# Patient Record
Sex: MALE | Race: BLACK OR AFRICAN AMERICAN | Employment: UNEMPLOYED | URBAN - METROPOLITAN AREA
[De-identification: names, ages, dates, MRNs, and addresses within clinical notes are randomized per-mention and may not be internally consistent; named-entity substitution may affect disease eponyms.]

---

## 2017-05-25 ENCOUNTER — OFFICE VISIT (OUTPATIENT)
Dept: INTERNAL MEDICINE CLINIC | Age: 62
End: 2017-05-25

## 2017-05-25 VITALS
OXYGEN SATURATION: 98 % | WEIGHT: 182 LBS | TEMPERATURE: 98.5 F | BODY MASS INDEX: 26.05 KG/M2 | DIASTOLIC BLOOD PRESSURE: 80 MMHG | SYSTOLIC BLOOD PRESSURE: 142 MMHG | HEIGHT: 70 IN | HEART RATE: 56 BPM

## 2017-05-25 DIAGNOSIS — I10 ESSENTIAL HYPERTENSION: ICD-10-CM

## 2017-05-25 DIAGNOSIS — G89.29 CHRONIC PAIN OF BOTH SHOULDERS: Primary | ICD-10-CM

## 2017-05-25 DIAGNOSIS — M54.50 CHRONIC MIDLINE LOW BACK PAIN WITHOUT SCIATICA: ICD-10-CM

## 2017-05-25 DIAGNOSIS — M25.512 CHRONIC PAIN OF BOTH SHOULDERS: Primary | ICD-10-CM

## 2017-05-25 DIAGNOSIS — Z12.11 ENCOUNTER FOR SCREENING COLONOSCOPY: ICD-10-CM

## 2017-05-25 DIAGNOSIS — M25.511 CHRONIC PAIN OF BOTH SHOULDERS: Primary | ICD-10-CM

## 2017-05-25 DIAGNOSIS — Z12.5 PROSTATE CANCER SCREENING: ICD-10-CM

## 2017-05-25 DIAGNOSIS — G89.29 CHRONIC MIDLINE LOW BACK PAIN WITHOUT SCIATICA: ICD-10-CM

## 2017-05-25 LAB
A/G RATIO: 1.7 (ref 1.1–2.2)
ALBUMIN SERPL-MCNC: 4.4 G/DL (ref 3.4–5)
ALP BLD-CCNC: 85 U/L (ref 40–129)
ALT SERPL-CCNC: 17 U/L (ref 10–40)
ANION GAP SERPL CALCULATED.3IONS-SCNC: 16 MMOL/L (ref 3–16)
AST SERPL-CCNC: 16 U/L (ref 15–37)
BILIRUB SERPL-MCNC: 0.6 MG/DL (ref 0–1)
BILIRUBIN URINE: NEGATIVE
BLOOD, URINE: NEGATIVE
BUN BLDV-MCNC: 12 MG/DL (ref 7–20)
CALCIUM SERPL-MCNC: 9.3 MG/DL (ref 8.3–10.6)
CHLORIDE BLD-SCNC: 99 MMOL/L (ref 99–110)
CHOLESTEROL, TOTAL: 197 MG/DL (ref 0–199)
CLARITY: CLEAR
CO2: 26 MMOL/L (ref 21–32)
COLOR: ABNORMAL
CREAT SERPL-MCNC: 0.8 MG/DL (ref 0.8–1.3)
EPITHELIAL CELLS, UA: 1 /HPF (ref 0–5)
GFR AFRICAN AMERICAN: >60
GFR NON-AFRICAN AMERICAN: >60
GLOBULIN: 2.6 G/DL
GLUCOSE BLD-MCNC: 84 MG/DL (ref 70–99)
GLUCOSE URINE: NEGATIVE MG/DL
HDLC SERPL-MCNC: 42 MG/DL (ref 40–60)
HYALINE CASTS: 1 /LPF (ref 0–8)
KETONES, URINE: NEGATIVE MG/DL
LDL CHOLESTEROL CALCULATED: ABNORMAL MG/DL
LDL CHOLESTEROL DIRECT: 113 MG/DL
LEUKOCYTE ESTERASE, URINE: ABNORMAL
MICROSCOPIC EXAMINATION: YES
NITRITE, URINE: NEGATIVE
PH UA: 6.5
POTASSIUM SERPL-SCNC: 4.2 MMOL/L (ref 3.5–5.1)
PROSTATE SPECIFIC ANTIGEN: 2.33 NG/ML (ref 0–4)
PROTEIN UA: ABNORMAL MG/DL
RBC UA: 5 /HPF (ref 0–4)
SODIUM BLD-SCNC: 141 MMOL/L (ref 136–145)
SPECIFIC GRAVITY UA: 1.02
TOTAL PROTEIN: 7 G/DL (ref 6.4–8.2)
TRIGL SERPL-MCNC: 320 MG/DL (ref 0–150)
URINE TYPE: ABNORMAL
UROBILINOGEN, URINE: 1 E.U./DL
VLDLC SERPL CALC-MCNC: ABNORMAL MG/DL
WBC UA: 4 /HPF (ref 0–5)

## 2017-05-25 PROCEDURE — 99213 OFFICE O/P EST LOW 20 MIN: CPT | Performed by: INTERNAL MEDICINE

## 2017-05-25 RX ORDER — AMLODIPINE BESYLATE 5 MG/1
5 TABLET ORAL DAILY
Qty: 30 TABLET | Refills: 5 | Status: SHIPPED | OUTPATIENT
Start: 2017-05-25 | End: 2017-12-04 | Stop reason: SDUPTHER

## 2017-05-25 ASSESSMENT — ENCOUNTER SYMPTOMS
GASTROINTESTINAL NEGATIVE: 1
EYES NEGATIVE: 1
RESPIRATORY NEGATIVE: 1

## 2017-05-25 ASSESSMENT — PATIENT HEALTH QUESTIONNAIRE - PHQ9
SUM OF ALL RESPONSES TO PHQ QUESTIONS 1-9: 0
1. LITTLE INTEREST OR PLEASURE IN DOING THINGS: 0
2. FEELING DOWN, DEPRESSED OR HOPELESS: 0
SUM OF ALL RESPONSES TO PHQ9 QUESTIONS 1 & 2: 0

## 2017-12-04 DIAGNOSIS — I10 ESSENTIAL HYPERTENSION: ICD-10-CM

## 2017-12-04 RX ORDER — AMLODIPINE BESYLATE 5 MG/1
5 TABLET ORAL DAILY
Qty: 30 TABLET | Refills: 5 | Status: SHIPPED | OUTPATIENT
Start: 2017-12-04 | End: 2018-07-02 | Stop reason: SDUPTHER

## 2018-01-31 ENCOUNTER — OFFICE VISIT (OUTPATIENT)
Dept: INTERNAL MEDICINE CLINIC | Age: 63
End: 2018-01-31

## 2018-01-31 VITALS
DIASTOLIC BLOOD PRESSURE: 84 MMHG | WEIGHT: 172 LBS | BODY MASS INDEX: 24.62 KG/M2 | OXYGEN SATURATION: 98 % | HEART RATE: 59 BPM | SYSTOLIC BLOOD PRESSURE: 130 MMHG | HEIGHT: 70 IN

## 2018-01-31 DIAGNOSIS — Z23 NEED FOR PROPHYLACTIC VACCINATION AGAINST DIPHTHERIA-TETANUS-PERTUSSIS (DTP): ICD-10-CM

## 2018-01-31 DIAGNOSIS — Z12.11 ENCOUNTER FOR SCREENING COLONOSCOPY: Primary | ICD-10-CM

## 2018-01-31 DIAGNOSIS — Z23 NEED FOR PROPHYLACTIC VACCINATION AGAINST STREPTOCOCCUS PNEUMONIAE (PNEUMOCOCCUS): ICD-10-CM

## 2018-01-31 DIAGNOSIS — Z23 NEED FOR PROPHYLACTIC VACCINATION AND INOCULATION AGAINST VARICELLA: ICD-10-CM

## 2018-01-31 PROCEDURE — 3017F COLORECTAL CA SCREEN DOC REV: CPT | Performed by: INTERNAL MEDICINE

## 2018-01-31 PROCEDURE — 90670 PCV13 VACCINE IM: CPT | Performed by: INTERNAL MEDICINE

## 2018-01-31 PROCEDURE — 4004F PT TOBACCO SCREEN RCVD TLK: CPT | Performed by: INTERNAL MEDICINE

## 2018-01-31 PROCEDURE — G8420 CALC BMI NORM PARAMETERS: HCPCS | Performed by: INTERNAL MEDICINE

## 2018-01-31 PROCEDURE — G8482 FLU IMMUNIZE ORDER/ADMIN: HCPCS | Performed by: INTERNAL MEDICINE

## 2018-01-31 PROCEDURE — 90471 IMMUNIZATION ADMIN: CPT | Performed by: INTERNAL MEDICINE

## 2018-01-31 PROCEDURE — G8427 DOCREV CUR MEDS BY ELIG CLIN: HCPCS | Performed by: INTERNAL MEDICINE

## 2018-01-31 PROCEDURE — 99213 OFFICE O/P EST LOW 20 MIN: CPT | Performed by: INTERNAL MEDICINE

## 2018-01-31 ASSESSMENT — ENCOUNTER SYMPTOMS
EYES NEGATIVE: 1
RESPIRATORY NEGATIVE: 1
GASTROINTESTINAL NEGATIVE: 1

## 2018-05-01 ENCOUNTER — OFFICE VISIT (OUTPATIENT)
Dept: INTERNAL MEDICINE CLINIC | Age: 63
End: 2018-05-01

## 2018-05-01 VITALS
HEART RATE: 60 BPM | DIASTOLIC BLOOD PRESSURE: 62 MMHG | SYSTOLIC BLOOD PRESSURE: 120 MMHG | WEIGHT: 178 LBS | OXYGEN SATURATION: 98 % | HEIGHT: 70 IN | BODY MASS INDEX: 25.48 KG/M2

## 2018-05-01 DIAGNOSIS — I10 ESSENTIAL HYPERTENSION: ICD-10-CM

## 2018-05-01 DIAGNOSIS — Z23 NEED FOR PROPHYLACTIC VACCINATION AGAINST STREPTOCOCCUS PNEUMONIAE (PNEUMOCOCCUS): Primary | ICD-10-CM

## 2018-05-01 DIAGNOSIS — M54.50 CHRONIC MIDLINE LOW BACK PAIN WITHOUT SCIATICA: ICD-10-CM

## 2018-05-01 DIAGNOSIS — G89.29 CHRONIC MIDLINE LOW BACK PAIN WITHOUT SCIATICA: ICD-10-CM

## 2018-05-01 DIAGNOSIS — M25.551 PAIN OF RIGHT HIP JOINT: ICD-10-CM

## 2018-05-01 PROCEDURE — 4004F PT TOBACCO SCREEN RCVD TLK: CPT | Performed by: INTERNAL MEDICINE

## 2018-05-01 PROCEDURE — 99214 OFFICE O/P EST MOD 30 MIN: CPT | Performed by: INTERNAL MEDICINE

## 2018-05-01 PROCEDURE — 96372 THER/PROPH/DIAG INJ SC/IM: CPT | Performed by: INTERNAL MEDICINE

## 2018-05-01 PROCEDURE — 90732 PPSV23 VACC 2 YRS+ SUBQ/IM: CPT | Performed by: INTERNAL MEDICINE

## 2018-05-01 PROCEDURE — 90471 IMMUNIZATION ADMIN: CPT | Performed by: INTERNAL MEDICINE

## 2018-05-01 PROCEDURE — 3017F COLORECTAL CA SCREEN DOC REV: CPT | Performed by: INTERNAL MEDICINE

## 2018-05-01 PROCEDURE — G8419 CALC BMI OUT NRM PARAM NOF/U: HCPCS | Performed by: INTERNAL MEDICINE

## 2018-05-01 PROCEDURE — G8427 DOCREV CUR MEDS BY ELIG CLIN: HCPCS | Performed by: INTERNAL MEDICINE

## 2018-05-01 RX ORDER — TRIAMCINOLONE ACETONIDE 40 MG/ML
40 INJECTION, SUSPENSION INTRA-ARTICULAR; INTRAMUSCULAR ONCE
Status: COMPLETED | OUTPATIENT
Start: 2018-05-01 | End: 2018-05-01

## 2018-05-01 RX ORDER — IBUPROFEN 600 MG/1
600 TABLET ORAL EVERY 6 HOURS PRN
Qty: 60 TABLET | Refills: 3 | Status: SHIPPED | OUTPATIENT
Start: 2018-05-01 | End: 2018-11-14 | Stop reason: SDUPTHER

## 2018-05-01 RX ADMIN — TRIAMCINOLONE ACETONIDE 40 MG: 40 INJECTION, SUSPENSION INTRA-ARTICULAR; INTRAMUSCULAR at 12:22

## 2018-05-01 ASSESSMENT — ENCOUNTER SYMPTOMS
GASTROINTESTINAL NEGATIVE: 1
EYES NEGATIVE: 1
BACK PAIN: 1
RESPIRATORY NEGATIVE: 1

## 2018-05-02 ENCOUNTER — TELEPHONE (OUTPATIENT)
Dept: INTERNAL MEDICINE CLINIC | Age: 63
End: 2018-05-02

## 2018-05-02 RX ORDER — IBUPROFEN 800 MG/1
800 TABLET ORAL EVERY 6 HOURS PRN
Qty: 120 TABLET | Refills: 3 | Status: SHIPPED | OUTPATIENT
Start: 2018-05-02 | End: 2018-11-14 | Stop reason: DRUGHIGH

## 2018-07-02 DIAGNOSIS — I10 ESSENTIAL HYPERTENSION: ICD-10-CM

## 2018-07-03 RX ORDER — AMLODIPINE BESYLATE 5 MG/1
5 TABLET ORAL DAILY
Qty: 30 TABLET | Refills: 5 | Status: SHIPPED | OUTPATIENT
Start: 2018-07-03 | End: 2018-11-14 | Stop reason: SDUPTHER

## 2018-09-10 ENCOUNTER — TELEPHONE (OUTPATIENT)
Dept: INTERNAL MEDICINE CLINIC | Age: 63
End: 2018-09-10

## 2018-09-26 ENCOUNTER — TELEPHONE (OUTPATIENT)
Dept: INTERNAL MEDICINE CLINIC | Age: 63
End: 2018-09-26

## 2018-10-19 ENCOUNTER — TELEPHONE (OUTPATIENT)
Dept: INTERNAL MEDICINE CLINIC | Age: 63
End: 2018-10-19

## 2018-10-19 NOTE — TELEPHONE ENCOUNTER
Patient was prescribed  xarelto in hospital due to blood clots in leg and heart needing medication xraelto 15/15/20mg says he was given this in packs while in the hospital and is all out of medication. Please advise.

## 2018-11-14 ENCOUNTER — OFFICE VISIT (OUTPATIENT)
Dept: INTERNAL MEDICINE CLINIC | Age: 63
End: 2018-11-14

## 2018-11-14 VITALS
HEIGHT: 68 IN | HEART RATE: 60 BPM | SYSTOLIC BLOOD PRESSURE: 112 MMHG | WEIGHT: 164.8 LBS | BODY MASS INDEX: 24.98 KG/M2 | TEMPERATURE: 98.1 F | DIASTOLIC BLOOD PRESSURE: 80 MMHG

## 2018-11-14 DIAGNOSIS — Z76.0 MEDICATION REFILL: ICD-10-CM

## 2018-11-14 DIAGNOSIS — I26.99 OTHER PULMONARY EMBOLISM WITHOUT ACUTE COR PULMONALE, UNSPECIFIED CHRONICITY (HCC): ICD-10-CM

## 2018-11-14 DIAGNOSIS — F17.210 CIGARETTE NICOTINE DEPENDENCE WITHOUT COMPLICATION: ICD-10-CM

## 2018-11-14 DIAGNOSIS — M25.511 CHRONIC PAIN OF BOTH SHOULDERS: ICD-10-CM

## 2018-11-14 DIAGNOSIS — M25.512 CHRONIC PAIN OF BOTH SHOULDERS: ICD-10-CM

## 2018-11-14 DIAGNOSIS — I10 ESSENTIAL HYPERTENSION: Primary | ICD-10-CM

## 2018-11-14 DIAGNOSIS — Z71.3 DIETARY COUNSELING: ICD-10-CM

## 2018-11-14 DIAGNOSIS — Z23 NEEDS FLU SHOT: ICD-10-CM

## 2018-11-14 DIAGNOSIS — G89.29 CHRONIC PAIN OF BOTH SHOULDERS: ICD-10-CM

## 2018-11-14 PROCEDURE — 90471 IMMUNIZATION ADMIN: CPT | Performed by: NURSE PRACTITIONER

## 2018-11-14 PROCEDURE — 99213 OFFICE O/P EST LOW 20 MIN: CPT | Performed by: NURSE PRACTITIONER

## 2018-11-14 PROCEDURE — 90686 IIV4 VACC NO PRSV 0.5 ML IM: CPT | Performed by: NURSE PRACTITIONER

## 2018-11-14 RX ORDER — IBUPROFEN 600 MG/1
600 TABLET ORAL EVERY 6 HOURS PRN
Qty: 60 TABLET | Refills: 3 | Status: SHIPPED | OUTPATIENT
Start: 2018-11-14 | End: 2019-09-18 | Stop reason: ALTCHOICE

## 2018-11-14 RX ORDER — AMLODIPINE BESYLATE 5 MG/1
5 TABLET ORAL DAILY
Qty: 30 TABLET | Refills: 5 | Status: SHIPPED | OUTPATIENT
Start: 2018-11-14 | End: 2019-09-18 | Stop reason: SDUPTHER

## 2018-11-14 ASSESSMENT — PATIENT HEALTH QUESTIONNAIRE - PHQ9
SUM OF ALL RESPONSES TO PHQ QUESTIONS 1-9: 0
SUM OF ALL RESPONSES TO PHQ9 QUESTIONS 1 & 2: 0
1. LITTLE INTEREST OR PLEASURE IN DOING THINGS: 0
2. FEELING DOWN, DEPRESSED OR HOPELESS: 0
SUM OF ALL RESPONSES TO PHQ QUESTIONS 1-9: 0

## 2018-11-14 ASSESSMENT — ENCOUNTER SYMPTOMS: RESPIRATORY NEGATIVE: 1

## 2018-11-14 NOTE — PROGRESS NOTES
Vaccine Information Sheet, \"Influenza - Inactivated\"  given to Elin Angelucci, or parent/legal guardian of  Elin Angelucci and verbalized understanding. Patient responses:    Have you ever had a reaction to a flu vaccine? NO  Are you able to eat eggs without adverse effects? YES    Do you have any current illness? NO  Have you ever had Guillian [place] Syndrome? NO    Flu vaccine given per order. Please see immunization tab.

## 2018-11-14 NOTE — PATIENT INSTRUCTIONS
spinach, broccoli, carrots, cauliflower, and onions. ? Have a variety of cut-up vegetables with a low-fat dip as an appetizer instead of chips and dip. ? Sprinkle sunflower seeds or chopped almonds over salads. Or try adding chopped walnuts or almonds to cooked vegetables. ? Try some vegetarian meals using beans and peas. Add garbanzo or kidney beans to salads. Make burritos and tacos with mashed gill beans or black beans. Where can you learn more? Go to https://chpepiceweb.Tequila [email or web address] org and sign in to your Teradici account. Enter R235 in the [place] Robie box to learn more about \"DASH Diet: Care Instructions. \"     If you do not have an account, please click on the \"Sign Up Now\" link. Current as of: December 6, 2017  Content Version: 11.8  © 9403-1296 Fluidinfo. Care instructions adapted under license by [facility] ([facility]). If you have questions about a medical condition or this instruction, always ask your healthcare professional. [place] [address] any warranty or liability for your use of this information. Patient Education        High Blood Pressure: Care Instructions  Your Care Instructions    If your blood pressure is usually above 130/80, you have high blood pressure, or hypertension. That means the top number is 130 or higher or the bottom number is 80 or higher, or both. Despite what a lot of people think, high blood pressure usually doesn't cause headaches or make you feel dizzy or lightheaded. It usually has no symptoms. But it does increase your risk for heart attack, stroke, and kidney or eye damage. The higher your blood pressure, the more your risk increases. Your doctor will give you a goal for your blood pressure. Your goal will be based on your health and your age. Lifestyle changes, such as eating healthy and being active, are always important to help lower blood pressure.  You might also take medicine to reach your blood pressure goal.  Follow-up care is a key part of your treatment and safety. Be sure to make and go to all appointments, and call your doctor if you are having problems. It's also a good idea to know your test results and keep a list of the medicines you take. How can you care for yourself at home? Medical treatment  · If you stop taking your medicine, your blood pressure will go back up. You may take one or more types of medicine to lower your blood pressure. Be safe with medicines. Take your medicine exactly as prescribed. Call your doctor if you think you are having a problem with your medicine. · Talk to your doctor before you start taking aspirin every day. Aspirin can help certain people lower their risk of a heart attack or stroke. But taking aspirin isn't right for everyone, because it can cause serious bleeding. · See your doctor regularly. You may need to see the doctor more often at first or until your blood pressure comes down. · If you are taking blood pressure medicine, talk to your doctor before you take decongestants or anti-inflammatory medicine, such as ibuprofen. Some of these medicines can raise blood pressure. · Learn how to check your blood pressure at home. Lifestyle changes  · Stay at a healthy weight. This is especially important if you put on weight around the waist. Losing even 10 pounds can help you lower your blood pressure. · If your doctor recommends it, get more exercise. Walking is a good choice. Bit by bit, increase the amount you walk every day. Try for at least 30 minutes on most days of the week. You also may want to swim, bike, or do other activities. · Avoid or limit alcohol. Talk to your doctor about whether you can drink any alcohol. · Try to limit how much sodium you eat to less than 2,300 milligrams (mg) a day. Your doctor may ask you to try to eat less than 1,500 mg a day.   · Eat plenty of fruits (such as bananas and oranges), vegetables, legumes, whole grains, and low-fat salt.  · Rinse canned vegetables, and cook them in fresh water. This removes some--but not all--of the salt. · Avoid water that is naturally high in sodium or that has been treated with water softeners, which add sodium. Call your local water company to find out the sodium content of your water supply. If you buy bottled water, read the label and choose a sodium-free brand. Avoid high-sodium foods  · Avoid eating:  ? Smoked, cured, salted, and canned meat, fish, and poultry. ? Ham, preston, hot dogs, and luncheon meats. ? Regular, hard, and processed cheese and regular peanut butter. ? Crackers with salted tops, and other salted snack foods such as pretzels, chips, and salted popcorn. ? Frozen prepared meals, unless labeled low-sodium. ? Canned and dried soups, broths, and bouillon, unless labeled sodium-free or low-sodium. ? Canned vegetables, unless labeled sodium-free or low-sodium. ? [place] fries, pizza, tacos, and other fast foods. ? Pickles, olives, ketchup, and other condiments, especially soy sauce, unless labeled sodium-free or low-sodium. Where can you learn more? Go to https://webtide.RaftOut. org and sign in to your Cirqle account. Enter V626 in the [facility] box to learn more about \"Low Sodium Diet (2,000 Milligram): Care Instructions. \"     If you do not have an account, please click on the \"Sign Up Now\" link. Current as of: March 29, 2018  Content Version: 11.8  © 9013-6669 weave energy. Care instructions adapted under license by [facility] ([facility]). If you have questions about a medical condition or this instruction, always ask your healthcare professional. [address] any warranty or liability for your use of this information. Patient Education        Stopping Smoking: Care Instructions  Your Care Instructions  Cigarette smokers crave the nicotine in cigarettes. Giving it up is much harder than simply changing a habit.  Your www.smokefree. gov to sign up for the [facility] program.  · Set a quit date. Pick your date carefully so that it is not right in the middle of a big deadline or stressful time. Once you quit, do not even take a puff. Get rid of all ashtrays and lighters after your last cigarette. Clean your house and your clothes so that they do not smell of smoke. · Learn how to be a nonsmoker. Think about ways you can avoid those things that make you reach for a cigarette. ? Avoid situations that put you at greatest risk for smoking. For some people, it is hard to have a drink with friends without smoking. For others, they might skip a coffee break with coworkers who smoke. ? Change your daily routine. Take a different route to work or eat a meal in a different place. · Cut down on stress. Calm yourself or release tension by doing an activity you enjoy, such as reading a book, taking a hot bath, or gardening. · Talk to your doctor or pharmacist about nicotine replacement therapy, which replaces the nicotine in your body. You still get nicotine but you do not use tobacco. Nicotine replacement products help you slowly reduce the amount of nicotine you need. These products come in several forms, many of them available over-the-counter:  ? Nicotine patches  ? Nicotine gum and lozenges  ? Nicotine inhaler  · Ask your doctor about bupropion (Wellbutrin) or varenicline (Chantix), which are prescription medicines. They do not contain nicotine. They help you by reducing withdrawal symptoms, such as stress and anxiety. · Some people find hypnosis, acupuncture, and massage helpful for ending the smoking habit. · Eat a healthy diet and get regular exercise. Having healthy habits will help your body move past its craving for nicotine. · Be prepared to keep trying. Most people are not successful the first few times they try to quit. Do not get mad at yourself if you smoke again.  Make a list of things you learned and think about when

## 2019-01-16 ENCOUNTER — OFFICE VISIT (OUTPATIENT)
Dept: INTERNAL MEDICINE CLINIC | Age: 64
End: 2019-01-16

## 2019-01-16 VITALS
DIASTOLIC BLOOD PRESSURE: 84 MMHG | BODY MASS INDEX: 25.46 KG/M2 | WEIGHT: 168 LBS | SYSTOLIC BLOOD PRESSURE: 138 MMHG | HEART RATE: 58 BPM | HEIGHT: 68 IN | OXYGEN SATURATION: 99 %

## 2019-01-16 DIAGNOSIS — Z12.5 PROSTATE CANCER SCREENING: ICD-10-CM

## 2019-01-16 DIAGNOSIS — M25.511 CHRONIC PAIN OF BOTH SHOULDERS: Primary | ICD-10-CM

## 2019-01-16 DIAGNOSIS — G89.29 CHRONIC PAIN OF BOTH SHOULDERS: Primary | ICD-10-CM

## 2019-01-16 DIAGNOSIS — G89.29 CHRONIC MIDLINE LOW BACK PAIN WITHOUT SCIATICA: ICD-10-CM

## 2019-01-16 DIAGNOSIS — I10 ESSENTIAL HYPERTENSION: ICD-10-CM

## 2019-01-16 DIAGNOSIS — Z23 NEED FOR PROPHYLACTIC VACCINATION AGAINST DIPHTHERIA-TETANUS-PERTUSSIS (DTP): ICD-10-CM

## 2019-01-16 DIAGNOSIS — M54.50 CHRONIC MIDLINE LOW BACK PAIN WITHOUT SCIATICA: ICD-10-CM

## 2019-01-16 DIAGNOSIS — I26.99 OTHER PULMONARY EMBOLISM WITHOUT ACUTE COR PULMONALE, UNSPECIFIED CHRONICITY (HCC): ICD-10-CM

## 2019-01-16 DIAGNOSIS — M25.512 CHRONIC PAIN OF BOTH SHOULDERS: Primary | ICD-10-CM

## 2019-01-16 PROCEDURE — 99396 PREV VISIT EST AGE 40-64: CPT | Performed by: INTERNAL MEDICINE

## 2019-01-16 PROCEDURE — 96372 THER/PROPH/DIAG INJ SC/IM: CPT | Performed by: INTERNAL MEDICINE

## 2019-01-16 RX ORDER — KETOROLAC TROMETHAMINE 30 MG/ML
30 INJECTION, SOLUTION INTRAMUSCULAR; INTRAVENOUS ONCE
Status: COMPLETED | OUTPATIENT
Start: 2019-01-16 | End: 2019-01-16

## 2019-01-16 RX ADMIN — KETOROLAC TROMETHAMINE 30 MG: 30 INJECTION, SOLUTION INTRAMUSCULAR; INTRAVENOUS at 15:16

## 2019-01-16 RX ADMIN — KETOROLAC TROMETHAMINE 30 MG: 30 INJECTION, SOLUTION INTRAMUSCULAR; INTRAVENOUS at 15:15

## 2019-01-16 ASSESSMENT — ENCOUNTER SYMPTOMS
EYES NEGATIVE: 1
SHORTNESS OF BREATH: 1
GASTROINTESTINAL NEGATIVE: 1

## 2019-01-16 ASSESSMENT — PATIENT HEALTH QUESTIONNAIRE - PHQ9
SUM OF ALL RESPONSES TO PHQ9 QUESTIONS 1 & 2: 0
1. LITTLE INTEREST OR PLEASURE IN DOING THINGS: 0
SUM OF ALL RESPONSES TO PHQ QUESTIONS 1-9: 0
SUM OF ALL RESPONSES TO PHQ QUESTIONS 1-9: 0
2. FEELING DOWN, DEPRESSED OR HOPELESS: 0

## 2019-01-17 LAB
A/G RATIO: 1.4 (ref 1.1–2.2)
ALBUMIN SERPL-MCNC: 4.2 G/DL (ref 3.4–5)
ALP BLD-CCNC: 117 U/L (ref 40–129)
ALT SERPL-CCNC: 30 U/L (ref 10–40)
ANION GAP SERPL CALCULATED.3IONS-SCNC: 13 MMOL/L (ref 3–16)
AST SERPL-CCNC: 55 U/L (ref 15–37)
BILIRUB SERPL-MCNC: 0.5 MG/DL (ref 0–1)
BILIRUBIN URINE: NEGATIVE
BLOOD, URINE: NEGATIVE
BUN BLDV-MCNC: 12 MG/DL (ref 7–20)
CALCIUM SERPL-MCNC: 9.8 MG/DL (ref 8.3–10.6)
CHLORIDE BLD-SCNC: 100 MMOL/L (ref 99–110)
CHOLESTEROL, TOTAL: 204 MG/DL (ref 0–199)
CLARITY: CLEAR
CO2: 27 MMOL/L (ref 21–32)
COLOR: YELLOW
CREAT SERPL-MCNC: 0.8 MG/DL (ref 0.8–1.3)
GFR AFRICAN AMERICAN: >60
GFR NON-AFRICAN AMERICAN: >60
GLOBULIN: 3.1 G/DL
GLUCOSE BLD-MCNC: 89 MG/DL (ref 70–99)
GLUCOSE URINE: NEGATIVE MG/DL
HDLC SERPL-MCNC: 49 MG/DL (ref 40–60)
KETONES, URINE: NEGATIVE MG/DL
LDL CHOLESTEROL CALCULATED: 109 MG/DL
LEUKOCYTE ESTERASE, URINE: NEGATIVE
MICROSCOPIC EXAMINATION: NORMAL
NITRITE, URINE: NEGATIVE
PH UA: 5.5
POTASSIUM SERPL-SCNC: 4.6 MMOL/L (ref 3.5–5.1)
PROSTATE SPECIFIC ANTIGEN: 1.44 NG/ML (ref 0–4)
PROTEIN UA: NEGATIVE MG/DL
SODIUM BLD-SCNC: 140 MMOL/L (ref 136–145)
SPECIFIC GRAVITY UA: 1.02
TOTAL PROTEIN: 7.3 G/DL (ref 6.4–8.2)
TRIGL SERPL-MCNC: 230 MG/DL (ref 0–150)
URINE TYPE: NORMAL
UROBILINOGEN, URINE: 0.2 E.U./DL
VLDLC SERPL CALC-MCNC: 46 MG/DL

## 2019-02-28 ENCOUNTER — TELEPHONE (OUTPATIENT)
Dept: INTERNAL MEDICINE CLINIC | Age: 64
End: 2019-02-28

## 2019-07-26 ENCOUNTER — HOSPITAL ENCOUNTER (OUTPATIENT)
Age: 64
Setting detail: OUTPATIENT SURGERY
Discharge: HOME | End: 2019-07-26
Attending: INTERNAL MEDICINE | Admitting: INTERNAL MEDICINE
Payer: COMMERCIAL

## 2019-07-26 VITALS
WEIGHT: 168 LBS | OXYGEN SATURATION: 98 % | TEMPERATURE: 97.2 F | RESPIRATION RATE: 16 BRPM | HEIGHT: 70 IN | DIASTOLIC BLOOD PRESSURE: 74 MMHG | BODY MASS INDEX: 24.05 KG/M2 | HEART RATE: 48 BPM | SYSTOLIC BLOOD PRESSURE: 137 MMHG

## 2019-07-26 PROCEDURE — 7100000010 HC PHASE II RECOVERY - FIRST 15 MIN: Performed by: INTERNAL MEDICINE

## 2019-07-26 PROCEDURE — 3609027000 HC COLONOSCOPY: Performed by: INTERNAL MEDICINE

## 2019-07-26 PROCEDURE — 2500000003 HC RX 250 WO HCPCS: Performed by: INTERNAL MEDICINE

## 2019-07-26 PROCEDURE — 99152 MOD SED SAME PHYS/QHP 5/>YRS: CPT | Performed by: INTERNAL MEDICINE

## 2019-07-26 PROCEDURE — 6360000002 HC RX W HCPCS: Performed by: INTERNAL MEDICINE

## 2019-07-26 PROCEDURE — 7100000011 HC PHASE II RECOVERY - ADDTL 15 MIN: Performed by: INTERNAL MEDICINE

## 2019-07-26 RX ORDER — MIDAZOLAM HYDROCHLORIDE 1 MG/ML
INJECTION INTRAMUSCULAR; INTRAVENOUS PRN
Status: DISCONTINUED | OUTPATIENT
Start: 2019-07-26 | End: 2019-07-26 | Stop reason: ALTCHOICE

## 2019-07-26 RX ORDER — MEPERIDINE HYDROCHLORIDE 50 MG/ML
INJECTION INTRAMUSCULAR; INTRAVENOUS; SUBCUTANEOUS PRN
Status: DISCONTINUED | OUTPATIENT
Start: 2019-07-26 | End: 2019-07-26 | Stop reason: ALTCHOICE

## 2019-07-26 ASSESSMENT — PAIN - FUNCTIONAL ASSESSMENT
PAIN_FUNCTIONAL_ASSESSMENT: 0-10

## 2019-07-26 ASSESSMENT — PAIN SCALES - WONG BAKER
WONGBAKER_NUMERICALRESPONSE: 0

## 2019-07-26 NOTE — PROGRESS NOTES
Jessica Morris is a 58 y.o. male patient. No current facility-administered medications for this encounter. No Known Allergies  Active Problems:    * No active hospital problems. *  Resolved Problems:    * No resolved hospital problems. *    Blood pressure (!) 143/84, pulse 55, temperature 97.2 °F (36.2 °C), temperature source Oral, resp. rate 16, height 5' 10\" (1.778 m), weight 168 lb (76.2 kg), SpO2 100 %. Subjective:  Symptoms:  Stable. Diet:  Adequate intake. Activity level: Normal.    Pain:  He reports no pain. Objective:  General Appearance:  Comfortable, well-appearing and in no acute distress. Vital signs: (most recent): Blood pressure (!) 143/84, pulse 55, temperature 97.2 °F (36.2 °C), temperature source Oral, resp. rate 16, height 5' 10\" (1.778 m), weight 168 lb (76.2 kg), SpO2 100 %. Vital signs are normal.    Output: Producing urine and producing stool. Lungs:  Normal effort. Heart: Bradycardia. Abdomen: Abdomen is soft. There is no abdominal tenderness. Extremities: Normal range of motion. Neurological: Patient is alert and oriented to person, place and time. Skin:  Warm and dry.       Assessment & Plan    Jigna Donovan RN  7/26/2019

## 2019-08-30 ENCOUNTER — HOSPITAL ENCOUNTER (OUTPATIENT)
Age: 64
Setting detail: OUTPATIENT SURGERY
Discharge: HOME | End: 2019-08-30
Attending: INTERNAL MEDICINE | Admitting: INTERNAL MEDICINE
Payer: COMMERCIAL

## 2019-08-30 VITALS
SYSTOLIC BLOOD PRESSURE: 150 MMHG | OXYGEN SATURATION: 96 % | BODY MASS INDEX: 24.62 KG/M2 | HEART RATE: 57 BPM | DIASTOLIC BLOOD PRESSURE: 76 MMHG | TEMPERATURE: 99 F | HEIGHT: 70 IN | RESPIRATION RATE: 18 BRPM | WEIGHT: 172 LBS

## 2019-08-30 PROCEDURE — 7100000011 HC PHASE II RECOVERY - ADDTL 15 MIN: Performed by: INTERNAL MEDICINE

## 2019-08-30 PROCEDURE — 3609010300 HC COLONOSCOPY W/BIOPSY SINGLE/MULTIPLE: Performed by: INTERNAL MEDICINE

## 2019-08-30 PROCEDURE — 7100000010 HC PHASE II RECOVERY - FIRST 15 MIN: Performed by: INTERNAL MEDICINE

## 2019-08-30 PROCEDURE — 88305 TISSUE EXAM BY PATHOLOGIST: CPT

## 2019-08-30 PROCEDURE — 2500000003 HC RX 250 WO HCPCS: Performed by: INTERNAL MEDICINE

## 2019-08-30 PROCEDURE — 6360000002 HC RX W HCPCS: Performed by: INTERNAL MEDICINE

## 2019-08-30 PROCEDURE — 2709999900 HC NON-CHARGEABLE SUPPLY: Performed by: INTERNAL MEDICINE

## 2019-08-30 PROCEDURE — 99152 MOD SED SAME PHYS/QHP 5/>YRS: CPT | Performed by: INTERNAL MEDICINE

## 2019-08-30 RX ORDER — MEPERIDINE HYDROCHLORIDE 50 MG/ML
INJECTION INTRAMUSCULAR; INTRAVENOUS; SUBCUTANEOUS PRN
Status: DISCONTINUED | OUTPATIENT
Start: 2019-08-30 | End: 2019-08-30 | Stop reason: ALTCHOICE

## 2019-08-30 RX ORDER — MIDAZOLAM HYDROCHLORIDE 1 MG/ML
INJECTION INTRAMUSCULAR; INTRAVENOUS PRN
Status: DISCONTINUED | OUTPATIENT
Start: 2019-08-30 | End: 2019-08-30 | Stop reason: ALTCHOICE

## 2019-08-30 RX ORDER — BISACODYL 5 MG
TABLET, DELAYED RELEASE (ENTERIC COATED) ORAL DAILY PRN
Status: ON HOLD | COMMUNITY
Start: 2019-08-29 | End: 2020-03-18

## 2019-08-30 ASSESSMENT — PAIN - FUNCTIONAL ASSESSMENT
PAIN_FUNCTIONAL_ASSESSMENT: FACES
PAIN_FUNCTIONAL_ASSESSMENT: 0-10

## 2019-08-30 ASSESSMENT — PAIN SCALES - WONG BAKER
WONGBAKER_NUMERICALRESPONSE: 0
WONGBAKER_NUMERICALRESPONSE: 0

## 2019-08-30 NOTE — H&P
no edema. Pulses normal    Pre-Procedure Assessment / Plan:  ASA 2 - Patient with mild systemic disease with no functional limitations  Mallampati Airway Assessment:  Mallampati Class I - (soft palate, fauces, uvula & anterior/posterior tonsillar pillars are visible)  Level of Sedation Plan: Moderate sedation  Post Procedure plan: Return to same level of care    I assessed the patient and find that the patient is in satisfactory condition to proceed with the planned procedure and sedation plan. I have explained the risk, benefits, and alternatives to the procedure. The patient understands and agrees to proceed.   Tonny Shultz  12:15 PM 8/30/2019

## 2019-09-18 ENCOUNTER — OFFICE VISIT (OUTPATIENT)
Dept: INTERNAL MEDICINE CLINIC | Age: 64
End: 2019-09-18
Payer: COMMERCIAL

## 2019-09-18 VITALS
DIASTOLIC BLOOD PRESSURE: 80 MMHG | TEMPERATURE: 97.5 F | BODY MASS INDEX: 24.67 KG/M2 | WEIGHT: 162.8 LBS | SYSTOLIC BLOOD PRESSURE: 120 MMHG | HEIGHT: 68 IN | OXYGEN SATURATION: 99 % | HEART RATE: 64 BPM

## 2019-09-18 DIAGNOSIS — Z23 NEED FOR IMMUNIZATION AGAINST INFLUENZA: ICD-10-CM

## 2019-09-18 DIAGNOSIS — I10 ESSENTIAL HYPERTENSION: Primary | ICD-10-CM

## 2019-09-18 DIAGNOSIS — M25.551 HIP PAIN, RIGHT: ICD-10-CM

## 2019-09-18 DIAGNOSIS — E78.2 MIXED HYPERLIPIDEMIA: ICD-10-CM

## 2019-09-18 DIAGNOSIS — F17.290 SMOKES CIGARS: ICD-10-CM

## 2019-09-18 DIAGNOSIS — I26.99 PULMONARY EMBOLISM, BILATERAL (HCC): ICD-10-CM

## 2019-09-18 DIAGNOSIS — F11.11 HISTORY OF HEROIN ABUSE (HCC): ICD-10-CM

## 2019-09-18 PROCEDURE — 99214 OFFICE O/P EST MOD 30 MIN: CPT | Performed by: INTERNAL MEDICINE

## 2019-09-18 PROCEDURE — 90471 IMMUNIZATION ADMIN: CPT | Performed by: INTERNAL MEDICINE

## 2019-09-18 PROCEDURE — 99406 BEHAV CHNG SMOKING 3-10 MIN: CPT | Performed by: INTERNAL MEDICINE

## 2019-09-18 PROCEDURE — 4004F PT TOBACCO SCREEN RCVD TLK: CPT | Performed by: INTERNAL MEDICINE

## 2019-09-18 PROCEDURE — 1123F ACP DISCUSS/DSCN MKR DOCD: CPT | Performed by: INTERNAL MEDICINE

## 2019-09-18 PROCEDURE — G8427 DOCREV CUR MEDS BY ELIG CLIN: HCPCS | Performed by: INTERNAL MEDICINE

## 2019-09-18 PROCEDURE — 4040F PNEUMOC VAC/ADMIN/RCVD: CPT | Performed by: INTERNAL MEDICINE

## 2019-09-18 PROCEDURE — G8420 CALC BMI NORM PARAMETERS: HCPCS | Performed by: INTERNAL MEDICINE

## 2019-09-18 PROCEDURE — 3017F COLORECTAL CA SCREEN DOC REV: CPT | Performed by: INTERNAL MEDICINE

## 2019-09-18 PROCEDURE — 90653 IIV ADJUVANT VACCINE IM: CPT | Performed by: INTERNAL MEDICINE

## 2019-09-18 RX ORDER — AMLODIPINE BESYLATE 5 MG/1
5 TABLET ORAL DAILY
Qty: 30 TABLET | Refills: 3 | Status: SHIPPED | OUTPATIENT
Start: 2019-09-18 | End: 2020-02-04

## 2019-09-18 RX ORDER — LIDOCAINE 50 MG/G
OINTMENT TOPICAL
Qty: 1 TUBE | Refills: 0 | Status: SHIPPED | OUTPATIENT
Start: 2019-09-18 | End: 2020-07-15

## 2019-10-15 ENCOUNTER — TELEPHONE (OUTPATIENT)
Dept: PAIN MANAGEMENT | Age: 64
End: 2019-10-15

## 2019-11-19 ENCOUNTER — TELEPHONE (OUTPATIENT)
Dept: INTERNAL MEDICINE CLINIC | Age: 64
End: 2019-11-19

## 2019-11-19 DIAGNOSIS — I26.99 PULMONARY EMBOLISM, BILATERAL (HCC): ICD-10-CM

## 2019-12-12 ENCOUNTER — OFFICE VISIT (OUTPATIENT)
Dept: INTERNAL MEDICINE CLINIC | Age: 64
End: 2019-12-12
Payer: COMMERCIAL

## 2019-12-12 VITALS
RESPIRATION RATE: 20 BRPM | WEIGHT: 168 LBS | BODY MASS INDEX: 24.05 KG/M2 | DIASTOLIC BLOOD PRESSURE: 62 MMHG | TEMPERATURE: 98.5 F | HEIGHT: 70 IN | SYSTOLIC BLOOD PRESSURE: 120 MMHG | HEART RATE: 82 BPM

## 2019-12-12 DIAGNOSIS — R21 RASH OF FACE: Primary | ICD-10-CM

## 2019-12-12 PROCEDURE — 99213 OFFICE O/P EST LOW 20 MIN: CPT | Performed by: INTERNAL MEDICINE

## 2019-12-12 ASSESSMENT — ENCOUNTER SYMPTOMS
GASTROINTESTINAL NEGATIVE: 1
EYES NEGATIVE: 1
RESPIRATORY NEGATIVE: 1

## 2020-02-26 ENCOUNTER — HOSPITAL ENCOUNTER (OUTPATIENT)
Age: 65
Setting detail: OUTPATIENT SURGERY
Discharge: HOME | End: 2020-02-26
Attending: INTERNAL MEDICINE | Admitting: INTERNAL MEDICINE
Payer: COMMERCIAL

## 2020-02-26 VITALS
DIASTOLIC BLOOD PRESSURE: 86 MMHG | TEMPERATURE: 97.5 F | BODY MASS INDEX: 23.34 KG/M2 | OXYGEN SATURATION: 100 % | RESPIRATION RATE: 16 BRPM | WEIGHT: 163 LBS | SYSTOLIC BLOOD PRESSURE: 153 MMHG | HEART RATE: 48 BPM | HEIGHT: 70 IN

## 2020-02-26 PROCEDURE — 7100000010 HC PHASE II RECOVERY - FIRST 15 MIN: Performed by: INTERNAL MEDICINE

## 2020-02-26 PROCEDURE — 6360000002 HC RX W HCPCS: Performed by: INTERNAL MEDICINE

## 2020-02-26 PROCEDURE — 7100000011 HC PHASE II RECOVERY - ADDTL 15 MIN: Performed by: INTERNAL MEDICINE

## 2020-02-26 PROCEDURE — 99152 MOD SED SAME PHYS/QHP 5/>YRS: CPT | Performed by: INTERNAL MEDICINE

## 2020-02-26 PROCEDURE — 2580000003 HC RX 258: Performed by: INTERNAL MEDICINE

## 2020-02-26 PROCEDURE — 3609017100 HC EGD: Performed by: INTERNAL MEDICINE

## 2020-02-26 RX ORDER — SODIUM CHLORIDE 9 MG/ML
INJECTION, SOLUTION INTRAVENOUS CONTINUOUS
Status: DISCONTINUED | OUTPATIENT
Start: 2020-02-26 | End: 2020-02-26 | Stop reason: HOSPADM

## 2020-02-26 RX ORDER — FENTANYL CITRATE 50 UG/ML
INJECTION, SOLUTION INTRAMUSCULAR; INTRAVENOUS PRN
Status: DISCONTINUED | OUTPATIENT
Start: 2020-02-26 | End: 2020-02-26 | Stop reason: ALTCHOICE

## 2020-02-26 RX ORDER — DEXTROSE AND SODIUM CHLORIDE 5; .9 G/100ML; G/100ML
INJECTION, SOLUTION INTRAVENOUS CONTINUOUS
Status: DISCONTINUED | OUTPATIENT
Start: 2020-02-26 | End: 2020-02-26 | Stop reason: HOSPADM

## 2020-02-26 RX ORDER — MIDAZOLAM HYDROCHLORIDE 1 MG/ML
INJECTION INTRAMUSCULAR; INTRAVENOUS PRN
Status: DISCONTINUED | OUTPATIENT
Start: 2020-02-26 | End: 2020-02-26 | Stop reason: ALTCHOICE

## 2020-02-26 RX ADMIN — DEXTROSE AND SODIUM CHLORIDE: 5; 900 INJECTION, SOLUTION INTRAVENOUS at 08:14

## 2020-02-26 ASSESSMENT — PAIN - FUNCTIONAL ASSESSMENT
PAIN_FUNCTIONAL_ASSESSMENT: 0-10

## 2020-02-26 ASSESSMENT — PAIN SCALES - GENERAL: PAINLEVEL_OUTOF10: 0

## 2020-02-26 NOTE — H&P
History and Physical / Pre-Sedation Assessment    Patient:  Rosana Mueller   :   1955     Intended Procedure:  egd    HPI: obscure gi bleed    Past Medical History:   has a past medical history of Anxiety, Back pain, DVT (deep venous thrombosis) ([facility] [address] History of heroin abuse ([facility] [address] Hypertension, and Pulmonary embolism ([facility] [address] Past Surgical History:   has a past surgical history that includes Elbow surgery (Right); Colonoscopy (N/A, 2019); and Colonoscopy (2019). Medications:  Prior to Admission medications    Medication Sig Start Date End Date Taking? Authorizing Provider   amLODIPine (NORVASC) 5 MG tablet TAKE 1 TABLET BY MOUTH EVERY DAY 20  Yes C Vernadine MD Lana   rivaroxaban (XARELTO) 20 MG TABS tablet Take 1 tablet by mouth daily (with breakfast) 19  Yes Anselmo Reyes MD   METHADONE HCL PO Take 76 mg by mouth daily   Yes Historical Provider, MD   lidocaine (XYLOCAINE) 5 % ointment Apply topically BID as needed. 19   Anselmo Reyes MD   [employer] GENTLE LAXATIVE 5 MG EC tablet Take by mouth daily as needed 19   Historical Provider, MD       Family History:  family history includes Cancer in his father; High Blood Pressure in his mother; Kidney Disease in his brother. Social History:   reports that he has been smoking cigars. He started smoking about 36 years ago. He has a 5.00 pack-year smoking history. He has never used smokeless tobacco. He reports current alcohol use. He reports that he does not use drugs. Allergies:  Patient has no known allergies. ROS:  twelve point system review was unremarkable except for above noted history. Nurses notes reviewed and agreed. Medications reviewed    Physical Exam:  Vital Signs: /75   Pulse 53   Temp 97.5 °F (36.4 °C) (Oral)   Resp 16   Ht 5' 10\" (1.778 m)   Wt 163 lb (73.9 kg)   SpO2 98%   BMI 23.39 kg/m²    Skin: normal  HEENT: normal  Neck: supple. No adenopathy. No thyromegaly. No JVD.

## 2020-02-27 NOTE — OP NOTE
[facility] [address]                                OPERATIVE REPORT    PATIENT NAME: Jayshree Chicas                     :        1955  MED REC NO:   [id number]                          ROOM:  ACCOUNT NO:   [de-identified]                           ADMIT DATE: 2020  PROVIDER:     Mason Rosales MD    DATE OF PROCEDURE:  2020    SURGEON:  Mason Rosales MD    INDICATION FOR PROCEDURE:  Occult GI bleed. DESCRIPTION OF PROCEDURE:  With the patient in the left lateral position  and after sedation with 50 mcg of fentanyl and 3 mg of Versed IV, the  Olympus video endoscope was introduced into the esophagus and advanced  towards the GE junction. Esophagus was normal.  Stomach was carefully  inspected. It was normal.  The duodenum was normal.  The scope was then  removed without complications. IMPRESSION:  Normal esophagogastroduodenoscopy.    ebl none        Randy Franklin MD    D: 2020 10:52:44       T: 2020 13:20:47     [email or web address]  Job#: [id number]     Doc#: [id number]    CC: MD KURT Grayson MD

## 2020-03-18 ENCOUNTER — HOSPITAL ENCOUNTER (OUTPATIENT)
Age: 65
Setting detail: OUTPATIENT SURGERY
Discharge: HOME | End: 2020-03-18
Attending: INTERNAL MEDICINE | Admitting: INTERNAL MEDICINE
Payer: COMMERCIAL

## 2020-03-18 VITALS
DIASTOLIC BLOOD PRESSURE: 77 MMHG | HEART RATE: 45 BPM | SYSTOLIC BLOOD PRESSURE: 121 MMHG | RESPIRATION RATE: 18 BRPM | OXYGEN SATURATION: 98 % | TEMPERATURE: 98.1 F | HEIGHT: 70 IN | WEIGHT: 169 LBS | BODY MASS INDEX: 24.2 KG/M2

## 2020-03-18 PROCEDURE — 99152 MOD SED SAME PHYS/QHP 5/>YRS: CPT | Performed by: INTERNAL MEDICINE

## 2020-03-18 PROCEDURE — 2709999900 HC NON-CHARGEABLE SUPPLY: Performed by: INTERNAL MEDICINE

## 2020-03-18 PROCEDURE — 7100000011 HC PHASE II RECOVERY - ADDTL 15 MIN: Performed by: INTERNAL MEDICINE

## 2020-03-18 PROCEDURE — 6360000002 HC RX W HCPCS: Performed by: INTERNAL MEDICINE

## 2020-03-18 PROCEDURE — 3609014000 HC ENTEROSCOPY BIOPSY: Performed by: INTERNAL MEDICINE

## 2020-03-18 PROCEDURE — 6370000000 HC RX 637 (ALT 250 FOR IP): Performed by: INTERNAL MEDICINE

## 2020-03-18 PROCEDURE — 7100000010 HC PHASE II RECOVERY - FIRST 15 MIN: Performed by: INTERNAL MEDICINE

## 2020-03-18 RX ORDER — MIDAZOLAM HYDROCHLORIDE 5 MG/ML
INJECTION INTRAMUSCULAR; INTRAVENOUS PRN
Status: DISCONTINUED | OUTPATIENT
Start: 2020-03-18 | End: 2020-03-18 | Stop reason: ALTCHOICE

## 2020-03-18 RX ORDER — FENTANYL CITRATE 50 UG/ML
INJECTION, SOLUTION INTRAMUSCULAR; INTRAVENOUS PRN
Status: DISCONTINUED | OUTPATIENT
Start: 2020-03-18 | End: 2020-03-18 | Stop reason: ALTCHOICE

## 2020-03-18 ASSESSMENT — PAIN - FUNCTIONAL ASSESSMENT
PAIN_FUNCTIONAL_ASSESSMENT: FACES
PAIN_FUNCTIONAL_ASSESSMENT: 0-10
PAIN_FUNCTIONAL_ASSESSMENT: FACES

## 2020-03-18 NOTE — OP NOTE
[facility]                                OPERATIVE REPORT    PATIENT NAME: Sara Hook                     :        1955  MED REC NO:   [id number]                          ROOM:  ACCOUNT NO:   [de-identified]                           ADMIT DATE: 2020  PROVIDER:     Aixa Hanson MD    DATE OF PROCEDURE:  2020    SURGEON:  Aixa Hanson MD    INDICATION FOR PROCEDURE:  Occult GI bleed. DESCRIPTION OF PROCEDURE:  With the patient in the left lateral position  and after 25 mcg of fentanyl and 4 mg of Versed IV, the Olympus video  endoscope was introduced into the esophagus and advanced toward the  stomach and further into small bowel. Small bowel was carefully  inspected and appeared normal.  The scope was then removed without  complication. IMPRESSION:  Normal push enteroscopy. ESTIMATED BLOOD LOSS:  None. Thank you Dr. Gareth Rich MD    D: 2020 12:08:16       T: 2020 12:43:14     [email or web address]  Job#: [id number]     Doc#: [id number]    CC:   Aixa Hanson MD

## 2020-03-18 NOTE — DISCHARGE SUMMARY
Pulmonary:Normal  Cardiac:Normal  Abdomen:Normal  MS: normal  Neuro: normal  Ext: no edema. Pulses normal    Pre-Procedure Assessment / Plan:  ASA 2 - Patient with mild systemic disease with no functional limitations  Mallampati Airway Assessment:  Mallampati Class I - (soft palate, fauces, uvula & anterior/posterior tonsillar pillars are visible)  Level of Sedation Plan: Moderate sedation  Post Procedure plan: Return to same level of care    I assessed the patient and find that the patient is in satisfactory condition to proceed with the planned procedure and sedation plan. I have explained the risk, benefits, and alternatives to the procedure. The patient understands and agrees to proceed.   Lenda Poll  11:41 AM 3/18/2020

## 2020-05-14 RX ORDER — RIVAROXABAN 20 MG/1
TABLET, FILM COATED ORAL
Qty: 90 TABLET | Refills: 1 | Status: SHIPPED | OUTPATIENT
Start: 2020-05-14 | End: 2020-06-25 | Stop reason: SDUPTHER

## 2020-07-15 ENCOUNTER — OFFICE VISIT (OUTPATIENT)
Dept: INTERNAL MEDICINE CLINIC | Age: 65
End: 2020-07-15
Payer: COMMERCIAL

## 2020-07-15 VITALS
OXYGEN SATURATION: 100 % | DIASTOLIC BLOOD PRESSURE: 89 MMHG | SYSTOLIC BLOOD PRESSURE: 170 MMHG | HEART RATE: 52 BPM | RESPIRATION RATE: 20 BRPM | BODY MASS INDEX: 22.82 KG/M2 | TEMPERATURE: 97.6 F | HEIGHT: 70 IN | WEIGHT: 159.4 LBS

## 2020-07-15 PROBLEM — L81.9 HYPERPIGMENTED SKIN LESION: Status: ACTIVE | Noted: 2020-07-15

## 2020-07-15 PROCEDURE — 99213 OFFICE O/P EST LOW 20 MIN: CPT | Performed by: STUDENT IN AN ORGANIZED HEALTH CARE EDUCATION/TRAINING PROGRAM

## 2020-07-15 ASSESSMENT — ENCOUNTER SYMPTOMS
SINUS PAIN: 0
COLOR CHANGE: 1
SHORTNESS OF BREATH: 0
SORE THROAT: 0
WHEEZING: 0
CONSTIPATION: 0
NAUSEA: 0
ABDOMINAL PAIN: 0
CHEST TIGHTNESS: 0
SINUS PRESSURE: 0
ABDOMINAL DISTENTION: 0
COUGH: 0
RHINORRHEA: 0
VOMITING: 0
DIARRHEA: 0

## 2020-07-15 ASSESSMENT — VISUAL ACUITY: OU: 1

## 2020-07-15 NOTE — PROGRESS NOTES
7/15/2020    Erasmo Hackett (:  1955) is a 71 y.o. male w/ PMH of DVT/PE (in 2018 on Xeralto) and HTN, here for evaluation of the following medical concerns:    HPI  Hyperpigmentation on his face - The rash/lesios are dark and flat and located on the cheek bones and bridge of nose. It appeared in  and is slowly getting getting bigger. There was no precipitating event. They do not itch, burn or scale. There is no loss of sensation in the area. Nothing is making it better. Referral was made to Derm in  but pt states he lost his mother and could not go to his appointment and when he called to reschedules, he was told he'd need another referral.     BP is elevated today to 170s/80s-90s. Pt states he did not take his Amlodipine today because he was in a hurry to leave home to go to a new job. He denies any symptoms. Review of Systems   Constitutional: Negative for chills, fatigue and fever. HENT: Negative for congestion, rhinorrhea, sinus pressure, sinus pain and sore throat. Eyes: Negative for visual disturbance. Respiratory: Negative for cough, chest tightness, shortness of breath and wheezing. Cardiovascular: Negative for chest pain, palpitations and leg swelling. Gastrointestinal: Negative for abdominal distention, abdominal pain, constipation, diarrhea, nausea and vomiting. Musculoskeletal: Negative for arthralgias and myalgias. Skin: Positive for color change and rash. Neurological: Negative for dizziness, syncope, weakness, light-headedness and headaches. Psychiatric/Behavioral: Negative for agitation and confusion. The patient is not nervous/anxious. Prior to Visit Medications    Medication Sig Taking?  Authorizing Provider   rivaroxaban (XARELTO) 20 MG TABS tablet TAKE 1 TABLET BY MOUTH EVERY DAY WITH BREAKFAST Yes Roni Raman MD   amLODIPine (NORVASC) 5 MG tablet TAKE 1 TABLET BY MOUTH EVERY DAY Yes SUZI Devi MD   METHADONE HCL PO Take 76 mg by mouth daily Yes Historical Provider, MD        No Known Allergies    Past Medical History:   Diagnosis Date    Anxiety 2010    Back pain 2012    DVT (deep venous thrombosis) (HCC)     [facility] of [facility] ([facility] [address]     Hypertension 10/25/2011    Pulmonary embolism ([facility] [address]     FRANCHESKA       Past Surgical History:   Procedure Laterality Date    COLONOSCOPY N/A 2019    COLONOSCOPY performed by Agnieszka Zamora MD at [facility]  2019    COLONOSCOPY WITH BIOPSY performed by Agnieszka Zamora MD at [address] [place] [address] Right     ENDOSCOPY, COLON, DIAGNOSTIC      UPPER GASTROINTESTINAL ENDOSCOPY N/A 2020    EGD DIAGNOSTIC ONLY performed by Agnieszka Zamora MD at [facility] N/A 3/18/2020    PUSH ENTEROSCOPY performed by Agnieszka Zamora MD at [address] [place] [address] Marital status:       Spouse name: Not on file    Number of children: Not on file    Years of education: Not on file    Highest education level: Not on file   Occupational History    Not on file   Social Needs    Financial resource strain: Not on file    Food insecurity     Worry: Not on file     Inability: Not on file    Transportation needs     Medical: Not on file     Non-medical: Not on file   Tobacco Use    Smoking status: Current Every Day Smoker     Packs/day: 0.25     Years: 20.00     Pack years: 5.00     Types: Cigars     Start date: 45     Last attempt to quit: 2019     Years since quittin.8    Smokeless tobacco: Never Used    Tobacco comment: 1 DAILY   Substance and Sexual Activity    Alcohol use: Yes     Comment: 1-2 X WEEKLY    Drug use: No    Sexual activity: Yes     Partners: Female   Lifestyle    Physical activity     Days per week: Not on file     Minutes per session: Not on file    Stress: Not on file   Relationships    Social connections     Talks on phone: Not on file     Gets together: Not on file     Attends Church service: Not on file     Active member of club or organization: Not on file     Attends meetings of clubs or organizations: Not on file     Relationship status: Not on file    Intimate partner violence     Fear of current or ex partner: Not on file     Emotionally abused: Not on file     Physically abused: Not on file     Forced sexual activity: Not on file   Other Topics Concern    Not on file   Social History Narrative    Not on file        Family History   Problem Relation Age of Onset    High Blood Pressure Mother     Cancer Father         ? BRAIN    Kidney Disease Brother         ESRD ON DIALYSIS       Vitals:    07/15/20 1520   BP: (!) 170/89   Site: Left Upper Arm   Position: Sitting   Cuff Size: Medium Adult   Pulse: 52   Resp: 20   Temp: 97.6 °F (36.4 °C)   TempSrc: Oral   SpO2: 100%   Weight: 159 lb 6.4 oz (72.3 kg)   Height: 5' 10\" (1.778 m)     Estimated body mass index is 22.87 kg/m² as calculated from the following:    Height as of this encounter: 5' 10\" (1.778 m). Weight as of this encounter: 159 lb 6.4 oz (72.3 kg). Physical Exam  Constitutional:       General: He is not in acute distress. Appearance: Normal appearance. He is not ill-appearing. HENT:      Head: Normocephalic and atraumatic. Eyes:      General: Vision grossly intact. Conjunctiva/sclera: Conjunctivae normal.   Neck:      Musculoskeletal: Full passive range of motion without pain, normal range of motion and neck supple. Cardiovascular:      Rate and Rhythm: Normal rate and regular rhythm. Pulses: Normal pulses. Heart sounds: Normal heart sounds, S1 normal and S2 normal. No murmur. No friction rub. No gallop. Pulmonary:      Effort: Pulmonary effort is normal. No respiratory distress. Breath sounds: Normal breath sounds and air entry. No wheezing or rales. Abdominal:      General: Bowel sounds are normal. There is no distension.       Palpations: Abdomen is soft.      Tenderness: There is no abdominal tenderness. Musculoskeletal: Normal range of motion. Right lower leg: No edema. Left lower leg: No edema. Skin:     Capillary Refill: Capillary refill takes less than 2 seconds. Coloration: Skin is not pale. Findings: Lesion (As described in HENT) present. Neurological:      Mental Status: He is alert and oriented to person, place, and time. Mental status is at baseline. Psychiatric:         Mood and Affect: Mood normal.         Speech: Speech normal.         Judgment: Judgment normal.         ASSESSMENT/PLAN:  1. Hyperpigmented skin lesion  - Ambulatory referral to Dermatology    2. Essential hypertension  - Cont taking Amlodipine 5mg daily. - Take your blood pressure at home once a day and keep a record of it. - Come back and see us in 1 month with your blood pressure records. We will talk about adjusting the medication then. Return in about 1 month (around 8/15/2020) for HTN. An  electronic signature was used to authenticate this note.     --Kimberly Mendoza MD on 7/15/2020 at 4:16 PM

## 2020-07-15 NOTE — PATIENT INSTRUCTIONS
- Call and make an appointment with Dr. Nivia Soto Dermatology. - Go to the lab to get your blood work done. Scripts are attached  - Take your blood pressure at home once a day and keep a record of it  - Continue taking all your other medication as prescribed  - Come back and see us in 1 month with your blood pressure records. We will talk about adjusting the medication then    Thank you.

## 2020-07-16 ENCOUNTER — TELEPHONE (OUTPATIENT)
Dept: DERMATOLOGY | Age: 65
End: 2020-07-16

## 2020-07-16 NOTE — TELEPHONE ENCOUNTER
The patient is being referred by [facility] pcp for dark patches on face, 3 areas, for a couple months. He would like to establish with Dr. Ravinder Hogan. May we schedule?      [phone]

## 2020-07-17 NOTE — TELEPHONE ENCOUNTER
Called pt to see if he could send pictures of spots, he does not know how and has no one to help him     Do you want to see him in the office

## 2020-07-20 DIAGNOSIS — I26.99 PULMONARY EMBOLISM, BILATERAL (HCC): ICD-10-CM

## 2020-07-20 DIAGNOSIS — I10 ESSENTIAL HYPERTENSION: ICD-10-CM

## 2020-07-20 DIAGNOSIS — E78.2 MIXED HYPERLIPIDEMIA: ICD-10-CM

## 2020-07-20 DIAGNOSIS — F11.11 HISTORY OF HEROIN ABUSE (HCC): ICD-10-CM

## 2020-07-20 LAB
A/G RATIO: 1.5 (ref 1.1–2.2)
ALBUMIN SERPL-MCNC: 4.2 G/DL (ref 3.4–5)
ALP BLD-CCNC: 100 U/L (ref 40–129)
ALT SERPL-CCNC: 20 U/L (ref 10–40)
AMPHETAMINE SCREEN, URINE: ABNORMAL
ANION GAP SERPL CALCULATED.3IONS-SCNC: 11 MMOL/L (ref 3–16)
AST SERPL-CCNC: 30 U/L (ref 15–37)
BARBITURATE SCREEN URINE: ABNORMAL
BASOPHILS ABSOLUTE: 0 K/UL (ref 0–0.2)
BASOPHILS RELATIVE PERCENT: 0.9 %
BENZODIAZEPINE SCREEN, URINE: ABNORMAL
BILIRUB SERPL-MCNC: 1.2 MG/DL (ref 0–1)
BUN BLDV-MCNC: 6 MG/DL (ref 7–20)
CALCIUM SERPL-MCNC: 9.5 MG/DL (ref 8.3–10.6)
CANNABINOID SCREEN URINE: POSITIVE
CHLORIDE BLD-SCNC: 104 MMOL/L (ref 99–110)
CHOLESTEROL, TOTAL: 174 MG/DL (ref 0–199)
CO2: 26 MMOL/L (ref 21–32)
COCAINE METABOLITE SCREEN URINE: ABNORMAL
CREAT SERPL-MCNC: 0.7 MG/DL (ref 0.8–1.3)
EOSINOPHILS ABSOLUTE: 0 K/UL (ref 0–0.6)
EOSINOPHILS RELATIVE PERCENT: 0.2 %
GFR AFRICAN AMERICAN: >60
GFR NON-AFRICAN AMERICAN: >60
GLOBULIN: 2.8 G/DL
GLUCOSE BLD-MCNC: 118 MG/DL (ref 70–99)
HCT VFR BLD CALC: 47.6 % (ref 40.5–52.5)
HDLC SERPL-MCNC: 51 MG/DL (ref 40–60)
HEMOGLOBIN: 15.7 G/DL (ref 13.5–17.5)
HEPATITIS C ANTIBODY INTERPRETATION: NORMAL
LDL CHOLESTEROL CALCULATED: 72 MG/DL
LYMPHOCYTES ABSOLUTE: 1.8 K/UL (ref 1–5.1)
LYMPHOCYTES RELATIVE PERCENT: 36.8 %
Lab: ABNORMAL
MCH RBC QN AUTO: 33.7 PG (ref 26–34)
MCHC RBC AUTO-ENTMCNC: 33 G/DL (ref 31–36)
MCV RBC AUTO: 102.1 FL (ref 80–100)
METHADONE SCREEN, URINE: POSITIVE
MONOCYTES ABSOLUTE: 0.4 K/UL (ref 0–1.3)
MONOCYTES RELATIVE PERCENT: 8.8 %
NEUTROPHILS ABSOLUTE: 2.5 K/UL (ref 1.7–7.7)
NEUTROPHILS RELATIVE PERCENT: 53.3 %
OPIATE SCREEN URINE: ABNORMAL
OXYCODONE URINE: ABNORMAL
PDW BLD-RTO: 14.3 % (ref 12.4–15.4)
PH UA: 6
PHENCYCLIDINE SCREEN URINE: ABNORMAL
PLATELET # BLD: 228 K/UL (ref 135–450)
PMV BLD AUTO: 8.1 FL (ref 5–10.5)
POTASSIUM SERPL-SCNC: 4.5 MMOL/L (ref 3.5–5.1)
PROPOXYPHENE SCREEN: ABNORMAL
RBC # BLD: 4.67 M/UL (ref 4.2–5.9)
SODIUM BLD-SCNC: 141 MMOL/L (ref 136–145)
TOTAL PROTEIN: 7 G/DL (ref 6.4–8.2)
TRIGL SERPL-MCNC: 253 MG/DL (ref 0–150)
TSH REFLEX: 1.22 UIU/ML (ref 0.27–4.2)
VITAMIN D 25-HYDROXY: 28.7 NG/ML
VLDLC SERPL CALC-MCNC: 51 MG/DL
WBC # BLD: 4.8 K/UL (ref 4–11)

## 2020-07-21 LAB
HIV AG/AB: NORMAL
HIV ANTIGEN: NORMAL
HIV-1 ANTIBODY: NORMAL
HIV-2 AB: NORMAL

## 2020-08-19 ENCOUNTER — OFFICE VISIT (OUTPATIENT)
Dept: INTERNAL MEDICINE CLINIC | Age: 65
End: 2020-08-19
Payer: COMMERCIAL

## 2020-08-19 VITALS
RESPIRATION RATE: 16 BRPM | SYSTOLIC BLOOD PRESSURE: 127 MMHG | DIASTOLIC BLOOD PRESSURE: 81 MMHG | HEART RATE: 78 BPM | HEIGHT: 70 IN | WEIGHT: 162 LBS | OXYGEN SATURATION: 97 % | BODY MASS INDEX: 23.19 KG/M2 | TEMPERATURE: 98.2 F

## 2020-08-19 PROBLEM — F11.11 HISTORY OF HEROIN ABUSE (HCC): Status: ACTIVE | Noted: 2020-08-19

## 2020-08-19 PROCEDURE — 99213 OFFICE O/P EST LOW 20 MIN: CPT | Performed by: STUDENT IN AN ORGANIZED HEALTH CARE EDUCATION/TRAINING PROGRAM

## 2020-08-19 PROCEDURE — 96372 THER/PROPH/DIAG INJ SC/IM: CPT

## 2020-08-19 PROCEDURE — 6360000002 HC RX W HCPCS: Performed by: STUDENT IN AN ORGANIZED HEALTH CARE EDUCATION/TRAINING PROGRAM

## 2020-08-19 RX ORDER — TRIAMCINOLONE ACETONIDE 40 MG/ML
40 INJECTION, SUSPENSION INTRA-ARTICULAR; INTRAMUSCULAR ONCE
Status: COMPLETED | OUTPATIENT
Start: 2020-08-19 | End: 2020-08-19

## 2020-08-19 RX ADMIN — TRIAMCINOLONE ACETONIDE 40 MG: 40 INJECTION, SUSPENSION INTRA-ARTICULAR; INTRAMUSCULAR at 15:15

## 2020-08-19 NOTE — PATIENT INSTRUCTIONS
Please get your left hip Xray this week. You can get it at any imaging center, or at [facility] when you are next in for testing. Come back to see us in three months.

## 2020-08-19 NOTE — PROGRESS NOTES
Outpatient Clinic Visit Note    Patient: Cande Lucas  : 1955 (2 y.o.)  Date: 2020    CC: Left Hip Pain, HTN Follow-up     HPI:      Mr. Esperanza Bonilla is a pleasant 69yo male here today for HTN folow up. Unfortunately he didn't bring his BP log. BP looks good on intake vitals. His facial hyperpigmented radsh has not progressed. He has an additional complaint today of severe left hip pain, worsened with any ambulation or leg crossing, and would like some assessment and treatment. He was referred for left hip X rays 3 views at his earliest convenience. We injected 10mg triamcinolone with xylocaine into the superficial musculature near the hip joint. We did not approach any nervous or vascular structures during our injection. He was ordered a tube of voltaren gel for his discretional use. Xarelto was refilled. Denies any fevers, chills, chest pain, palpitations, leg swelling, cough, shortness of breath, difficulty breathing, wheezing, abdominal pain, nausea, vomiting, diarrhea. Past Medical History:    Past Medical History:   Diagnosis Date    Anxiety 2010    Back pain 2012    DVT (deep venous thrombosis) (HCC)     History of heroin abuse ([facility] [address]     Hypertension 10/25/2011    Pulmonary embolism (HCC)     FRANCHESKA       Past Surgical History:  Past Surgical History:   Procedure Laterality Date    COLONOSCOPY N/A 2019    COLONOSCOPY performed by Cristiane Mueller MD at [facility]  2019    COLONOSCOPY WITH BIOPSY performed by Cristiane Mueller MD at [facility] Right     ENDOSCOPY, COLON, DIAGNOSTIC      UPPER GASTROINTESTINAL ENDOSCOPY N/A 2020    EGD DIAGNOSTIC ONLY performed by Cristiane Mueller MD at [address] [facility] N/A 3/18/2020    PUSH ENTEROSCOPY performed by Cristiane Mueller MD at [facility] Medications:  Has been reviewed and as documented.     Allergies:    Patient has no known allergies. Family History:       Problem Relation Age of Onset    High Blood Pressure Mother     Cancer Father         ? BRAIN    Kidney Disease Brother         ESRD ON DIALYSIS       ROS: A 10-organ Review Of Systems was obtained and otherwise unremarkable except as per HPI. Data: Old records have been reviewed electronically. PHYSICAL EXAM:  /81 (Site: Left Upper Arm, Position: Sitting, Cuff Size: Large Adult)   Pulse 78   Temp 98.2 °F (36.8 °C) (Oral)   Resp 16   Ht 5' 10\" (1.778 m)   Wt 162 lb (73.5 kg)   SpO2 97%   BMI 23.24 kg/m²   Physical Exam  Vitals signs and nursing note reviewed. Constitutional:       General: He is not in acute distress. Appearance: Normal appearance. He is not ill-appearing or toxic-appearing. HENT:      Head: Normocephalic and atraumatic. Right Ear: External ear normal.      Left Ear: External ear normal.      Nose: Nose normal.      Mouth/Throat:      Mouth: Mucous membranes are moist.      Pharynx: Oropharynx is clear. Eyes:      General: No scleral icterus. Conjunctiva/sclera: Conjunctivae normal.      Pupils: Pupils are equal, round, and reactive to light. Neck:      Musculoskeletal: Normal range of motion and neck supple. No neck rigidity. Cardiovascular:      Rate and Rhythm: Normal rate and regular rhythm. Pulses: Normal pulses. Heart sounds: No murmur. Pulmonary:      Effort: Pulmonary effort is normal. No respiratory distress. Breath sounds: Normal breath sounds. No stridor. No wheezing, rhonchi or rales. Abdominal:      General: Abdomen is flat. Bowel sounds are normal. There is no distension. Palpations: Abdomen is soft. Tenderness: There is no abdominal tenderness. There is no guarding or rebound. Musculoskeletal: Normal range of motion. General: No swelling. Comments: Severe decreased passive ROM in BL hips, more pronounced on left. Positive EAGLE on left.     Lymphadenopathy: Cervical: No cervical adenopathy. Skin:     General: Skin is warm and dry. Capillary Refill: Capillary refill takes less than 2 seconds. Coloration: Skin is not jaundiced or pale. Findings: No bruising. Neurological:      General: No focal deficit present. Mental Status: He is alert and oriented to person, place, and time. Cranial Nerves: No cranial nerve deficit. Sensory: No sensory deficit. Motor: No weakness. Gait: Gait normal.   Psychiatric:         Mood and Affect: Mood normal.         Behavior: Behavior normal.         Thought Content: Thought content normal.         Judgment: Judgment normal.         Assessment & Plan:      1. Pain of left hip joint  - 40mg Triamcinolone IM injection completed in office, with xylocaine  - XR HIP LEFT (2-3 VIEWS); Future  - diclofenac sodium (VOLTAREN) 1 % GEL; Apply 4 g topically 4 times daily  Dispense: 5 Tube; Refill: 0    2. Pulmonary embolism, bilateral (HCC)  - asymptomatic  - rivaroxaban (XARELTO) 20 MG TABS tablet; TAKE 1 TABLET BY MOUTH EVERY DAY WITH BREAKFAST  Dispense: 90 tablet; Refill: 1    3.  Hypertension  - BP looks great in office today  - no refills needed on Norvasc 5mg        Dispo: Pt has been staffed with Dr. Violeta Cruz  _______________  Eulogio Yun DO, 8/19/2020 3:24 PM   PGY-2

## 2020-08-20 RX ORDER — AMLODIPINE BESYLATE 5 MG/1
TABLET ORAL
Qty: 90 TABLET | Refills: 1 | Status: SHIPPED | OUTPATIENT
Start: 2020-08-20 | End: 2020-09-17

## 2020-08-21 ENCOUNTER — OFFICE VISIT (OUTPATIENT)
Dept: DERMATOLOGY | Age: 65
End: 2020-08-21
Payer: COMMERCIAL

## 2020-08-21 VITALS — TEMPERATURE: 97.9 F

## 2020-08-21 PROCEDURE — 11103 TANGNTL BX SKIN EA SEP/ADDL: CPT | Performed by: DERMATOLOGY

## 2020-08-21 PROCEDURE — 11104 PUNCH BX SKIN SINGLE LESION: CPT | Performed by: DERMATOLOGY

## 2020-08-21 PROCEDURE — 99203 OFFICE O/P NEW LOW 30 MIN: CPT | Performed by: DERMATOLOGY

## 2020-08-21 NOTE — PROGRESS NOTES
COLONOSCOPY performed by Grzegorz Pierson MD at [address] [facility]  8/30/2019    COLONOSCOPY WITH BIOPSY performed by Grzegorz Pierson MD at [address] [facility] Right     ENDOSCOPY, COLON, DIAGNOSTIC      UPPER GASTROINTESTINAL ENDOSCOPY N/A 2/26/2020    EGD DIAGNOSTIC ONLY performed by Grzegorz Pierson MD at [facility] N/A 3/18/2020    PUSH ENTEROSCOPY performed by Grzegorz Pierson MD at [facility] ENDOSCOPY       Past Family History:  Family History   Problem Relation Age of Onset    High Blood Pressure Mother     Cancer Father         ? BRAIN    Kidney Disease Brother         ESRD ON DIALYSIS     Patient denies  a family history of cutaneous malignancy  Patient denies  a family history of abnormal moles  Patient denies  a family history of melanoma. Allergies:  No Known Allergies    Current Medications:  Current Outpatient Medications   Medication Sig Dispense Refill    amLODIPine (NORVASC) 5 MG tablet TAKE 1 TABLET BY MOUTH EVERY DAY 90 tablet 1    rivaroxaban (XARELTO) 20 MG TABS tablet TAKE 1 TABLET BY MOUTH EVERY DAY WITH BREAKFAST 90 tablet 1    diclofenac sodium (VOLTAREN) 1 % GEL Apply 4 g topically 4 times daily 5 Tube 0     No current facility-administered medications for this visit. Review of Systems:  Constitutional: No fevers, chills or recent illness.  feels well   Skin: Skin:As per HPI AND otherwise no new, bleeding or symptomatic skin lesions      Objective:     Vitals:    08/21/20 1052   Temp: 97.9 °F (36.6 °C)   TempSrc: Temporal       Physical Examination:  General: alert, comfortable, no apparent distress, well-appearing  Psych: alert, oriented and pleasant  Neuro: oriented to person, place, and time  Skin: Areas examined: head including face, lips, conjunctiva and lids, neck, hair/scalp, chest, including breasts and axilla, abdomen, back, right upper extremity, left upper extremity, left hand, right hand and digits and nails      All areas examined were within normal limits except those listed below with the appropriate assessment and plan    Assessment and Plan (with relevant objective exam findings):     1. Rash and non-specific skin eruption  Location: cheeks, nasal dorsum and forehead  Objective findings: dark brown patches with some areas having atrophic appearance and scale. DDX: discoid hyperpigmented lupus versus pigmented contact dermatitis versus photosensitivity drug reaction versus melasma  Biopsy today. See procedure note below. The specimen was sent to pathology for diagnosis. We will call patient or send note on Mychart with biopsy results as soon as they are available, and discuss treatment options as needed. Wound care was discussed and written instructions also given to patient. 2. Neoplasm uncertain behavior, skin  Location: left conchal bowl  Objective findings: 7 mm dark brown hyperkeratotic scaly plaque  Ddx: SK verus discoid lupus  Biopsy today. See procedure note below. The specimen was sent to pathology for diagnosis. We will call patient or send note on Mychart with biopsy results as soon as they are available, and discuss treatment options as needed. Wound care was discussed and written instructions also given to patient. 3. Dermatosis Papulosa Nigra  Location: face  Objective Findings: scattered small brown to black stuck on papules    Discussed that these growths are benign, age or \"wisdom\" spots, related to sk's and are more common in pigmented skin. If they are irritated they can be treated medically with destructive methods such as liquid nitrogen, snip removal, or hyfrecation. Reassurance was provided        - Photoprotection was encouraged and written material on sunscreen provided. Follow up:  Return visit in 5 days for suture removal and 2 weeks for f/u or as needed for change in condition. All questions addressed.      Procedure:   Procedure: PUNCH BIOPSY (22054)  Location:  Left

## 2020-08-21 NOTE — PATIENT INSTRUCTIONS
Wound care instructions    1. Clean with soap and water twice daily  2. Apply Vaseline or Aquaphor ointment twice daily  3. Cover with small dressing or Band-Aid for 7-10 days. 1. Studies show that wounds heal better when covered with ointment and a bandage. 4. Please return in 7-10 days as instructed to have the stitches removed (unless otherwise discussed). FREQUENTLY ASKED QUESTIONS:  Toby Mendez It is OK to get the wound wet when washing or bathing.  If bleeding should occur, apply firm direct pressure for 20 minutes CONTINUOUSLY. After 20 minutes, you may check to see if bleeding has stopped. If bleeding persists, please repeat CONTINUOUS PRESSURE for another 20 minutes WITHOUT LOOKING.  If bleeding still persists, call the office at [phone]   Signs of infection include increased redness extending over 1 centimeter from the wound, increased warmth, yellow to green purulent (pus-like) discharge, and significantly increased tenderness to palpation. If you have any concerns regarding infection or develop fevers or chills, please call. You will be contacted with the results of your procedure when we receive them, usually within two weeks.  Please call if you have not heard from us call the office at [phone] between 8 am-4 pm Monday-Friday

## 2020-08-26 LAB — DERMATOLOGY PATHOLOGY REPORT: NORMAL

## 2020-09-02 ENCOUNTER — TELEPHONE (OUTPATIENT)
Dept: INTERNAL MEDICINE CLINIC | Age: 65
End: 2020-09-02

## 2020-09-03 ENCOUNTER — TELEPHONE (OUTPATIENT)
Dept: INTERNAL MEDICINE CLINIC | Age: 65
End: 2020-09-03

## 2020-09-03 NOTE — TELEPHONE ENCOUNTER
Patient insurance will not cover the diclofenac gel. Dr. Kriss Diggs said he can buy it over the counter. Left message on his voice mail to but the gel over the counter and use same as directed on his prescription 4 times a day . Radha Harry To call if any questions.

## 2020-09-03 NOTE — TELEPHONE ENCOUNTER
Patient called me back . He understands his insurance will not cover the diclofenac gel, but he he can by it over the counter. I stressed to him to be sure he is buying  the GEL , NOT Diclofenac  tablets as he should not be on the tablets while on the blood thinner . He verbalized understanding .

## 2020-09-11 ENCOUNTER — OFFICE VISIT (OUTPATIENT)
Dept: DERMATOLOGY | Age: 65
End: 2020-09-11
Payer: COMMERCIAL

## 2020-09-11 VITALS — TEMPERATURE: 97.3 F

## 2020-09-11 PROCEDURE — 99214 OFFICE O/P EST MOD 30 MIN: CPT | Performed by: DERMATOLOGY

## 2020-09-11 RX ORDER — DESONIDE 0.5 MG/G
OINTMENT TOPICAL
Qty: 30 G | Refills: 1 | Status: SHIPPED | OUTPATIENT
Start: 2020-09-11 | End: 2020-12-21 | Stop reason: ALTCHOICE

## 2020-09-11 NOTE — PATIENT INSTRUCTIONS
Sunscreen information    1. Sunscreen should be broad-spectrum protection (protects against UVA and UVB rays), Sun Protection Factor (SPF) 50 or greater, and water-resistant. 2.  Apply the sunscreen to dry skin 15-30 minutes BEFORE going outdoors. Be sure to apply it generously to achieve the UV protection indicated on the product label. 3.  Re-apply sunscreen approximately every two hours or after swimming or sweating heavily according to the directions on the bottle. If the bottle specifies a lower water resistance time, then reapply according to those guidelines (ie water resistance of 60 minutes needs to be applied every 60 minutes). 4.  Skin cancer also can form on the lips. To protect your lips, apply a lip balm or lipstick that contains sunscreen with an SPF of 50 or higher. 5.  There are many types of sunscreen. A.  Creams are best for dry skin and the face. Neutrogena ultra sheer dry touch can be nice for the face. B.  Gels are good for hairy areas, such as the scalp or male chest.    C.  Sticks are good to use around the eyes. D. Sprays are sometimes preferred by parents since they are easy to apply to children, however sprays are NOT generally recommended due to the inability to fully cover most areas adequately. If you are using these, make sure to use enough of these products to cover the entire surface area thoroughly and rub in with your hands after spraying. Do NOT inhale these products or apply near heat, open flame or while smoking. E.  There also are sunscreens made for specific purposes, such as for sensitive skin and babies. Aveeno and Neutrogena are two examples. F. Daily facial moisturizers with spf can also be helpful (Aveeno positively radiant, Aveeno smart essentials, Cetaphil with sunscreen, etc). 6.  Wear protective clothing, such as a long-sleeved shirt, pants, a wide-brimmed hat and sunglasses, where possible.  There are even clothing lines that have special sun-protective clothing and hats such as Coolibar and [place] [employer]). 7.  Seek shade when appropriate, remembering that the sun's rays are strongest between 10 a.m. and 2 p.m. If your shadow is shorter than you are, seek shade. 8.  Use extra caution near water, snow and sand as they reflect the damaging rays of the sun, which can increase your chance of sunburn. 9.  Get vitamin D safely through a healthy diet that may include vitamin supplements. Don't seek the sun. 10.  Avoid tanning beds. Ultraviolet light from the sun and tanning beds can cause skin cancer and wrinkling. If you want to look tan, consider using a self-tanning product, but continue to use sunscreen with it. Skin Cancer Prevention: After Your Visit    Skin cancer is the abnormal growth of cells in the skin. It usually appears as a growth that changes in color, shape, or size. This can be a sore that does not heal or a change in a wart or a mole. Skin cancer is almost always curable when found early and treated. So it is important to see your doctor if you have any of these changes in your skin. Skin cancer is the most common type of cancer. It often appears on areas of the body that have been exposed to the sun, such as the head, face, neck, back, chest, or shoulders. Follow-up care is a key part of your treatment and safety. Be sure to make and go to all appointments, and call your doctor if you are having problems. It's also a good idea to know your test results and keep a list of the medicines you take. How can you care for yourself at home?  - Wear a wide-brimmed hat and long sleeves and pants if you are going to be outdoors for a long time. - Avoid the sun between 10 a.m. and 4 p.m., which is the peak time for UV rays. - Wear sunscreen on exposed skin. Make sure the sunscreen blocks ultraviolet rays (both UVA and UVB) and has a sun protection factor (SPF) of at least 30.  Use it every day, even when it is cloudy. Some doctors may recommend a higher SPF, such as 48.  - Do not use tanning booths or sunlamps. - Use lip balm or cream that has sun protection factor (SPF) to protect your lips from getting sunburned or getting cold sores. - Wear sunglasses that block UV rays. When should you call for help? Watch closely for changes in your health, and be sure to contact your doctor if:  - You are concerned about any problem areas on your skin. - You notice a change in a mole or skin growth. For example:  a. It gets bigger. b. It develops uneven borders. c. It gets thicker, raised, or worn down. d. It changes color. e. It starts to bleed easily.

## 2020-09-11 NOTE — PROGRESS NOTES
Patient's Name: Christina Miller  MRN: [id number]  YOB: 1955  Date of Visit: 9/11/2020  Primary Care Provider: Maurice Lucero MD  Referring Provider: No ref. provider found    Subjective:     Chief Complaint   Patient presents with    Follow-up     rash on face and nose        History of Present Illness: Patient presents for follow-up of hyperpigmentation. Patient was last evaluated on 8/21/20. At their last visit they had two biopsies from face and ear. The biopsy from his ear revealed a pigmented SK and the one from his face showed pigment deposition and subtle interface inflammation. Patient reports no improvement of their pigmentation since last visit. Past medical/surgical/family history reviewed with no changes since last visit on 8/21/20    Past Medical History:  Past Medical History:   Diagnosis Date    Anxiety 4/7/2010    Back pain 7/24/2012    DVT (deep venous thrombosis) (HCC)     [facility] of [facility] ([facility] [address]     Hypertension 10/25/2011    Pulmonary embolism (HCC)     FRANCHESKA       Past Surgical History:  Past Surgical History:   Procedure Laterality Date    COLONOSCOPY N/A 7/26/2019    COLONOSCOPY performed by Esther Espino MD at [facility]  8/30/2019    COLONOSCOPY WITH BIOPSY performed by Esther Espino MD at [address] [facility] Right     ENDOSCOPY, COLON, DIAGNOSTIC      UPPER GASTROINTESTINAL ENDOSCOPY N/A 2/26/2020    EGD DIAGNOSTIC ONLY performed by Esther Espino MD at [address] N/A 3/18/2020    PUSH ENTEROSCOPY performed by Esther [facility]ino MD at [facility] ENDOSCOPY       Past Family History:  Family History   Problem Relation Age of Onset    High Blood Pressure Mother     Cancer Father         ?  BRAIN    Kidney Disease Brother         ESRD ON DIALYSIS         Allergies:  No Known Allergies    Current Medications:  Current Outpatient Medications   Medication Sig Dispense Refill    amLODIPine (NORVASC) 5 MG tablet TAKE 1 TABLET BY MOUTH EVERY DAY 90 tablet 1    rivaroxaban (XARELTO) 20 MG TABS tablet TAKE 1 TABLET BY MOUTH EVERY DAY WITH BREAKFAST 90 tablet 1    diclofenac sodium (VOLTAREN) 1 % GEL Apply 4 g topically 4 times daily 5 Tube 0     No current facility-administered medications for this visit. Review of Systems:  Constitutional: No fevers, chills or recent illness. feels well   Skin: Skin:As per HPI AND otherwise no new, bleeding or symptomatic skin lesions          Objective:     Vitals:    09/11/20 1125   Temp: 97.3 °F (36.3 °C)   TempSrc: Temporal     BIOPSY RESULTS:    DIAGNOSIS:   A. LEFT CHEEK-   Perifollicular lymphocytic inflammation with subtle   interface inflammation   Multiple deeper cuts were performed. Dilated follicles with   intrafollicular demodex and pityrosporum organisms are   present. There is basal layer hyperpigmentation with   scattered melanophages in superficial dermis. Mild   perifollicular lymphocytic inflammation with subtle   follicular interface inflammation is also identified. COMMENT: The case was discussed with Dr. Jermaine Gonzalez on 8/26/20. B. LEFT CONCHAL BOWL-   Seborrheic Keratosis, inflamed and pigmented   An epidermal hypertrophy composed of reticulations of   mature polygonal and basaloid cells enclosing multiple   variably sized horn cysts.  There is no suggestion of   malignancy. The lesion is chronically inflamed and   pigmented.    Margarita Pina MD       Physical Examination:  General: alert, comfortable, no apparent distress, well-appearing  Psych: alert, oriented and pleasant  Neuro: oriented to person, place, and time  Skin: Areas examined: head including face, lips, conjunctiva and lids, neck, hair/scalp, left hand, right hand and digits and nails      All areas examined were within normal limits except those listed below with the appropriate assessment and plan    Assessment and Plan (with relevant objective exam findings): 1. Hyperpigmentation  Location/objective findings: unchanged from previous exam    Discussed biopsy results at length with the patient. Explained that the biopsy did not show clear findings suggestive of lupus. Given the clinical presentation and the path, my suspicion is high for medication induced hyperpigmentation. Patient is on a calcium channel blocker, which has been reported to cause photodistributed hyperpigmentation which is similar to what patient has as well as the pathology read of interface inflammation affecting the follicular epithelium. References:  1. [facility] RN, [facility], Ezio JARRELL, Albino PRECIADO, Zaria VILLARREAL. Diltiazem Induces Severe Photodistributed Hyperpigmentation: Case Series, Histoimmunopathology, Management, and Review of the Literature. Arch Dermatol. 2006;142(2):206-210.     2. Hina Hassan MW, Raymundo HW, Diltiazem-associated photodistributed hyperpigmentation: a review of 4 cases, Arch Dermatol 137(2):179-82, 2001. 3. Gage A, Kiersten S, Diltiazem-induced hyperpigmentation in an [place] American woman, J Am Acad Dermatol 46(3):468-9, 2002. - Will initiate treatment with desonide 0.05% ointment twice daily M-F for 3-4 weeks, then once daily x 2 weeks, then every other day x 1 week. - Will discuss with Dr. Rachel Morton regarding changing Amlodipine  - Discussed with patient potential use of compounded triluma. - Recommended sun avoidance, use of protective clothing and broad spectrum sunscreen containing titanium dioxide, or zinc oxide with SPF 50 or higher     I spent a minimum of 25 minutes interacting face-to-face with this patient. More than 50% of this time was spent counseling and coordinating care for the patient  regarding their condition and therapies.  This included reviewing the patients chief complaint, reviewing and clarifying the patients present illness, reviewing and clarifying the patients past medical history, reviewing and clarifying the patients review of systems/social history/family history, reviewing and clarifying the patients current medications/drug allergies, reviewing  medical records, examining the patients skin, discussing with the patient my specific diagnostic impressions and any relevant differential diagnoses, discussing with the patient the biopsy results, discussing possible treatment strategies with the patient, discussing my specific treatment recommendations, discussing possible side effects of the recommended treatment(s), discussing strategies for avoiding/minimizing treatment side effects, discussing the patients prognosis, and arranging for follow-up. Follow up:  Return visit in 4-6 weeks or as needed for change in condition. All questions addressed.      Procedure:   No procedure performed      Eh Yang MD. MS

## 2020-09-17 RX ORDER — VALSARTAN AND HYDROCHLOROTHIAZIDE 160; 25 MG/1; MG/1
1 TABLET ORAL DAILY
Qty: 90 TABLET | Refills: 1 | Status: SHIPPED | OUTPATIENT
Start: 2020-09-17 | End: 2020-12-21

## 2020-10-16 ENCOUNTER — OFFICE VISIT (OUTPATIENT)
Dept: DERMATOLOGY | Age: 65
End: 2020-10-16
Payer: COMMERCIAL

## 2020-10-16 PROCEDURE — 99213 OFFICE O/P EST LOW 20 MIN: CPT | Performed by: DERMATOLOGY

## 2020-10-16 NOTE — PATIENT INSTRUCTIONS
Dr. Indigo Rice [facility] Safe Strategies:    1. Avoid the sun if possible especially between the hours of 10 am and 4 pm. That's when the sun's most harmful UV rays are hitting the earth. 2. Use protective clothing. Just like the right equipment helps you perform better in your sport, the proper clothing can do a lot to help us in our fight to prevent skin cancer. Thicker and darker clothing with a tighter weave will block more of the suns harmful rays, and it never wears off! Make wearing long sleeves, a broad brimmed hat and sun glasses part of your routine. 3. Use a broad spectrum sunscreen with SPF 30 or higher on all your exposed skin. Make sure the sunscreen has either titanium dioxide, and/or zinc oxide (preferred), or Avobenzone in it (check the active ingredients on the back of the bottle to make sure). If you are going to be in the water or sweating, make sure the sunscreen you choose is water resistant. Better sunscreens are available in [place] ([facility]) and [facility] like Tinosorb S (Bemotrizinol) and Tinosorb M ([address] [place] [address]), so if you are serious about sun protection and visit those areas, feel free to stock up. 4. Reapply the sunscreen after 2 hours or after swimming, sweating, or toweling off. We don't care about the brand, but some good brands to use are Omaira Cormier MD , [facility], and really just about anything with the above ingredients. Cetaphil oil control moisturizer with SPF 30 or 50 is a good one for the face. It is not water resistant but goes on really light and won't make you break out. 5. Use enough. One ounce of lotion, which is about the size of a golf ball, is the amount needed to cover the exposed parts of an average adult body. For sprays, apply until it \"glistens\" on skin (2 seconds of spray per arm, 4 seconds per leg, and 5 to 8 seconds each for the front torso and back). For sticks apply 3 to 4 passes back and forth per area. 6. Get regular check ups.  Studies show that those who check their own skin every month, get checked by their spouse, partner, or significant other several times a year (we recommend you do this on your birthdays, anniversary, and big holidays), and get their yearly check by a board certified dermatologist have their skin cancers detected much earlier and have a much higher chance of cure than those who don't.

## 2020-10-16 NOTE — PROGRESS NOTES
Patient's Name: Lana Díaz  MRN: [id number]  YOB: 1955  Date of Visit: 10/16/2020  Primary Care Provider: Kareen Varela MD  Referring Provider: No ref. provider found    Subjective:     Chief Complaint   Patient presents with    Follow-up     melanin hyperpigmentation , states desonide is not helping        History of Present Illness: Patient presents for follow-up on medication induced photo-distributed hyperpigmentation  Patient was last evaluated on 9/11/20   At their last visit they were prescribed 0.05% Desonide ointment to be applied to dark areas twice daily M-F and taper after two weeks to once daily, then to every other day. Patient initially stated that the hyperpigmentation did not improve, however after photographs from his initial visit was shared with him he stated that he feels area on forehead, nose and left cheek have improved since last visit. Dr. Irma Cat changed his previous medication thought to be the culprit. Patient reports good sun protection. Denies any irritation  From the topical steroids. Denies any new lesions.       Past medical/surgical/family history reviewed with no changes since last visit on 9/13/20    Past Medical History:  Past Medical History:   Diagnosis Date    Anxiety 4/7/2010    Back pain 7/24/2012    DVT (deep venous thrombosis) (HCC)     History of heroin abuse ([facility] [address]     Hypertension 10/25/2011    Pulmonary embolism (HCC)     FRANCHESKA       Past Surgical History:  Past Surgical History:   Procedure Laterality Date    COLONOSCOPY N/A 7/26/2019    COLONOSCOPY performed by Zachary Salomon MD at [address] [facility]  8/30/2019    COLONOSCOPY WITH BIOPSY performed by Zachary Salomon MD at [address] [facility] Right     ENDOSCOPY, COLON, DIAGNOSTIC      UPPER GASTROINTESTINAL ENDOSCOPY N/A 2/26/2020    EGD DIAGNOSTIC ONLY performed by Zachary Salomon MD at [address] [facility] 3/18/2020    PUSH Hydroquinone combined with Kojic acid, Vit C and hydrocortisone 2.5%  Patient was in agreement and prescription called in to Brandi Bonilla. Patient was instructed to take a break of 1 month after 2 months of using the cream and also recommended strict sun protection/avoidance and the use of protective clothing and broad spectrum sunscreen. He verbalized understanding          Follow up:  Return visit in 2 months or as needed for change in condition. All questions addressed.      Procedure:   No procedure performed            Germaine Silveira MD. MS

## 2020-11-23 ENCOUNTER — OFFICE VISIT (OUTPATIENT)
Dept: INTERNAL MEDICINE CLINIC | Age: 65
End: 2020-11-23
Payer: COMMERCIAL

## 2020-11-23 ENCOUNTER — HOSPITAL ENCOUNTER (OUTPATIENT)
Dept: GENERAL RADIOLOGY | Age: 65
Discharge: HOME | End: 2020-11-23
Payer: COMMERCIAL

## 2020-11-23 VITALS
HEART RATE: 81 BPM | HEIGHT: 70 IN | BODY MASS INDEX: 23.18 KG/M2 | SYSTOLIC BLOOD PRESSURE: 139 MMHG | OXYGEN SATURATION: 99 % | WEIGHT: 161.9 LBS | TEMPERATURE: 98.4 F | DIASTOLIC BLOOD PRESSURE: 89 MMHG

## 2020-11-23 PROCEDURE — 73502 X-RAY EXAM HIP UNI 2-3 VIEWS: CPT

## 2020-11-23 PROCEDURE — 99213 OFFICE O/P EST LOW 20 MIN: CPT | Performed by: STUDENT IN AN ORGANIZED HEALTH CARE EDUCATION/TRAINING PROGRAM

## 2020-11-23 RX ORDER — LIDOCAINE 4 G/G
1 PATCH TOPICAL DAILY
Qty: 30 PATCH | Refills: 0 | Status: SHIPPED | OUTPATIENT
Start: 2020-11-23 | End: 2020-12-23

## 2020-11-23 RX ORDER — METHYLPREDNISOLONE 4 MG/1
TABLET ORAL
Qty: 1 KIT | Refills: 0 | Status: SHIPPED | OUTPATIENT
Start: 2020-11-23 | End: 2020-11-29

## 2020-11-23 ASSESSMENT — PATIENT HEALTH QUESTIONNAIRE - PHQ9
SUM OF ALL RESPONSES TO PHQ QUESTIONS 1-9: 0
SUM OF ALL RESPONSES TO PHQ9 QUESTIONS 1 & 2: 0
2. FEELING DOWN, DEPRESSED OR HOPELESS: 0
SUM OF ALL RESPONSES TO PHQ QUESTIONS 1-9: 0
1. LITTLE INTEREST OR PLEASURE IN DOING THINGS: 0
SUM OF ALL RESPONSES TO PHQ QUESTIONS 1-9: 0

## 2020-11-23 ASSESSMENT — ENCOUNTER SYMPTOMS
PHOTOPHOBIA: 0
VOICE CHANGE: 0
DIARRHEA: 0
COUGH: 0
CONSTIPATION: 0
TROUBLE SWALLOWING: 0
NAUSEA: 0
WHEEZING: 0
ABDOMINAL DISTENTION: 0
CHEST TIGHTNESS: 0
CHOKING: 0
ABDOMINAL PAIN: 0
STRIDOR: 0
SHORTNESS OF BREATH: 0
VOMITING: 0
APNEA: 0

## 2020-11-23 NOTE — PROGRESS NOTES
2020    Mark Hartman (:  1955) is a 76 y.o. male, here for evaluation of the following medical concerns:    Left hip pain   Pain for 4-5 months, worsen over the past several weeks. At worst 10/10, prevent patient from standing for too long. Pain more with changing in position, from sitting to standing up, and radiation to the front aspect of his thigh. Did try Voltaren gel with some improvement initially but no longer give him relief. Pain is only partially improved with Ibuprofen. Denies trauma, other joint pain. Review of Systems   Constitutional: Negative for activity change, appetite change, chills, diaphoresis, fatigue, fever and unexpected weight change. HENT: Negative for trouble swallowing and voice change. Eyes: Negative for photophobia and visual disturbance. Respiratory: Negative for apnea, cough, choking, chest tightness, shortness of breath, wheezing and stridor. Cardiovascular: Negative for chest pain, palpitations and leg swelling. Gastrointestinal: Negative for abdominal distention, abdominal pain, constipation, diarrhea, nausea and vomiting. Genitourinary: Negative for difficulty urinating and dysuria. Skin: Negative for rash and wound. Neurological: Negative for dizziness, weakness and light-headedness. Psychiatric/Behavioral: Negative for agitation and behavioral problems. Prior to Visit Medications    Medication Sig Taking? Authorizing Provider   rivaroxaban (XARELTO) 20 MG TABS tablet TAKE 1 TABLET BY MOUTH EVERY DAY WITH BREAKFAST Yes Sara Salgado MD   lidocaine 4 % external patch Place 1 patch onto the skin daily Yes Sara Salgado MD   methylPREDNISolone (MEDROL DOSEPACK) 4 MG tablet Take by mouth. Yes Sara Salgado MD   desonide (DESOWEN) 0.05 % ointment Apply to affected areas on forehead, cheeks and nose twice daily M-F for up to 3-4 weeks, then taper to once daily M-F for 2 weeks, then every other day once daily for 1 week, then off.  Yes Emmett Boas, MD   valsartan-hydroCHLOROthiazide (DIOVAN-HCT) 160-25 MG per tablet Take 1 tablet by mouth daily  Patient not taking: Reported on 2020  C Ramirez Damon MD   diclofenac sodium (VOLTAREN) 1 % GEL Apply 4 g topically 4 times daily  Aj Gambino DO        No Known Allergies    Past Medical History:   Diagnosis Date    Anxiety 2010    Back pain 2012    [facility] [address]     [facility] of [facility]     Hypertension 10/25/2011    [facility]     FRAN[place]KA       Past Surgical History:   Procedure Laterality Date    COLONOSCOPY N/A 2019    COLONOSCOPY performed by Karlos Cardona MD at [address] [facility]  2019    COLONOSCOPY WITH BIOPSY performed by Karlos Cardona MD at [address] [facility] Right     ENDOSCOPY, COLON, DIAGNOSTIC      UPPER GASTROINTESTINAL ENDOSCOPY N/A 2020    EGD DIAGNOSTIC ONLY performed by Karlos Cardona MD at [address] N/A 3/18/2020    PUSH ENTEROSCOPY performed by Karlos Cardona MD at [address] [facility] History     Socioeconomic History    Marital status:       Spouse name: Not on file    Number of children: Not on file    Years of education: Not on file    Highest education level: Not on file   Occupational History    Not on file   Social Needs    Financial resource strain: Not on file    Food insecurity     Worry: Not on file     Inability: Not on file    Transportation needs     Medical: Not on file     Non-medical: Not on file   Tobacco Use    Smoking status: Current Every Day Smoker     Packs/day: 0.25     Years: 20.00     Pack years: 5.00     Types: Cigars     Start date: 45     Last attempt to quit: 2019     Years since quittin.1    Smokeless tobacco: Never Used    Tobacco comment: 1 DAILY   Substance and Sexual Activity    Alcohol use: Yes     Comment: 1-2 X WEEKLY    Drug use: No    Sexual activity: Yes Partners: Female   Lifestyle    Physical activity     Days per week: Not on file     Minutes per session: Not on file    Stress: Not on file   Relationships    Social connections     Talks on phone: Not on file     Gets together: Not on file     Attends Worship service: Not on file     Active member of club or organization: Not on file     Attends meetings of clubs or organizations: Not on file     Relationship status: Not on file    Intimate partner violence     Fear of current or ex partner: Not on file     Emotionally abused: Not on file     Physically abused: Not on file     Forced sexual activity: Not on file   Other Topics Concern    Not on file   Social History Narrative    Not on file        Family History   Problem Relation Age of Onset    High Blood Pressure Mother     Cancer Father         ? BRAIN    Kidney Disease Brother         ESRD ON DIALYSIS       Vitals:    11/23/20 1357   BP: 139/89   Site: Right Upper Arm   Position: Sitting   Cuff Size: Medium Adult   Pulse: 81   Temp: 98.4 °F (36.9 °C)   TempSrc: Temporal   SpO2: 99%   Weight: 161 lb 14.4 oz (73.4 kg)   Height: 5' 10\" (1.778 m)     Estimated body mass index is 23.23 kg/m² as calculated from the following:    Height as of this encounter: 5' 10\" (1.778 m). Weight as of this encounter: 161 lb 14.4 oz (73.4 kg). Physical Exam  Vitals signs and nursing note reviewed. Constitutional:       General: He is not in acute distress. Appearance: He is well-developed. He is not diaphoretic. HENT:      Head: Normocephalic and atraumatic. Eyes:      General: No scleral icterus. Conjunctiva/sclera: Conjunctivae normal.      Pupils: Pupils are equal, round, and reactive to light. Neck:      Musculoskeletal: Normal range of motion and neck supple. Cardiovascular:      Rate and Rhythm: Normal rate and regular rhythm. Heart sounds: Normal heart sounds. No murmur. No friction rub. No gallop.     Pulmonary:      Effort: Pulmonary effort is normal. No respiratory distress. Breath sounds: Normal breath sounds. No wheezing or rales. Chest:      Chest wall: No tenderness. Abdominal:      General: Bowel sounds are normal. There is no distension. Palpations: Abdomen is soft. Musculoskeletal: Normal range of motion. General: Tenderness (left hip at the trochanter joint. Straight leg raising was negative, but does have significant pain radiating down to his front thigh. Analgesic gait. ) present. No deformity. Comments: Full range of motion of his back    Skin:     General: Skin is warm and dry. Neurological:      General: No focal deficit present. Mental Status: He is alert and oriented to person, place, and time. Cranial Nerves: No cranial nerve deficit. Sensory: No sensory deficit. Psychiatric:         Behavior: Behavior normal.         ASSESSMENT/PLAN:  1. Trochanteric bursitis of left hip  Pain for 4-5 months, worsening over the past few weeks. - Shower chair  - External Referral To Physical Therapy  - lidocaine 4 % external patch; Place 1 patch onto the skin daily  Dispense: 30 patch; Refill: 0  - IR ARTHR/ASP/INJ MAJOR JT/BURSA W US; Future  - methylPREDNISolone (MEDROL DOSEPACK) 4 MG tablet; Take by mouth. Dispense: 1 kit; Refill: 0  - XR left hip     2. Pulmonary embolism, bilateral (HCC)  Stable, continue with the same treatment   - rivaroxaban (XARELTO) 20 MG TABS tablet; TAKE 1 TABLET BY MOUTH EVERY DAY WITH BREAKFAST  Dispense: 90 tablet; Refill: 1    3. Essential hypertension  Reading at office is better today 139/89, will continue with same medications for now       Return in about 4 weeks (around 12/21/2020). to re-evaluate hip pain     An  electronic signature was used to authenticate this note. --Yassine Alvarez MD on 11/23/2020 at 2:49 PM     Addendum to Resident H& P/Progress note:  I have personally seen,examined and evaluated the patient.  I have reviewed the current history, physical findings, labs and assessment and plan and agree with note as documented by resident MD ( Marina Serna)      Abi Johnson MD, Funmi Sahu

## 2020-11-23 NOTE — PATIENT INSTRUCTIONS
Patient Education        Trochanteric Bursitis: Exercises  Introduction  Here are some examples of exercises for you to try. The exercises may be suggested for a condition or for rehabilitation. Start each exercise slowly. Ease off the exercises if you start to have pain. You will be told when to start these exercises and which ones will work best for you. How to do the exercises  Hamstring wall stretch   1. Lie on your back in a doorway, with your good leg through the open door. 2. Slide your affected leg up the wall to straighten your knee. You should feel a gentle stretch down the back of your leg. 3. Hold the stretch for at least 1 minute to begin. Then try to lengthen the time you hold the stretch to as long as 6 minutes. 4. Repeat 2 to 4 times. 5. If you do not have a place to do this exercise in a doorway, there is another way to do it:  6. Lie on your back, and bend the knee of your affected leg. 7. Loop a towel under the ball and toes of that foot, and hold the ends of the towel in your hands. 8. Straighten your knee, and slowly pull back on the towel. You should feel a gentle stretch down the back of your leg. 9. Hold the stretch for 15 to 30 seconds. Or even better, hold the stretch for 1 minute if you can. 10. Repeat 2 to 4 times. 1. Do not arch your back. 2. Do not bend either knee. 3. Keep one heel touching the floor and the other heel touching the wall. Do not point your toes. Straight-leg raises to the outside   1. Lie on your side, with your affected leg on top. 2. Tighten the front thigh muscles of your top leg to keep your knee straight. 3. Keep your hip and your leg straight in line with the rest of your body, and keep your knee pointing forward. Do not drop your hip back. 4. Lift your top leg straight up toward the ceiling, about 12 inches off the floor. Hold for about 6 seconds, then slowly lower your leg. 5. Repeat 8 to 12 times. Clamshell   1.  Lie on your side, with your affected leg on top and your head propped on a pillow. Keep your feet and knees together and your knees bent. 2. Raise your top knee, but keep your feet together. Do not let your hips roll back. Your legs should open up like a clamshell. 3. Hold for 6 seconds. 4. Slowly lower your knee back down. Rest for 10 seconds. 5. Repeat 8 to 12 times. Standing quadriceps stretch   1. If you are not steady on your feet, hold on to a chair, counter, or wall. You can also lie on your stomach or your side to do this exercise. 2. Bend the knee of the leg you want to stretch, and reach behind you to grab the front of your foot or ankle with the hand on the same side. For example, if you are stretching your right leg, use your right hand. 3. Keeping your knees next to each other, pull your foot toward your buttock until you feel a gentle stretch across the front of your hip and down the front of your thigh. Your knee should be pointed directly to the ground, and not out to the side. 4. Hold the stretch for 15 to 30 seconds. 5. Repeat 2 to 4 times. Piriformis stretch   1. Lie on your back with your legs straight. 2. Lift your affected leg and bend your knee. With your opposite hand, reach across your body, and then gently pull your knee toward your opposite shoulder. 3. Hold the stretch for 15 to 30 seconds. 4. Repeat 2 to 4 times. Double knee-to-chest   1. Lie on your back with your knees bent and your feet flat on the floor. You can put a small pillow under your head and neck if it is more comfortable. 2. Bring both knees to your chest.  3. Keep your lower back pressed to the floor. Hold for 15 to 30 seconds. 4. Relax, and lower your knees to the starting position. 5. Repeat 2 to 4 times. Follow-up care is a key part of your treatment and safety. Be sure to make and go to all appointments, and call your doctor if you are having problems.  It's also a good idea to know your test results and keep a list of the medicines you take. Where can you learn more? Go to [email or web address] and sign in to your [employer] account. Enter L991 in the [facility] box to learn more about \"Trochanteric Bursitis: Exercises. \"     If you do not have an account, please click on the \"Sign Up Now\" link. Current as of: March 2, 2020               Content Version: 12.6  © 2006-2020 [employer]. Care instructions adapted under license by [facility] ([facility]). If you have questions about a medical condition or this instruction, always ask your healthcare professional. [address] any warranty or liability for your use of this information.

## 2020-12-21 ENCOUNTER — OFFICE VISIT (OUTPATIENT)
Dept: INTERNAL MEDICINE CLINIC | Age: 65
End: 2020-12-21
Payer: COMMERCIAL

## 2020-12-21 VITALS
SYSTOLIC BLOOD PRESSURE: 145 MMHG | DIASTOLIC BLOOD PRESSURE: 94 MMHG | BODY MASS INDEX: 23.02 KG/M2 | TEMPERATURE: 96.9 F | HEART RATE: 64 BPM | HEIGHT: 70 IN | OXYGEN SATURATION: 100 % | WEIGHT: 160.8 LBS

## 2020-12-21 PROCEDURE — 99213 OFFICE O/P EST LOW 20 MIN: CPT | Performed by: STUDENT IN AN ORGANIZED HEALTH CARE EDUCATION/TRAINING PROGRAM

## 2020-12-21 RX ORDER — MELATONIN
1000 DAILY
Qty: 90 TABLET | Refills: 1 | Status: SHIPPED | OUTPATIENT
Start: 2020-12-21 | End: 2021-06-04 | Stop reason: CLARIF

## 2020-12-21 ASSESSMENT — ENCOUNTER SYMPTOMS
CHEST TIGHTNESS: 0
NAUSEA: 0
CONSTIPATION: 0
SHORTNESS OF BREATH: 0
STRIDOR: 0
VOICE CHANGE: 0
VOMITING: 0
WHEEZING: 0
TROUBLE SWALLOWING: 0
APNEA: 0
ABDOMINAL PAIN: 0
COUGH: 0
PHOTOPHOBIA: 0
ABDOMINAL DISTENTION: 0
CHOKING: 0
DIARRHEA: 0

## 2020-12-21 NOTE — PROGRESS NOTES
2020    Laverna Meckel (:  1955) is a 76 y.o. male with a PMHx of PE (on xarelto) and HTN here for evaluation of the following medical concerns:    Left hip pain  Pt states that he has had B/L hip pain which has been ongoing for the last 4-5 months. It is rated as a 10/10 at worst and improves to a 6/10 during the course of the day and especially when he applies the voltarin gel. The pain is worst with standing and ambulating however he now also has L hip pain at rest. He denies numbness or tingling. He has a significant hx of high impact sports during his youth. Pt denies F/C/N/V, HA, fatigue, CP, dyspnea, abdominal pain, constipation/diarrhea, and urinary symptoms. Review of Systems   Constitutional: Negative for activity change, appetite change, chills, diaphoresis, fatigue, fever and unexpected weight change. HENT: Negative for trouble swallowing and voice change. Eyes: Negative for photophobia and visual disturbance. Respiratory: Negative for apnea, cough, choking, chest tightness, shortness of breath, wheezing and stridor. Cardiovascular: Negative for chest pain, palpitations and leg swelling. Gastrointestinal: Negative for abdominal distention, abdominal pain, constipation, diarrhea, nausea and vomiting. Genitourinary: Negative for difficulty urinating and dysuria. Skin: Negative for rash and wound. Neurological: Negative for dizziness, weakness and light-headedness. Psychiatric/Behavioral: Negative for agitation and behavioral problems. Prior to Visit Medications    Medication Sig Taking?  Authorizing Provider   vitamin D3 (CHOLECALCIFEROL) 25 MCG (1000 UT) TABS tablet Take 1 tablet by mouth daily Yes Sravani Ledezma MD   rivaroxaban (XARELTO) 20 MG TABS tablet TAKE 1 TABLET BY MOUTH EVERY DAY WITH BREAKFAST Yes Ly Jasmine MD   lidocaine 4 % external patch Place 1 patch onto the skin daily Yes Ly Jasmine MD   diclofenac sodium (VOLTAREN) 1 % GEL Apply 4 g topically 4 times daily  Tanmay Shankar,         Allergies   Allergen Reactions    Valsartan-Hydrochlorothiazide      Sweating - light headed       Past Medical History:   Diagnosis Date    Anxiety 2010    Back pain 2012    DVT (deep venous thrombosis) (HCC)     History of heroin abuse ([facility] [address]     Hypertension 10/25/2011    Pulmonary embolism (HCC)     FRANCHESKA       Past Surgical History:   Procedure Laterality Date    COLONOSCOPY N/A 2019    COLONOSCOPY performed by Quang Blanchard MD at [address] [facility]  2019    COLONOSCOPY WITH BIOPSY performed by Quang Blanchard MD at [address] [facility] Right     ENDOSCOPY, COLON, DIAGNOSTIC      UPPER GASTROINTESTINAL ENDOSCOPY N/A 2020    EGD DIAGNOSTIC ONLY performed by Quang Blanchard MD at [address] [facility] [address] N/A 3/18/2020    PUSH ENTEROSCOPY performed by Quang Blanchard MD at [address] [facility] History     Socioeconomic History    Marital status:       Spouse name: Not on file    Number of children: Not on file    Years of education: Not on file    Highest education level: Not on file   Occupational History    Not on file   Social Needs    Financial resource strain: Not on file    Food insecurity     Worry: Not on file     Inability: Not on file    Transportation needs     Medical: Not on file     Non-medical: Not on file   Tobacco Use    Smoking status: Current Every Day Smoker     Packs/day: 0.25     Years: 20.00     Pack years: 5.00     Types: Cigars     Start date: 45     Last attempt to quit: 2019     Years since quittin.2    Smokeless tobacco: Never Used    Tobacco comment: 1 DAILY   Substance and Sexual Activity    Alcohol use: Yes     Comment: 1-2 X WEEKLY    Drug use: No    Sexual activity: Yes     Partners: Female   Lifestyle    Physical activity     Days per week: Not on file     Minutes per session: Not on file    Stress: Not on file   Relationships    Social connections     Talks on phone: Not on file     Gets together: Not on file     Attends Hinduism service: Not on file     Active member of club or organization: Not on file     Attends meetings of clubs or organizations: Not on file     Relationship status: Not on file    Intimate partner violence     Fear of current or ex partner: Not on file     Emotionally abused: Not on file     Physically abused: Not on file     Forced sexual activity: Not on file   Other Topics Concern    Not on file   Social History Narrative    Not on file        Family History   Problem Relation Age of Onset    High Blood Pressure Mother     Cancer Father         ? BRAIN    Kidney Disease Brother         ESRD ON DIALYSIS       Vitals:    12/21/20 1350 12/21/20 1352   BP: (!) 148/94 (!) 145/94   Site: Right Upper Arm Right Upper Arm   Position: Sitting Sitting   Cuff Size: Medium Adult Medium Adult   Pulse: 64 64   Temp: 96.9 °F (36.1 °C) 96.9 °F (36.1 °C)   TempSrc: Temporal Temporal   SpO2: 100% 100%   Weight: 160 lb 12.8 oz (72.9 kg) 160 lb 12.8 oz (72.9 kg)   Height: 5' 10\" (1.778 m) 5' 10\" (1.778 m)     Estimated body mass index is 23.07 kg/m² as calculated from the following:    Height as of this encounter: 5' 10\" (1.778 m). Weight as of this encounter: 160 lb 12.8 oz (72.9 kg). Physical Exam  Vitals signs and nursing note reviewed. Constitutional:       General: He is not in acute distress. Appearance: He is well-developed. He is not diaphoretic. HENT:      Head: Normocephalic and atraumatic. Eyes:      General: No scleral icterus. Conjunctiva/sclera: Conjunctivae normal.      Pupils: Pupils are equal, round, and reactive to light. Neck:      Musculoskeletal: Normal range of motion and neck supple. Cardiovascular:      Rate and Rhythm: Normal rate and regular rhythm. Heart sounds: Normal heart sounds. No murmur. No friction rub. No gallop.     Pulmonary:      Effort: Pulmonary effort is normal. No respiratory distress. Breath sounds: Normal breath sounds. No wheezing or rales. Chest:      Chest wall: No tenderness. Abdominal:      General: Bowel sounds are normal. There is no distension. Palpations: Abdomen is soft. Musculoskeletal: Normal range of motion. General: Tenderness (left hip at the trochanter joint. Straight leg raising was negative, but does have significant pain radiating down to his front thigh. Analgesic gait. ) present. No deformity. Comments: Full range of motion of his back    Skin:     General: Skin is warm and dry. Neurological:      General: No focal deficit present. Mental Status: He is alert and oriented to person, place, and time. Cranial Nerves: No cranial nerve deficit. Sensory: No sensory deficit. Psychiatric:         Behavior: Behavior normal.         ASSESSMENT/PLAN:    B/L hip OA (L>R)  Pain for 4-5 months, worsening over the past few weeks. XR left hip significant for severe bilateral hip arthritis with bone on bone morphology. - External Referral To Physical Therapy  - External Referral To Orthopedic sx  - continue lidocaine 4 % external patch    Pulmonary embolism, bilateral (HCC)  Stable, continue with the same treatment. - rivaroxaban (XARELTO) 20 MG qD    Essential hypertension  Reading at office is 145/94. Pt complains of pain. Will continue with same medications for now. No follow-ups on file. to re-evaluate hip pain     An  electronic signature was used to authenticate this note. The patient will be discussed with Dr Iza Lewis. --Sherrie Newton MD PGY-3 on 12/21/2020 at 2:32 PM     Addendum to Resident H& P/Progress note:  I have personally seen,examined and evaluated the patient.  I have reviewed the current history, physical findings, labs and assessment and plan and agree with note as documented by resident MD ( Ciaran Quinones)      Emmett Clay MD, [place]

## 2021-02-19 ENCOUNTER — OFFICE VISIT (OUTPATIENT)
Dept: ORTHOPEDIC SURGERY | Age: 66
End: 2021-02-19
Payer: MEDICARE

## 2021-02-19 VITALS — HEIGHT: 70 IN | BODY MASS INDEX: 22.9 KG/M2 | WEIGHT: 160 LBS

## 2021-02-19 DIAGNOSIS — M54.9 BACK PAIN, UNSPECIFIED BACK LOCATION, UNSPECIFIED BACK PAIN LATERALITY, UNSPECIFIED CHRONICITY: Primary | ICD-10-CM

## 2021-02-19 DIAGNOSIS — M54.16 LUMBAR RADICULOPATHY: ICD-10-CM

## 2021-02-19 DIAGNOSIS — M51.36 DEGENERATIVE DISC DISEASE, LUMBAR: ICD-10-CM

## 2021-02-19 DIAGNOSIS — M16.0 PRIMARY OSTEOARTHRITIS OF BOTH HIPS: ICD-10-CM

## 2021-02-19 PROCEDURE — 20611 DRAIN/INJ JOINT/BURSA W/US: CPT | Performed by: ORTHOPAEDIC SURGERY

## 2021-02-19 PROCEDURE — 99203 OFFICE O/P NEW LOW 30 MIN: CPT | Performed by: ORTHOPAEDIC SURGERY

## 2021-02-19 RX ORDER — TRIAMCINOLONE ACETONIDE 40 MG/ML
80 INJECTION, SUSPENSION INTRA-ARTICULAR; INTRAMUSCULAR ONCE
Status: COMPLETED | OUTPATIENT
Start: 2021-02-19 | End: 2021-02-19

## 2021-02-19 RX ORDER — BUPIVACAINE HYDROCHLORIDE 2.5 MG/ML
4 INJECTION, SOLUTION INFILTRATION; PERINEURAL ONCE
Status: COMPLETED | OUTPATIENT
Start: 2021-02-19 | End: 2021-02-19

## 2021-02-19 RX ORDER — LIDOCAINE HYDROCHLORIDE 10 MG/ML
4 INJECTION, SOLUTION INFILTRATION; PERINEURAL ONCE
Status: COMPLETED | OUTPATIENT
Start: 2021-02-19 | End: 2021-02-19

## 2021-02-19 RX ADMIN — LIDOCAINE HYDROCHLORIDE 10 MG: 10 INJECTION, SOLUTION INFILTRATION; PERINEURAL at 11:14

## 2021-02-19 RX ADMIN — TRIAMCINOLONE ACETONIDE 80 MG: 40 INJECTION, SUSPENSION INTRA-ARTICULAR; INTRAMUSCULAR at 11:13

## 2021-02-19 RX ADMIN — BUPIVACAINE HYDROCHLORIDE 2.5 MG: 2.5 INJECTION, SOLUTION INFILTRATION; PERINEURAL at 11:13

## 2021-02-19 NOTE — PROGRESS NOTES
Dr Travis Good      Date /Time 2/19/2021       10:52 AM EST  Name Indigo Galloway             1955   Location  [facility]  MRN [id number]                Chief Complaint   Patient presents with    Back Pain     FRANCHESKA LBP WITH PAIN INTO LEGS        History of Present Illness    Indigo Galloway is a 76 y.o. male who presents with  bilateral hip pain. Sent in consultation by Linda Sarabia MD,. Occupation: Retired  Occupational activities: . Athletic/exercise activity: no sports. Injury Mechanism:  none. Worker's Comp. & legal issues:   none. Previous Treatments: Ice, Heat, NSAIDs and pain medication    Patient presents the office today for a new problem. Patient is here with a chief complaint of left greater than right hip pain. Patient has had pain for several months with increased symptoms over the last few weeks. His symptoms are concentrated lateral greater than groin. Patient does have increased pain with weightbearing activities. He also complains of lumbar pain and bilateral thigh pain. He does not complain of any pain symptoms in his lower legs or feet. No numbness and tingling. He has tried NSAIDs, activity modifications and assistive devices without any significant improvement. He does have a history of a PE and is taking Xarelto. He also has a history of heroin addiction but states he has been clean for 1 year.   He continues to smoke    Pain Assessment  Location of Pain: Pelvis(hip)  Location Modifiers: Left  Severity of Pain: 10  Quality of Pain: Aching  Duration of Pain: Persistent  Frequency of Pain: Constant  Aggravating Factors: Walking, Stairs  Relieving Factors: Nsaids  Result of Injury: No  Work-Related Injury: No  Are there other pain locations you wish to document?: No]    Past History  Past Medical History:   Diagnosis Date    Anxiety 4/7/2010    Back pain 7/24/2012    [facility] ([facility])     History of heroin abuse ([facility] [address]     Hypertension 10/25/2011    Pulmonary embolism (HCC)     FRANCHESKA     Past Surgical History:   Procedure Laterality Date    COLONOSCOPY N/A 2019    COLONOSCOPY performed by Caitlyn Carvalho MD at [address]  2019    COLONOSCOPY WITH BIOPSY performed by Caitlyn Carvalho MD at [address] [facility] Right     ENDOSCOPY, COLON, DIAGNOSTIC      UPPER GASTROINTESTINAL ENDOSCOPY N/A 2020    EGD DIAGNOSTIC ONLY performed by Caitlyn Carvalho MD at [address] [facility] N/A 3/18/2020    PUSH ENTEROSCOPY performed by Caitlyn Carvalho MD at [facility] ENDOSCOPY     Social History     Tobacco Use    Smoking status: Current Every Day Smoker     Packs/day: 0.25     Years: 20.00     Pack years: 5.00     Types: Cigars     Start date: 45     Last attempt to quit: 2019     Years since quittin.4    Smokeless tobacco: Never Used    Tobacco comment: 1 DAILY   Substance Use Topics    Alcohol use: Yes     Comment: 1-2 X WEEKLY      Current Outpatient Medications on File Prior to Visit   Medication Sig Dispense Refill    vitamin D3 (CHOLECALCIFEROL) 25 MCG (1000 UT) TABS tablet Take 1 tablet by mouth daily 90 tablet 1    rivaroxaban (XARELTO) 20 MG TABS tablet TAKE 1 TABLET BY MOUTH EVERY DAY WITH BREAKFAST 90 tablet 1    diclofenac sodium (VOLTAREN) 1 % GEL Apply 4 g topically 4 times daily 5 Tube 0     No current facility-administered medications on file prior to visit. ASCVD 10-YEAR RISK SCORE  The 10-year ASCVD risk score (Ly Ambrocio, et al., 2013) is: 20.1%    Values used to calculate the score:      Age: 76 years      Sex: Male      Is Non- : Yes      Diabetic: No      Tobacco smoker: Yes      Systolic Blood Pressure: 133 mmHg      Is BP treated: No      HDL Cholesterol: 51 mg/dL      Total Cholesterol: 174 mg/dL   . Review of Systems  10-point ROS is negative other than HPI.     Physical Exam  Based off 1997 Exam Criteria  Ht 5' 10\" (1.778 m) Wt 160 lb (72.6 kg)   BMI 22.96 kg/m²      Constitutional:       General: He is not in acute distress. Appearance: Normal appearance. Cardiovascular:      Rate and Rhythm: Normal rate and regular rhythm. Pulses: Normal pulses. Pulmonary:      Effort: Pulmonary effort is normal. No respiratory distress. Neurological:      Mental Status: He is alert and oriented to person, place, and time. Mental status is at baseline.      Musculoskeletal:  Gait:  antalgic    Spine / Hip Exam:      RIGHT  LEFT    Lumbar Spine Exam  [] All Neg    [] All Neg     Straight leg raise  []  []Not tested   []  []Not tested    Clonus  []  []Not tested   []  []Not tested    Pain with motion  [x]  []Not tested   [x]  []Not tested    Radiculopathy  [x]  []Not tested   [x]  []Not tested    Paraspinal muscle tenderness  [x] Paraspinal  [x]Midline   [x] Paraspinal  [x]Midline   Sensation RIGHT  LEFT    L3  [x] Normal []Decreased    [x] Normal []Decreased   L4  [] Normal  [x]Decreased   [] Normal [x]Decreased   L5  [] Normal [x]Decreased   [] Normal [x]Decreased   S1  [] Normal  [x]Decreased   [] Normal [x]Decreased   Pelvis       Scoliosis  [] Nml  [x] Present     Leg-length discrepency  [x] Equal  [] Right longer   [] Left longer   Range of Motion Active Passive Active Passive   Hip Flexion 90  90    Abduction 20  20    External Rotation @ 90 flex 35  35    Internal Rotation @ 90 flex -10  -10           Hip Impingement / Dysplasia  [] All Neg  [] Not tested   [] All Neg  [] Not tested    Hip impingement test  [x]  []Not tested   [x]  []Not tested    C-sign  [x]  []Not tested   [x]  []Not tested    Anterior instability apprehension  []  []Not tested   []  []Not tested    Posterior instability apprehension  []  []Not tested   []  []Not tested    Uncontained Internal rotation  []  []Not tested  []  []Not tested          Abductors  [x] All Neg  [] Not tested   [x] All Neg  [] Not tested    Medius strength  []  []Not tested   []  []Not tested    Minimum strength  []  []Not tested   []  []Not tested    IT band tendonitis  []  []Not tested   []  []Not tested    Trochanteric tenderness  []  []Not tested  []  []Not tested   Sciatic neuropathic pain  []  []Not tested   []  []Not tested           Post-arthroplasty  [] All Neg  [] Not tested   [] All Neg  [] Not tested    Rectus tendonitis  []  []Not tested   []  []Not tested    Iliopsoas tendonitis       Start-up pain  []  []Not tested   []  []Not tested      Markedly stiff hips, stiff painful back as well. Some radiculopathy. Imaging    Bilateral hip: [facility]  Radiographs: End-stage osteoarthritis with severe osteophyte formation, joint space narrowing, cyst and sclerosis. Arthritic and lordotic lumbar spine as well. Some spondylolisthesis present in the lower lumbar segments. Assessment and Plan  Sarwat Hay was seen today for back pain. Diagnoses and all orders for this visit:    Back pain, unspecified back location, unspecified back pain laterality, unspecified chronicity  -     XR LUMBAR SPINE (2-3 VIEWS); Future  -     US ARTHR/ASP/INJ MAJOR JNT/BURSA LEFT    Primary osteoarthritis of both hips  -     US ARTHR/ASP/INJ MAJOR JNT/BURSA LEFT    Degenerative disc disease, lumbar  -     US ARTHR/ASP/INJ MAJOR JNT/BURSA LEFT    Lumbar radiculopathy  -     US ARTHR/ASP/INJ MAJOR JNT/BURSA LEFT    Other orders  -     bupivacaine (MARCAINE) 0.25 % injection 10 mg  -     lidocaine 1 % injection 4 mL  -     triamcinolone acetonide (KENALOG-40) injection 80 mg        I discussed with Cheryl Sanchez that his history, symptoms, signs and imaging are most consistent with hip arthritis and Lumbar degenerative disease and anterolisthesis    We reviewed the natural history of these conditions and treatment options ranging from conservative measures (rest, icing, activity modification, physical therapy, pain meds, cortisone injection) to surgical options.      In terms of treatment, I recommended continuing with rest, icing, avoidance of painful activities, NSAIDs or pain meds as tolerated, and physical therapy. Left Hip Cortisone Injection - Ultrasound-guided CPT: 78335   Consent was obtained after discussion of the risks, benefits, alternatives, including, but not limited to bleeding, pain, infection, skin disruption or discoloration. Laterality was confirmed (timeout). The hip was prepped with alcohol.  Deep ultrasound was used to visualize the femoral head, neck, and acetabular rim. 22-gauge spinal needle was used. Ultrasound-guided needle localization to the hip joint was performed. A formulation of 2cc of 40mg/ml Kenalog, 1cc of 1% lidocaine, 1cc of 0.25% sensoricaine was injected into the hip. Appropriate insufflation deep to the hip capsule was visualized. The site was cleaned and dressed with a band aid.  He tolerated this well and there were no complications. 75% of her pain was relieved after injection. We discussed surgical options as well, should conservative measures fail. He has severe arthritis. He does continue to smoke however and is a recovering heroin addict. We will see him back in 3 months. If he has stop smoking and would like to proceed with hip replacement surgery, he be a reasonable candidate from an arthritis standpoint. However we need to manage his risk perioperatively. Electronically signed by Anthony Troncoso MD on 2/19/2021 at 11:18 AM  This dictation was generated by voice recognition computer software. Although all attempts are made to edit the dictation for accuracy, there may be errors in the transcription that are not intended.

## 2021-02-22 ENCOUNTER — OFFICE VISIT (OUTPATIENT)
Dept: INTERNAL MEDICINE CLINIC | Age: 66
End: 2021-02-22
Payer: MEDICARE

## 2021-02-22 VITALS
TEMPERATURE: 97 F | DIASTOLIC BLOOD PRESSURE: 87 MMHG | WEIGHT: 173.7 LBS | BODY MASS INDEX: 24.92 KG/M2 | OXYGEN SATURATION: 98 % | SYSTOLIC BLOOD PRESSURE: 136 MMHG | HEART RATE: 64 BPM | RESPIRATION RATE: 16 BRPM

## 2021-02-22 DIAGNOSIS — I10 ESSENTIAL HYPERTENSION: Primary | ICD-10-CM

## 2021-02-22 DIAGNOSIS — I26.99 PULMONARY EMBOLISM, BILATERAL (HCC): ICD-10-CM

## 2021-02-22 PROCEDURE — 99213 OFFICE O/P EST LOW 20 MIN: CPT | Performed by: STUDENT IN AN ORGANIZED HEALTH CARE EDUCATION/TRAINING PROGRAM

## 2021-02-22 RX ORDER — HYDROCHLOROTHIAZIDE 25 MG/1
25 TABLET ORAL EVERY MORNING
Qty: 90 TABLET | Refills: 1 | Status: CANCELLED | OUTPATIENT
Start: 2021-02-22

## 2021-02-22 ASSESSMENT — PATIENT HEALTH QUESTIONNAIRE - PHQ9
SUM OF ALL RESPONSES TO PHQ QUESTIONS 1-9: 0
SUM OF ALL RESPONSES TO PHQ9 QUESTIONS 1 & 2: 0

## 2021-02-22 NOTE — PROGRESS NOTES
GASTROINTESTINAL ENDOSCOPY N/A 2020    EGD DIAGNOSTIC ONLY [facility] N/A 3/18/2020    PUSH ENTEROSCOPY performed by Charlene Brooks MD at [address] [facility] Medications:  Prior to Visit Medications    Medication Sig Taking? Authorizing Provider   rivaroxaban (XARELTO) 20 MG TABS tablet TAKE 1 TABLET BY MOUTH EVERY DAY WITH BREAKFAST Yes Lolita Scott DO   vitamin D3 (CHOLECALCIFEROL) 25 MCG (1000 UT) TABS tablet Take 1 tablet by mouth daily Yes Cleveland Jara MD   diclofenac sodium (VOLTAREN) 1 % GEL Apply 4 g topically 4 times daily  Ruddy Reyez DO        Allergies:    Valsartan-hydrochlorothiazide    Family History:       Problem Relation Age of Onset    High Blood Pressure Mother     Cancer Father         ? BRAIN    Kidney Disease Brother         ESRD ON DIALYSIS       Social History:  Social History     Tobacco Use    Smoking status: Current Every Day Smoker     Packs/day: 0.25     Years: 20.00     Pack years: 5.00     Types: Cigars     Start date:      Last attempt to quit: 2019     Years since quittin.4    Smokeless tobacco: Never Used    Tobacco comment: 1 DAILY   Substance Use Topics    Alcohol use: Yes     Comment: 1-2 X WEEKLY    Drug use: No       Data: Old records have been reviewed electronically. PHYSICAL EXAM:  /87 (Site: Right Upper Arm, Position: Sitting, Cuff Size: Medium Adult)   Pulse 64   Temp 97 °F (36.1 °C) (Oral)   Resp 16   Wt 173 lb 11.2 oz (78.8 kg)   SpO2 98%   BMI 24.92 kg/m²   Physical Exam  Constitutional:       General: He is not in acute distress. Appearance: He is not diaphoretic. HENT:      Head: Normocephalic and atraumatic. Mouth/Throat:      Mouth: Mucous membranes are moist.      Pharynx: Oropharynx is clear. No oropharyngeal exudate. Eyes:      General: No scleral icterus. Extraocular Movements: Extraocular movements intact. Conjunctiva/sclera: Conjunctivae normal.      Pupils: Pupils are equal, round, and reactive to light. Cardiovascular:      Rate and Rhythm: Normal rate and regular rhythm. Pulses: Normal pulses. Heart sounds: No murmur. No gallop. Pulmonary:      Effort: Pulmonary effort is normal. No respiratory distress. Breath sounds: Normal breath sounds. No wheezing. Abdominal:      General: Bowel sounds are normal. There is no distension. Palpations: Abdomen is soft. Tenderness: There is no abdominal tenderness. Musculoskeletal:      Right lower leg: Edema (2+) present. Left lower leg: Edema (2+) present. Skin:     Capillary Refill: Capillary refill takes less than 2 seconds. Neurological:      General: No focal deficit present. Mental Status: He is alert and oriented to person, place, and time. Health Maintanence:  Health Maintenance   Topic Date Due    AAA screen  1955    COVID-19 Vaccine (1 of 2) 09/05/1970    DTaP/Tdap/Td vaccine (1 - Tdap) 09/05/1973    Shingles Vaccine (2 of 3) 04/02/2018    Annual Wellness Visit (AWV)  03/17/2020    Pneumococcal 65+ years Vaccine (2 of 2 - PPSV23) 05/01/2023    Lipid screen  07/20/2025    Colon cancer screen colonoscopy  08/30/2029    Flu vaccine  Completed    Hepatitis C screen  Completed    Hepatitis A vaccine  Aged Out    Hepatitis B vaccine  Aged Out    Hib vaccine  Aged Out    Meningococcal (ACWY) vaccine  Aged Out       Assessment & Plan:      Sade Montalvo was seen today for follow-up and medication refill.     Diagnoses and all orders for this visit:    Essential hypertension       -    Stop amlodipine with swelling       -     Return in 1 week to see if swelling has decreased and we will reevaluated BP and start another medication    Lower extremity edema        -    Most likely 2/2 Amlodipine    Pulmonary embolism, bilateral (HCC)  -     rivaroxaban (XARELTO) 20 MG TABS tablet; TAKE 1 TABLET BY MOUTH EVERY DAY WITH BREAKFAST    RTC in 1 week to have swelling reevaluated. If better will start a new BP med. If not improved will consider echo.     Dispo: Pt has been staffed with Dr. Cortez Taylor  _______________  Bryon Corona, DO  Internal Medicine, PGY-3  2/22/2021 11:09 AM

## 2021-02-22 NOTE — PATIENT INSTRUCTIONS
Stop taking Amlodipine. Come back to the clinic in 1 week so we can reevaluate the swelling in your legs and if needed will add another blood pessure medication. The swelling in your legs is most likely due to amlodipine, the blood pressure medication you are on.

## 2021-02-23 ASSESSMENT — ENCOUNTER SYMPTOMS
VOICE CHANGE: 0
ABDOMINAL DISTENTION: 0
WHEEZING: 0
SHORTNESS OF BREATH: 0
VOMITING: 0
DIARRHEA: 0
RHINORRHEA: 0
TROUBLE SWALLOWING: 0
COUGH: 0
NAUSEA: 0

## 2021-03-01 ENCOUNTER — OFFICE VISIT (OUTPATIENT)
Dept: INTERNAL MEDICINE CLINIC | Age: 66
End: 2021-03-01
Payer: MEDICARE

## 2021-03-01 VITALS
WEIGHT: 171 LBS | HEART RATE: 72 BPM | RESPIRATION RATE: 16 BRPM | DIASTOLIC BLOOD PRESSURE: 109 MMHG | SYSTOLIC BLOOD PRESSURE: 158 MMHG | OXYGEN SATURATION: 97 % | BODY MASS INDEX: 24.54 KG/M2 | TEMPERATURE: 97.7 F

## 2021-03-01 DIAGNOSIS — Z71.6 ENCOUNTER FOR SMOKING CESSATION COUNSELING: ICD-10-CM

## 2021-03-01 DIAGNOSIS — I10 ESSENTIAL HYPERTENSION: Primary | ICD-10-CM

## 2021-03-01 PROCEDURE — 99213 OFFICE O/P EST LOW 20 MIN: CPT | Performed by: STUDENT IN AN ORGANIZED HEALTH CARE EDUCATION/TRAINING PROGRAM

## 2021-03-01 RX ORDER — NIFEDIPINE 30 MG/1
30 TABLET, FILM COATED, EXTENDED RELEASE ORAL DAILY
Qty: 30 TABLET | Refills: 1 | Status: SHIPPED | OUTPATIENT
Start: 2021-03-01 | End: 2021-03-30

## 2021-03-01 RX ORDER — VARENICLINE TARTRATE 1 MG/1
1 TABLET, FILM COATED ORAL 2 TIMES DAILY
Qty: 60 TABLET | Refills: 5 | Status: SHIPPED | OUTPATIENT
Start: 2021-03-01 | End: 2021-04-05 | Stop reason: SDUPTHER

## 2021-03-01 NOTE — PROGRESS NOTES
Outpatient Clinic Visit Note    Patient: Gustavo Hogan  : 1955 (76 y.o.)  Date: 3/1/2021    CC: Medication change/BP followup    HPI:      Stopped taking the Amlodipine and since then the swelling in his legs has gotten much better His BP in clinic is elevated since he was taken off his BP med. We discussed the use of anti-hypertensive medications and what may work best for the patient. He states he had a lisinopril-HCTZ combo pill that as soon as he took it he got sweaty, shaky and dizzy. Discussed that we could give him each agent alone and see which one had cause the affect or switch to a similat class medication. Patient would like to avoid anything that he had reacted to. No chest pain, SOB, headache, nausea, vomiting, vision changes    Patient is planning on hip surgery and was told he needs to quit smoking. He is down to one cigar a day, but would like somehting to help him. Discussed various methods to help with cessation. Patient does not want anything that has nicotine in it. Discussed chantix and patient would like to try that     ROS:  Review of Systems   Constitutional: Negative for activity change, appetite change, chills, diaphoresis, fatigue and fever. HENT: Negative for postnasal drip, rhinorrhea and sinus pressure. Respiratory: Negative for cough, shortness of breath and wheezing. Cardiovascular: Positive for leg swelling (improved). Negative for chest pain. Gastrointestinal: Negative for abdominal distention, abdominal pain, diarrhea, nausea and vomiting. Genitourinary: Negative for difficulty urinating, dysuria and frequency. Musculoskeletal: Negative for arthralgias, back pain and joint swelling. Neurological: Negative for dizziness, syncope, speech difficulty, weakness, light-headedness, numbness and headaches.        Past Medical History:    Past Medical History:   Diagnosis Date    Anxiety 2010    Back pain 2012    [facility] [address]     History of heroin abuse ([facility] [address]     Hypertension 10/25/2011    Pulmonary embolism (HCC)     FRAN[place]KA       Past Surgical History:  Past Surgical History:   Procedure Laterality Date    COLONOSCOPY N/A 2019    COLONOSCOPY performed by Marty Oliveira MD at [address] [facility]  2019    COLONOSCOPY WITH BIOPSY performed by Marty Oliveira MD at [address] Right     ENDOSCOPY, COLON, DIAGNOSTIC      UPPER GASTROINTESTINAL ENDOSCOPY N/A 2020    EGD DIAGNOSTIC ONLY performed by Marty Oliveira MD at [facility] N/A 3/18/2020    PUSH ENTEROSCOPY performed by Marty Oliveira MD at [facility] Medications:  Prior to Visit Medications    Medication Sig Taking? Authorizing Provider   varenicline (CHANTIX) 1 MG tablet Take 1 tablet by mouth 2 times daily Yes Lolita Scott DO   NIFEdipine (ADALAT CC) 30 MG extended release tablet Take 1 tablet by mouth daily Yes Lolita Scott DO   rivaroxaban (XARELTO) 20 MG TABS tablet TAKE 1 TABLET BY MOUTH EVERY DAY WITH BREAKFAST Yes Lolita Scott DO   vitamin D3 (CHOLECALCIFEROL) 25 MCG (1000 UT) TABS tablet Take 1 tablet by mouth daily Yes Cleveland Jara MD   diclofenac sodium (VOLTAREN) 1 % GEL Apply 4 g topically 4 times daily  Ge Brandon DO        Allergies:    Valsartan-hydrochlorothiazide    Family History:       Problem Relation Age of Onset    High Blood Pressure Mother     Cancer Father         ?  BRAIN    Kidney Disease Brother         ESRD ON DIALYSIS       Social History:  Social History     Tobacco Use    Smoking status: Current Every Day Smoker     Packs/day: 0.25     Years: 20.00     Pack years: 5.00     Types: Cigars     Start date:      Last attempt to quit: 2019     Years since quittin.4    Smokeless tobacco: Never Used    Tobacco comment: 1 DAILY   Substance Use Topics    Alcohol use: Yes     Comment: 1-2 X WEEKLY    Drug use: No       Data: Old records have been reviewed electronically. PHYSICAL EXAM:  BP (!) 158/109   Pulse 72   Temp 97.7 °F (36.5 °C) (Oral)   Resp 16   Wt 171 lb (77.6 kg)   SpO2 97%   BMI 24.54 kg/m²   Physical Exam  Vitals signs reviewed. Constitutional:       General: He is not in acute distress. Appearance: Normal appearance. He is not diaphoretic. HENT:      Head: Normocephalic and atraumatic. Right Ear: External ear normal.      Left Ear: External ear normal.      Nose: Nose normal. No congestion or rhinorrhea. Mouth/Throat:      Mouth: Mucous membranes are moist.      Pharynx: Oropharynx is clear. No oropharyngeal exudate or posterior oropharyngeal erythema. Eyes:      General: No scleral icterus. Extraocular Movements: Extraocular movements intact. Conjunctiva/sclera: Conjunctivae normal.      Pupils: Pupils are equal, round, and reactive to light. Cardiovascular:      Rate and Rhythm: Normal rate and regular rhythm. Pulses: Normal pulses. Heart sounds: Normal heart sounds. No murmur. No gallop. Pulmonary:      Effort: Pulmonary effort is normal. No respiratory distress. Breath sounds: Normal breath sounds. No wheezing or rales. Abdominal:      General: Bowel sounds are normal. There is no distension. Palpations: Abdomen is soft. Tenderness: There is no abdominal tenderness. Musculoskeletal: Normal range of motion. General: No tenderness. Right lower leg: Edema (1+) present. Left lower leg: Edema (1+) present. Skin:     General: Skin is warm and dry. Capillary Refill: Capillary refill takes less than 2 seconds. Findings: No erythema or rash. Neurological:      General: No focal deficit present. Mental Status: He is alert and oriented to person, place, and time.          Health Maintanence:  Health Maintenance   Topic Date Due    AAA screen  1955    COVID-19 Vaccine (1 of 2) 09/05/1970    DTaP/Tdap/Td vaccine (1 - Tdap) 09/05/1973    Shingles Vaccine (2 of 3) 04/02/2018    Annual Wellness Visit (AWV)  03/17/2020    Pneumococcal 65+ years Vaccine (2 of 2 - PPSV23) 05/01/2023    Lipid screen  07/20/2025    Colon cancer screen colonoscopy  08/30/2029    Flu vaccine  Completed    Hepatitis C screen  Completed    Hepatitis A vaccine  Aged Out    Hepatitis B vaccine  Aged Out    Hib vaccine  Aged Out    Meningococcal (ACWY) vaccine  Aged Out       Assessment & Plan:      Iwona Tapia was seen today for follow-up. Diagnoses and all orders for this visit:    Essential hypertension  -     NIFEdipine (ADALAT CC) 30 MG extended release tablet; Take 1 tablet by mouth daily    Encounter for smoking cessation counseling  -     varenicline (CHANTIX) 1 MG tablet;  Take 1 tablet by mouth 2 times daily    RTC in 1 month to check BP and how smoking cessation is going with Chantix    Dispo: Pt has been staffed with Dr. Darius Sheikh  _______________  Clark Monahan, DO  Internal Medicine, PGY-3  3/1/2021 3:17 PM

## 2021-03-01 NOTE — PATIENT INSTRUCTIONS
Start taking the nifedepine once a day    Patient Education        varenicline  Pronunciation:  sylvia CHRISTIANSON prabhakar Araiza  Brand:  Chantix  What is the most important information I should know about varenicline? When you stop smoking, you may have nicotine withdrawal symptoms with or without using medication such as varenicline. This includes feeling restless, depressed, angry, frustrated, or irritated. Stop taking varenicline and call your doctor if you have if you feel depressed, agitated, hostile, aggressive, or have thoughts about suicide or hurting yourself. Do not drink large amounts alcohol. Varenicline can increase the effects of alcohol or change the way you react to it. What is varenicline? Varenicline is a smoking cessation medicine. It is used together with behavior modification and counseling support to help you stop smoking. Varenicline may also be used for purposes not listed in this medication guide. What should I discuss with my health care provider before taking varenicline? You should not use varenicline if you used it in the past and had:  · a serious allergic reaction --trouble breathing, swelling in your face (lips, tongue, throat) or neck; or  · a serious skin reaction --blisters in your mouth, peeling skin rash. Tell your doctor if you have ever had:  · depression or mental illness;  · a seizure;  · kidney disease (or if you are on dialysis);  · heart or blood vessel problems; or  · if you drink alcohol. Tell your doctor if you are pregnant. It is not known whether varenicline will harm an unborn baby if you use the medicine during pregnancy. However, smoking while you are pregnant can harm the unborn baby or cause birth defects. If you breast-feed while using this medicine, your baby may spit up or vomit more than normal, and may have a seizure. Varenicline is not approved for use by anyone younger than 25years old. How should I take varenicline?   Follow all directions on your prescription label and read all medication guides or instruction sheets. Use the medicine exactly as directed. When you first start taking varenicline, you will take a low dose and then gradually increase it over the first several days of treatment. Take varenicline regularly to get the most benefit. You may choose from 3 ways to use varenicline. Ask your doctor which method is best for you:  · Set a date to quit smoking and start taking varenicline 1 week before that date. Make sure to quit smoking on your planned quit date. Take varenicline for a total of 12 weeks. · Start taking varenicline before you set a planned quit date, and choose a quit date that is between 8 and 35 days after you start treatment. Take varenicline for a total of 12 weeks. · Start taking varenicline and gradually reduce the number of cigarettes you smoke each day over a 12-week period, until you no longer smoke at all. Then take varenicline for another 12 weeks, for a total of 24 weeks. Take varenicline after eating. Take the medicine with a full glass of water. When you stop smoking, you may have nicotine withdrawal symptoms with or without using medication such as varenicline. Withdrawal symptoms include: increased appetite, weight gain, trouble sleeping, slower heart rate, feeling anxious or restless, and having the urge to smoke. Smoking cessation may also cause new or worsening mental health problems, such as depression. Stop taking varenicline and call your doctor if you have if you feel depressed, agitated, hostile, aggressive, or have thoughts about suicide or hurting yourself. Store at room temperature away from moisture and heat. What happens if I miss a dose? Take the medicine as soon as you can, but skip the missed dose if it is almost time for your next dose. Do not take two doses at one time. Get your prescription refilled before you run out of medicine completely. What happens if I overdose?   Seek emergency medical attention or call the Poison Help line at 1-682.835.2460. What should I avoid while taking varenicline? Do not drink large amounts alcohol while taking this medicine. Varenicline can increase the effects of alcohol or change the way you react to it. Some people taking varenicline have had unusual or aggressive behavior or forgetfulness while drinking alcohol. Do not use other medicines to quit smoking, unless your doctor tells you to. Using varenicline while wearing a nicotine patch can cause unpleasant side effects. Avoid driving or hazardous activity until you know how this medicine will affect you. Your reactions could be impaired. What are the possible side effects of varenicline? Get emergency medical help if you have signs of an allergic reaction (hives, difficult breathing, swelling in your face or throat) or a severe skin reaction (fever, sore throat, burning eyes, skin pain, red or purple skin rash with blistering and peeling). Stop using varenicline and call your doctor at once if you have:  · a seizure (convulsions);  · thoughts about suicide or hurting yourself;  · strange dreams, sleepwalking, trouble sleeping;  · new or worsening mental health problems --mood or behavior changes, depression, agitation, hostility, aggression;  · heart attack symptoms --chest pain or pressure, pain spreading to your jaw or shoulder, nausea, sweating; o  · stroke symptoms --sudden numbness or weakness (especially on one side of the body), slurred speech, problems with vision or balance. Your family or other caregivers should also be alert to changes in your mood or behavior. Common side effects may include:  · nausea (may persist for several months), vomiting;  · constipation, gas;  · sleep problems (insomnia); or  · unusual dreams. This is not a complete list of side effects and others may occur. Call your doctor for medical advice about side effects.  You may report side effects to FDA at 1-800-FDA-1088. What other drugs will affect varenicline? After you stop smoking, your doctor may need to adjust the doses of certain medicines you take on a regular basis. Tell your doctor about all your current medicines and any medicine you start or stop using. This includes prescription and over-the-counter medicines, vitamins, and herbal products. Not all possible interactions are listed here. Where can I get more information? Your pharmacist can provide more information about varenicline. Remember, keep this and all other medicines out of the reach of children, never share your medicines with others, and use this medication only for the indication prescribed. Every effort has been made to ensure that the information provided by [address] [place], [place] [address] is accurate, up-to-date, and complete, but no guarantee is made to that effect. Drug information contained herein may be time sensitive. [employer] information has been compiled for use by healthcare practitioners and consumers in the [place] and therefore [facility] [employer] [facility] does not warrant that uses outside of the [place] are appropriate, unless specifically indicated otherwise. [facility] drug information does not endorse drugs, diagnose patients or recommend therapy. [employer] drug information is an informational resource designed to assist licensed healthcare practitioners in caring for their patients and/or to serve consumers viewing this service as a supplement to, and not a substitute for, the expertise, skill, knowledge and judgment of healthcare practitioners. The absence of a warning for a given drug or drug combination in no way should be construed to indicate that the drug or drug combination is safe, effective or appropriate for any given patient. [facility] does not assume any responsibility for any aspect of healthcare administered with the aid of information [facility] provides.  The information contained herein is not intended to cover all possible uses, directions, precautions, warnings, drug interactions, allergic reactions, or adverse effects. If you have questions about the drugs you are taking, check with your doctor, nurse or pharmacist.  Copyright 5274-1470 [address]. Version: 10.01. Revision date: 8/8/2018. Care instructions adapted under license by [facility] ([facility]). If you have questions about a medical condition or this instruction, always ask your healthcare professional. [place] [address] any warranty or liability for your use of this information.

## 2021-03-03 ASSESSMENT — ENCOUNTER SYMPTOMS
ABDOMINAL DISTENTION: 0
ABDOMINAL PAIN: 0
NAUSEA: 0
RHINORRHEA: 0
DIARRHEA: 0
BACK PAIN: 0
VOMITING: 0
WHEEZING: 0
COUGH: 0
SHORTNESS OF BREATH: 0
SINUS PRESSURE: 0

## 2021-03-27 DIAGNOSIS — I10 ESSENTIAL HYPERTENSION: ICD-10-CM

## 2021-04-05 ENCOUNTER — OFFICE VISIT (OUTPATIENT)
Dept: INTERNAL MEDICINE CLINIC | Age: 66
End: 2021-04-05
Payer: MEDICARE

## 2021-04-05 VITALS
BODY MASS INDEX: 24.22 KG/M2 | SYSTOLIC BLOOD PRESSURE: 125 MMHG | OXYGEN SATURATION: 95 % | TEMPERATURE: 96.5 F | WEIGHT: 168.8 LBS | HEART RATE: 79 BPM | RESPIRATION RATE: 16 BRPM | DIASTOLIC BLOOD PRESSURE: 79 MMHG

## 2021-04-05 DIAGNOSIS — Z71.6 ENCOUNTER FOR SMOKING CESSATION COUNSELING: ICD-10-CM

## 2021-04-05 DIAGNOSIS — I10 ESSENTIAL HYPERTENSION: Primary | ICD-10-CM

## 2021-04-05 PROCEDURE — 99213 OFFICE O/P EST LOW 20 MIN: CPT | Performed by: STUDENT IN AN ORGANIZED HEALTH CARE EDUCATION/TRAINING PROGRAM

## 2021-04-05 RX ORDER — NIFEDIPINE 30 MG/1
TABLET, FILM COATED, EXTENDED RELEASE ORAL
Qty: 30 TABLET | Refills: 1 | Status: SHIPPED | OUTPATIENT
Start: 2021-04-05 | End: 2021-06-16 | Stop reason: SDUPTHER

## 2021-04-05 RX ORDER — VARENICLINE TARTRATE 1 MG/1
1 TABLET, FILM COATED ORAL 2 TIMES DAILY
Qty: 60 TABLET | Refills: 5 | Status: SHIPPED | OUTPATIENT
Start: 2021-04-05 | End: 2021-06-04 | Stop reason: ALTCHOICE

## 2021-04-05 ASSESSMENT — ENCOUNTER SYMPTOMS
ABDOMINAL PAIN: 0
ABDOMINAL DISTENTION: 0
WHEEZING: 0
VOMITING: 0
COUGH: 0
SHORTNESS OF BREATH: 0
NAUSEA: 0

## 2021-04-05 NOTE — PROGRESS NOTES
ENDOSCOPY    UPPER GASTROINTESTINAL ENDOSCOPY N/A 3/18/2020    PUSH ENTEROSCOPY performed by Feliciano Subramanian MD at [address] [facility] Medications:  Prior to Visit Medications    Medication Sig Taking? Authorizing Provider   NIFEdipine (ADALAT CC) 30 MG extended release tablet TAKE 1 TABLET BY MOUTH EVERY DAY Yes Lolita Scott DO   varenicline (CHANTIX) 1 MG tablet Take 1 tablet by mouth 2 times daily Yes Lolita Scott DO   rivaroxaban (XARELTO) 20 MG TABS tablet TAKE 1 TABLET BY MOUTH EVERY DAY WITH BREAKFAST Yes Lolita Scott DO   vitamin D3 (CHOLECALCIFEROL) 25 MCG (1000 UT) TABS tablet Take 1 tablet by mouth daily  Cleveland Jara MD   diclofenac sodium (VOLTAREN) 1 % GEL Apply 4 g topically 4 times daily  Radhika Elizalde DO        Allergies:    Valsartan-hydrochlorothiazide    Family History:       Problem Relation Age of Onset    High Blood Pressure Mother     Cancer Father         ? BRAIN    Kidney Disease Brother         ESRD ON DIALYSIS       Social History:  Social History     Tobacco Use    Smoking status: Current Every Day Smoker     Packs/day: 0.25     Years: 20.00     Pack years: 5.00     Types: Cigars     Start date:      Last attempt to quit: 2019     Years since quittin.5    Smokeless tobacco: Never Used    Tobacco comment: 1 DAILY   Substance Use Topics    Alcohol use: Yes     Comment: 1-2 X WEEKLY    Drug use: No       Data: Old records have been reviewed electronically. PHYSICAL EXAM:  /79   Pulse 79   Temp 96.5 °F (35.8 °C) (Oral)   Resp 16   Wt 168 lb 12.8 oz (76.6 kg)   SpO2 95%   BMI 24.22 kg/m²   Physical Exam  Vitals signs reviewed. Constitutional:       General: He is not in acute distress. Appearance: Normal appearance. He is not diaphoretic. HENT:      Head: Normocephalic and atraumatic. Mouth/Throat:      Mouth: Mucous membranes are moist.      Pharynx: Oropharynx is clear.  No oropharyngeal exudate or posterior oropharyngeal erythema. Eyes:      General: No scleral icterus. Extraocular Movements: Extraocular movements intact. Conjunctiva/sclera: Conjunctivae normal.      Pupils: Pupils are equal, round, and reactive to light. Cardiovascular:      Rate and Rhythm: Normal rate and regular rhythm. Pulses: Normal pulses. Heart sounds: Normal heart sounds. No murmur. No gallop. Pulmonary:      Effort: Pulmonary effort is normal. No respiratory distress. Breath sounds: Normal breath sounds. No wheezing or rales. Abdominal:      General: Bowel sounds are normal. There is no distension. Palpations: Abdomen is soft. Tenderness: There is no abdominal tenderness. Musculoskeletal: Normal range of motion. General: No swelling or tenderness. Skin:     General: Skin is warm and dry. Capillary Refill: Capillary refill takes less than 2 seconds. Findings: No erythema or rash. Neurological:      General: No focal deficit present. Mental Status: He is alert and oriented to person, place, and time. Health Maintanence:  Health Maintenance   Topic Date Due    AAA screen  Never done    COVID-19 Vaccine (1) Never done    DTaP/Tdap/Td vaccine (1 - Tdap) Never done    Shingles Vaccine (2 of 3) 04/02/2018    Annual Wellness Visit (AWV)  Never done    Pneumococcal 65+ years Vaccine (2 of 2 - PPSV23) 05/01/2023    Lipid screen  07/20/2025    Colon cancer screen colonoscopy  08/30/2029    Flu vaccine  Completed    Hepatitis C screen  Completed    Hepatitis A vaccine  Aged Out    Hepatitis B vaccine  Aged Out    Hib vaccine  Aged Out    Meningococcal (ACWY) vaccine  Aged Out       Assessment & Plan:      Barbara Muhammad was seen today for follow-up. Diagnoses and all orders for this visit:    Essential hypertension       -     Controlled       -     Continue current regimen    Encounter for smoking cessation counseling  -     varenicline (CHANTIX) 1 MG tablet;  Take 1 tablet by mouth 2 times daily    RTC in 3 months. Keep up the good work.  Follow up with the surgeon about the possibility of hip surgery    Dispo: Pt has been staffed with Dr. Wilfrid Amezquita  _______________  Chandra eHad, DO  Internal Medicine, PGY-3  4/5/2021 2:38 PM

## 2021-04-13 ENCOUNTER — TELEPHONE (OUTPATIENT)
Dept: ADMINISTRATIVE | Age: 66
End: 2021-04-13

## 2021-05-26 NOTE — TELEPHONE ENCOUNTER
NEW BP MEDICATION NIFEDIPINE IS CAUSING PT'S ANKLES AND LEGS TO SWELL, WHAT ELSE CAN HE DO OR TAKE? PT CAN BE REACHED [phone].

## 2021-06-04 ENCOUNTER — HOSPITAL ENCOUNTER (OUTPATIENT)
Dept: VASCULAR LAB | Age: 66
Discharge: HOME | End: 2021-06-04
Payer: MEDICARE

## 2021-06-04 ENCOUNTER — OFFICE VISIT (OUTPATIENT)
Dept: ORTHOPEDIC SURGERY | Age: 66
End: 2021-06-04
Payer: MEDICARE

## 2021-06-04 ENCOUNTER — HOSPITAL ENCOUNTER (EMERGENCY)
Age: 66
Discharge: HOME | End: 2021-06-04
Attending: STUDENT IN AN ORGANIZED HEALTH CARE EDUCATION/TRAINING PROGRAM
Payer: MEDICARE

## 2021-06-04 VITALS
SYSTOLIC BLOOD PRESSURE: 126 MMHG | TEMPERATURE: 97.7 F | OXYGEN SATURATION: 100 % | DIASTOLIC BLOOD PRESSURE: 87 MMHG | HEART RATE: 60 BPM | RESPIRATION RATE: 18 BRPM

## 2021-06-04 VITALS — WEIGHT: 168 LBS | HEIGHT: 70 IN | BODY MASS INDEX: 24.05 KG/M2

## 2021-06-04 DIAGNOSIS — M79.662 PAIN AND SWELLING OF LEFT LOWER LEG: Primary | ICD-10-CM

## 2021-06-04 DIAGNOSIS — Z86.718 HISTORY OF DVT (DEEP VEIN THROMBOSIS): ICD-10-CM

## 2021-06-04 DIAGNOSIS — M79.89 PAIN AND SWELLING OF LOWER LEG, RIGHT: ICD-10-CM

## 2021-06-04 DIAGNOSIS — M16.0 PRIMARY OSTEOARTHRITIS OF BOTH HIPS: ICD-10-CM

## 2021-06-04 DIAGNOSIS — Z86.711 HISTORY OF PULMONARY EMBOLISM: ICD-10-CM

## 2021-06-04 DIAGNOSIS — M79.89 PAIN AND SWELLING OF LEFT LOWER LEG: Primary | ICD-10-CM

## 2021-06-04 DIAGNOSIS — M79.661 PAIN AND SWELLING OF LOWER LEG, RIGHT: ICD-10-CM

## 2021-06-04 DIAGNOSIS — I82.413 ACUTE DEEP VEIN THROMBOSIS (DVT) OF FEMORAL VEIN OF BOTH LOWER EXTREMITIES (HCC): Primary | ICD-10-CM

## 2021-06-04 LAB
ANION GAP SERPL CALCULATED.3IONS-SCNC: 9 MMOL/L (ref 3–16)
APTT: 31.8 SEC (ref 24.2–36.2)
BASOPHILS ABSOLUTE: 0 K/UL (ref 0–0.2)
BASOPHILS RELATIVE PERCENT: 0.6 %
BUN BLDV-MCNC: 8 MG/DL (ref 7–20)
CALCIUM SERPL-MCNC: 8.9 MG/DL (ref 8.3–10.6)
CHLORIDE BLD-SCNC: 98 MMOL/L (ref 99–110)
CO2: 27 MMOL/L (ref 21–32)
CREAT SERPL-MCNC: 0.7 MG/DL (ref 0.8–1.3)
EKG ATRIAL RATE: 55 BPM
EKG DIAGNOSIS: NORMAL
EKG P AXIS: 67 DEGREES
EKG P-R INTERVAL: 160 MS
EKG Q-T INTERVAL: 452 MS
EKG QRS DURATION: 76 MS
EKG QTC CALCULATION (BAZETT): 432 MS
EKG R AXIS: -8 DEGREES
EKG T AXIS: 21 DEGREES
EKG VENTRICULAR RATE: 55 BPM
EOSINOPHILS ABSOLUTE: 0 K/UL (ref 0–0.6)
EOSINOPHILS RELATIVE PERCENT: 0 %
GFR AFRICAN AMERICAN: >60
GFR NON-AFRICAN AMERICAN: >60
GLUCOSE BLD-MCNC: 108 MG/DL (ref 70–99)
HCT VFR BLD CALC: 33.6 % (ref 40.5–52.5)
HEMOGLOBIN: 11.3 G/DL (ref 13.5–17.5)
INR BLD: 1.66 (ref 0.86–1.14)
LYMPHOCYTES ABSOLUTE: 0.8 K/UL (ref 1–5.1)
LYMPHOCYTES RELATIVE PERCENT: 10.6 %
MCH RBC QN AUTO: 34.3 PG (ref 26–34)
MCHC RBC AUTO-ENTMCNC: 33.6 G/DL (ref 31–36)
MCV RBC AUTO: 101.9 FL (ref 80–100)
MONOCYTES ABSOLUTE: 0.4 K/UL (ref 0–1.3)
MONOCYTES RELATIVE PERCENT: 5.3 %
NEUTROPHILS ABSOLUTE: 6.2 K/UL (ref 1.7–7.7)
NEUTROPHILS RELATIVE PERCENT: 83.5 %
PDW BLD-RTO: 14.2 % (ref 12.4–15.4)
PLATELET # BLD: 209 K/UL (ref 135–450)
PMV BLD AUTO: 7.9 FL (ref 5–10.5)
POTASSIUM REFLEX MAGNESIUM: 4.6 MMOL/L (ref 3.5–5.1)
PRO-BNP: 95 PG/ML (ref 0–124)
PROTHROMBIN TIME: 19.4 SEC (ref 10–13.2)
RBC # BLD: 3.3 M/UL (ref 4.2–5.9)
SODIUM BLD-SCNC: 134 MMOL/L (ref 136–145)
TROPONIN: <0.01 NG/ML
WBC # BLD: 7.5 K/UL (ref 4–11)

## 2021-06-04 PROCEDURE — 85610 PROTHROMBIN TIME: CPT

## 2021-06-04 PROCEDURE — 85025 COMPLETE CBC W/AUTO DIFF WBC: CPT

## 2021-06-04 PROCEDURE — 84484 ASSAY OF TROPONIN QUANT: CPT

## 2021-06-04 PROCEDURE — 83880 ASSAY OF NATRIURETIC PEPTIDE: CPT

## 2021-06-04 PROCEDURE — 99284 EMERGENCY DEPT VISIT MOD MDM: CPT | Performed by: SURGERY

## 2021-06-04 PROCEDURE — 93005 ELECTROCARDIOGRAM TRACING: CPT | Performed by: STUDENT IN AN ORGANIZED HEALTH CARE EDUCATION/TRAINING PROGRAM

## 2021-06-04 PROCEDURE — 80048 BASIC METABOLIC PNL TOTAL CA: CPT

## 2021-06-04 PROCEDURE — 99283 EMERGENCY DEPT VISIT LOW MDM: CPT

## 2021-06-04 PROCEDURE — 85730 THROMBOPLASTIN TIME PARTIAL: CPT

## 2021-06-04 PROCEDURE — 93970 EXTREMITY STUDY: CPT

## 2021-06-04 PROCEDURE — 99214 OFFICE O/P EST MOD 30 MIN: CPT | Performed by: PHYSICIAN ASSISTANT

## 2021-06-04 RX ORDER — METHADONE HYDROCHLORIDE 10 MG/ML
35 CONCENTRATE ORAL DAILY
COMMUNITY
End: 2022-10-31 | Stop reason: ALTCHOICE

## 2021-06-04 ASSESSMENT — ENCOUNTER SYMPTOMS
DIARRHEA: 0
SORE THROAT: 0
VOMITING: 0
COUGH: 0
ANAL BLEEDING: 0
CHEST TIGHTNESS: 0
ABDOMINAL PAIN: 0
SHORTNESS OF BREATH: 0
BACK PAIN: 0
EYE PAIN: 0
EYE REDNESS: 0
NAUSEA: 0

## 2021-06-04 ASSESSMENT — PAIN DESCRIPTION - PAIN TYPE: TYPE: ACUTE PAIN

## 2021-06-04 ASSESSMENT — PAIN SCALES - GENERAL: PAINLEVEL_OUTOF10: 0

## 2021-06-04 ASSESSMENT — PAIN DESCRIPTION - LOCATION: LOCATION: LEG

## 2021-06-04 ASSESSMENT — PAIN DESCRIPTION - ORIENTATION: ORIENTATION: LEFT;RIGHT

## 2021-06-04 NOTE — ED PROVIDER NOTES
[address] [facility]          ATTENDING PHYSICIAN NOTE       Date of evaluation: 6/4/2021    Chief Complaint     Leg Pain    History of Present Illness     Allan Montague is a 76 y.o. male who presents with bilateral lower extremity swelling. Patient has a history of anxiety, hypertension, and pulmonary embolus/DVT currently on Xarelto, states that he missed 1 dose a few days ago but otherwise has been compliant with medication, had orthopedic surgery visit today in preparation for possible hip replacement and it was noted that he had bilateral lower extremity swelling at that time and given his history was sent to hospital for bilateral lower extremity DVT ultrasound. After ultrasound patient was advised that he should present to the emergency department because \"they saw something on the ultrasound. \"  Patient has no additional information on arrival, denies recent infectious symptoms, chest pain, shortness of breath, lightheadedness, syncope, or headache. Review of Systems     Review of Systems   Constitutional: Negative for appetite change, chills, fatigue and fever. HENT: Negative for congestion and sore throat. Eyes: Negative for pain, redness and visual disturbance. Respiratory: Negative for cough, chest tightness and shortness of breath. Cardiovascular: Positive for leg swelling (bilateral, R>L). Negative for chest pain and palpitations. Gastrointestinal: Negative for abdominal pain, anal bleeding, diarrhea, nausea and vomiting. Genitourinary: Negative for decreased urine volume, dysuria, frequency and hematuria. Musculoskeletal: Negative for back pain, myalgias, neck pain and neck stiffness. Skin: Negative for rash and wound. Neurological: Negative for syncope, weakness, light-headedness, numbness and headaches. Hematological: Does not bruise/bleed easily. Psychiatric/Behavioral: Negative for confusion.        Past Medical, Surgical, Family, and General: No swelling. Normal range of motion. Cervical back: Normal range of motion and neck supple. No rigidity. Right lower leg: Edema (+2 pitting edema) present. Left lower leg: Edema (+2 pitting edema) present. Skin:     General: Skin is warm and dry. Capillary Refill: Capillary refill takes less than 2 seconds. Findings: No rash. Neurological:      General: No focal deficit present. Mental Status: He is alert and oriented to person, place, and time. Mental status is at baseline.    Psychiatric:         Mood and Affect: Mood normal.         Diagnostic Results     EKG   EKG Interpretation    Interpreted by emergency department physician    Rhythm: sinus bradycardia  Rate: 50-60  Axis: left  Ectopy: none  Conduction: normal  ST Segments: normal  T Waves: non specific changes    Clinical Impression: non-specific EKG    Nany Gallegos MD      RADIOLOGY:  No orders to display       LABS:   Results for orders placed or performed during the hospital encounter of 06/04/21   CBC Auto Differential   Result Value Ref Range    WBC 7.5 4.0 - 11.0 K/uL    RBC 3.30 (L) 4.20 - 5.90 M/uL    Hemoglobin 11.3 (L) 13.5 - 17.5 g/dL    Hematocrit 33.6 (L) 40.5 - 52.5 %    .9 (H) 80.0 - 100.0 fL    MCH 34.3 (H) 26.0 - 34.0 pg    MCHC 33.6 31.0 - 36.0 g/dL    RDW 14.2 12.4 - 15.4 %    Platelets 131 799 - 433 K/uL    MPV 7.9 5.0 - 10.5 fL    Neutrophils % 83.5 %    Lymphocytes % 10.6 %    Monocytes % 5.3 %    Eosinophils % 0.0 %    Basophils % 0.6 %    Neutrophils Absolute 6.2 1.7 - 7.7 K/uL    Lymphocytes Absolute 0.8 (L) 1.0 - 5.1 K/uL    Monocytes Absolute 0.4 0.0 - 1.3 K/uL    Eosinophils Absolute 0.0 0.0 - 0.6 K/uL    Basophils Absolute 0.0 0.0 - 0.2 K/uL   Basic Metabolic Panel w/ Reflex to MG   Result Value Ref Range    Sodium 134 (L) 136 - 145 mmol/L    Potassium reflex Magnesium 4.6 3.5 - 5.1 mmol/L    Chloride 98 (L) 99 - 110 mmol/L    CO2 27 21 - 32 mmol/L    Anion Gap 9 3 - 16    Glucose 108 (H) 70 - 99 mg/dL    BUN 8 7 - 20 mg/dL    CREATININE 0.7 (L) 0.8 - 1.3 mg/dL    GFR Non-African American >60 >60    GFR African American >60 >60    Calcium 8.9 8.3 - 10.6 mg/dL   Brain Natriuretic Peptide   Result Value Ref Range    Pro-BNP 95 0 - 124 pg/mL   Troponin   Result Value Ref Range    Troponin <0.01 <0.01 ng/mL   Protime-INR   Result Value Ref Range    Protime 19.4 (H) 10.0 - 13.2 sec    INR 1.66 (H) 0.86 - 1.14   APTT   Result Value Ref Range    aPTT 31.8 24.2 - 36.2 sec       RECENT VITALS:  BP: 126/87,Temp: 97.7 °F (36.5 °C), Pulse: 60, Resp: 18, SpO2: 100 %     Procedures       ED Course     Nursing Notes, Past Medical Hx, Past Surgical Hx, Social Hx,Allergies, and Family Hx were reviewed. patient was given the following medications:  No orders of the defined types were placed in this encounter. CONSULTS:  [facility] 36. DECISIONMAKING / ASSESSMENT / Mendel Seen is a 76 y.o. male who presents with bilateral lower extremity swelling but without chest pain, shortness of breath or lightheadedness. Patient had a bilateral lower lower extremity venous duplex earlier today however this has not resulted yet and I am unable to see these images. I will await the results of the studies while obtaining basic laboratory work including coagulation studies and EKG. Patient denies chest pain, shortness of breath, saturating well on room air and has a heart rate in the 60s on arrival, I am less concerned for pulmonary embolus and do not feel the patient requires D-dimer or CT PE at this time. On ultrasound study of the legs patient noted to have acute, mobile DVTs of bilateral common femoral as well as acute on chronic DVTs of the mid to distal thigh bilaterally.     Vascular surgery consulted and has evaluated patient at bedside, recommends outpatient follow-up with primary care physician to initiate Coumadin therapy as well as continued compression stocking

## 2021-06-04 NOTE — CONSULTS
Department of vascular Surgery        Reason for Consult:  DVT  Requesting Physician:  Dr. Cathy Olmstead:  Leg pain    History Obtained From:  patient, electronic medical record    HISTORY OF PRESENT ILLNESS:                The patient is a 76 y.o. male who presents with bilateral lower extremity swelling for two weeks. The patient states he was being assessed for hip surgery and patient was noted to have swelling in his bilateral lower extremities. The patient states he has had bilaterally lower extremity pain for two weeks. It is associated with mild pain. Denies rest pain. The swelling has not allowed him to be as active as usual but he denies any long distance travel or periods of immobility. Of note the patient had bilateral pulmonary emboli in 2018 and was placed on xerelto after ekos and IVC filter placement. The patient states he has been taking his xerelto as prescribed and only missed one dose three days ago. He denies any shortness of breathe or abdominal pain. Duplex demonstrated Evidence of an acute mobile appearing partially occluding deep venous thrombosis is noted in the right common femoral vein in prox to mid thigh and acute on chronic deep venous thrombosis in the femoral vein at distal thigh, the deep femoral vein, and popliteal vein. There is evidence on acute mobile appearing partially occluding DVT in L common femoral vein. Partially occluding superficial venous thrombosis in the L saphenofemoral junction.       Past Medical History:        Diagnosis Date    Anxiety 4/7/2010    Back pain 7/24/2012    DVT (deep venous thrombosis) (HCC)     History of heroin abuse ([facility] [address]     Hypertension 10/25/2011    Pulmonary embolism (HCC)     [place]     Past Surgical History:        Procedure Laterality Date    COLONOSCOPY N/A 7/26/2019    COLONOSCOPY performed by Dale Blanco MD at [address] [place] [address]  8/30/2019    COLONOSCOPY WITH BIOPSY performed by Dale Blanco MD at [facility] ENDOSCOPY    ELBOW SURGERY Right     ENDOSCOPY, COLON, DIAGNOSTIC      UPPER GASTROINTESTINAL ENDOSCOPY N/A 2/26/2020    EGD DIAGNOSTIC ONLY performed by Ce Castellon MD at [address] [facility] N/A 3/18/2020    PUSH ENTEROSCOPY performed by Ce Castellon MD at [address] ENDOSCOPY     Current Medications:   No current facility-administered medications for this encounter. Allergies:  Valsartan-hydrochlorothiazide    Social History:   TOBACCO:  Former smoker. Type of tobacco used:  Cigarettes. .   Quit in 2021. Started in [address]:  yes  DRUGS:  Former heroin user. Quit one year ago  Family History:       Problem Relation Age of Onset    High Blood Pressure Mother     Cancer Father         ? BRAIN    Kidney Disease Brother         ESRD ON DIALYSIS       REVIEW OF SYSTEMS:    CONSTITUTIONAL:  negative  RESPIRATORY:  negative  CARDIOVASCULAR:  negative  GASTROINTESTINAL:  negative    PHYSICAL EXAM:    VITALS:  /87   Pulse 60   Temp 97.7 °F (36.5 °C) (Oral)   Resp 18   SpO2 100%   24HR INTAKE/OUTPUT:  No intake or output data in the 24 hours ending 06/04/21 1415  CONSTITUTIONAL:  awake, alert, cooperative, no apparent distress, and appears stated age  LUNGS:  No increased work of breathing, good air exchange, clear to auscultation bilaterally, no crackles or wheezing  CARDIOVASCULAR:  Normal apical impulse, regular rate and rhythm, normal S1 and S2, no S3 or S4, and no murmur noted  ABDOMEN:  No scars, normal bowel sounds, soft, non-distended, non-tender, no masses palpated, No hepatosplenomegaly  EXTREMITIES: Lower extremities without signs of ischemia, edema to bilateral lower extremities, strength 5/5 bilaterally, warm to touch and no normal color/cap refill. Pulses: Patient has palpable femoral, popliteal, and DP pulses. PT pulses have doppler signals bilaterally.          DATA:    CBC:   Lab Results   Component Value Date    WBC 7.5 06/04/2021    RBC 3.30 06/04/2021 HGB 11.3 06/04/2021    HCT 33.6 06/04/2021    .9 06/04/2021    MCH 34.3 06/04/2021    MCHC 33.6 06/04/2021    RDW 14.2 06/04/2021     06/04/2021    MPV 7.9 06/04/2021     WBC:    Lab Results   Component Value Date    WBC 7.5 06/04/2021     CMP:    Lab Results   Component Value Date     07/20/2020    K 4.5 07/20/2020     07/20/2020    CO2 26 07/20/2020    BUN 6 07/20/2020    CREATININE 0.7 07/20/2020    GFRAA >60 07/20/2020    AGRATIO 1.5 07/20/2020    LABGLOM >60 07/20/2020    GLUCOSE 118 07/20/2020    PROT 7.0 07/20/2020    LABALBU 4.2 07/20/2020    CALCIUM 9.5 07/20/2020    BILITOT 1.2 07/20/2020    ALKPHOS 100 07/20/2020    AST 30 07/20/2020    ALT 20 07/20/2020     BMP:    Lab Results   Component Value Date     07/20/2020    K 4.5 07/20/2020     07/20/2020    CO2 26 07/20/2020    BUN 6 07/20/2020    LABALBU 4.2 07/20/2020    CREATININE 0.7 07/20/2020    CALCIUM 9.5 07/20/2020    GFRAA >60 07/20/2020    LABGLOM >60 07/20/2020    GLUCOSE 118 07/20/2020     Hepatic Function Panel:    Lab Results   Component Value Date    ALKPHOS 100 07/20/2020    ALT 20 07/20/2020    AST 30 07/20/2020    PROT 7.0 07/20/2020    BILITOT 1.2 07/20/2020    LABALBU 4.2 07/20/2020     PT/INR:  No results found for: PROTIME, INR  U/A:    Lab Results   Component Value Date    COLORU YELLOW 01/16/2019    PROTEINU Negative 01/16/2019    PHUR 6.0 07/20/2020    WBCUA 4 05/25/2017    RBCUA 5 05/25/2017    CLARITYU Clear 01/16/2019    SPECGRAV 1.022 01/16/2019    LEUKOCYTESUR Negative 01/16/2019    UROBILINOGEN 0.2 01/16/2019    BILIRUBINUR Negative 01/16/2019    BLOODU Negative 01/16/2019    GLUCOSEU Negative 01/16/2019   VL extremity   Right   Evidence of an acute mobile appearing partially occluding deep venous   thrombosis is noted in the right common femoral vein, acute partially   occluding deep venous thrombosis in the femoral vein in prox to mid thigh and   acute on chronic deep venous thrombosis in the femoral vein at distal thigh,   the deep femoral vein, and popliteal vein. A chronic phlebitic process is noted in the gastrocnemius, posterior tibial,   peroneal, and soleal veins. No other deep or superficial venous thrombosis is noted on the right. Continuous venous flow is noted in the right saphenofemoral junction,   continuous and somewhat pulsatile flow in the deep femoral vein, and popliteal   vein, indicating a possible more proximal obstruction or extrinsic compression   and pulsatile flow in the femoral vein most commonly associated with fluid   volume overload (some anechoic veins are difficult to compress likely due   fluid volume overload bilaterally). Left   Evidence of an acute mobile appearing partially occluding deep venous   thrombosis is noted in the left common femoral vein. Evidence of an acute mobile appearing partially to totally occluding   superficial venous thrombosis noted in the left saphenofemoral junction. No other deep or superficial venous thrombosis noted on the left. Continuous venous flow is noted in the common femoral vein, and continous   somewhat pulsatile flow in the femoral vein, and deep femoral vein indicating   a possible more proximal obstruction or extrinsic compression and fluid volume   overload. IMPRESSION/RECOMMENDATIONS:      76year old male who presents with bilateral lower extremity swelling for two weeks. Patient has history of DVT PE requiring EKOS and IVC filter now on xerelto. The patient denies any provocative factors at this point. The patient also states he is compliant with taking his xerelto. Patient had no acute sign of limb ischemia or changes associated with venous status.  There is no indication for acute surgical intervention at this time.    - Recommend transitioning to warfarin and following up in warfarin clinic  - Recommend compression and elevation to help with resolution of edema    Ashish Cespedes DO  PGY1, General Surgery  06/04/21  3:44 PM  [phone]

## 2021-06-04 NOTE — ED NOTES
Discharge instructions reviewed with patient. Pt verbalized understanding. IV d/c. Pt tolerated well. Pt discharged home with family.        Esther Araya RN  06/04/21 [id number]

## 2021-06-04 NOTE — ED TRIAGE NOTES
Pt arrived to ED with bilateral leg pain for 2 weeks. Sent to ED from doctor, just had outpt ultrasound and diagnosed with DVT. Currently takes xarelto.

## 2021-06-04 NOTE — PROGRESS NOTES
Dr Landon Hodges      Date /Time 6/4/2021       10:52 AM EST  Name Diana Pandey             1955   Location  [facility]  MRN [id number]                Chief Complaint   Patient presents with    Hip Pain     CK FRANCHESKA HIPS, LEFT HIP INJ 2/19/21        History of Present Illness    Diana Pandey is a 76 y.o. male who presents with  bilateral hip pain. Sent in consultation by Susanne James MD,. Occupation: Retired  Occupational activities: . Athletic/exercise activity: no sports. Injury Mechanism:  none. Worker's Comp. & legal issues:   none. Previous Treatments: Ice, Heat, NSAIDs and pain medication       Current history: Patient resents the office today for follow-up visit. He is here concerning bilateral hip pain. At his last visit he was diagnosed with bilateral hip osteoarthritis and was given a left hip intra-articular cortisone injection. The injection did help him. He is here to discuss surgical options for hip replacement. His pain is mostly concentrated over his groin with very little lateral hip pain. He does complain of the entire right lower extremity swelling from his hip to his foot. He also has left lower extremity swelling. He does have a history of DVT and PE and is currently taking Xarelto. Patient does state that he has quit smoking. Patient denies shortness of breath or trouble breathing. Previous history: Patient presents the office today for a new problem. Patient is here with a chief complaint of left greater than right hip pain. Patient has had pain for several months with increased symptoms over the last few weeks. His symptoms are concentrated lateral greater than groin. Patient does have increased pain with weightbearing activities. He also complains of lumbar pain and bilateral thigh pain. He does not complain of any pain symptoms in his lower legs or feet. No numbness and tingling.   He has tried NSAIDs, activity modifications and assistive devices without any significant improvement. He does have a history of a PE and is taking Xarelto. He also has a history of heroin addiction but states he has been clean for 1 year.   He continues to smoke    Pain Assessment  Location of Pain: Pelvis  Location Modifiers: Right, Left  Severity of Pain: 10  Quality of Pain: Aching  Duration of Pain: Persistent  Frequency of Pain: Constant]    Past History  Past Medical History:   Diagnosis Date    Anxiety 2010    Back pain 2012    DVT (deep venous thrombosis) (HCC)     [facility] of [facility]     Hypertension 10/25/2011    Pulmonary embolism ([facility]     FRANCHESKA     Past Surgical History:   Procedure Laterality Date    COLONOSCOPY N/A 2019    COLONOSCOPY performed by Patricia Sung MD at [address] [facility] [place]  2019    COLONOSCOPY WITH BIOPSY performed by Patricia Sung MD at [address] [facility] Right     ENDOSCOPY, COLON, DIAGNOSTIC      UPPER GASTROINTESTINAL ENDOSCOPY N/A 2020    EGD DIAGNOSTIC ONLY performed by Patricia Sung MD at [address] [place] [address] N/A 3/18/2020    PUSH ENTEROSCOPY performed by Patricia Sung MD at [facility] History     Tobacco Use    Smoking status: Current Every Day Smoker     Packs/day: 0.25     Years: 20.00     Pack years: 5.00     Types: Cigars     Start date: 45     Last attempt to quit: 2019     Years since quittin.7    Smokeless tobacco: Never Used    Tobacco comment: 1 DAILY   Substance Use Topics    Alcohol use: Yes     Comment: 1-2 X WEEKLY      Current Outpatient Medications on File Prior to Visit   Medication Sig Dispense Refill    NIFEdipine (ADALAT CC) 30 MG extended release tablet TAKE 1 TABLET BY MOUTH EVERY DAY 30 tablet 1    varenicline (CHANTIX) 1 MG tablet Take 1 tablet by mouth 2 times daily 60 tablet 5    rivaroxaban (XARELTO) 20 MG TABS tablet TAKE 1 TABLET BY MOUTH EVERY DAY WITH BREAKFAST 90 tablet 1    vitamin D3 (CHOLECALCIFEROL) 25 MCG (1000 UT) TABS tablet Take 1 tablet by mouth daily 90 tablet 1    diclofenac sodium (VOLTAREN) 1 % GEL Apply 4 g topically 4 times daily 5 Tube 0     No current facility-administered medications on file prior to visit. ASCVD 10-YEAR RISK SCORE  The 10-year ASCVD risk score (Enrique Gomez, et al., 2013) is: 25%    Values used to calculate the score:      Age: 76 years      Sex: Male      Is Non- : Yes      Diabetic: No      Tobacco smoker: Yes      Systolic Blood Pressure: 469 mmHg      Is BP treated: Yes      HDL Cholesterol: 51 mg/dL      Total Cholesterol: 174 mg/dL   . Review of Systems  10-point ROS is negative other than HPI. Physical Exam  Based off 1997 Exam Criteria  Ht 5' 10\" (1.778 m)   Wt 168 lb (76.2 kg)   BMI 24.11 kg/m²      Constitutional:       General: He is not in acute distress. Appearance: Normal appearance. Cardiovascular:      Rate and Rhythm: Normal rate and regular rhythm. Pulses: Normal pulses. Pulmonary:      Effort: Pulmonary effort is normal. No respiratory distress. Neurological:      Mental Status: He is alert and oriented to person, place, and time. Mental status is at baseline.      Musculoskeletal:  Gait:  antalgic    Spine / Hip Exam:      RIGHT  LEFT    Lumbar Spine Exam  [] All Neg    [] All Neg     Straight leg raise  []  []Not tested   []  []Not tested    Clonus  []  []Not tested   []  []Not tested    Pain with motion  [x]  []Not tested   [x]  []Not tested    Radiculopathy  [x]  []Not tested   [x]  []Not tested    Paraspinal muscle tenderness  [x] Paraspinal  [x]Midline   [x] Paraspinal  [x]Midline   Sensation RIGHT  LEFT    L3  [x] Normal []Decreased    [x] Normal []Decreased   L4  [] Normal  [x]Decreased   [] Normal [x]Decreased   L5  [] Normal [x]Decreased   [] Normal [x]Decreased   S1  [] Normal  [x]Decreased   [] Normal [x]Decreased   Pelvis       Scoliosis  [] Nml  [x] Present Leg-length discrepency  [x] Equal  [] Right longer   [] Left longer   Range of Motion Active Passive Active Passive   Hip Flexion 90  90    Abduction 20  20    External Rotation @ 90 flex 35  35    Internal Rotation @ 90 flex -10  -10           Hip Impingement / Dysplasia  [] All Neg  [] Not tested   [] All Neg  [] Not tested    Hip impingement test  [x]  []Not tested   [x]  []Not tested    C-sign  [x]  []Not tested   [x]  []Not tested    Anterior instability apprehension  []  []Not tested   []  []Not tested    Posterior instability apprehension  []  []Not tested   []  []Not tested    Uncontained Internal rotation  []  []Not tested  []  []Not tested          Abductors  [x] All Neg  [] Not tested   [x] All Neg  [] Not tested    Medius strength  []  []Not tested   []  []Not tested    Minimum strength  []  []Not tested   []  []Not tested    IT band tendonitis  []  []Not tested   []  []Not tested    Trochanteric tenderness  []  []Not tested  []  []Not tested   Sciatic neuropathic pain  []  []Not tested   []  []Not tested           Post-arthroplasty  [] All Neg  [] Not tested   [] All Neg  [] Not tested    Rectus tendonitis  []  []Not tested   []  []Not tested    Iliopsoas tendonitis       Start-up pain  []  []Not tested   []  []Not tested      Markedly stiff hips, stiff painful back as well. Some radiculopathy. Patient does have lower extremity swelling on the right side from his groin into his foot. He has +3 pitting edema. Positive calf pain and Homans. Patient also has left lower leg swelling distal to his knee. +2-+3 pitting edema with calf pain and a positive Homans. Imaging    Previous Bilateral hip: [facility]  Radiographs: End-stage osteoarthritis with severe osteophyte formation, joint space narrowing, cyst and sclerosis. Arthritic and lordotic lumbar spine as well. Some spondylolisthesis present in the lower lumbar segments.       Assessment and Plan  Preston Herbert was seen today for hip pain. Diagnoses and all orders for this visit:    Pain and swelling of left lower leg  -     US DUP LOWER EXTREMITIES BILATERAL VENOUS; Future    Pain and swelling of lower leg, right  -     US DUP LOWER EXTREMITIES BILATERAL VENOUS; Future    History of DVT (deep vein thrombosis)  -     US DUP LOWER EXTREMITIES BILATERAL VENOUS; Future    History of pulmonary embolism  -     US DUP LOWER EXTREMITIES BILATERAL VENOUS; Future    Primary osteoarthritis of both hips           Patient is in need of  bilateral total hip arthroplasties. Obviously 1 at a time. He would be a good candidate for an anterior approach. I do have bigger concerns today. He has definite signs of bilateral lower extremity DVT. He has a history of DVT and PE and is currently taking Xarelto. I have ordered him stacked venous Dopplers of bilateral lower extremities. He will follow-up next week. If he is negative for DVT we can discuss total hip arthroplasty at that time. If he is positive for DVT he may benefit from an intra-articular cortisone injection. I discussed with Jaylan Clemente that his history, symptoms, signs and imaging are most consistent with hip arthritis  And DVT. We reviewed the natural history of these conditions and treatment options ranging from conservative measures (rest, icing, activity modification, physical therapy, pain meds, cortisone injection) to surgical options. Based on his symptoms I recommended the following imaging studies: Venous Doppler. Script was provided. In terms of treatment, I recommended continuing with rest, icing, avoidance of painful activities, NSAIDs or pain meds as tolerated, and physical therapy. If these are not effective, cortisone injection can be considered. We discussed surgical options as well, should conservative measures fail.     Electronically signed by Nichole Jang PA-C on 6/4/2021 at 9:52 AM  This dictation was generated by voice recognition computer software. Although all attempts are made to edit the dictation for accuracy, there may be errors in the transcription that are not intended. I just received word on patient's bilateral lower extremity venous Doppler. Official report is in chart. Patient has multiple bilateral lower extremity acute DVTs with some being mobile still. I have discussed this with the radiology technician. She will transport the patient directly to the emergency room at [facility]. from the ultrasound suite at [facility]..    After talking to the patient he has complaints of severe right hip. Was seen by ortho. We moustapha give kenalog 40mg IM to help with this.

## 2021-06-07 ENCOUNTER — OFFICE VISIT (OUTPATIENT)
Dept: INTERNAL MEDICINE CLINIC | Age: 66
End: 2021-06-07
Payer: MEDICARE

## 2021-06-07 VITALS
SYSTOLIC BLOOD PRESSURE: 135 MMHG | TEMPERATURE: 97.2 F | WEIGHT: 177.7 LBS | HEIGHT: 70 IN | OXYGEN SATURATION: 99 % | HEART RATE: 64 BPM | DIASTOLIC BLOOD PRESSURE: 81 MMHG | BODY MASS INDEX: 25.44 KG/M2

## 2021-06-07 DIAGNOSIS — I10 ESSENTIAL HYPERTENSION: ICD-10-CM

## 2021-06-07 DIAGNOSIS — M54.50 CHRONIC MIDLINE LOW BACK PAIN WITHOUT SCIATICA: ICD-10-CM

## 2021-06-07 DIAGNOSIS — G89.29 CHRONIC MIDLINE LOW BACK PAIN WITHOUT SCIATICA: ICD-10-CM

## 2021-06-07 DIAGNOSIS — I82.413 ACUTE DEEP VEIN THROMBOSIS (DVT) OF FEMORAL VEIN OF BOTH LOWER EXTREMITIES (HCC): Primary | ICD-10-CM

## 2021-06-07 PROCEDURE — 96372 THER/PROPH/DIAG INJ SC/IM: CPT

## 2021-06-07 PROCEDURE — 99213 OFFICE O/P EST LOW 20 MIN: CPT | Performed by: STUDENT IN AN ORGANIZED HEALTH CARE EDUCATION/TRAINING PROGRAM

## 2021-06-07 PROCEDURE — 6360000002 HC RX W HCPCS: Performed by: INTERNAL MEDICINE

## 2021-06-07 RX ORDER — GABAPENTIN 100 MG/1
100 CAPSULE ORAL 2 TIMES DAILY PRN
Qty: 90 CAPSULE | Refills: 0 | Status: SHIPPED | OUTPATIENT
Start: 2021-06-07 | End: 2021-06-07

## 2021-06-07 RX ORDER — TRIAMCINOLONE ACETONIDE 40 MG/ML
40 INJECTION, SUSPENSION INTRA-ARTICULAR; INTRAMUSCULAR ONCE
Status: COMPLETED | OUTPATIENT
Start: 2021-06-07 | End: 2021-06-07

## 2021-06-07 RX ORDER — WARFARIN SODIUM 5 MG/1
5 TABLET ORAL DAILY
Qty: 30 TABLET | Refills: 3 | Status: SHIPPED | OUTPATIENT
Start: 2021-06-07 | End: 2021-06-07

## 2021-06-07 RX ORDER — WARFARIN SODIUM 5 MG/1
5 TABLET ORAL DAILY
Qty: 30 TABLET | Refills: 3 | Status: SHIPPED | OUTPATIENT
Start: 2021-06-07 | End: 2021-12-02 | Stop reason: SDUPTHER

## 2021-06-07 RX ORDER — GABAPENTIN 300 MG/1
300 CAPSULE ORAL 2 TIMES DAILY PRN
Qty: 90 CAPSULE | Refills: 0 | Status: SHIPPED | OUTPATIENT
Start: 2021-06-07 | End: 2021-06-30

## 2021-06-07 RX ADMIN — TRIAMCINOLONE ACETONIDE 40 MG: 40 INJECTION, SUSPENSION INTRA-ARTICULAR; INTRAMUSCULAR at 10:52

## 2021-06-07 ASSESSMENT — ENCOUNTER SYMPTOMS
BACK PAIN: 1
APNEA: 0
ABDOMINAL DISTENTION: 0
EYE REDNESS: 0
COUGH: 0
SHORTNESS OF BREATH: 0
CHEST TIGHTNESS: 0
DIARRHEA: 0
CHOKING: 0
EYE DISCHARGE: 0
CONSTIPATION: 0
EYE ITCHING: 0
NAUSEA: 0
EYE PAIN: 0
PHOTOPHOBIA: 0
ABDOMINAL PAIN: 0

## 2021-06-07 NOTE — PATIENT INSTRUCTIONS
Patient Education      please follow up with warfarin clinic to check level  Start warfarin 5 mg once daily  Inject Lovenox (enoxaparin) 80 mg into the skin every 12 hours   Stop Xarelto  warfarin (oral)  Pronunciation:  WAR far in  Brand:  Coumadin, Merlin Frames  What is the most important information I should know about warfarin? You should not take warfarin if you are prone to bleeding because of a medical condition, if you have an upcoming surgery, or if you need a spinal tap or epidural. Do not take warfarin if you cannot take it on time every day. Warfarin increases your risk of severe or fatal bleeding, especially if you have certain medical conditions, if you are 72 or older, or if you have had a stroke, or bleeding in your stomach or intestines. Seek emergency help if you have any bleeding that will not stop. Call your doctor at once if you have other signs of bleeding such as: swelling, pain, feeling very weak or dizzy, unusual bruising, bleeding gums, nosebleeds, heavy menstrual periods or abnormal vaginal bleeding, blood in your urine, bloody or tarry stools, coughing up blood or vomit that looks like coffee grounds. Many other drugs can increase your risk of bleeding when used with warfarin. Tell your doctor about all medicines you have recently used. Avoid making any changes in your diet without first talking to your doctor. Some foods can make warfarin less effective. What is warfarin? Warfarin is an anticoagulant (blood thinner). Warfarin reduces the formation of blood clots. Warfarin is used to treat or prevent blood clots in veins or arteries, which can reduce the risk of stroke, heart attack, or other serious conditions. Warfarin may also be used for purposes not listed in this medication guide. What should I discuss with my healthcare provider before taking warfarin?   You should not take warfarin if you are allergic to it, or if:  · you have very high blood pressure;  · you recently had or will have surgery on your brain, spine, or eye;  · you undergo a spinal tap or spinal anesthesia (epidural); or  · you cannot take warfarin on time every day. You also should not take warfarin if you are are prone to bleeding because of a medical condition, such as:   · a blood cell disorder (such as low red blood cells or low platelets);  · ulcers or bleeding in your stomach, intestines, lungs, or urinary tract;  · an aneurysm or bleeding in the brain; or  · an infection of the lining of your heart. Do not take warfarin if you are pregnant, unless your doctor tells you to. Warfarin can cause birth defects, but preventing blood clots may outweigh any risks to the baby. If you are not pregnant, use effective birth control to prevent pregnancy while taking warfarin and for at least 1 month after your last dose. Tell your doctor right away if you become pregnant. Warfarin can make you bleed more easily, especially if you have ever had:   · high blood pressure or serious heart disease;  · kidney disease;  · cancer or low blood cell counts;  · an accident or surgery;  · bleeding in your stomach or intestines;  · a stroke; or  · if you are 72 or older. To make sure warfarin is safe for you, tell your doctor if you have ever had:  · diabetes;  · congestive heart failure;  · liver disease, kidney disease (or if you are on dialysis);  · a hereditary clotting deficiency; or  · low blood platelets after receiving heparin. It is not known whether warfarin passes into breast milk. Watch for signs of bruising or bleeding in the baby if you take warfarin while you are breast-feeding a baby. How should I take warfarin? Follow all directions on your prescription label. Your doctor may occasionally change your dose. Do not take warfarin in larger or smaller amounts or for longer than your doctor tells you to. Take warfarin at the same time every day, with or without food. Never take a double dose.   Warfarin can make it easier for you to bleed. Seek emergency help if you have any bleeding that will not stop. You will need frequent \"INR\" or prothrombin time tests (to measure your blood-clotting time and determine your warfarin dose). You must remain under the care of a doctor while taking warfarin. If you receive warfarin in a hospital, call or visit your doctor 3 to 7 days after you leave the hospital. Your INR will need to be tested at that time. Do not miss any follow-up appointments. Tell your doctor if you are sick with diarrhea, fever, chills, or flu symptoms, or if your body weight changes. You may need to stop taking warfarin 5 to 7 days before having any surgery, dental work, or a medical procedure. Call your doctor for instructions. Wear a medical alert tag or carry an ID card stating that you take warfarin. Any medical care provider who treats you should know that you are taking this medicine. Store at room temperature away from heat, moisture, and light. What happens if I miss a dose? Take the missed dose as soon as you remember. Skip the missed dose if it is almost time for your next scheduled dose. Do not take extra medicine to make up the missed dose. What happens if I overdose? Seek emergency medical attention or call the Poison Help line at 1-876.101.7468. An overdose can cause excessive bleeding. What should I avoid while taking warfarin? Avoid activities that may increase your risk of bleeding or injury. Use extra care to prevent bleeding while shaving or brushing your teeth. You may still bleed more easily for several days after you stop taking warfarin. Avoid making any changes in your diet without first talking to your doctor. Foods that are high in vitamin K (liver, leafy green vegetables, or vegetable oils) can make warfarin less effective. If these foods are part of your diet, eat a consistent amount on a weekly basis.   Grapefruit juice, cranberry juice, marie juice, and pomegranate juice may interact with warfarin and lead to unwanted side effects. Avoid the use of these juice products while taking warfarin. Avoid drinking alcohol. Ask your doctor before using any medicine for pain, arthritis, fever, or swelling. This includes aspirin, ibuprofen (Advil, Motrin), naproxen (Aleve), celecoxib (Celebrex), diclofenac, indomethacin, meloxicam, and others. These medicines may affect blood clotting and may also increase your risk of stomach bleeding. What are the possible side effects of warfarin? Get emergency medical help if you have signs of an allergic reaction: hives; difficult breathing; swelling of your face, lips, tongue, or throat. Warfarin increases your risk of bleeding, which can be severe or life-threatening. Call your doctor at once if you have any signs of bleeding such as:  · sudden headache, feeling very weak or dizzy;  · swelling, pain, unusual bruising;  · bleeding gums, nosebleeds;  · bleeding from wounds or needle injections that will not stop;  · heavy menstrual periods or abnormal vaginal bleeding;  · blood in your urine, bloody or tarry stools; or  · coughing up blood or vomit that looks like coffee grounds. Clots formed by warfarin may block normal blood flow, which could lead to tissue death or amputation of the affected body part. Get medical help at once if you have:  · pain, swelling, hot or cold feeling, skin changes, or discoloration anywhere on your body; or  · sudden and severe leg or foot pain, foot ulcer, purple toes or fingers. Bleeding is the most common side effect of warfarin. This is not a complete list of side effects and others may occur. Call your doctor for medical advice about side effects. You may report side effects to FDA at 2-188-FDA-2610. What other drugs will affect warfarin? Many drugs (including some over-the-counter medicines and herbal products) can affect your INR and may increase the risk of bleeding if you take them with warfarin.   Not all possible drug interactions are listed in this medication guide. It is very important to ask your doctor and pharmacist before you start or stop using any other medicine, especially:  · other medicines to prevent blood clots;  · an antibiotic or antifungal medicine;  · supplements that contain vitamin K; or  · herbal (botanical) products --coenzyme Q10, cranberry, echinacea, garlic, ginkgo biloba, ginseng, goldenseal, or Wing's wort. This list is not complete and many other drugs can interact with warfarin. This includes prescription and over-the-counter medicines, vitamins, and herbal products. Give a list of all your medicines to any healthcare provider who treats you. Where can I get more information? Your pharmacist can provide more information about warfarin. Remember, keep this and all other medicines out of the reach of children, never share your medicines with others, and use this medication only for the indication prescribed. Every effort has been made to ensure that the information provided by [address] [place] [address]  is accurate, up-to-date, and complete, but no guarantee is made to that effect. Drug information contained herein may be time sensitive. [facility] information has been compiled for use by healthcare practitioners and consumers in the [place] and therefore [facility] does not warrant that uses outside of the [place] are appropriate, unless specifically indicated otherwise. [facility]'s drug information does not endorse drugs, diagnose patients or recommend therapy. [facility] drug information is an informational resource designed to assist licensed healthcare practitioners in caring for their patients and/or to serve consumers viewing this service as a supplement to, and not a substitute for, the expertise, skill, knowledge and judgment of healthcare practitioners.  The absence of a warning for a given drug or drug combination in no way should be construed to indicate that the drug or drug combination is safe, effective or appropriate for any given patient. [facility] does not assume any responsibility for any aspect of healthcare administered with the aid of information [facility] provides. The information contained herein is not intended to cover all possible uses, directions, precautions, warnings, drug interactions, allergic reactions, or adverse effects. If you have questions about the drugs you are taking, check with your doctor, nurse or pharmacist.  Copyright 1322-2828 [address]: 22.01. Revision date: 9/14/2017. Care instructions adapted under license by [facility] ([facility]). If you have questions about a medical condition or this instruction, always ask your healthcare professional. [place] [address] any warranty or liability for your use of this information.

## 2021-06-07 NOTE — PROGRESS NOTES
CLINICAL PHARMACY NOTE--Anticoagulation    Indication:  PE 2018, bilateral DVT 6/4/21 while on Xarelto  Goal INR:  2-3  Time of day dose taken: prefers to take in AM with nifedipine  Product patient has at home: warfarin 5 mg (peach color)    Reviewed the following with patient in detail:  · Reason for and intended duration of therapy  · INR goal and frequency of INR monitoring. · Medication Interactions: Call clinic if new any new medications are started--especially antibiotics. Avoid NSAIDs for pain. Use Tylenol instead. · Food-Drug Interactions: Foods high in vitamin K can change the effects of warfarin (decrease INR)--Stressed consistency with these foods. Reviewed a list of high vitamin K foods- mostly leafy green vegetables. Not able to quantify amount per week, but states he does have these on occasion. · Side effects: May bruise easier and small cuts may take longer to clot than usual.  Seek medical attention for any cuts that won't stop bleeding or falls involving head injury. Any blood in the urine or stool should be reported immediately. · Effect of alcohol and tobacco on INR--Patient quit smoking 1 month ago. Patient reports drinking 3-4 glasses wine ~4 days per week, but will limit to 2 servings per day   · Call the clinic with planned surgeries or procedures. Patient's last Xarelto dose was yesterday (6/6) in the AM. Instructed pt to start both lovenox (twice daily) and warfarin (once daily) today. Explained that he will continue lovenox until INR >2. Counseled on SQ injection technique for lovenox. Called pharmacy to verify affordable cost (~$1). They will have medication ready for patient shortly. Scheduled at [facility] on 6/10/21 for INR check. For Bk White in place:   Yes   Recommendation Provided To: Provider: 1 via Verbally to provider and Patient/Caregiver: 3 via In person   Intervention Detail: Discontinued Rx: 1, reason: KIESHA, New Rx: 2, reason: Needs Additional Therapy and Scheduled Appointment   Gap Closed?: No    Total # of Interventions Recommended: 4   Total # of Interventions Accepted: 4   Intervention Accepted By: Provider: 1 and Patient/Caregiver: 3   Time Spent (min): [address] Ranjeet Plummer, PharmD, [facility]  [facility]ication [facility]   Ph: [phone]  6/7/2021 10:54 AM

## 2021-06-07 NOTE — PROGRESS NOTES
Outpatient Clinic New Patient Note    Patient: Jojo Cruz  : 1955 (45 y.o.)  Date: 2021    CC    Follow up ED visit    HPI:      76Year old male with PMH x of DVT/PE has IVC filter in place  on Xarelto,  HTN, presented today for post emergency department follow up ,   Pt visited ED 4 days ago for Bilateral lower limb swelling and pain, pt had Orthopedics outpatient visit for pre op possible hip replacement when they found he had bilateral leg swelling, Doppler US ordered which showed Right acute deep venous thrombosis in the common femoral vein and femoral, Left acute deep venous thrombosis in the common femoral vein. Pt reports he is complaint with Xarelto and he just missed one dose in the last few months, Vascular surgery was consulted and they recommend Recommend transitioning to warfarin and following up in warfarin clinic  Pt reports bilateral leg pain more than 1 week ago, , throbbing in nature, 8/10, associated with swelling, denies any SOB, cough, chest pain,     Of note  -   pt on xarelto for more than 2 years and this is the first tiime developed this kind of pain     - REVIEW OF THE MEDICAL RECORD 2018   slowly growing stomach tumor admitted on 2018 for dyspnea due to submassive saddle PE  -Patient was admitted for less than 48 hours. Was found to have LE DVT and submassive bilateral PEs and is now POD#1 s/p IVC filter placement with bilateral EKOS catheter placement and 4 hour TPA infusion. Due to remote reported history of an abdominal mass, CT abdomen pelvis was ordered to rule out any ongoing issues necessitating intervention. There was no apparent stomach mass by CT.          Past Medical History:    Past Medical History:   Diagnosis Date    Anxiety 2010    Back pain 2012    DVT (deep venous thrombosis) (HCC)     History of heroin abuse ([facility] [address]     Hypertension 10/25/2011    Pulmonary embolism (HCC)     FRANCHESKA       Past Surgical History:  Past Surgical History: Procedure Laterality Date    COLONOSCOPY N/A 7/26/2019    COLONOSCOPY performed by Maureen Carias MD at [address]  8/30/2019    COLONOSCOPY WITH BIOPSY performed by Maureen Carias MD at [address]     ENDOSCOPY, COLON, DIAGNOSTIC      UPPER GASTROINTESTINAL ENDOSCOPY N/A 2/26/2020    EGD DIAGNOSTIC ONLY performed by Maureen Carias MD at [facility] N/A 3/18/2020    PUSH ENTEROSCOPY performed by Maureen Carias MD at [facility]:  Prior to Visit Medications    Medication Sig Taking? Authorizing Provider   warfarin (COUMADIN) 5 MG tablet Take 1 tablet by mouth daily Yes Rekha Beasley MD   gabapentin (NEURONTIN) 100 MG capsule Take 1 capsule by mouth 2 times daily as needed (pain) for up to 30 days. Yes Megan Chisholm MD   methadone (DOLOPHINE) 10 MG/ML solution Take 35 mg by mouth daily. Yes Historical Provider, MD   NIFEdipine (ADALAT CC) 30 MG extended release tablet TAKE 1 TABLET BY MOUTH EVERY DAY Yes Lolita Norton,        Allergies:    Valsartan-hydrochlorothiazide    Family History:       Problem Relation Age of Onset    High Blood Pressure Mother     Cancer Father         ? BRAIN    Kidney Disease Brother         ESRD ON DIALYSIS       Review of Systems   Constitutional: Negative for activity change, diaphoresis, fatigue and fever. HENT: Negative for congestion, drooling, ear discharge and ear pain. Eyes: Negative for photophobia, pain, discharge, redness and itching. Respiratory: Negative for apnea, cough, choking, chest tightness and shortness of breath. Cardiovascular: Negative for chest pain, palpitations and leg swelling. Gastrointestinal: Negative for abdominal distention, abdominal pain, constipation, diarrhea and nausea. Genitourinary: Negative for difficulty urinating, dysuria, enuresis, flank pain and frequency. Musculoskeletal: Positive for arthralgias and back pain. Negative for gait problem, joint swelling, myalgias, neck pain and neck stiffness. Bilateral leg swelling    Neurological: Negative for dizziness, tremors, seizures, syncope, facial asymmetry, speech difficulty, light-headedness, numbness and headaches. Psychiatric/Behavioral: Negative for agitation, behavioral problems, confusion, decreased concentration and dysphoric mood. A 10-organ Review Of Systems was obtained and otherwise unremarkable except as per HPI. Immunization History   Administered Date(s) Administered    Influenza Virus Vaccine 11/15/2017    Influenza, High Dose (Fluzone 65 yrs and older) 11/22/2019    Influenza, Quadv, IM, PF (6 mo and older Fluzone, Flulaval, Fluarix, and 3 yrs and older Afluria) 11/14/2018    Influenza, Quadv, adjuvanted, 65 yrs +, IM, PF (Fluad) 10/08/2020    Influenza, Triv, inactivated, subunit, adjuvanted, IM (Fluad 65 yrs and older) 09/18/2019    Pneumococcal Conjugate 13-valent ([id number]) 01/31/2018    Pneumococcal Polysaccharide ([id number]) 05/01/2018    Zoster Live (Zostavax) 02/05/2018       Health Maintenance Due   Topic Date Due    AAA screen  Never done    DTaP/Tdap/Td vaccine (1 - Tdap) Never done    Diabetes screen  Never done    Shingles Vaccine (2 of 3) 04/02/2018    Annual Wellness Visit (AWV)  Never done       Data: Old records have been reviewed electronically. PHYSICAL EXAM:  /81 (Site: Left Upper Arm, Position: Sitting, Cuff Size: Medium Adult)   Pulse 64   Temp 97.2 °F (36.2 °C) (Temporal)   Ht 5' 10\" (1.778 m)   Wt 177 lb 11.2 oz (80.6 kg)   SpO2 99%   BMI 25.50 kg/m²   Physical Exam  Constitutional:       Appearance: Normal appearance. HENT:      Head: Normocephalic. Left Ear: Tympanic membrane normal.      Nose: Nose normal.      Mouth/Throat:      Mouth: Mucous membranes are moist.   Eyes:      Pupils: Pupils are equal, round, and reactive to light. Cardiovascular:      Rate and Rhythm: Normal rate. Pulses: Normal pulses. Pulmonary:      Effort: Pulmonary effort is normal. No respiratory distress. Breath sounds: No rhonchi. Abdominal:      General: Abdomen is flat. There is no distension. Hernia: No hernia is present. Musculoskeletal:         General: Swelling and tenderness present. No signs of injury. Normal range of motion. Cervical back: Normal range of motion. Right lower leg: Edema present. Left lower leg: Edema present. Comments: Bilateral leg swelling and mild tenderness,warm, tender,    Skin:     General: Skin is warm. Capillary Refill: Capillary refill takes less than 2 seconds. Coloration: Skin is not jaundiced. Findings: No erythema. Neurological:      General: No focal deficit present. Mental Status: He is alert. Cranial Nerves: No cranial nerve deficit. Coordination: Coordination normal.   Psychiatric:         Mood and Affect: Mood normal.         Assessment & Plan:      1. Acute deep vein thrombosis (DVT) of femoral vein of both lower extremities (HCC)  Since pt has acute DVT on xarelto, this consider failure, will start Warfarin instead bridging with lovenox BID  -discuss with pharmacy team, they will follow up with warfarin clinic    - warfarin (COUMADIN) 5 MG tablet; Take 1 tablet by mouth daily  Dispense: 30 tablet; Refill: 3  - enoxaparin (LOVENOX) injection 80 mg  - gabapentin (NEURONTIN) 100 MG capsule; Take 1 capsule by mouth 2 times daily as needed (pain) for up to 30 days. Dispense: 90 capsule; Refill: 0  -we will order Ct chest and abdomen looking for 2/2 malignancy?   - CTA CHEST ABDOMEN W CONTRAST; Future    2. Chronic midline low back pain without sciatica    - gabapentin (NEURONTIN) 100 MG capsule; Take 1 capsule by mouth 2 times daily as needed (pain) for up to 30 days. Dispense: 90 capsule; Refill: 0    3. Essential hypertension  stable      Return in about 2 weeks (around 6/21/2021).     Dispo: Pt has been staffed with Dr. Mary Cates   _______________  Lucy Conner MD, 6/7/2021 10:10 AM   PGY-3

## 2021-06-10 ENCOUNTER — ANTI-COAG VISIT (OUTPATIENT)
Dept: PHARMACY | Age: 66
End: 2021-06-10
Payer: MEDICARE

## 2021-06-10 DIAGNOSIS — I26.99 PULMONARY EMBOLISM, BILATERAL (HCC): Primary | ICD-10-CM

## 2021-06-10 DIAGNOSIS — I82.413 ACUTE DEEP VEIN THROMBOSIS (DVT) OF FEMORAL VEIN OF BOTH LOWER EXTREMITIES (HCC): ICD-10-CM

## 2021-06-10 LAB — INTERNATIONAL NORMALIZATION RATIO, POC: 1.3

## 2021-06-10 PROCEDURE — 85610 PROTHROMBIN TIME: CPT

## 2021-06-10 PROCEDURE — 99213 OFFICE O/P EST LOW 20 MIN: CPT

## 2021-06-10 NOTE — PROGRESS NOTES
Delanna Gowers is a 76 y.o. male with PMHx significant for bilateral PE, DVT (6/4/21) while on Xarelto, HTN who presents to clinic 6/10/2021 for anticoagulation monitoring and adjustment. Anticoagulation Indication(s):  PE 2018, bilateral DVT 6/4/21 while on Xarelto  Referring Physician:   Dr. Janene Overton  Goal INR Range:  2-3  Duration of Anticoagulation Therapy:  indefinite  Time of day dose taken: prefers to take in AM with nifedipine  Product patient has at home: warfarin 5 mg (peach color)    Pertinent labs:  Lab Results   Component Value Date    INR 1.66 (H) 06/04/2021     INR Summary                           Warfarin regimen (mg)  Date INR   A/P   Sun Mon Tue Wed Thu Fri Sat Mg/wk  6/10 1.3 Below goal, increase 7.5 INR   10 10 7.5     Started warfarin 5 mg daily on 6/7/21    Last CBC:  Lab Results   Component Value Date    RBC 3.30 (L) 06/04/2021    HGB 11.3 (L) 06/04/2021    HCT 33.6 (L) 06/04/2021    .9 (H) 06/04/2021    MCH 34.3 (H) 06/04/2021    MPV 7.9 06/04/2021    RDW 14.2 06/04/2021     06/04/2021       Patient History:  Recent hospitalizations/HC visits Pt dx with bilat DVT 6/4/21 while on Xarelto 2yrs for hx of PE. Pt stopped Xarelto on 6/6/21 and started warfarin + lovenox bridge on 6/7/21. Recent medication changes None reported   Medications taken regularly that may interact with warfarin or alter INR No significant drug interactions identified   Warfarin dose taken as prescribed Yes    Signs/symptoms of bleeding None reported   Vitamin K intake Normally has maximum of 1 serving of green, leafy vegetables per week  Reviewed a list of high vitamin K foods   Recent vomiting/diarrhea/fever, changes in weight or activity level None reported   Tobacco or alcohol use Patient quit smoking 1 month ago.  Patient reports drinking 3-4 glasses wine ~4 days per week, but will limit to 2 servings per day    Upcoming surgeries or procedures None reported Assessment/Plan:  Patient's INR was subtherapeutic (1.3) today after starting warfarin just 3 days ago. Love Cody was instructed to increase warfarin dose to 10 mg today and tomorrow, then 7.5 mg daily. Continue lovenox injections every 12 hours until INR >2. Repeat INR on 6/14/21. Patient was reminded to maintain consistent vitamin K intake and call with any bleeding, medication changes, or fever/vomiting/diarrhea. As it was the first visit to [place] [address] [facility] for Love Cody, the following was also reviewed with the patient in detail:  · Reason for and intended duration of therapy  · INR goal and frequency of INR monitoring. · Medication Interactions: Call clinic if new any new medications are started--especially antibiotics. Avoid NSAIDs for pain. Use Tylenol instead. · Food-Drug Interactions: Foods high in vitamin K can change the effects of warfarin (decrease INR)--Stressed consistency with these foods. Reviewed a list of high vitamin K foods- mostly leafy green vegetables. · Side effects: May bruise easier and small cuts may take longer to clot than usual.  Seek medical attention for any cuts that won't stop bleeding or falls involving head injury. Any blood in the urine or stool should be reported immediately. · Effect of alcohol and tobacco on INR  · Call the clinic with planned surgeries or procedures. · An information pamphlet \"A Guide to Your Warfarin Therapy\" was provided to the patient. Patient understands dosing directions and information discussed. Dosing schedule and follow up appointment given to patient. Progress note routed to referring physician's office. Patient acknowledges working in consult agreement with pharmacist as referred by his/her physician. Next Clinic Appointment:  6/14 at [place] [address] location    Please call [place] [address] [facility] at [phone] with any questions.    Please call The [address] [place] [address] Avenue at [phone] with any questions. Thanks!   Bel Alejandro, PharmD, BCACP  6/10/2021 12:01 PM     For Pharmacy Admin Tracking Only     Intervention Detail: Dose Adjustment: 1: reason: Therapy Optimization   Total # of Interventions Recommended: 1   Total # of Interventions Accepted: 1   Time Spent (min): 30

## 2021-06-14 ENCOUNTER — ANTI-COAG VISIT (OUTPATIENT)
Dept: PHARMACY | Age: 66
End: 2021-06-14
Payer: MEDICARE

## 2021-06-14 DIAGNOSIS — I82.413 ACUTE DEEP VEIN THROMBOSIS (DVT) OF FEMORAL VEIN OF BOTH LOWER EXTREMITIES (HCC): ICD-10-CM

## 2021-06-14 DIAGNOSIS — I26.99 PULMONARY EMBOLISM, BILATERAL (HCC): Primary | ICD-10-CM

## 2021-06-14 LAB — INTERNATIONAL NORMALIZATION RATIO, POC: 4.2

## 2021-06-14 PROCEDURE — 85610 PROTHROMBIN TIME: CPT

## 2021-06-14 PROCEDURE — 99212 OFFICE O/P EST SF 10 MIN: CPT

## 2021-06-14 NOTE — PROGRESS NOTES
servings per day    Upcoming surgeries or procedures None reported     Assessment/Plan:  Patient's INR was supratherapeutic (4.3) today after confusing advised warfarin doses and taking 10 mg daily since last appointment. Jonelle Bustillo was instructed to hold warfarin tomorrow, take 5 mg on Wednesday, then continue with 7.5 mg daily. Repeat INR on 6/21/21. Patient was reminded to maintain consistent vitamin K intake and call with any bleeding, medication changes, or fever/vomiting/diarrhea. Showed patient the dosing calendar in detail and went over doses. Patient is not yet familiar with warfarin and seems easily confused by varied dosing. He expressed frustration with the medication and it not stabilizing quickly. Explained that some variation in INR level is always normal and stressed using the provided dosing calendar when taking the medication to help ensure proper doses are taken. Patient understands dosing directions and information discussed. Dosing schedule and follow up appointment given to patient. Progress note routed to referring physician's office. Patient acknowledges working in consult agreement with pharmacist as referred by his/her physician. Next Clinic Appointment:  6/21    Please call [facility] at [phone] with any questions. Please call The [address] [place] [address] Avenue at [phone] with any questions. Thanks!   Dominic Siemens, [facility], PharmD  6/14/2021 10:15 AM     For Pharmacy Admin Tracking Only     Intervention Detail: Dose Adjustment: 1: reason: Therapy Optimization   Total # of Interventions Recommended: 1   Total # of Interventions Accepted: 1   Time Spent (min): 30

## 2021-06-16 DIAGNOSIS — I10 ESSENTIAL HYPERTENSION: ICD-10-CM

## 2021-06-16 RX ORDER — NIFEDIPINE 30 MG/1
TABLET, FILM COATED, EXTENDED RELEASE ORAL
Qty: 30 TABLET | Refills: 1 | Status: SHIPPED | OUTPATIENT
Start: 2021-06-16 | End: 2021-08-03

## 2021-06-21 ENCOUNTER — ANTI-COAG VISIT (OUTPATIENT)
Dept: PHARMACY | Age: 66
End: 2021-06-21
Payer: MEDICARE

## 2021-06-21 DIAGNOSIS — I26.99 PULMONARY EMBOLISM, BILATERAL (HCC): Primary | ICD-10-CM

## 2021-06-21 DIAGNOSIS — I82.413 ACUTE DEEP VEIN THROMBOSIS (DVT) OF FEMORAL VEIN OF BOTH LOWER EXTREMITIES (HCC): ICD-10-CM

## 2021-06-21 LAB — INTERNATIONAL NORMALIZATION RATIO, POC: 4.3

## 2021-06-21 PROCEDURE — 85610 PROTHROMBIN TIME: CPT

## 2021-06-21 PROCEDURE — 99212 OFFICE O/P EST SF 10 MIN: CPT

## 2021-06-21 NOTE — PROGRESS NOTES
Rashawn Rosario is a 76 y.o. male with PMHx significant for bilateral PE, DVT (6/4/21) while on Xarelto, HTN who presents to clinic 6/21/2021 for anticoagulation monitoring and adjustment. Anticoagulation Indication(s):  PE 2018, bilateral DVT 6/4/21 while on Xarelto  Referring Physician:   Dr. Rachael Mabry  Goal INR Range:  2-3  Duration of Anticoagulation Therapy:  indefinite  Time of day dose taken: prefers to take in AM with nifedipine  Product patient has at home: warfarin 5 mg (peach color)    Pertinent labs:  Lab Results   Component Value Date    INR 4.2 06/14/2021    INR 1.3 06/10/2021    INR 1.66 (H) 06/04/2021     INR Summary                           Warfarin regimen (mg)  Date INR   A/P   Sun Mon Tue Wed Thu Fri Sat Mg/wk  6/21 4.3 Above goal, holdx1 5 0/7.5 5 7.5 5 7.5 5 42.5  6/14 4.2 Above goal, hold 7.5 10 0/7.5 5/7.5 7.5 7.5 7.5   6/10 1.3 Below goal, increase 7.5 INR   10 10 7.5     Started warfarin 5 mg daily on 6/7/21    Last CBC:  Lab Results   Component Value Date    RBC 3.30 (L) 06/04/2021    HGB 11.3 (L) 06/04/2021    HCT 33.6 (L) 06/04/2021    .9 (H) 06/04/2021    MCH 34.3 (H) 06/04/2021    MPV 7.9 06/04/2021    RDW 14.2 06/04/2021     06/04/2021       Patient History:  Recent hospitalizations/HC visits Pt dx with bilat DVT 6/4/21 while on Xarelto 2yrs for hx of PE. Pt stopped Xarelto on 6/6/21 and started warfarin + lovenox bridge on 6/7/21.    Recent medication changes None reported   Medications taken regularly that may interact with warfarin or alter INR No significant drug interactions identified   Warfarin dose taken as prescribed Yes    Signs/symptoms of bleeding None reported   Vitamin K intake Normally has maximum of 1 serving of green, leafy vegetables per week  Reviewed a list of high vitamin K foods   Recent vomiting/diarrhea/fever, changes in weight or activity level None reported   Tobacco or alcohol use Patient quit smoking 1 month ago. Patient reports drinking 3-4 glasses wine ~4 days per week, but will limit to 2 servings per day    Upcoming surgeries or procedures None reported     Assessment/Plan:  Patient's INR was supratherapeutic (4.3) today. Pt reports compliance with dosing and denies any changes to meds, diet, or health since last visit. He denies any s/sxs bleeding. Flower Phillips was instructed to hold warfarin today (did not take dose yet), then decrease maintenance dose to 7.5 mg on MWF and 5 mg all other days. Repeat INR on 6/28/21. Patient was reminded to maintain consistent vitamin K intake and call with any bleeding, medication changes, or fever/vomiting/diarrhea. Showed patient the dosing calendar in detail and went over doses. Patient is not yet familiar with warfarin and seems easily confused by varied dosing. He expressed frustration with the medication and it not stabilizing quickly. Explained that some variation in INR level is always normal and stressed using the provided dosing calendar when taking the medication to help ensure proper doses are taken. Patient understands dosing directions and information discussed. Dosing schedule and follow up appointment given to patient. Progress note routed to referring physician's office. Patient acknowledges working in consult agreement with pharmacist as referred by his/her physician. Next Clinic Appointment:  6/28    Please call [facility] at [phone] with any questions. Please call The [address] [place] [address] Avenue at [phone] with any questions. Thanks!   Teddy Moncada, PharmD  6/21/2021 3:36 PM     For Pharmacy Admin Tracking Only     Intervention Detail: Dose Adjustment: 1, reason: Therapy De-escalation   Total # of Interventions Recommended: 1   Total # of Interventions Accepted: 1   Time Spent (min): 30

## 2021-06-28 ENCOUNTER — ANTI-COAG VISIT (OUTPATIENT)
Dept: PHARMACY | Age: 66
End: 2021-06-28
Payer: MEDICARE

## 2021-06-28 DIAGNOSIS — I26.99 PULMONARY EMBOLISM, BILATERAL (HCC): Primary | ICD-10-CM

## 2021-06-28 DIAGNOSIS — I82.413 ACUTE DEEP VEIN THROMBOSIS (DVT) OF FEMORAL VEIN OF BOTH LOWER EXTREMITIES (HCC): ICD-10-CM

## 2021-06-28 LAB — INTERNATIONAL NORMALIZATION RATIO, POC: 2.1

## 2021-06-28 PROCEDURE — 99211 OFF/OP EST MAY X REQ PHY/QHP: CPT

## 2021-06-28 PROCEDURE — 85610 PROTHROMBIN TIME: CPT

## 2021-06-30 ENCOUNTER — OFFICE VISIT (OUTPATIENT)
Dept: INTERNAL MEDICINE CLINIC | Age: 66
End: 2021-06-30
Payer: MEDICARE

## 2021-06-30 VITALS
BODY MASS INDEX: 25.44 KG/M2 | HEIGHT: 70 IN | DIASTOLIC BLOOD PRESSURE: 73 MMHG | TEMPERATURE: 97.7 F | SYSTOLIC BLOOD PRESSURE: 136 MMHG | HEART RATE: 67 BPM | OXYGEN SATURATION: 97 % | WEIGHT: 177.7 LBS

## 2021-06-30 DIAGNOSIS — I82.413 ACUTE DEEP VEIN THROMBOSIS (DVT) OF FEMORAL VEIN OF BOTH LOWER EXTREMITIES (HCC): ICD-10-CM

## 2021-06-30 DIAGNOSIS — G89.29 CHRONIC MIDLINE LOW BACK PAIN WITHOUT SCIATICA: ICD-10-CM

## 2021-06-30 DIAGNOSIS — M54.50 CHRONIC MIDLINE LOW BACK PAIN WITHOUT SCIATICA: ICD-10-CM

## 2021-06-30 PROCEDURE — 99213 OFFICE O/P EST LOW 20 MIN: CPT | Performed by: STUDENT IN AN ORGANIZED HEALTH CARE EDUCATION/TRAINING PROGRAM

## 2021-06-30 RX ORDER — GABAPENTIN 300 MG/1
600 CAPSULE ORAL 2 TIMES DAILY PRN
Qty: 90 CAPSULE | Refills: 0 | Status: SHIPPED | OUTPATIENT
Start: 2021-06-30 | End: 2021-08-31 | Stop reason: ALTCHOICE

## 2021-06-30 NOTE — LETTER
[address] [place] [address] [address] [facility] [address]  Phone: [phone]  Fax: [phone]    Suraj Cantrell MD        June 30, 2021     Patient: Keily Rivera   YOB: 1955   Date of Visit: 6/30/2021       To Whom It May Concern: It is my medical opinion that Susanne Rothman should remain out of work until July 12th, 2021. .    If you have any questions or concerns, please don't hesitate to call.     Sincerely,        Suraj Cantrell MD

## 2021-06-30 NOTE — LETTER
[address] [place] [address] [address] [facility] [address]  Phone: [phone]  Fax: [phone]    Carole Davis MD        June 30, 2021     Patient: Thomas Ford   YOB: 1955   Date of Visit: 6/30/2021       To Whom It May Concern: It is my medical opinion that Wing Phillips may return to work on 7/12/2021 with the following restrictions: avoid excessive walking or standing (limited to 4 hours in a 8 hour work day) until August 2021. If you have any questions or concerns, please don't hesitate to call.     Sincerely,        Carole Davis MD

## 2021-06-30 NOTE — PROGRESS NOTES
Outpatient Clinic Established Patient Note    Patient: Hema Olvera  : 1955 (85 y.o.)  Date: 2021    CC: bilateral leg swelling/pain    HPI:    Mr. [place] Rodriguez is a 24-year-old male with a history of hypertension and DVTs. Patient is compliant with his medications and has been getting his weekly INR checked. He states his swelling in his legs are improving but he is still in pain. He would also like to request another week off work to help with his symptoms. He denies any chest pain, shortness of breath, cough, hemoptysis, hematochezia, changes in bowel/urinary habits. Home Meds:  Prior to Visit Medications    Medication Sig Taking? Authorizing Provider   gabapentin (NEURONTIN) 300 MG capsule Take 2 capsules by mouth 2 times daily as needed (pain) for up to 30 days. Yes Tomasa Damian MD   NIFEdipine (ADALAT CC) 30 MG extended release tablet TAKE 1 TABLET BY MOUTH EVERY DAY  Gianluca Lazcano MD   warfarin (COUMADIN) 5 MG tablet Take 1 tablet by mouth daily  Rekha Varela MD   methadone (DOLOPHINE) 10 MG/ML solution Take 35 mg by mouth daily.   Historical Provider, MD       Allergies:    Valsartan-hydrochlorothiazide    Health Maintenance Due   Topic Date Due    AAA screen  Never done    DTaP/Tdap/Td vaccine (1 - Tdap) Never done    Diabetes screen  Never done    Shingles Vaccine (2 of 3) 2018    Annual Wellness Visit (AWV)  Never done    COVID-19 Vaccine (2 - Moderna 2-dose series) 06/15/2021       Immunization History   Administered Date(s) Administered    Influenza Virus Vaccine 11/15/2017    Influenza, High Dose (Fluzone 65 yrs and older) 2019    Influenza, Quadv, IM, PF (6 mo and older Fluzone, Flulaval, Fluarix, and 3 yrs and older Afluria) 2018    Influenza, Quadv, adjuvanted, 65 yrs +, IM, PF (Fluad) 10/08/2020    Influenza, Triv, inactivated, subunit, adjuvanted, IM (Fluad 65 yrs and older) 2019    Pneumococcal Conjugate 13-valent (Shameka Libra) 0      Return in about 1 month (around 7/30/2021).     Dispo: Pt has been staffed with Dr. Katia Plummer  _______________  Josh Umanzor MD, 6/30/2021 11:05 AM   PGY-2

## 2021-07-08 ENCOUNTER — ANTI-COAG VISIT (OUTPATIENT)
Dept: PHARMACY | Age: 66
End: 2021-07-08
Payer: MEDICARE

## 2021-07-08 DIAGNOSIS — I26.99 PULMONARY EMBOLISM, BILATERAL (HCC): Primary | ICD-10-CM

## 2021-07-08 DIAGNOSIS — I82.413 ACUTE DEEP VEIN THROMBOSIS (DVT) OF FEMORAL VEIN OF BOTH LOWER EXTREMITIES (HCC): ICD-10-CM

## 2021-07-08 LAB — INTERNATIONAL NORMALIZATION RATIO, POC: 5.6

## 2021-07-08 PROCEDURE — 85610 PROTHROMBIN TIME: CPT

## 2021-07-08 PROCEDURE — 99212 OFFICE O/P EST SF 10 MIN: CPT

## 2021-07-08 NOTE — PROGRESS NOTES
Ayah Gaytan is a 76 y.o. male with PMHx significant for bilateral PE, DVT (6/4/21) while on Xarelto, HTN who presents to clinic 7/8/2021 for anticoagulation monitoring and adjustment. Anticoagulation Indication(s):  PE 2018, bilateral DVT 6/4/21 while on Xarelto  Referring Physician:   Dr. Neli Andrews  Goal INR Range:  2-3  Duration of Anticoagulation Therapy:  indefinite  Time of day dose taken: prefers to take in AM with nifedipine  Product patient has at home: warfarin 5 mg (peach color)    Pertinent labs:  Lab Results   Component Value Date    INR 2.1 06/28/2021    INR 4.3 06/21/2021    INR 4.2 06/14/2021    INR 1.3 06/10/2021     INR Summary                           Warfarin regimen (mg)  Date INR   A/P   Sun Mon Tue Wed Thu Fri Sat Mg/wk  7/8 5.6 Above goal, holdx1 5 5 5 5 5 5 5 35  6/28 2.1 At goal, no change 5 7.5 5 7.5 5 7.5 5 42.5  6/21 4.3 Above goal, holdx1 5 x 5 7.5 5 7.5 5   6/14 4.2 Above goal, hold 7.5 10 0/7.5 5/7.5 7.5 7.5 7.5   6/10 1.3 Below goal, increase 7.5 INR   10 10 7.5     Started warfarin 5 mg daily on 6/7/21    Last CBC:  Lab Results   Component Value Date    RBC 3.30 (L) 06/04/2021    HGB 11.3 (L) 06/04/2021    HCT 33.6 (L) 06/04/2021    .9 (H) 06/04/2021    MCH 34.3 (H) 06/04/2021    MPV 7.9 06/04/2021    RDW 14.2 06/04/2021     06/04/2021       Patient History:  Recent hospitalizations/HC visits Pt dx with bilat DVT 6/4/21 while on Xarelto 2yrs for hx of PE. Pt stopped Xarelto on 6/6/21 and started warfarin + lovenox bridge on 6/7/21.    Recent medication changes None reported   Medications taken regularly that may interact with warfarin or alter INR No significant drug interactions identified   Warfarin dose taken as prescribed Yes    Signs/symptoms of bleeding None reported   Vitamin K intake Normally has maximum of 1 serving of green, leafy vegetables per week  Reviewed a list of high vitamin K foods   Recent vomiting/diarrhea/fever, changes in weight or activity level None reported   Tobacco or alcohol use Patient quit smoking in May 2021.  7/8/21: pt reports having 3-4 large glasses of wine last night, but states each glass only had ~4 oz wine mixed with grape juice. It is normal for him to drink this much wine ~2-3 days per week. Upcoming surgeries or procedures None reported     Assessment/Plan:  Patient's INR was supratherapeutic (5.6) today. Initially, pt reported having ~3-4 large glasses of wine last night, but then stated this was mixed with grape juice, so he really only had 3-4 small servings. It's possible this contributed to slight INR elevation, but unlikely to cause INR of 5.6. Pt reminded to limit alcohol to 1-2 servings per day, and he agreed to cut back. Pt reports compliance with dosing and denies any changes to meds, diet, or health since last visit. He denies any s/sxs bleeding. It's unclear why his INRs are so labile. Ian Villalpando was instructed to hold warfarin tonight, then decrease dose to warfarin 5 mg daily. Repeat INR in 1 week. Patient was reminded to maintain consistent vitamin K intake and call with any bleeding, medication changes, or fever/vomiting/diarrhea. Showed patient the dosing calendar in detail and went over doses. Patient is not yet familiar with warfarin and seems easily confused by varied dosing. He expressed frustration with the medication and it not stabilizing quickly. Explained that some variation in INR level is always normal and stressed using the provided dosing calendar when taking the medication to help ensure proper doses are taken. He does not use a pillbox. Patient understands dosing directions and information discussed. Dosing schedule and follow up appointment given to patient. Progress note routed to referring physician's office. Patient acknowledges working in consult agreement with pharmacist as referred by his/her physician.      Next Clinic Appointment:  7/15    Please call [facility] at [phone] with any questions. Please call The [address] [place] [address] Avenue at [phone] with any questions. Thanks!   Jeremy Askew, PharmD  7/8/2021 11:20 AM     For Pharmacy Admin Tracking Only     Intervention Detail: Dose Adjustment: 1, reason: Therapy De-escalation   Total # of Interventions Recommended: 1   Total # of Interventions Accepted: 1   Time Spent (min): 20

## 2021-07-10 DIAGNOSIS — I10 ESSENTIAL HYPERTENSION: ICD-10-CM

## 2021-07-15 ENCOUNTER — ANTI-COAG VISIT (OUTPATIENT)
Dept: PHARMACY | Age: 66
End: 2021-07-15
Payer: MEDICARE

## 2021-07-15 DIAGNOSIS — I26.99 PULMONARY EMBOLISM, BILATERAL (HCC): Primary | ICD-10-CM

## 2021-07-15 DIAGNOSIS — I82.413 ACUTE DEEP VEIN THROMBOSIS (DVT) OF FEMORAL VEIN OF BOTH LOWER EXTREMITIES (HCC): ICD-10-CM

## 2021-07-15 LAB — INTERNATIONAL NORMALIZATION RATIO, POC: 2.1

## 2021-07-15 PROCEDURE — 85610 PROTHROMBIN TIME: CPT

## 2021-07-15 PROCEDURE — 99212 OFFICE O/P EST SF 10 MIN: CPT

## 2021-07-15 NOTE — PROGRESS NOTES
Kilo Sue is a 76 y.o. male with PMHx significant for bilateral PE, DVT (6/4/21) while on Xarelto, HTN who presents to clinic 7/15/2021 for anticoagulation monitoring and adjustment. Anticoagulation Indication(s):  PE 2018, bilateral DVT 6/4/21 while on Xarelto  Referring Physician:   Dr. Gisel Reddy  Goal INR Range:  2-3  Duration of Anticoagulation Therapy:  indefinite  Time of day dose taken: prefers to take in AM with nifedipine  Product patient has at home: warfarin 5 mg (peach color)    INR Summary                           Warfarin regimen (mg)  Date INR   A/P   Sun Mon Tue Wed Thu Fri Sat Mg/wk  7/15 2.1 At goal, increase 5 5 5 5 7.5 5 5 37.5  7/8 5.6 Above goal, holdx1 5 5 5 5 5 5 5 35  6/28 2.1 At goal, no change 5 7.5 5 7.5 5 7.5 5 42.5  6/21 4.3 Above goal, holdx1 5 x 5 7.5 5 7.5 5   6/14 4.2 Above goal, hold 7.5 10 0/7.5 5/7.5 7.5 7.5 7.5   6/10 1.3 Below goal, increase 7.5 INR   10 10 7.5     Started warfarin 5 mg daily on 6/7/21    Last CBC:  Lab Results   Component Value Date    RBC 3.30 (L) 06/04/2021    HGB 11.3 (L) 06/04/2021    HCT 33.6 (L) 06/04/2021    .9 (H) 06/04/2021    MCH 34.3 (H) 06/04/2021    MPV 7.9 06/04/2021    RDW 14.2 06/04/2021     06/04/2021       Patient History:  Recent hospitalizations/HC visits Pt dx with bilat DVT 6/4/21 while on Xarelto 2yrs for hx of PE. Pt stopped Xarelto on 6/6/21 and started warfarin + lovenox bridge on 6/7/21.    Recent medication changes None reported   Medications taken regularly that may interact with warfarin or alter INR No significant drug interactions identified   Warfarin dose taken as prescribed Yes    Signs/symptoms of bleeding None reported   Vitamin K intake Normally has maximum of 1 serving of green, leafy vegetables per week  Reviewed a list of high vitamin K foods   Recent vomiting/diarrhea/fever, changes in weight or activity level None reported   Tobacco or alcohol use Patient quit smoking in May 2021.  7/8/21: pt reports having 3-4 large glasses of wine last night, but states each glass only had ~4 oz wine mixed with grape juice. It is normal for him to drink this much wine ~2-3 days per week. 7/15/21: reports cutting back on wine to 2-3 glasses per day (1/2 is grape juice)   Upcoming surgeries or procedures None reported     Assessment/Plan:  Patient's INR was therapeutic (2.1) today, but dropped significantly over the past week. Pt reported making an effort to reduce wine from ~3-4 glasses to 2-3 glasses per day. He still mixes this with grape juice. Pt reminded to limit alcohol to 1-2 servings per day. Pt reports compliance with dosing and denies any changes to meds, diet, or health since last visit. He denies any s/sxs bleeding. It's unclear why his INRs are so labile. Because INR dropped so quickly, and he had a recent DVT,  Rachel Morton was instructed to increase warfarin dose to warfarin 5 mg daily, except 7.5 mg on thursdays. Repeat INR in 1 week. Patient was reminded to maintain consistent vitamin K intake and call with any bleeding, medication changes, or fever/vomiting/diarrhea. Showed patient the dosing calendar in detail and went over doses. Patient is not yet familiar with warfarin and seems easily confused by varied dosing. He expressed frustration with the medication and it not stabilizing quickly. Explained that some variation in INR level is always normal and stressed using the provided dosing calendar when taking the medication to help ensure proper doses are taken. He does not use a pillbox. Patient understands dosing directions and information discussed. Dosing schedule and follow up appointment given to patient. Progress note routed to referring physician's office. Patient acknowledges working in consult agreement with pharmacist as referred by his/her physician.      Next Clinic Appointment:  7/22    Please call [facility] at [phone] with any questions. Please call The [facility] [address] at [phone] with any questions. Thanks!   Jolie Ordonez, PharmD  7/15/2021 11:06 AM     For Pharmacy Admin Tracking Only     Intervention Detail: Dose Adjustment: 1, reason: Therapy Optimization   Total # of Interventions Recommended: 1   Total # of Interventions Accepted: 1   Time Spent (min): 20

## 2021-07-16 ENCOUNTER — OFFICE VISIT (OUTPATIENT)
Dept: ORTHOPEDIC SURGERY | Age: 66
End: 2021-07-16
Payer: MEDICARE

## 2021-07-16 VITALS — HEIGHT: 70 IN | BODY MASS INDEX: 25.34 KG/M2 | WEIGHT: 177 LBS

## 2021-07-16 DIAGNOSIS — M16.0 PRIMARY OSTEOARTHRITIS OF BOTH HIPS: ICD-10-CM

## 2021-07-16 DIAGNOSIS — Z86.718 HISTORY OF DVT (DEEP VEIN THROMBOSIS): Primary | ICD-10-CM

## 2021-07-16 DIAGNOSIS — M16.11 PRIMARY OSTEOARTHRITIS OF RIGHT HIP: ICD-10-CM

## 2021-07-16 DIAGNOSIS — M25.559 HIP PAIN: ICD-10-CM

## 2021-07-16 PROCEDURE — 20611 DRAIN/INJ JOINT/BURSA W/US: CPT | Performed by: ORTHOPAEDIC SURGERY

## 2021-07-16 PROCEDURE — 99214 OFFICE O/P EST MOD 30 MIN: CPT | Performed by: ORTHOPAEDIC SURGERY

## 2021-07-16 RX ORDER — LIDOCAINE HYDROCHLORIDE 10 MG/ML
20 INJECTION, SOLUTION INFILTRATION; PERINEURAL ONCE
Status: COMPLETED | OUTPATIENT
Start: 2021-07-16 | End: 2021-07-16

## 2021-07-16 RX ORDER — BUPIVACAINE HYDROCHLORIDE 2.5 MG/ML
30 INJECTION, SOLUTION INFILTRATION; PERINEURAL ONCE
Status: COMPLETED | OUTPATIENT
Start: 2021-07-16 | End: 2021-07-16

## 2021-07-16 RX ADMIN — BUPIVACAINE HYDROCHLORIDE 75 MG: 2.5 INJECTION, SOLUTION INFILTRATION; PERINEURAL at 12:04

## 2021-07-16 RX ADMIN — LIDOCAINE HYDROCHLORIDE 20 ML: 10 INJECTION, SOLUTION INFILTRATION; PERINEURAL at 12:06

## 2021-07-16 NOTE — PROGRESS NOTES
Dr Yareli Sepulveda      Date /Time 7/16/2021       10:52 AM EST  Name Gayathri Cam             1955   Location  [address]  MRN [id number]                Chief Complaint   Patient presents with    Hip Pain     bilateral hips        History of Present Illness    Gayathri Cam is a 76 y.o. male who presents with  bilateral hip pain. Sent in consultation by Lety Hammer MD,. Occupation: Retired  Occupational activities: . Athletic/exercise activity: no sports. Injury Mechanism:  none. Worker's Comp. & legal issues:   none. Previous Treatments: Ice, Heat, NSAIDs and pain medication     Current history: At patient's last visit he was complaining of bilateral extremity swelling. We were concerned about a DVT. We did order a bilateral lower extremity venous Doppler. They were positive for massive DVTs of bilateral lower extremity while taking Xarelto. Since then he has been placed on Coumadin. He states his lower extremity swelling is improving but continues to have significant hip pain. He does have advanced osteoarthritis of his hip. Previous history: Patient resents the office today for follow-up visit. He is here concerning bilateral hip pain. At his last visit he was diagnosed with bilateral hip osteoarthritis and was given a left hip intra-articular cortisone injection. The injection did help him. He is here to discuss surgical options for hip replacement. His pain is mostly concentrated over his groin with very little lateral hip pain. He does complain of the entire right lower extremity swelling from his hip to his foot. He also has left lower extremity swelling. He does have a history of DVT and PE and is currently taking Xarelto. Patient does state that he has quit smoking. Patient denies shortness of breath or trouble breathing. Previous history: Patient presents the office today for a new problem.   Patient is here with a chief complaint of left greater than right hip pain. Patient has had pain for several months with increased symptoms over the last few weeks. His symptoms are concentrated lateral greater than groin. Patient does have increased pain with weightbearing activities. He also complains of lumbar pain and bilateral thigh pain. He does not complain of any pain symptoms in his lower legs or feet. No numbness and tingling. He has tried NSAIDs, activity modifications and assistive devices without any significant improvement. He does have a history of a PE and is taking Xarelto. He also has a history of heroin addiction but states he has been clean for 1 year.   He continues to smoke    Pain Assessment  Location of Pain: Other (Comment)  Location Modifiers: Right, Left  Severity of Pain: 7]    Past History  Past Medical History:   Diagnosis Date    Anxiety 2010    Back pain 2012    DVT (deep venous thrombosis) (HCC)     [facility] of [facility]     Hypertension 10/25/2011    Pulmonary embolism ([facility] [address]     FRANCHESKA     Past Surgical History:   Procedure Laterality Date    COLONOSCOPY N/A 2019    COLONOSCOPY performed by Gloria Moody MD at [place] [address]  2019    COLONOSCOPY WITH BIOPSY performed by Gloria Moody MD at [address] [place] [address] Right     ENDOSCOPY, COLON, DIAGNOSTIC      UPPER GASTROINTESTINAL ENDOSCOPY N/A 2020    EGD DIAGNOSTIC ONLY performed by Gloria Moody MD at [facility] N/A 3/18/2020    PUSH ENTEROSCOPY performed by Gloria Moody MD at [address] [place] [address] History     Tobacco Use    Smoking status: Former Smoker     Packs/day: 0.25     Years: 20.00     Pack years: 5.00     Types: Cigars     Start date:      Quit date: 2021     Years since quittin.2    Smokeless tobacco: Never Used    Tobacco comment: 1 DAILY   Substance Use Topics    Alcohol use: Yes     Comment: pint of wine a day      Current Outpatient Medications on File Prior to Visit   Medication Sig Dispense Refill    gabapentin (NEURONTIN) 300 MG capsule Take 2 capsules by mouth 2 times daily as needed (pain) for up to 30 days. 90 capsule 0    NIFEdipine (ADALAT CC) 30 MG extended release tablet TAKE 1 TABLET BY MOUTH EVERY DAY 30 tablet 1    warfarin (COUMADIN) 5 MG tablet Take 1 tablet by mouth daily 30 tablet 3    methadone (DOLOPHINE) 10 MG/ML solution Take 35 mg by mouth daily. No current facility-administered medications on file prior to visit. ASCVD 10-YEAR RISK SCORE  The 10-year ASCVD risk score (Verenice Bowen, et al., 2013) is: 17.7%    Values used to calculate the score:      Age: 76 years      Sex: Male      Is Non- : Yes      Diabetic: No      Tobacco smoker: No      Systolic Blood Pressure: 792 mmHg      Is BP treated: Yes      HDL Cholesterol: 51 mg/dL      Total Cholesterol: 174 mg/dL   . Review of Systems  10-point ROS is negative other than HPI. Physical Exam  Based off 1997 Exam Criteria  Ht 5' 10\" (1.778 m)   Wt 177 lb (80.3 kg)   BMI 25.40 kg/m²      Constitutional:       General: He is not in acute distress. Appearance: Normal appearance. Cardiovascular:      Rate and Rhythm: Normal rate and regular rhythm. Pulses: Normal pulses. Pulmonary:      Effort: Pulmonary effort is normal. No respiratory distress. Neurological:      Mental Status: He is alert and oriented to person, place, and time. Mental status is at baseline.      Musculoskeletal:  Gait:  antalgic    Spine / Hip Exam:      RIGHT  LEFT    Lumbar Spine Exam  [] All Neg    [] All Neg     Straight leg raise  []  []Not tested   []  []Not tested    Clonus  []  []Not tested   []  []Not tested    Pain with motion  [x]  []Not tested   [x]  []Not tested    Radiculopathy  [x]  []Not tested   [x]  []Not tested    Paraspinal muscle tenderness  [x] Paraspinal  [x]Midline   [x] Paraspinal  [x]Midline   Sensation RIGHT  LEFT    L3  [x] Normal []Decreased    [x] Normal []Decreased   L4  [] Normal  [x]Decreased   [] Normal [x]Decreased   L5  [] Normal [x]Decreased   [] Normal [x]Decreased   S1  [] Normal  [x]Decreased   [] Normal [x]Decreased   Pelvis       Scoliosis  [] Nml  [x] Present     Leg-length discrepency  [x] Equal  [] Right longer   [] Left longer   Range of Motion Active Passive Active Passive   Hip Flexion 90  90    Abduction 20  20    External Rotation @ 90 flex 35  35    Internal Rotation @ 90 flex -10  -10           Hip Impingement / Dysplasia  [] All Neg  [] Not tested   [] All Neg  [] Not tested    Hip impingement test  [x]  []Not tested   [x]  []Not tested    C-sign  [x]  []Not tested   [x]  []Not tested    Anterior instability apprehension  []  []Not tested   []  []Not tested    Posterior instability apprehension  []  []Not tested   []  []Not tested    Uncontained Internal rotation  []  []Not tested  []  []Not tested          Abductors  [x] All Neg  [] Not tested   [x] All Neg  [] Not tested    Medius strength  []  []Not tested   []  []Not tested    Minimum strength  []  []Not tested   []  []Not tested    IT band tendonitis  []  []Not tested   []  []Not tested    Trochanteric tenderness  []  []Not tested  []  []Not tested   Sciatic neuropathic pain  []  []Not tested   []  []Not tested           Post-arthroplasty  [] All Neg  [] Not tested   [] All Neg  [] Not tested    Rectus tendonitis  []  []Not tested   []  []Not tested    Iliopsoas tendonitis       Start-up pain  []  []Not tested   []  []Not tested      Markedly stiff hips, stiff painful back as well. Some radiculopathy. Patient does have lower extremity swelling on the right side from his groin into his foot. He has +3 pitting edema. Positive calf pain and Homans. Patient also has left lower leg swelling distal to his knee. +2-+3 pitting edema with calf pain and a positive Homans.     Imaging    Previous Bilateral hip: [facility] [place]  Radiographs: End-stage not limited to bleeding, pain, infection, skin disruption or discoloration. Laterality was confirmed (timeout). The hip was prepped with alcohol.  Deep ultrasound was used to visualize the femoral head, neck, and acetabular rim. 22-gauge spinal needle was used. I confirmed transducer orientation along the axis of the femoral neck. [employer] ultrasound unit was used with a variable frequency (6.0-15.0 MHz) linear transducer. Ultrasound-guided needle localization to the hip joint was performed. Confirmation of needle placement was performed, and the image was stored  A formulation of 2cc of 40mg/ml Kenalog, 1cc of 1% lidocaine, 1cc of 0.25% sensoricaine was injected into the hip. Appropriate insufflation deep to the hip capsule was visualized. The site was cleaned and dressed with a band aid.  Transducer was aligned with femoral neck. Visualization of the femoral head and neck region was obtained. Visualization of the needle within the joint capsule was visualized. Images were taken and saved for permanent record. We discussed surgical options as well, should conservative measures fail.

## 2021-07-26 ENCOUNTER — ANTI-COAG VISIT (OUTPATIENT)
Dept: PHARMACY | Age: 66
End: 2021-07-26
Payer: MEDICARE

## 2021-07-26 DIAGNOSIS — I26.99 PULMONARY EMBOLISM, BILATERAL (HCC): Primary | ICD-10-CM

## 2021-07-26 DIAGNOSIS — I82.413 ACUTE DEEP VEIN THROMBOSIS (DVT) OF FEMORAL VEIN OF BOTH LOWER EXTREMITIES (HCC): ICD-10-CM

## 2021-07-26 LAB — INTERNATIONAL NORMALIZATION RATIO, POC: 3.1

## 2021-07-26 PROCEDURE — 85610 PROTHROMBIN TIME: CPT

## 2021-07-26 PROCEDURE — 99211 OFF/OP EST MAY X REQ PHY/QHP: CPT

## 2021-07-26 NOTE — PROGRESS NOTES
Chad Mccallum is a 76 y.o. male with PMHx significant for bilateral PE, DVT (6/4/21) while on Xarelto, HTN who presents to clinic 7/26/2021 for anticoagulation monitoring and adjustment. Anticoagulation Indication(s):  PE 2018, bilateral DVT 6/4/21 while on Xarelto  Referring Physician:   Dr. Troy Morales  Goal INR Range:  2-3  Duration of Anticoagulation Therapy:  indefinite  Time of day dose taken: prefers to take in AM with nifedipine  Product patient has at home: warfarin 5 mg (peach color)    INR Summary                           Warfarin regimen (mg)  Date INR   A/P   Sun Mon Tue Wed Thu Fri Sat Mg/wk  7/26     3.1       At goal, no change 5 5 5 5 7.5 5 5 37.5  7/15 2.1 At goal, increase 5 5 5 5 7.5 5 5 37.5  7/8 5.6 Above goal, holdx1 5 5 5 5 5 5 5 35  6/28 2.1 At goal, no change 5 7.5 5 7.5 5 7.5 5 42.5  6/21 4.3 Above goal, holdx1 5 x 5 7.5 5 7.5 5   6/14 4.2 Above goal, hold 7.5 10 0/7.5 5/7.5 7.5 7.5 7.5   6/10 1.3 Below goal, increase 7.5 INR   10 10 7.5     Started warfarin 5 mg daily on 6/7/21    Last CBC:  Lab Results   Component Value Date    RBC 3.30 (L) 06/04/2021    HGB 11.3 (L) 06/04/2021    HCT 33.6 (L) 06/04/2021    .9 (H) 06/04/2021    MCH 34.3 (H) 06/04/2021    MPV 7.9 06/04/2021    RDW 14.2 06/04/2021     06/04/2021       Patient History:  Recent hospitalizations/HC visits Pt dx with bilat DVT 6/4/21 while on Xarelto 2yrs for hx of PE. Pt stopped Xarelto on 6/6/21 and started warfarin + lovenox bridge on 6/7/21.    Recent medication changes None reported   Medications taken regularly that may interact with warfarin or alter INR No significant drug interactions identified   Warfarin dose taken as prescribed Yes    Signs/symptoms of bleeding None reported   Vitamin K intake Normally has maximum of 1 serving of green, leafy vegetables per week  Reviewed a list of high vitamin K foods  7/26- hasn't had any recently    Recent vomiting/diarrhea/fever, changes in weight or activity level None reported   Tobacco or alcohol use Patient quit smoking in May 2021.  21: pt reports having 3-4 large glasses of wine last night, but states each glass only had ~4 oz wine mixed with grape juice. It is normal for him to drink this much wine ~2-3 days per week. 7/15/21: reports cutting back on wine to 2-3 glasses per day (1/2 is grape juice)  -2/day still 1/2 grape juice   Upcoming surgeries or procedures None reported     Assessment/Plan:  Patient's INR was therapeutic (3.1) today. Pt reported not having any vitamin K rich foods recently. He is okay with and agrees to increasing his intake to 1 serving each week. He is staying consistent with his alcohol consumption of 2-3 glasses per day. Pt reports compliance with dosing and denies any changes to meds, diet, or health since last visit. He denies any s/sxs bleeding. It's unclear why his INRs are so labile. Rogelio Sultana was instructed to keep warfarin dose of 5 mg daily, except 7.5 mg on  and to be consistent in eating one serving of leafy green vegetables per week. Repeat INR in 2 week. Patient was reminded to maintain consistent vitamin K intake and call with any bleeding, medication changes, or fever/vomiting/diarrhea. Patient understands dosing directions and information discussed. Dosing schedule and follow up appointment given to patient. Progress note routed to referring physician's office. Patient acknowledges working in consult agreement with pharmacist as referred by his/her physician. Next Clinic Appointment:      Please call [facility] at [phone] with any questions. Please call The [address] [place] [address] at [phone] with any questions. Thanks!   Sarah Akhtar, [address] [facility], PharmD  2021 1:56 PM     For Pharmacy Admin Tracking Only     Intervention Detail: Adherence Monitorin   Total # of Interventions Recommended: 1   Total # of Interventions Accepted: 1   Time Spent (min): 15

## 2021-08-03 ENCOUNTER — OFFICE VISIT (OUTPATIENT)
Dept: INTERNAL MEDICINE CLINIC | Age: 66
End: 2021-08-03
Payer: MEDICARE

## 2021-08-03 VITALS
WEIGHT: 169.4 LBS | HEIGHT: 70 IN | OXYGEN SATURATION: 99 % | HEART RATE: 51 BPM | BODY MASS INDEX: 24.25 KG/M2 | DIASTOLIC BLOOD PRESSURE: 82 MMHG | TEMPERATURE: 97 F | SYSTOLIC BLOOD PRESSURE: 159 MMHG

## 2021-08-03 DIAGNOSIS — R06.02 SOB (SHORTNESS OF BREATH): ICD-10-CM

## 2021-08-03 DIAGNOSIS — D53.9 MACROCYTIC ANEMIA: ICD-10-CM

## 2021-08-03 DIAGNOSIS — I82.403 RECURRENT ACUTE DEEP VEIN THROMBOSIS (DVT) OF BOTH LOWER EXTREMITIES (HCC): ICD-10-CM

## 2021-08-03 DIAGNOSIS — Z00.00 HEALTHCARE MAINTENANCE: ICD-10-CM

## 2021-08-03 DIAGNOSIS — R06.02 SOB (SHORTNESS OF BREATH) ON EXERTION: Primary | ICD-10-CM

## 2021-08-03 DIAGNOSIS — E55.9 VITAMIN D INSUFFICIENCY: ICD-10-CM

## 2021-08-03 PROCEDURE — 99213 OFFICE O/P EST LOW 20 MIN: CPT | Performed by: STUDENT IN AN ORGANIZED HEALTH CARE EDUCATION/TRAINING PROGRAM

## 2021-08-03 RX ORDER — NIFEDIPINE 30 MG/1
TABLET, FILM COATED, EXTENDED RELEASE ORAL
Qty: 30 TABLET | Refills: 1 | Status: SHIPPED | OUTPATIENT
Start: 2021-08-03 | End: 2021-08-31 | Stop reason: SDUPTHER

## 2021-08-03 RX ORDER — FUROSEMIDE 20 MG/1
20 TABLET ORAL DAILY
Qty: 60 TABLET | Refills: 3 | Status: SHIPPED | OUTPATIENT
Start: 2021-08-03 | End: 2022-07-27

## 2021-08-03 RX ORDER — VALSARTAN 80 MG/1
80 TABLET ORAL DAILY
Qty: 30 TABLET | Refills: 0 | Status: SHIPPED | OUTPATIENT
Start: 2021-08-03 | End: 2021-08-31 | Stop reason: SINTOL

## 2021-08-03 NOTE — PROGRESS NOTES
[facility]) outpatient clinic note  PGY-2      Patient's PCP: Stewart Vasquez MD      HISTORY OF PRESENT ILLNESS:     Sabi Prince is a 76 y.o. male with hx of hypertension, massive bilateral recurrent leg DVTs (while already anticoagulated for previous DVT on Xarelto. Patient is currently on Coumadin) and bilateral PE (2018) status post IVC filter and osteoarthritis of both hip.  - Osteoarthritis of both hip: patient have seen Dr. Aide Burden on 07/16. Patient received right intra-articular cortisone injection. Left one is planned in 2 weeks. - Massive bilateral recurrent leg DVTs (while already anticoagulated for previous DVT on Xarelto. Patient is currently on Coumadin) and bilateral PE (2018) status post IVC filter: Patient complained today of mild chronic shortness of breath especially with exertion and bilateral pleural lower extremity edema.   - HTN: Blood pressure today 159/82. Patient does not measure his blood pressure. Patient is compliant with his home nifedipine 30 mg p.o. daily.     PAST HISTORY:     Past Medical History:   Diagnosis Date    Anxiety 4/7/2010    Back pain 7/24/2012    DVT (deep venous thrombosis) (HCC)     History of heroin abuse ([facility] [address]     Hypertension 10/25/2011    Pulmonary embolism (HCC)     FRAN[place]KA       Past Surgical History:   Procedure Laterality Date    COLONOSCOPY N/A 7/26/2019    COLONOSCOPY performed by Joceline Shields MD at [address] [facility] [place]note 2]  8/30/2019    COLONOSCOPY WITH BIOPSY performed by Joceline Shields MD at [address]     ENDOSCOPY, COLON, DIAGNOSTIC      UPPER GASTROINTESTINAL ENDOSCOPY N/A 2/26/2020    EGD DIAGNOSTIC ONLY performed by Joceline Shields MD at [address] [place] [address] N/A 3/18/2020    PUSH ENTEROSCOPY performed by Joceline Shields MD at [address] [place][note 3] History     Substance and Sexual Activity   Alcohol Use Yes    Comment: pint of wine a day     Social History     Substance and Sexual Activity   Drug Use Not Currently    Comment: last heroine      Social History     Tobacco Use   Smoking Status Former Smoker    Packs/day: 0.25    Years: 20.00    Pack years: 5.00    Types: Cigars    Start date: 45    Quit date: 2021    Years since quittin.2   Smokeless Tobacco Never Used   Tobacco Comment    1 DAILY       FMHx:      Problem Relation Age of Onset    High Blood Pressure Mother     Cancer Father         ? BRAIN    Kidney Disease Brother         ESRD ON DIALYSIS        MEDICATIONS:     Current Outpatient Medications on File Prior to Visit   Medication Sig Dispense Refill    gabapentin (NEURONTIN) 300 MG capsule Take 2 capsules by mouth 2 times daily as needed (pain) for up to 30 days. 90 capsule 0    NIFEdipine (ADALAT CC) 30 MG extended release tablet TAKE 1 TABLET BY MOUTH EVERY DAY 30 tablet 1    warfarin (COUMADIN) 5 MG tablet Take 1 tablet by mouth daily 30 tablet 3    methadone (DOLOPHINE) 10 MG/ML solution Take 35 mg by mouth daily. No current facility-administered medications on file prior to visit. Scheduled Meds:   Continuous Infusions:  PRN Meds:    Allergies: Allergies   Allergen Reactions    Valsartan-Hydrochlorothiazide      Sweating - light headed       REVIEW OF SYSTEMS:       History obtained from the patient  ? CONSTITUTIONAL:  Neg Recent weight changes,fatigue,fever,chills or night sweats  ? EYES:  Neg blurriness. ? EARS:  Neg hearing loss. ? NOSE:  Neg rhinorrhea.  ? MOUTH/THROAT:  Neg bleeding gums. ? RESPIRATORY:  Pos SOB. Neg wheeze,cough,sputum. ? CARDIOVASCULAR:  Neg chest pain,palpitations,dyspnea on exertion. ? GASTROINTESTINAL:  Neg Appetite changes,nausea,vomiting,or diarrhea. ? GENITOURINARY:  Neg Urinary frequency, hesitancy. ? HEMATOLOGIC/LYMPHATIC:  Neg Anemia. ? MUSCULOSKELETAL:  Neg New myalgias,bone pain. ? NEUROLOGICAL: Neg Loss of Consciousness,memeory loss, forgetfulness.   ? SKIN :  Neg skin or hair changes,and has no itching,rashes,sores. ? PSYCHIATRIC:  Neg depression. ? ENDOCRINE:  Neg polydipsia,polyuria,abnormal weight changes. ? ALL/IMM :  Neg  reactions to drugs other than listed. PHYSICAL EXAM:       Vitals: BP (!) 159/82 (Site: Left Upper Arm, Position: Sitting, Cuff Size: Small Adult)   Pulse 51   Temp 97 °F (36.1 °C) (Temporal)   Ht 5' 10\" (1.778 m)   Wt 169 lb 6.4 oz (76.8 kg)   SpO2 99%   BMI 24.31 kg/m²     I/O:  No intake or output data in the 24 hours ending 08/03/21 1538  [unfilled]  [unfilled]    Physical Examination:   ? General appearance: alert, appears stated age and cooperative. ? Skin: Skin color, texture, turgor normal.   ? HEENT: Head: Normocephalic. ? Neck: no adenopathy. ? Lungs: clear to auscultation bilaterally. ? Heart: no murmur, click, rub or gallop. ? Abdomen: soft, non-tender; bowel sounds normal.  ? Extremities: B/l LE edema. ? Lymphatic: No significant lymph node enlargement palpable. ? Neurologic: Mental status: Alert, oriented, thought content appropriate. DATA:       Labs:  No results found for this visit on 08/03/21. U/A:No results for input(s): NITRITE, COLORU, PHUR, LABCAST, WBCUA, RBCUA, MUCUS, TRICHOMONAS, YEAST, BACTERIA, CLARITYU, SPECGRAV, LEUKOCYTESUR, UROBILINOGEN, BILIRUBINUR, BLOODU, GLUCOSEU, AMORPHOUS in the last 72 hours. Invalid input(s): Angel Pierce    Rad:   No orders to display       ASSESSMENT AND PLAN:     -Osteoarthritis of both hip  Patient have seen Dr. Ewa Jack on 07/16. Patient received right intra-articular cortisone injection. Left one is planned in 2 weeks. -Massive bilateral recurrent leg DVTs (while already anticoagulated for previous DVT on Xarelto. Patient is currently on Coumadin) and bilateral PE (2018) status post IVC filter  -SOB   Patient complained today of mild chronic shortness of breath especially with exertion and bilateral pleural lower extremity edema. - Continue home warfarin.  Weekly INR checks in the warfarin clinic  - Echo  - F/U hypercoagulable test    -HTN  Blood pressure today 159/82. Patient does not measure his blood pressure.    - Continue home nifedipine 30 mg p.o. daily  - Patient was asked to monitor his blood pressure at home and to bring it to our next appointment    This patient has been staffed and discussed with Salvatore Johnson MD.  -----------------------------  Phil Ruvalcaba MD, PGY-2

## 2021-08-03 NOTE — PATIENT INSTRUCTIONS
- Please do your lab work  - Please do an echocardiogram (ultrasound of the heart)  - Please take Lasix 20 mg daily and Valsartan 80 mg daily  - Please measure your blood pressure at home and bring a log in our next visit  - Please come back in 1 month   - Please call if you have any questions

## 2021-08-09 ENCOUNTER — TELEPHONE (OUTPATIENT)
Dept: INTERNAL MEDICINE CLINIC | Age: 66
End: 2021-08-09

## 2021-08-09 NOTE — TELEPHONE ENCOUNTER
PT STATED HE WAS PRESCRIBED VALSARTAN BUT HE PREVIOUSLY HAD A BAD REACTION TO IT AND WAS TAKEN OFF IT, PT STATED HE SHOULD BE TAKING NIFEDIPINE INSTEAD, PT CAN BE REACHED [phone]

## 2021-08-19 ENCOUNTER — TELEPHONE (OUTPATIENT)
Dept: PHARMACY | Age: 66
End: 2021-08-19

## 2021-08-25 ENCOUNTER — TELEPHONE (OUTPATIENT)
Dept: INTERNAL MEDICINE CLINIC | Age: 66
End: 2021-08-25

## 2021-08-26 ENCOUNTER — TELEPHONE (OUTPATIENT)
Dept: INTERNAL MEDICINE CLINIC | Age: 66
End: 2021-08-26

## 2021-08-26 NOTE — TELEPHONE ENCOUNTER
----- Message from Sunday Curry sent at 8/25/2021 12:00 PM EDT -----  Subject: Medication Problem    QUESTIONS  Name of Medication? NIFEdipine (ADALAT CC) 30 MG extended release tablet  Patient-reported dosage and instructions? 30 mg tab  What question or problem do you have with the medication? Patient states   he was given a refill for the wrong blood pressure medication. He said the   80mg Valsartan should be removed from his record and he only needs the   30mg Nifedipine. Has been without it for a day  Preferred Pharmacy? [id number] [address]  Pharmacy phone number (if available)? [phone]  Additional Information for Provider? Pt. referenced Dr. Maine Mendoza as the   prescriber. Reny marquis/ cheyanne ALLISON called the [facility]   and they are going to reach out to the patient about the issue with the   medication.  ---------------------------------------------------------------------------  --------------  CALL BACK INFO  What is the best way for the office to contact you? OK to leave message on   voicemail  Preferred Call Back Phone Number? [phone]  ---------------------------------------------------------------------------  --------------  SCRIPT ANSWERS  Relationship to Patient?  Self

## 2021-08-30 ENCOUNTER — ANTI-COAG VISIT (OUTPATIENT)
Dept: PHARMACY | Age: 66
End: 2021-08-30
Payer: MEDICARE

## 2021-08-30 DIAGNOSIS — I26.99 PULMONARY EMBOLISM, BILATERAL (HCC): Primary | ICD-10-CM

## 2021-08-30 DIAGNOSIS — I82.413 ACUTE DEEP VEIN THROMBOSIS (DVT) OF FEMORAL VEIN OF BOTH LOWER EXTREMITIES (HCC): ICD-10-CM

## 2021-08-30 LAB — INTERNATIONAL NORMALIZATION RATIO, POC: 1.8

## 2021-08-30 PROCEDURE — 85610 PROTHROMBIN TIME: CPT | Performed by: PHARMACIST

## 2021-08-30 PROCEDURE — 99211 OFF/OP EST MAY X REQ PHY/QHP: CPT | Performed by: PHARMACIST

## 2021-08-30 NOTE — PROGRESS NOTES
Corrinne Guppy is a 76 y.o. male with PMHx significant for bilateral PE, DVT (6/4/21) while on Xarelto, HTN who presents to clinic 8/30/2021 for anticoagulation monitoring and adjustment. Anticoagulation Indication(s):  PE 2018, bilateral DVT 6/4/21 while on Xarelto  Referring Physician:   Dr. Carlos Mosley  Goal INR Range:  2-3  Duration of Anticoagulation Therapy:  indefinite  Time of day dose taken: prefers to take in AM with nifedipine  Product patient has at home: warfarin 5 mg (peach color)    INR Summary                           Warfarin regimen (mg)  Date INR   A/P   Sun Mon Tue Wed Thu Fri Sat Mg/wk  8/30     1.8       Below goal, no change5 5 5 5 7.5 5 5 37.5  7/26     3.1       At goal, no change 5 5 5 5 7.5 5 5 37.5  7/15 2.1 At goal, increase 5 5 5 5 7.5 5 5 37.5  7/8 5.6 Above goal, holdx1 5 5 5 5 5 5 5 35  6/28 2.1 At goal, no change 5 7.5 5 7.5 5 7.5 5 42.5  6/21 4.3 Above goal, holdx1 5 x 5 7.5 5 7.5 5   6/14 4.2 Above goal, hold 7.5 10 0/7.5 5/7.5 7.5 7.5 7.5   6/10 1.3 Below goal, increase 7.5 INR   10 10 7.5     Started warfarin 5 mg daily on 6/7/21    Last CBC:  Lab Results   Component Value Date    RBC 3.30 (L) 06/04/2021    HGB 11.3 (L) 06/04/2021    HCT 33.6 (L) 06/04/2021    .9 (H) 06/04/2021    MCH 34.3 (H) 06/04/2021    MPV 7.9 06/04/2021    RDW 14.2 06/04/2021     06/04/2021       Patient History:  Recent hospitalizations/HC visits Pt dx with bilat DVT 6/4/21 while on Xarelto 2yrs for hx of PE. Pt stopped Xarelto on 6/6/21 and started warfarin + lovenox bridge on 6/7/21.    Recent medication changes None reported   Medications taken regularly that may interact with warfarin or alter INR No significant drug interactions identified   Warfarin dose taken as prescribed Yes    Signs/symptoms of bleeding None reported   Vitamin K intake Normally has maximum of 1 serving of green, leafy vegetables per week  Reviewed a list of high vitamin K foods  7/26- Pharmacy Admin Tracking Only     Intervention Detail: Adherence Monitorin   Total # of Interventions Recommended: 1   Total # of Interventions Accepted: 1   Time Spent (min): 15

## 2021-08-31 ENCOUNTER — OFFICE VISIT (OUTPATIENT)
Dept: INTERNAL MEDICINE CLINIC | Age: 66
End: 2021-08-31
Payer: MEDICARE

## 2021-08-31 VITALS
HEART RATE: 53 BPM | SYSTOLIC BLOOD PRESSURE: 125 MMHG | TEMPERATURE: 97.7 F | BODY MASS INDEX: 23.68 KG/M2 | HEIGHT: 70 IN | DIASTOLIC BLOOD PRESSURE: 78 MMHG | OXYGEN SATURATION: 97 % | WEIGHT: 165.4 LBS | RESPIRATION RATE: 16 BRPM

## 2021-08-31 DIAGNOSIS — Z23 NEEDS FLU SHOT: ICD-10-CM

## 2021-08-31 DIAGNOSIS — F17.210 CIGARETTE NICOTINE DEPENDENCE WITHOUT COMPLICATION: ICD-10-CM

## 2021-08-31 DIAGNOSIS — Z23 NEED FOR SHINGLES VACCINE: ICD-10-CM

## 2021-08-31 DIAGNOSIS — Z13.6 SCREENING FOR AAA (ABDOMINAL AORTIC ANEURYSM): ICD-10-CM

## 2021-08-31 DIAGNOSIS — E55.9 HYPOVITAMINOSIS D: ICD-10-CM

## 2021-08-31 DIAGNOSIS — Z23 NEED FOR PROPHYLACTIC VACCINATION AGAINST DIPHTHERIA-TETANUS-PERTUSSIS (DTP): ICD-10-CM

## 2021-08-31 DIAGNOSIS — Z23 NEED FOR TDAP VACCINATION: ICD-10-CM

## 2021-08-31 DIAGNOSIS — I10 ESSENTIAL HYPERTENSION: Primary | ICD-10-CM

## 2021-08-31 DIAGNOSIS — E53.8 LOW FOLATE: ICD-10-CM

## 2021-08-31 PROCEDURE — 99213 OFFICE O/P EST LOW 20 MIN: CPT | Performed by: STUDENT IN AN ORGANIZED HEALTH CARE EDUCATION/TRAINING PROGRAM

## 2021-08-31 PROCEDURE — G0008 ADMIN INFLUENZA VIRUS VAC: HCPCS | Performed by: STUDENT IN AN ORGANIZED HEALTH CARE EDUCATION/TRAINING PROGRAM

## 2021-08-31 PROCEDURE — 90471 IMMUNIZATION ADMIN: CPT | Performed by: STUDENT IN AN ORGANIZED HEALTH CARE EDUCATION/TRAINING PROGRAM

## 2021-08-31 PROCEDURE — 6360000002 HC RX W HCPCS: Performed by: STUDENT IN AN ORGANIZED HEALTH CARE EDUCATION/TRAINING PROGRAM

## 2021-08-31 PROCEDURE — 90715 TDAP VACCINE 7 YRS/> IM: CPT | Performed by: STUDENT IN AN ORGANIZED HEALTH CARE EDUCATION/TRAINING PROGRAM

## 2021-08-31 RX ORDER — FOLIC ACID 1 MG/1
1 TABLET ORAL DAILY
Qty: 90 TABLET | Refills: 1 | Status: SHIPPED | OUTPATIENT
Start: 2021-08-31 | End: 2022-03-14

## 2021-08-31 RX ORDER — NIFEDIPINE 30 MG/1
TABLET, FILM COATED, EXTENDED RELEASE ORAL
Qty: 90 TABLET | Refills: 3 | Status: SHIPPED | OUTPATIENT
Start: 2021-08-31 | End: 2021-11-30 | Stop reason: SINTOL

## 2021-08-31 RX ORDER — ERGOCALCIFEROL 1.25 MG/1
50000 CAPSULE ORAL WEEKLY
Qty: 12 CAPSULE | Refills: 1 | Status: SHIPPED | OUTPATIENT
Start: 2021-08-31 | End: 2022-01-18

## 2021-08-31 RX ORDER — NIFEDIPINE 30 MG/1
TABLET, FILM COATED, EXTENDED RELEASE ORAL
Qty: 90 TABLET | Refills: 3 | Status: SHIPPED | OUTPATIENT
Start: 2021-08-31 | End: 2021-08-31

## 2021-08-31 RX ADMIN — TETANUS TOXOID, REDUCED DIPHTHERIA TOXOID AND ACELLULAR PERTUSSIS VACCINE, ADSORBED 0.5 ML: 5; 2.5; 8; 8; 2.5 SUSPENSION INTRAMUSCULAR at 10:32

## 2021-08-31 NOTE — PATIENT INSTRUCTIONS
Please bring BP cuff for next visit    Please document BP readings at home. If the top number is persistently higher than 140, please refill the Nifedipine and start taking the medication. Low Sodium Diet (2,000 Milligram): Care Instructions  Overview     Limiting sodium can be an important part of managing some health problems. The most common source of sodium is salt. People get most of the salt in their diet from canned, prepared, and packaged foods. Fast food and restaurant meals also are very high in sodium. Your doctor will probably limit your sodium to less than 2,000 milligrams (mg) a day. This limit counts all the sodium in prepared and packaged foods and any salt you add to your food. Follow-up care is a key part of your treatment and safety. Be sure to make and go to all appointments, and call your doctor if you are having problems. It's also a good idea to know your test results and keep a list of the medicines you take. How can you care for yourself at home? Read food labels  · Read labels on cans and food packages. The labels tell you how much sodium is in each serving. Make sure that you look at the serving size. If you eat more than the serving size, you have eaten more sodium. · Food labels also tell you the Percent Daily Value for sodium. Choose products with low Percent Daily Values for sodium. · Be aware that sodium can come in forms other than salt, including monosodium glutamate (MSG), sodium citrate, and sodium bicarbonate (baking soda). MSG is often added to Asian food. When you eat out, you can sometimes ask for food without MSG or added salt. Buy low-sodium foods  · Buy foods that are labeled \"unsalted\" (no salt added), \"sodium-free\" (less than 5 mg of sodium per serving), or \"low-sodium\" (140 mg or less of sodium per serving). Foods labeled \"reduced-sodium\" and \"light sodium\" may still have too much sodium.  Be sure to read the label to see how much sodium you are getting. · Buy fresh vegetables, or frozen vegetables without added sauces. Buy low-sodium versions of canned vegetables, soups, and other canned goods. Prepare low-sodium meals  · Cut back on the amount of salt you use in cooking. This will help you adjust to the taste. Do not add salt after cooking. One teaspoon of salt has about 2,300 mg of sodium. · Take the salt shaker off the table. · Flavor your food with garlic, lemon juice, onion, vinegar, herbs, and spices. Do not use soy sauce, lite soy sauce, steak sauce, onion salt, garlic salt, celery salt, or ketchup on your food. · Use low-sodium salad dressings, sauces, and ketchup. Or make your own salad dressings and sauces without adding salt. · Use less salt (or none) when recipes call for it. You can often use half the salt a recipe calls for without losing flavor. Other foods such as rice, pasta, and grains do not need added salt. · Rinse canned vegetables, and cook them in fresh water. This removes some--but not all--of the salt. · Avoid water that is naturally high in sodium or that has been treated with water softeners, which add sodium. If you buy bottled water, read the label and choose a sodium-free brand. Avoid high-sodium foods  · Avoid eating:  ? Smoked, cured, salted, and canned meat, fish, and poultry. ? Ham, preston, hot dogs, and luncheon meats. ? Regular, hard, and processed cheese and regular peanut butter. ? Crackers with salted tops, and other salted snack foods such as pretzels, chips, and salted popcorn. ? Frozen prepared meals, unless labeled low-sodium. ? Canned and dried soups, broths, and bouillon, unless labeled sodium-free or low-sodium. ? Canned vegetables, unless labeled sodium-free or low-sodium. ? [place] fries, pizza, tacos, and other fast foods. ? Pickles, olives, ketchup, and other condiments, especially soy sauce, unless labeled sodium-free or low-sodium. Where can you learn more?   Go to https://chpepiceweb.[employer] org and sign in to your [employer] account. Enter J994 in the [facility] box to learn more about \"Low Sodium Diet (2,000 Milligram): Care Instructions. \"     If you do not have an account, please click on the \"Sign Up Now\" link. Current as of: December 17, 2020               Content Version: 12.9  © 2006-2021 [employer]. Care instructions adapted under license by [facility] ([facility]). If you have questions about a medical condition or this instruction, always ask your healthcare professional. [place] [address] any warranty or liability for your use of this information. Patient Education        How to Read a Food Label to Limit Sodium: Care Instructions  Overview  Limiting sodium can be an important part of managing some health problems. Processed foods, fast food, and restaurant foods are the major sources of dietary sodium. The most common name for sodium is salt. Most packaged foods have a Nutrition Facts label. This will tell you how much sodium is in one serving of food. Follow-up care is a key part of your treatment and safety. Be sure to make and go to all appointments, and call your doctor if you are having problems. It's also a good idea to know your test results and keep a list of the medicines you take. How can you care for yourself at home? Read ingredient lists on food labels  · Read the list of ingredients on food labels to help you find how much sodium is in a food. The label lists the ingredients in a food in descending order (from the most to the least). If salt or sodium is high on the list, there may be a lot of sodium in the food. · Know that sodium has different names. Sodium is also called monosodium glutamate (MSG, common in [facility] food), sodium citrate, sodium alginate, and sodium phosphate. Read Nutrition Facts labels  · On most foods, there is a Nutrition Facts label.  This will tell you how much sodium is in one serving of food. Look at both the serving size and the sodium amount. The serving size is located at the top of the label, usually right under the \"Nutrition Facts\" title. The amount of sodium is given in the list under the title. It is given in milligrams (mg). ? Check the serving size carefully. A single serving is often very small, and you may eat more than one serving. If this is the case, you will eat more sodium than listed on the label. For example, if the serving size for a canned soup is 1 cup and the sodium amount is 470 mg, if you have 2 cups you will eat 940 mg of sodium. · The nutrition facts for fresh fruits and vegetables are not listed on the food. They may be listed somewhere in the store. These foods usually have no sodium or low sodium. · The Nutrition Facts label also gives you the Percent Daily Value for sodium. This is how much of the recommended amount of sodium a serving contains. The daily value for sodium is less than 2,300 mg. So if the Percent Daily Value says 50%, this means one serving is giving you half of this, or 1,150 mg. Buy low-sodium foods  · Look for foods that are made with less sodium. Watch for the following words on the label. ? \"Unsalted\" means there is no sodium added to the food. But there may be sodium already in the food naturally. ? \"Sodium-free\" means a serving has less than 5 mg of sodium. ? \"Very low sodium\" means a serving has 35 mg or less of sodium. ? \"Low-sodium\" means a serving has 140 mg or less of sodium. · \"Reduced-sodium\" means that there is 25% less sodium than what the food normally has. This is still usually too much sodium. Try not to buy foods with this on the label. · Buy fresh vegetables, or frozen vegetables without added sauces. Buy low-sodium versions of canned vegetables, soups, and other canned goods. Where can you learn more? Go to https://tom.[employer]. org and sign in to your [employer] account.  Enter 26 [id number] in the Search Health Information box to learn more about \"How to Read a Food Label to Limit Sodium: Care Instructions. \"     If you do not have an account, please click on the \"Sign Up Now\" link. Current as of: December 17, 2020               Content Version: 12.9  © 6484-8991 Healthwise, Incorporated. Care instructions adapted under license by [facility] ([facility]). If you have questions about a medical condition or this instruction, always ask your healthcare professional. [address] any warranty or liability for your use of this information.

## 2021-08-31 NOTE — PROGRESS NOTES
Fluzone, Flulaval, Fluarix, and 3 yrs and older Afluria) 11/14/2018    Influenza, Quadv, adjuvanted, 65 yrs +, IM, PF (Fluad) 10/08/2020    Influenza, Triv, inactivated, subunit, adjuvanted, IM (Fluad 65 yrs and older) 09/18/2019    Pneumococcal Conjugate 13-valent ([id number]) 01/31/2018    Pneumococcal Polysaccharide ([id number]) 05/01/2018    Zoster Live (Zostavax) 02/05/2018     PHYSICAL EXAM:  /78 (Site: Left Upper Arm, Position: Sitting, Cuff Size: Medium Adult)   Pulse 53   Temp 97.7 °F (36.5 °C) (Temporal)   Resp 16   Ht 5' 10\" (1.778 m)   Wt 165 lb 6.4 oz (75 kg)   SpO2 97%   BMI 23.73 kg/m²   Physical Exam  Constitutional:       General: He is not in acute distress. Appearance: Normal appearance. He is normal weight. He is not ill-appearing, toxic-appearing or diaphoretic. Eyes:      Pupils: Pupils are equal, round, and reactive to light. Cardiovascular:      Rate and Rhythm: Normal rate and regular rhythm. Pulses: Normal pulses. Heart sounds: Normal heart sounds. No murmur heard. Pulmonary:      Effort: Pulmonary effort is normal. No respiratory distress. Breath sounds: Normal breath sounds. Abdominal:      General: Bowel sounds are normal. There is no distension. Palpations: Abdomen is soft. Musculoskeletal:         General: No swelling. Neurological:      Mental Status: He is alert and oriented to person, place, and time. Mental status is at baseline. Assessment & Plan:      1. Essential hypertension  Well controlled in clinic 125/78  - patient was advised to measure his BP at home everyday and start taking nifedipine ONLY if his SBP is persistently > 140  - Printed prescription for nifedipine   - Requested to bring home BP cuff to clinic for caliberation during next visit  - NIFEdipine (ADALAT CC) 30 MG extended release tablet; Take once daily  Dispense: 90 tablet; Refill: 3    2.  Hypovitaminosis D  - vitamin D (ERGOCALCIFEROL) 1.25 MG (15906 UT) CAPS capsule; Take 1 capsule by mouth once a week  Dispense: 12 capsule; Refill: 1    3. Low folate  - folic acid (FOLVITE) 1 MG tablet; Take 1 tablet by mouth daily  Dispense: 90 tablet; Refill: 1    4. Needs flu shot  5. Need for Tdap vaccination  Administered at clinic    6. Need for shingles vaccine  Recommended getting vaccine at pharmacy     7. Screening for AAA (abdominal aortic aneurysm)  Last CT abdomen pelvis with contrast on 09/08/2018  Did not have any evidence of  Increased abd aorta diameter  Will hold of on the US for now    8. Current smoker  Pt is a current smoker, however just takes a few puffs a day (not even a full cigarette). He started smoking around 27years of age, at that time he only used to smoke 4 cigs a day. Does not have enough hx to qualify for yearly lung CT    Return in about 3 months (around 11/30/2021).     Dispo: Pt has been staffed with Dr. Jamir Hart  _______________  Janette Hahn MD, 8/31/2021 11:00 AM   PGY-2

## 2021-09-13 ENCOUNTER — ANTI-COAG VISIT (OUTPATIENT)
Dept: PHARMACY | Age: 66
End: 2021-09-13
Payer: MEDICARE

## 2021-09-13 DIAGNOSIS — I82.413 ACUTE DEEP VEIN THROMBOSIS (DVT) OF FEMORAL VEIN OF BOTH LOWER EXTREMITIES (HCC): ICD-10-CM

## 2021-09-13 DIAGNOSIS — I26.99 PULMONARY EMBOLISM, BILATERAL (HCC): Primary | ICD-10-CM

## 2021-09-13 LAB — INTERNATIONAL NORMALIZATION RATIO, POC: 2.2

## 2021-09-13 PROCEDURE — 99211 OFF/OP EST MAY X REQ PHY/QHP: CPT

## 2021-09-13 PROCEDURE — 85610 PROTHROMBIN TIME: CPT

## 2021-09-13 NOTE — PROGRESS NOTES
Sunday Tang is a 78 y.o. male with PMHx significant for bilateral PE, DVT (6/4/21) while on Xarelto, HTN who presents to clinic 9/13/2021 for anticoagulation monitoring and adjustment. Anticoagulation Indication(s):  PE 2018, bilateral DVT 6/4/21 while on Xarelto  Referring Physician:   Dr. Alexandra Colvin  Goal INR Range:  2-3  Duration of Anticoagulation Therapy:  indefinite  Time of day dose taken: prefers to take in AM with nifedipine  Product patient has at home: warfarin 5 mg (peach color)    INR Summary                           Warfarin regimen (mg)  Date INR   A/P   Sun Mon Tue Wed Thu Fri Sat Mg/wk  9/13 2.2 At goal, continue 5 5 5 5 7.5 5 5 37.5  8/30     1.8       Below goal, cont 5 5 5 5 7.5 5 5 37.5  7/26     3.1       At goal, no change 5 5 5 5 7.5 5 5 37.5  7/15 2.1 At goal, increase 5 5 5 5 7.5 5 5 37.5  7/8 5.6 Above goal, holdx1 5 5 5 5 5 5 5 35  6/28 2.1 At goal, no change 5 7.5 5 7.5 5 7.5 5 42.5  6/21 4.3 Above goal, holdx1 5 x 5 7.5 5 7.5 5   6/14 4.2 Above goal, hold 7.5 10 0/7.5 5/7.5 7.5 7.5 7.5   6/10 1.3 Below goal, increase 7.5 INR   10 10 7.5     Started warfarin 5 mg daily on 6/7/21    Last CBC:  Lab Results   Component Value Date    RBC 3.30 (L) 06/04/2021    HGB 11.3 (L) 06/04/2021    HCT 33.6 (L) 06/04/2021    .9 (H) 06/04/2021    MCH 34.3 (H) 06/04/2021    MPV 7.9 06/04/2021    RDW 14.2 06/04/2021     06/04/2021       Patient History:  Recent hospitalizations/HC visits Pt dx with bilat DVT 6/4/21 while on Xarelto 2yrs for hx of PE. Pt stopped Xarelto on 6/6/21 and started warfarin + lovenox bridge on 6/7/21.    Recent medication changes None reported   Medications taken regularly that may interact with warfarin or alter INR No significant drug interactions identified   Warfarin dose taken as prescribed Yes    Signs/symptoms of bleeding None reported   Vitamin K intake Normally has maximum of 1 serving of green, leafy vegetables per week  Reviewed a list of high vitamin K foods  7/26- hasn't had any recently    Recent vomiting/diarrhea/fever, changes in weight or activity level None reported   Tobacco or alcohol use Patient quit smoking in May 2021.  7/8/21: pt reports having 3-4 large glasses of wine last night, but states each glass only had ~4 oz wine mixed with grape juice. It is normal for him to drink this much wine ~2-3 days per week. 7/15/21: reports cutting back on wine to 2-3 glasses per day (1/2 is grape juice)  7/26-2/day still 1/2 grape juice   Upcoming surgeries or procedures None reported     Assessment/Plan:  Patient's INR was therapeutic (2.2) today. He is staying consistent with his alcohol consumption of 2-3 glasses per day. Pt reports compliance with dosing and denies any changes to meds, diet, or health since last visit. He denies any s/sxs bleeding. It's unclear why his INRs are so labile. Patient was instructed to keep warfarin dose of 5 mg daily, except 7.5 mg on Thursdays and to be consistent in eating one serving of leafy green vegetables per week. Repeat INR in 3 weeks. Will watch closely as patient had recent DVT ~3 months ago. Patient was reminded to maintain consistent vitamin K intake and call with any bleeding, medication changes, or fever/vomiting/diarrhea. Patient understands dosing directions and information discussed. Dosing schedule and follow up appointment given to patient. Progress note routed to referring physician's office. Patient acknowledges working in consult agreement with pharmacist as referred by his/her physician. Next Clinic Appointment:  10/4    Please call [facility] at [phone] with any questions. Please call [facility] [address] at [phone] with any questions. Thanks!   Modesto Ribera, PharmD  [facility]  [place]: [phone]  [place]: [phone]  9/13/2021 10:50 AM    For Pharmacy Admin Tracking

## 2021-10-06 ENCOUNTER — ANTI-COAG VISIT (OUTPATIENT)
Dept: PHARMACY | Age: 66
End: 2021-10-06
Payer: MEDICARE

## 2021-10-06 DIAGNOSIS — I82.413 ACUTE DEEP VEIN THROMBOSIS (DVT) OF FEMORAL VEIN OF BOTH LOWER EXTREMITIES (HCC): ICD-10-CM

## 2021-10-06 DIAGNOSIS — I26.99 PULMONARY EMBOLISM, BILATERAL (HCC): Primary | ICD-10-CM

## 2021-10-06 LAB — INTERNATIONAL NORMALIZATION RATIO, POC: 1.3

## 2021-10-06 PROCEDURE — 99211 OFF/OP EST MAY X REQ PHY/QHP: CPT | Performed by: PHARMACIST

## 2021-10-06 PROCEDURE — 85610 PROTHROMBIN TIME: CPT | Performed by: PHARMACIST

## 2021-10-06 NOTE — PROGRESS NOTES
Al Lopez is a 78 y.o. male with PMHx significant for bilateral PE, DVT (6/4/21) while on Xarelto, HTN who presents to clinic 10/6/2021 for anticoagulation monitoring and adjustment. Anticoagulation Indication(s):  PE 2018, bilateral DVT 6/4/21 while on Xarelto  Referring Physician:   Dr. Hunt Left  Goal INR Range:  2-3  Duration of Anticoagulation Therapy:  indefinite  Time of day dose taken: prefers to take in AM with nifedipine  Product patient has at home: warfarin 5 mg (peach color)    INR Summary                           Warfarin regimen (mg)  Date INR   A/P   Sun Mon Tue Wed Thu Fri Sat Mg/wk   10/6 1.3 Below goal (missed) 5 5 5 10/5 7.5 5 5 37.5  9/13 2.2 At goal, continue 5 5 5 5 7.5 5 5 37.5  8/30     1.8       Below goal, cont 5 5 5 5 7.5 5 5 37.5  7/26     3.1       At goal, no change 5 5 5 5 7.5 5 5 37.5  7/15 2.1 At goal, increase 5 5 5 5 7.5 5 5 37.5  7/8 5.6 Above goal, holdx1 5 5 5 5 5 5 5 35  6/28 2.1 At goal, no change 5 7.5 5 7.5 5 7.5 5 42.5  6/21 4.3 Above goal, holdx1 5 x 5 7.5 5 7.5 5   6/14 4.2 Above goal, hold 7.5 10 0/7.5 5/7.5 7.5 7.5 7.5   6/10 1.3 Below goal, increase 7.5 INR   10 10 7.5     Started warfarin 5 mg daily on 6/7/21    Last CBC:  Lab Results   Component Value Date    RBC 3.30 (L) 06/04/2021    HGB 11.3 (L) 06/04/2021    HCT 33.6 (L) 06/04/2021    .9 (H) 06/04/2021    MCH 34.3 (H) 06/04/2021    MPV 7.9 06/04/2021    RDW 14.2 06/04/2021     06/04/2021       Patient History:  Recent hospitalizations/HC visits Pt dx with bilat DVT 6/4/21 while on Xarelto 2yrs for hx of PE. Pt stopped Xarelto on 6/6/21 and started warfarin + lovenox bridge on 6/7/21.    Recent medication changes None reported   Medications taken regularly that may interact with warfarin or alter INR No significant drug interactions identified   Warfarin dose taken as prescribed Yes    Signs/symptoms of bleeding None reported   Vitamin K intake Normally has maximum of 1 serving of green, leafy vegetables per week  Reviewed a list of high vitamin K foods  7/26/21- hasn't had any recently   10/6/21: cooked cabbage and cooked greens in past week   Recent vomiting/diarrhea/fever, changes in weight or activity level None reported   Tobacco or alcohol use Patient quit smoking in May 2021.  7/8/21: pt reports having 3-4 large glasses of wine last night, but states each glass only had ~4 oz wine mixed with grape juice. It is normal for him to drink this much wine ~2-3 days per week. 7/15/21: reports cutting back on wine to 2-3 glasses per day (1/2 is grape juice)  7/26/21-2/day still 1/2 grape juice  10/6/21: 2-3 glasses every other night    Upcoming surgeries or procedures None reported     Assessment/Plan:  Patient's INR was subtherapeutic (1.3) today likely due to missed dose 3 days ago. He also reports he may have decreased the amount of alcohol consumption from 2-3 glasses per day to 2-3 glasses every other day. Decrease in alcohol may decrease INR level. Patient denies any changes to meds, diet, or health since last visit. He denies any s/sxs bleeding. It's unclear why his INRs are so labile. Patient was instructed to bolus with 10 mg tonight then continue 5 mg daily, except 7.5 mg on Thursdays and to be consistent in eating one serving of leafy green vegetables per week. Repeat INR in 1 week as patient had DVT ~4 months ago. Patient was reminded to maintain consistent vitamin K intake and call with any bleeding, medication changes, or fever/vomiting/diarrhea. Patient understands dosing directions and information discussed. Dosing schedule and follow up appointment given to patient. Progress note routed to referring physician's office. Patient acknowledges working in consult agreement with pharmacist as referred by his/her physician. Next Clinic Appointment:  10/13    Please call [facility] at [phone] with any questions.    Please call [facility] at [phone] with any questions. Thanks!   Oscar Burger, PharmD, BCPS  [facility]  [place]: [phone]  [facility]: [phone]  10/6/2021 2:10 PM    For Pharmacy Admin Tracking Only   Intervention Detail: Adherence Monitorin   Total # of Interventions Recommended: 0   Total # of Interventions Accepted: 0   Time Spent (min): 15

## 2021-10-13 ENCOUNTER — ANTI-COAG VISIT (OUTPATIENT)
Dept: PHARMACY | Age: 66
End: 2021-10-13
Payer: MEDICARE

## 2021-10-13 DIAGNOSIS — I82.413 ACUTE DEEP VEIN THROMBOSIS (DVT) OF FEMORAL VEIN OF BOTH LOWER EXTREMITIES (HCC): ICD-10-CM

## 2021-10-13 DIAGNOSIS — I26.99 PULMONARY EMBOLISM, BILATERAL (HCC): Primary | ICD-10-CM

## 2021-10-13 LAB — INTERNATIONAL NORMALIZATION RATIO, POC: 1.8

## 2021-10-13 PROCEDURE — 99212 OFFICE O/P EST SF 10 MIN: CPT

## 2021-10-13 PROCEDURE — 85610 PROTHROMBIN TIME: CPT

## 2021-10-13 NOTE — PROGRESS NOTES
Amanda Marte is a 78 y.o. male with PMHx significant for bilateral PE, DVT (6/4/21) while on Xarelto, HTN who presents to clinic 10/13/2021 for anticoagulation monitoring and adjustment. Anticoagulation Indication(s):  PE 2018, bilateral DVT 6/4/21 while on Xarelto  Referring Physician:   Dr. Sindy Gonzalez  Goal INR Range:  2-3  Duration of Anticoagulation Therapy:  indefinite  Time of day dose taken: prefers to take in AM with nifedipine  Product patient has at home: warfarin 5 mg (peach color)    INR Summary                           Warfarin regimen (mg)  Date INR   A/P   Sun Mon Tue Wed Thu Fri Sat Mg/wk   10/6 1.3 Below goal (missed) 5 5 5 10/5 7.5 5 5 37.5  9/13 2.2 At goal, continue 5 5 5 5 7.5 5 5 37.5  8/30     1.8       Below goal, cont 5 5 5 5 7.5 5 5 37.5  7/26     3.1       At goal, no change 5 5 5 5 7.5 5 5 37.5  7/15 2.1 At goal, increase 5 5 5 5 7.5 5 5 37.5  7/8 5.6 Above goal, holdx1 5 5 5 5 5 5 5 35  6/28 2.1 At goal, no change 5 7.5 5 7.5 5 7.5 5 42.5  6/21 4.3 Above goal, holdx1 5 x 5 7.5 5 7.5 5   6/14 4.2 Above goal, hold 7.5 10 0/7.5 5/7.5 7.5 7.5 7.5   6/10 1.3 Below goal, increase 7.5 INR   10 10 7.5     Started warfarin 5 mg daily on 6/7/21    Last CBC:  Lab Results   Component Value Date    RBC 3.30 (L) 06/04/2021    HGB 11.3 (L) 06/04/2021    HCT 33.6 (L) 06/04/2021    .9 (H) 06/04/2021    MCH 34.3 (H) 06/04/2021    MPV 7.9 06/04/2021    RDW 14.2 06/04/2021     06/04/2021       Patient History:  Recent hospitalizations/HC visits Pt dx with bilat DVT 6/4/21 while on Xarelto 2yrs for hx of PE. Pt stopped Xarelto on 6/6/21 and started warfarin + lovenox bridge on 6/7/21.    Recent medication changes None reported   Medications taken regularly that may interact with warfarin or alter INR No significant drug interactions identified   Warfarin dose taken as prescribed Yes    Signs/symptoms of bleeding None reported   Vitamin K intake Normally has maximum of 1 serving of green, leafy vegetables per week  Reviewed a list of high vitamin K foods  7/26/21- hasn't had any recently   10/6/21: cooked cabbage and cooked greens in past week  10/13: cooked greens last night   Recent vomiting/diarrhea/fever, changes in weight or activity level None reported   Tobacco or alcohol use Patient quit smoking in May 2021.  7/8/21: pt reports having 3-4 large glasses of wine last night, but states each glass only had ~4 oz wine mixed with grape juice. It is normal for him to drink this much wine ~2-3 days per week. 7/15/21: reports cutting back on wine to 2-3 glasses per day (1/2 is grape juice)  7/26/21-2/day still 1/2 grape juice  10/6/21: 2-3 glasses every other night    Upcoming surgeries or procedures None reported     Assessment/Plan:  Patient's INR was subtherapeutic (1.8) today after boosting dose last week. He reports that he had greens last night and normally does not eat that many greens. States that he thinks he took 7.5 mg last night, thought he was taking 7.5 mg on Wed but then stated that he took 7.5 mg on Thurs of last week. Patient unclear, he left paperwork in bathroom last week. He also reports he may have decreased the amount of alcohol consumption from 2-3 glasses per day to 2-3 glasses every other day. Decrease in alcohol may decrease INR level. Patient states that methadone dose was lowered recently. He denies any s/sxs bleeding. Advised for patient to use paperwork every day to take doses in order to take correct dose every day. INR fluctuations could be from missing and taking incorrect dosing, decreased alcohol intake, or inconsistent vit k intake. Advised pt to be as consistent as possible    Patient was instructed to take 7.5 mg on Tuesdays and Thursday and 5 mg all other days of the week. Repeat INR in 10 days. . Patient was reminded to maintain consistent vitamin K intake and call with any bleeding, medication changes, or fever/vomiting/diarrhea. Patient understands dosing directions and information discussed. Dosing schedule and follow up appointment given to patient. Progress note routed to referring physician's office. Patient acknowledges working in consult agreement with pharmacist as referred by his/her physician. Next Clinic Appointment:  10/25    Please call [facility] Anticoagulation Clinic at [phone] with any questions. Please call The [address] [place] [address] Avenue at [phone] with any questions. Thanks!       For Pharmacy Admin Tracking Only   Intervention Detail: Adherence Monitorin and Dose Adjustment: 1, reason: Therapy Optimization   Total # of Interventions Recommended: 2   Total # of Interventions Accepted: 2   Time Spent (min): 15

## 2021-10-25 ENCOUNTER — ANTI-COAG VISIT (OUTPATIENT)
Dept: PHARMACY | Age: 66
End: 2021-10-25
Payer: MEDICARE

## 2021-10-25 DIAGNOSIS — I82.413 ACUTE DEEP VEIN THROMBOSIS (DVT) OF FEMORAL VEIN OF BOTH LOWER EXTREMITIES (HCC): ICD-10-CM

## 2021-10-25 DIAGNOSIS — I26.99 PULMONARY EMBOLISM, BILATERAL (HCC): Primary | ICD-10-CM

## 2021-10-25 LAB — INTERNATIONAL NORMALIZATION RATIO, POC: 5.3

## 2021-10-25 PROCEDURE — 99212 OFFICE O/P EST SF 10 MIN: CPT | Performed by: PHARMACIST

## 2021-10-25 PROCEDURE — 85610 PROTHROMBIN TIME: CPT | Performed by: PHARMACIST

## 2021-10-25 NOTE — PROGRESS NOTES
Holly Chow is a 78 y.o. male with PMHx significant for bilateral PE, DVT (6/4/21) while on Xarelto, HTN who presents to clinic 10/25/2021 for anticoagulation monitoring and adjustment. Anticoagulation Indication(s):  PE 2018, bilateral DVT 6/4/21 while on Xarelto  Referring Physician:   Dr. Liz Pollock  Goal INR Range:  2-3  Duration of Anticoagulation Therapy:  indefinite  Time of day dose taken: prefers to take in AM with nifedipine  Product patient has at home: warfarin 5 mg (peach color)    INR Summary                           Warfarin regimen (mg)  Date INR   A/P   Sun Mon Tue Wed Thu Fri Sat Mg/wk   10/25 5.3 Above goal, hold+dec  5 5 0/5 0/5 7.5 5 5 37.5   10/13 1.8 Below goal, increase 5 5 7.5 5 7.5 5 5 40   10/6 1.3 Below goal (missed) 5 5 5 10/5 7.5 5 5 37.5  9/13 2.2 At goal, continue 5 5 5 5 7.5 5 5 37.5  8/30     1.8       Below goal, cont 5 5 5 5 7.5 5 5 37.5  7/26     3.1       At goal, no change 5 5 5 5 7.5 5 5 37.5  7/15 2.1 At goal, increase 5 5 5 5 7.5 5 5 37.5  7/8 5.6 Above goal, holdx1 5 5 5 5 5 5 5 35  6/28 2.1 At goal, no change 5 7.5 5 7.5 5 7.5 5 42.5  6/21 4.3 Above goal, holdx1 5 x 5 7.5 5 7.5 5   6/14 4.2 Above goal, hold 7.5 10 0/7.5 5/7.5 7.5 7.5 7.5   6/10 1.3 Below goal, increase 7.5 INR   10 10 7.5     Started warfarin 5 mg daily on 6/7/21    Last CBC:  Lab Results   Component Value Date    RBC 3.30 (L) 06/04/2021    HGB 11.3 (L) 06/04/2021    HCT 33.6 (L) 06/04/2021    .9 (H) 06/04/2021    MCH 34.3 (H) 06/04/2021    MPV 7.9 06/04/2021    RDW 14.2 06/04/2021     06/04/2021       Patient History:  Recent hospitalizations/HC visits Pt dx with bilat DVT 6/4/21 while on Xarelto 2yrs for hx of PE. Pt stopped Xarelto on 6/6/21 and started warfarin + lovenox bridge on 6/7/21.    Recent medication changes None reported   Medications taken regularly that may interact with warfarin or alter INR No significant drug interactions identified Warfarin dose taken as prescribed Yes    Signs/symptoms of bleeding None reported   Vitamin K intake Normally has maximum of 1 serving of green, leafy vegetables per week  Reviewed a list of high vitamin K foods  7/26/21- hasn't had any recently   10/6/21: cooked cabbage and cooked greens in past week  10/25/21: none in past week    Recent vomiting/diarrhea/fever, changes in weight or activity level None reported   Tobacco or alcohol use Patient quit smoking in May 2021.  7/8/21: pt reports having 3-4 large glasses of wine last night, but states each glass only had ~4 oz wine mixed with grape juice. It is normal for him to drink this much wine ~2-3 days per week. 7/15/21: reports cutting back on wine to 2-3 glasses per day (1/2 is grape juice)  7/26/21-2/day still 1/2 grape juice  10/6/21: 2-3 glasses every other night   10/25/21: 5-6 glasses last night    Upcoming surgeries or procedures None reported     Assessment/Plan:  Patient's INR was supratherapeutic (5.3) possibly due to increase in alcohol intake and no vitamin K foods in the past week. He denies any s/sxs bleeding, changes in medications, illness, etc.     Advised for patient to use paperwork every day to take doses in order to take correct dose every day. INR fluctuations could be from missing and taking incorrect dosing, decreased alcohol intake, or inconsistent vit k intake. Advised pt to be as consistent as possible    Patient was instructed to hold warfarin tomorrow and Wednesday (already took dose today) then decrease dose back to 5 mg daily except 7.5 mg on Thursdays. Repeat INR in 10 days. Patient was reminded to maintain consistent vitamin K intake and call with any bleeding, medication changes, or fever/vomiting/diarrhea. Patient understands dosing directions and information discussed. Dosing schedule and follow up appointment given to patient. Progress note routed to referring physician's office.  Patient acknowledges working in consult agreement with pharmacist as referred by his/her physician. Next Clinic Appointment:   at [facility]     Please call [facility] Clinic at [phone] with any questions. Please call The [address] [place] [address] Avenue at [phone] with any questions. Thanks!       For Pharmacy Admin Tracking Only   Intervention Detail: Adherence Monitorin and Dose Adjustment: 1, reason: Therapy Optimization   Total # of Interventions Recommended: 2   Total # of Interventions Accepted: 2   Time Spent (min): 15

## 2021-11-03 NOTE — PROGRESS NOTES
Dioni Bowie is a 78 y.o. male with PMHx significant for bilateral PE, DVT (6/4/21) while on Xarelto, HTN who presents to clinic 11/3/2021 for anticoagulation monitoring and adjustment. Anticoagulation Indication(s):  PE 2018, bilateral DVT 6/4/21 while on Xarelto  Referring Physician:   Dr. Frank Reyes  Goal INR Range:  2-3  Duration of Anticoagulation Therapy:  indefinite  Time of day dose taken: prefers to take in AM with nifedipine  Product patient has at home: warfarin 5 mg (peach color)    INR Summary                           Warfarin regimen (mg)  Date INR   A/P   Sun Mon Tue Wed Thu Fri Sat Mg/wk   11/4 5.1 Above goal, hold+dec 5 5 5 5 0/5 0/5 5 35   10/25 5.3 Above goal, hold+dec  5 5 0/5 0/5 7.5 5 5 37.5   10/13 1.8 Below goal, increase 5 5 7.5 5 7.5 5 5 40   10/6 1.3 Below goal (missed) 5 5 5 10/5 7.5 5 5 37.5  9/13 2.2 At goal, continue 5 5 5 5 7.5 5 5 37.5  8/30     1.8       Below goal, cont 5 5 5 5 7.5 5 5 37.5  7/26     3.1       At goal, no change 5 5 5 5 7.5 5 5 37.5  7/15 2.1 At goal, increase 5 5 5 5 7.5 5 5 37.5  7/8 5.6 Above goal, holdx1 5 5 5 5 5 5 5 35  6/28 2.1 At goal, no change 5 7.5 5 7.5 5 7.5 5 42.5  6/21 4.3 Above goal, holdx1 5 x 5 7.5 5 7.5 5   6/14 4.2 Above goal, hold 7.5 10 0/7.5 5/7.5 7.5 7.5 7.5   6/10 1.3 Below goal, increase 7.5 INR   10 10 7.5     Started warfarin 5 mg daily on 6/7/21    Last CBC:  Lab Results   Component Value Date    RBC 3.30 (L) 06/04/2021    HGB 11.3 (L) 06/04/2021    HCT 33.6 (L) 06/04/2021    .9 (H) 06/04/2021    MCH 34.3 (H) 06/04/2021    MPV 7.9 06/04/2021    RDW 14.2 06/04/2021     06/04/2021       Patient History:  Recent hospitalizations/HC visits Pt dx with bilat DVT 6/4/21 while on Xarelto 2yrs for hx of PE. Pt stopped Xarelto on 6/6/21 and started warfarin + lovenox bridge on 6/7/21.    Recent medication changes None reported   Medications taken regularly that may interact with warfarin or alter INR No significant drug interactions identified   Warfarin dose taken as prescribed 11/4/21: Pt did not hold warfarin for 2 days last week as directed   Signs/symptoms of bleeding None reported   Vitamin K intake Normally has maximum of 1 serving of green, leafy vegetables per week  Reviewed a list of high vitamin K foods  7/26/21- hasn't had any recently   10/6/21: cooked cabbage and cooked greens in past week  10/25/21: none in past week   11/4/21: 1 forkful of broccoli    Recent vomiting/diarrhea/fever, changes in weight or activity level None reported   Tobacco or alcohol use Patient quit smoking in May 2021.  7/8/21: pt reports having 3-4 large glasses of wine last night, but states each glass only had ~4 oz wine mixed with grape juice. It is normal for him to drink this much wine ~2-3 days per week. 7/15/21: reports cutting back on wine to 2-3 glasses per day (1/2 is grape juice)  7/26/21-2/day still 1/2 grape juice  10/6/21: 2-3 glasses every other night   10/25/21: 5-6 glasses last night   11/4/21: 4 glasses wine/grape juice mix (16 oz wine) past few nights   Upcoming surgeries or procedures None reported     Assessment/Plan:  Patient's INR was supratherapeutic (5.1). He did not hold warfarin last week, but he did decrease dose as instructed. He denies any s/sxs bleeding, changes in medications, diet, illness, etc. He did have a little bit of broccoli this week, but he normally doesn't eat many high vit k foods. Pt continues to drink wine and reports drinking about 16 oz each night (~3-4 servings). Advised for patient to use paperwork every day to take doses in order to take correct dose every day. INR fluctuations could be from missing and taking incorrect dosing, changes in alcohol intake, or inconsistent vit k intake. Advised pt to be as consistent as possible and to limit wine to 1-2 servings per day. Patient was instructed to hold warfarin for 2 days, then decrease dose to 5 mg daily.  Repeat INR in 1 wk. Patient was reminded to maintain consistent vitamin K intake and call with any bleeding, medication changes, or fever/vomiting/diarrhea. Patient understands dosing directions and information discussed. Dosing schedule and follow up appointment given to patient. Progress note routed to referring physician's office. Patient acknowledges working in consult agreement with pharmacist as referred by his/her physician. Next Clinic Appointment:       Please call [facility] Anticoagulation Clinic at [phone] with any questions. Please call The [address] [place] [address] Avenue at [phone] with any questions. Thanks!     Polly Hay, PharmD, [facility]  Medication Management Clinic   [address] Ph: [phone]  [address] Ph: [phone]  11/3/2021 1:58 PM        For Pharmacy Admin Tracking Only   Intervention Detail: Adherence Monitorin and Dose Adjustment: 1, reason: Therapy De-escalation   Total # of Interventions Recommended: 2   Total # of Interventions Accepted: 2   Time Spent (min): 15

## 2021-11-04 ENCOUNTER — ANTI-COAG VISIT (OUTPATIENT)
Dept: PHARMACY | Age: 66
End: 2021-11-04
Payer: MEDICARE

## 2021-11-04 DIAGNOSIS — I26.99 PULMONARY EMBOLISM, BILATERAL (HCC): Primary | ICD-10-CM

## 2021-11-04 DIAGNOSIS — I82.413 ACUTE DEEP VEIN THROMBOSIS (DVT) OF FEMORAL VEIN OF BOTH LOWER EXTREMITIES (HCC): ICD-10-CM

## 2021-11-04 LAB — INTERNATIONAL NORMALIZATION RATIO, POC: 5.1

## 2021-11-04 PROCEDURE — 85610 PROTHROMBIN TIME: CPT

## 2021-11-04 PROCEDURE — 99212 OFFICE O/P EST SF 10 MIN: CPT

## 2021-11-08 ENCOUNTER — OFFICE VISIT (OUTPATIENT)
Dept: DERMATOLOGY | Age: 66
End: 2021-11-08
Payer: MEDICARE

## 2021-11-08 VITALS — TEMPERATURE: 98.1 F

## 2021-11-08 DIAGNOSIS — L81.4 OTHER MELANIN HYPERPIGMENTATION: Primary | ICD-10-CM

## 2021-11-08 PROCEDURE — 99214 OFFICE O/P EST MOD 30 MIN: CPT | Performed by: DERMATOLOGY

## 2021-11-08 PROCEDURE — G8427 DOCREV CUR MEDS BY ELIG CLIN: HCPCS | Performed by: DERMATOLOGY

## 2021-11-08 PROCEDURE — 1036F TOBACCO NON-USER: CPT | Performed by: DERMATOLOGY

## 2021-11-08 PROCEDURE — 3017F COLORECTAL CA SCREEN DOC REV: CPT | Performed by: DERMATOLOGY

## 2021-11-08 PROCEDURE — 1123F ACP DISCUSS/DSCN MKR DOCD: CPT | Performed by: DERMATOLOGY

## 2021-11-08 PROCEDURE — G8420 CALC BMI NORM PARAMETERS: HCPCS | Performed by: DERMATOLOGY

## 2021-11-08 PROCEDURE — 4040F PNEUMOC VAC/ADMIN/RCVD: CPT | Performed by: DERMATOLOGY

## 2021-11-08 PROCEDURE — G8482 FLU IMMUNIZE ORDER/ADMIN: HCPCS | Performed by: DERMATOLOGY

## 2021-11-09 NOTE — PATIENT INSTRUCTIONS
Dr. Ren Overton [facility] Safe Strategies:    1. Avoid the sun if possible especially between the hours of 10 am and 4 pm. That's when the sun's most harmful UV rays are hitting the earth. 2. Use protective clothing. Just like the right equipment helps you perform better in your sport, the proper clothing can do a lot to help us in our fight to prevent skin cancer. Thicker and darker clothing with a tighter weave will block more of the suns harmful rays, and it never wears off! Make wearing long sleeves, a broad brimmed hat and sun glasses part of your routine. 3. Use a broad spectrum sunscreen with SPF 30 or higher on all your exposed skin. Make sure the sunscreen has either titanium dioxide, and/or zinc oxide (preferred), or Avobenzone in it (check the active ingredients on the back of the bottle to make sure). If you are going to be in the water or sweating, make sure the sunscreen you choose is water resistant. Better sunscreens are available in [place] ([facility]) and [facility] like Tinosorb S (Bemotrizinol) and Tinosorb M ([address] [place] [address]), so if you are serious about sun protection and visit those areas, feel free to stock up. 4. Reapply the sunscreen after 2 hours or after swimming, sweating, or toweling off. We don't care about the brand, but some good brands to use are Mohini Jacob MD , [facility], and really just about anything with the above ingredients. Cetaphil oil control moisturizer with SPF 30 or 50 is a good one for the face. It is not water resistant but goes on really light and won't make you break out. 5. Use enough. One ounce of lotion, which is about the size of a golf ball, is the amount needed to cover the exposed parts of an average adult body. For sprays, apply until it \"glistens\" on skin (2 seconds of spray per arm, 4 seconds per leg, and 5 to 8 seconds each for the front torso and back). For sticks apply 3 to 4 passes back and forth per area. 6. Get regular check ups.  Studies show that

## 2021-11-09 NOTE — PROGRESS NOTES
Patient's Name: Juanpablo Chow  MRN: [id number]  YOB: 1955  Date of Visit: 11/8/2021  Primary Care Provider: Waylon Patino MD  Referring Provider: No ref. provider found    Subjective:   Chief Complaint:   1 Year Follow Up (recheck facial pigmentation )      History of Present Illness:   Juanpablo Chow is a 78 y.o. male who presents in clinic today for  follow up on facial hyperpigmentation    Last office visit: 10/16/21    At their last visit they were prescribed compounded hydroquinone. Patient reports using the cream for a  couple of weeks, then discontinuing its use, as he thought it made the pigmentation on his nose worse. He reports working outside, but tries to stay in the shade. Denies using sunscreen. Denies any other rashes on any of the other sun exposed areas. Past medical and surgical histories, current medications, allergies, social and family histories were reviewed with no change since the last visit        Allergies: Allergies   Allergen Reactions    Valsartan-Hydrochlorothiazide      Sweating - light headed       Current Medications:  Current Outpatient Medications   Medication Sig Dispense Refill    vitamin D (ERGOCALCIFEROL) 1.25 MG (25215 UT) CAPS capsule Take 1 capsule by mouth once a week 12 capsule 1    furosemide (LASIX) 20 MG tablet Take 1 tablet by mouth daily 60 tablet 3    warfarin (COUMADIN) 5 MG tablet Take 1 tablet by mouth daily 30 tablet 3    methadone (DOLOPHINE) 10 MG/ML solution Take 35 mg by mouth daily.  folic acid (FOLVITE) 1 MG tablet Take 1 tablet by mouth daily (Patient not taking: Reported on 11/4/2021) 90 tablet 1    NIFEdipine (ADALAT CC) 30 MG extended release tablet Take once daily (Patient not taking: Reported on 11/4/2021) 90 tablet 3     No current facility-administered medications for this visit. Review of Systems:  Constitutional: No fevers, chills or recent illness.   Skin: Skin:As per HPI AND otherwise no new, bleeding or symptomatic skin lesions      Objective:     Vitals:    11/08/21 1408   Temp: 98.1 °F (36.7 °C)   TempSrc: Infrared       Physical Examination:  General: alert, comfortable, no apparent distress, well-appearing  Psych: alert and oriented  Neuro: oriented to person, place, and time  Skin: Areas examined: head including face, lips, conjunctiva and lids, neck, hair/scalp, right upper extremity, left upper extremity, left hand, right hand and digits and nails      All areas examined were within normal limits except those listed below with the appropriate assessment and plan    Assessment and Plan (with relevant objective exam findings):     1. Hyperpigmentation  Location/objective findings: forehead, nasal dorsum, malar cheeks L>R with brown reticulated patches  I had a very lengthy discussion with the patient regarding treatment for hyperpigmentation. The treatment would need to be tried for at last 2 months to determine efficacy. We also discussed the importance of sun avoidance. Recommended sun avoidance, use of protective clothing and broad spectrum sunscreen containing avobenzone, titanium dioxide, or zinc oxide with SPF 50 or higher. I spent 32 minutes face-to face with the patient today, greater than 50% of which was spent in counseling and/or coordination of care. Follow up:  Return visit in 2 months or as needed for change in condition. All questions addressed.      Procedure:   No procedure performed          Samina Hernandez MD. MS

## 2021-11-11 ENCOUNTER — ANTI-COAG VISIT (OUTPATIENT)
Dept: PHARMACY | Age: 66
End: 2021-11-11
Payer: MEDICARE

## 2021-11-11 DIAGNOSIS — I26.99 PULMONARY EMBOLISM, BILATERAL (HCC): Primary | ICD-10-CM

## 2021-11-11 DIAGNOSIS — I82.413 ACUTE DEEP VEIN THROMBOSIS (DVT) OF FEMORAL VEIN OF BOTH LOWER EXTREMITIES (HCC): ICD-10-CM

## 2021-11-11 LAB — INTERNATIONAL NORMALIZATION RATIO, POC: 1.3

## 2021-11-11 PROCEDURE — 85610 PROTHROMBIN TIME: CPT

## 2021-11-11 PROCEDURE — 99212 OFFICE O/P EST SF 10 MIN: CPT

## 2021-11-11 NOTE — PROGRESS NOTES
Al Lopez is a 78 y.o. male with PMHx significant for bilateral PE, DVT (6/4/21) while on Xarelto, HTN who presents to clinic 11/11/2021 for anticoagulation monitoring and adjustment. Anticoagulation Indication(s):  PE 2018, bilateral DVT 6/4/21 while on Xarelto  Referring Physician:   Dr. Hunt Left  Goal INR Range:  2-3  Duration of Anticoagulation Therapy:  indefinite  Time of day dose taken: prefers to take in AM with nifedipine  Product patient has at home: warfarin 5 mg (peach color)    INR Summary                           Warfarin regimen (mg)  Date INR   A/P   Sun Mon Tue Wed Thu Fri Sat Mg/wk   11/11 1.3 Below goal, cont 5 5 5 5 5 5 5 35   11/4 5.1 Above goal, hold+dec 5 5 5 5 0/5 0/5 5 35   10/25 5.3 Above goal, hold+dec  5 5 0/5 0/5 7.5 5 5 37.5   10/13 1.8 Below goal, increase 5 5 7.5 5 7.5 5 5 40   10/6 1.3 Below goal (missed) 5 5 5 10/5 7.5 5 5 37.5  9/13 2.2 At goal, continue 5 5 5 5 7.5 5 5 37.5  8/30     1.8       Below goal, cont 5 5 5 5 7.5 5 5 37.5  7/26     3.1       At goal, no change 5 5 5 5 7.5 5 5 37.5  7/15 2.1 At goal, increase 5 5 5 5 7.5 5 5 37.5  7/8 5.6 Above goal, holdx1 5 5 5 5 5 5 5 35  6/28 2.1 At goal, no change 5 7.5 5 7.5 5 7.5 5 42.5  6/21 4.3 Above goal, holdx1 5 x 5 7.5 5 7.5 5   6/14 4.2 Above goal, hold 7.5 10 0/7.5 5/7.5 7.5 7.5 7.5   6/10 1.3 Below goal, increase 7.5 INR   10 10 7.5     Started warfarin 5 mg daily on 6/7/21    Last CBC:  Lab Results   Component Value Date    RBC 3.30 (L) 06/04/2021    HGB 11.3 (L) 06/04/2021    HCT 33.6 (L) 06/04/2021    .9 (H) 06/04/2021    MCH 34.3 (H) 06/04/2021    MPV 7.9 06/04/2021    RDW 14.2 06/04/2021     06/04/2021       Patient History:  Recent hospitalizations/HC visits Pt dx with bilat DVT 6/4/21 while on Xarelto 2yrs for hx of PE. Pt stopped Xarelto on 6/6/21 and started warfarin + lovenox bridge on 6/7/21.    Recent medication changes None reported   Medications taken regularly that may interact with warfarin or alter INR No significant drug interactions identified   Warfarin dose taken as prescribed 11/4/21: Pt did not hold warfarin for 2 days last week as directed   Signs/symptoms of bleeding None reported   Vitamin K intake Normally has maximum of 1 serving of green, leafy vegetables per week  Reviewed a list of high vitamin K foods  7/26/21- hasn't had any recently   10/6/21: cooked cabbage and cooked greens in past week  10/25/21: none in past week   11/4/21: 1 forkful of broccoli   11/11/21: no greens   Recent vomiting/diarrhea/fever, changes in weight or activity level None reported   Tobacco or alcohol use Patient quit smoking in May 2021.  7/8/21: pt reports having 3-4 large glasses of wine last night, but states each glass only had ~4 oz wine mixed with grape juice. It is normal for him to drink this much wine ~2-3 days per week. 7/15/21: reports cutting back on wine to 2-3 glasses per day (1/2 is grape juice)  7/26/21-2/day still 1/2 grape juice  10/6/21: 2-3 glasses every other night   10/25/21: 5-6 glasses last night   11/4/21-current: 4 glasses wine/grape juice mix (16 oz wine) past few nights   Upcoming surgeries or procedures None reported     Assessment/Plan:  Patient's INR was subtherapeutic (1.3). INR fluctuations could be from missing and taking incorrect dosing, changes in alcohol intake, or inconsistent vit k intake. He states he was compliant with dosing, but upon further probing, he admits there are sometimes he doesn't remember if he took the dose or not, so he skips it. He agreed to start using a pillbox to assist with compliance. Pt also states he has not changed his alcohol use and agreed to try limiting to 2 servings per day (currently had avg 4 drinks per day). Additionally, vit k intake varies: He has not had any this week, but typically only had about 1 serving. Admits that this can change based on \"what's available. \"   He denies any s/sxs bleeding, changes in medications, illness, etc.     Patient was instructed to continue warfarin 5 mg daily for now. Repeat INR in 1 wk. Patient was reminded to maintain consistent vitamin K intake and call with any bleeding, medication changes, or fever/vomiting/diarrhea. Patient understands dosing directions and information discussed. Dosing schedule and follow up appointment given to patient. Progress note routed to referring physician's office. Patient acknowledges working in consult agreement with pharmacist as referred by his/her physician. Next Clinic Appointment:      Please call [facility] at [phone] with any questions. Please call The [address] [place] [address] Avenue at [phone] with any questions. Thanks!     Sarah Devine, PharmD, [facility]  Medication Management Clinic   L[facility] Harmon Ph: [phone]  [address] Ph: [phone]  2021 10:30 AM        For Pharmacy Admin Tracking Only     Intervention Detail: Adherence Monitorin   Total # of Interventions Recommended: 1   Total # of Interventions Accepted: 1   Time Spent (min): 15

## 2021-11-18 ENCOUNTER — TELEPHONE (OUTPATIENT)
Dept: PHARMACY | Age: 66
End: 2021-11-18

## 2021-11-18 ENCOUNTER — ANTI-COAG VISIT (OUTPATIENT)
Dept: PHARMACY | Age: 66
End: 2021-11-18
Payer: MEDICARE

## 2021-11-18 VITALS — DIASTOLIC BLOOD PRESSURE: 89 MMHG | SYSTOLIC BLOOD PRESSURE: 143 MMHG | HEART RATE: 60 BPM

## 2021-11-18 DIAGNOSIS — I82.413 ACUTE DEEP VEIN THROMBOSIS (DVT) OF FEMORAL VEIN OF BOTH LOWER EXTREMITIES (HCC): ICD-10-CM

## 2021-11-18 DIAGNOSIS — I26.99 PULMONARY EMBOLISM, BILATERAL (HCC): Primary | ICD-10-CM

## 2021-11-18 LAB — INTERNATIONAL NORMALIZATION RATIO, POC: 2.8

## 2021-11-18 PROCEDURE — 99211 OFF/OP EST MAY X REQ PHY/QHP: CPT

## 2021-11-18 PROCEDURE — 85610 PROTHROMBIN TIME: CPT

## 2021-11-18 RX ORDER — NICOTINE 21 MG/24HR
1 PATCH, TRANSDERMAL 24 HOURS TRANSDERMAL DAILY
Qty: 28 PATCH | Refills: 1 | Status: SHIPPED | OUTPATIENT
Start: 2021-11-18 | End: 2022-02-08 | Stop reason: SDUPTHER

## 2021-11-18 NOTE — PROGRESS NOTES
Eleazar Flores is a 78 y.o. male with PMHx significant for bilateral PE, DVT (6/4/21) while on Xarelto, HTN who presents to clinic 11/18/2021 for anticoagulation monitoring and adjustment. Anticoagulation Indication(s):  PE 2018, bilateral DVT 6/4/21 while on Xarelto  Referring Physician:   Dr. Rolando Elder  Goal INR Range:  2-3  Duration of Anticoagulation Therapy:  indefinite  Time of day dose taken: prefers to take in AM with nifedipine  Product patient has at home: warfarin 5 mg (peach color)    INR Summary                           Warfarin regimen (mg)  Date INR   A/P   Sun Mon Tue Wed Thu Fri Sat Mg/wk   11/18 2.8 At goal, no change 5 5 5 5 5 5 5 35     11/11 1.3 Below goal, cont 5 5 5 5 5 5 5 35   11/4 5.1 Above goal, hold+dec 5 5 5 5 0/5 0/5 5 35   10/25 5.3 Above goal, hold+dec  5 5 0/5 0/5 7.5 5 5 37.5   10/13 1.8 Below goal, increase 5 5 7.5 5 7.5 5 5 40   10/6 1.3 Below goal (missed) 5 5 5 10/5 7.5 5 5 37.5  9/13 2.2 At goal, continue 5 5 5 5 7.5 5 5 37.5  8/30     1.8       Below goal, cont 5 5 5 5 7.5 5 5 37.5  7/26     3.1       At goal, no change 5 5 5 5 7.5 5 5 37.5  7/15 2.1 At goal, increase 5 5 5 5 7.5 5 5 37.5  7/8 5.6 Above goal, holdx1 5 5 5 5 5 5 5 35  6/28 2.1 At goal, no change 5 7.5 5 7.5 5 7.5 5 42.5  6/21 4.3 Above goal, holdx1 5 x 5 7.5 5 7.5 5   6/14 4.2 Above goal, hold 7.5 10 0/7.5 5/7.5 7.5 7.5 7.5   6/10 1.3 Below goal, increase 7.5 INR   10 10 7.5     Started warfarin 5 mg daily on 6/7/21    Last CBC:  Lab Results   Component Value Date    RBC 3.30 (L) 06/04/2021    HGB 11.3 (L) 06/04/2021    HCT 33.6 (L) 06/04/2021    .9 (H) 06/04/2021    MCH 34.3 (H) 06/04/2021    MPV 7.9 06/04/2021    RDW 14.2 06/04/2021     06/04/2021       Patient History:  Recent hospitalizations/HC visits Pt dx with bilat DVT 6/4/21 while on Xarelto 2yrs for hx of PE. Pt stopped Xarelto on 6/6/21 and started warfarin + lovenox bridge on 6/7/21.    Recent medication changes None reported   Medications taken regularly that may interact with warfarin or alter INR No significant drug interactions identified   Warfarin dose taken as prescribed 11/4/21: Pt did not hold warfarin for 2 days last week as directed  11/18/21: started using pillbox, no missed doses   Signs/symptoms of bleeding None reported   Vitamin K intake Normally has 0-1 serving of green, leafy vegetables per week   Recent vomiting/diarrhea/fever, changes in weight or activity level None reported   Tobacco or alcohol use Patient quit smoking in May 2021.  7/8/21: pt reports having 3-4 large glasses of wine last night, but states each glass only had ~4 oz wine mixed with grape juice. It is normal for him to drink this much wine ~2-3 days per week. 7/15/21: reports cutting back on wine to 2-3 glasses per day (1/2 is grape juice)  7/26/21-2/day still 1/2 grape juice  10/6/21: 2-3 glasses every other night   10/25/21: 5-6 glasses last night   11/4/21-current: 4 glasses wine/grape juice mix (16 oz wine) past few nights  11/18/21: only cut back on alcohol slightly. Smoking 2 cig per day, wants to try nrt 14 mg patch   Upcoming surgeries or procedures None reported     Assessment/Plan:  Patient's INR was therapeutic (2.8), up from 1.3 last week with no dose change. INR now therapeutic, but very labile the past few weeks. INR fluctuations could be from missing and taking incorrect dosing, changes in alcohol intake, or inconsistent vit k intake. He states he was compliant with dosing, but upon further probing, he admits there are sometimes he doesn't remember if he took the dose or not, so he skips it. He did start using a pillbox last week, and this has helped. Pt says he tried to cut back on alcohol slightly, but is still drinking 3-4 per day. Additionally, vit k intake varies: He has not had any this week, but typically only had about 1 serving. Admits that this can change based on \"what's available. \"

## 2021-11-18 NOTE — TELEPHONE ENCOUNTER
Pt is motivated to quit smoking. He would like to try using the 14 mg nicotine patch. He is currently only smoking ~2 cigarettes per day. Pt states the 7 mg patch wasn't strong enough for him. Order pended if you agree.      Duke Houston, PharmD, [facility]  Medication Management Clinic   [facility] [address] Ph: [phone]  [facility] Ph: [phone]  11/18/2021 10:32 AM

## 2021-11-24 ENCOUNTER — TELEPHONE (OUTPATIENT)
Dept: PHARMACY | Age: 66
End: 2021-11-24

## 2021-11-24 NOTE — TELEPHONE ENCOUNTER
Patient no-showed to Medication Management Clinic visit today. Called patient and left voicemail asking patient to call back as soon as possible to reschedule appointment.     Peter Worthy PharmD, BCACP  11/24/2021 12:53 PM

## 2021-11-30 ENCOUNTER — OFFICE VISIT (OUTPATIENT)
Dept: INTERNAL MEDICINE CLINIC | Age: 66
End: 2021-11-30
Payer: MEDICARE

## 2021-11-30 VITALS
HEIGHT: 70 IN | SYSTOLIC BLOOD PRESSURE: 149 MMHG | BODY MASS INDEX: 24.22 KG/M2 | OXYGEN SATURATION: 98 % | DIASTOLIC BLOOD PRESSURE: 89 MMHG | TEMPERATURE: 97.9 F | HEART RATE: 66 BPM | WEIGHT: 169.2 LBS

## 2021-11-30 DIAGNOSIS — I10 PRIMARY HYPERTENSION: Primary | ICD-10-CM

## 2021-11-30 PROCEDURE — 99213 OFFICE O/P EST LOW 20 MIN: CPT | Performed by: STUDENT IN AN ORGANIZED HEALTH CARE EDUCATION/TRAINING PROGRAM

## 2021-11-30 RX ORDER — LIDOCAINE 4 G/G
1 PATCH TOPICAL DAILY
Qty: 30 PATCH | Refills: 0 | Status: SHIPPED | OUTPATIENT
Start: 2021-11-30 | End: 2021-12-30

## 2021-11-30 RX ORDER — AMLODIPINE BESYLATE 5 MG/1
5 TABLET ORAL DAILY
Qty: 30 TABLET | Refills: 3 | Status: SHIPPED | OUTPATIENT
Start: 2021-11-30 | End: 2022-01-25

## 2021-11-30 NOTE — PROGRESS NOTES
[facility]) outpatient clinic note  PGY-2      Patient's PCP: Inna Roque MD      HISTORY OF PRESENT ILLNESS:     Mejia Sterling is a 76 y.o. male with hx of hypertension, massive bilateral recurrent leg DVTs (while already anticoagulated for previous DVT on Xarelto. Patient is currently on Coumadin) and bilateral PE (2018) status post IVC filter and osteoarthritis of both hip.  - Osteoarthritis of both hip: patient have seen Dr. Emili Huerta. Patient has been having pain while he walks (L>R). He said that he will have hip surgery as soon as he stops smoking.   - Massive bilateral recurrent leg DVTs (while already anticoagulated for previous DVT on Xarelto. Patient is currently on Coumadin) and bilateral PE (2018) status post IVC filter. - HTN: Blood pressure today 149/89. Patient does not measure his blood pressure. Previously, patient was no compliant with his home nifedipine 30 mg p.o. daily and was asked to measure his BP and start taking it if his SBP is constantly > 140. Per the patient, Nifedipine gives him headaches. - Tobacco abuse: Started smoking when he was 39 y/o. He was smoking cigars FT. He said that he has been decreasing in quality.  - Hx of heroin abuse: Compliant with his home Methadone. Patient goes to  to get his medications.     PAST HISTORY:     Past Medical History:   Diagnosis Date    Anxiety 4/7/2010    Back pain 7/24/2012    DVT (deep venous thrombosis) (HCC)     History of heroin abuse ([address]     Hypertension 10/25/2011    Pulmonary embolism (HCC)     FRANCHESKA       Past Surgical History:   Procedure Laterality Date    COLONOSCOPY N/A 7/26/2019    COLONOSCOPY performed by Jennifer Wright MD at [address] [place] [address]  8/30/2019    COLONOSCOPY WITH BIOPSY performed by Jennifer Wright MD at [address] [facility]     ENDOSCOPY, COLON, DIAGNOSTIC      UPPER GASTROINTESTINAL ENDOSCOPY N/A 2/26/2020    EGD DIAGNOSTIC ONLY performed by Jennifer Wright MD at [facility] UPPER GASTROINTESTINAL ENDOSCOPY N/A 3/18/2020    PUSH ENTEROSCOPY performed by Jignesh Jack MD at [address] History     Substance and Sexual Activity   Alcohol Use Yes    Comment: pint of wine a day     Social History     Substance and Sexual Activity   Drug Use Not Currently    Comment: last heroine      Social History     Tobacco Use   Smoking Status Former Smoker    Packs/day: 0.25    Years: 20.00    Pack years: 5.00    Types: Cigars    Start date: 45    Quit date: 2021    Years since quittin.5   Smokeless Tobacco Never Used   Tobacco Comment    1 DAILY       FMHx:      Problem Relation Age of Onset    High Blood Pressure Mother     Cancer Father         ? BRAIN    Kidney Disease Brother         ESRD ON DIALYSIS        MEDICATIONS:     Current Outpatient Medications on File Prior to Visit   Medication Sig Dispense Refill    nicotine (NICODERM CQ) 14 MG/24HR Place 1 patch onto the skin daily 28 patch 1    folic acid (FOLVITE) 1 MG tablet Take 1 tablet by mouth daily 90 tablet 1    vitamin D (ERGOCALCIFEROL) 1.25 MG (35853 UT) CAPS capsule Take 1 capsule by mouth once a week 12 capsule 1    NIFEdipine (ADALAT CC) 30 MG extended release tablet Take once daily (Patient not taking: Reported on 2021) 90 tablet 3    furosemide (LASIX) 20 MG tablet Take 1 tablet by mouth daily 60 tablet 3    warfarin (COUMADIN) 5 MG tablet Take 1 tablet by mouth daily 30 tablet 3    methadone (DOLOPHINE) 10 MG/ML solution Take 35 mg by mouth daily. No current facility-administered medications on file prior to visit. Scheduled Meds:   Continuous Infusions:  PRN Meds:    Allergies: Allergies   Allergen Reactions    Valsartan-Hydrochlorothiazide      Sweating - light headed       REVIEW OF SYSTEMS:       History obtained from the patient  ? CONSTITUTIONAL:  Neg Recent weight changes,fatigue,fever. ? EYES:  Neg blurriness. ? EARS:  Neg hearing loss.   ? NOSE:  Neg rhinorrhea.  ? MOUTH/THROAT:  Neg bleeding gums. ? RESPIRATORY:  Neg SOB,wheeze,cough. ? CARDIOVASCULAR:  Neg chest pain,palpitations,dyspnea on exertion. ? GASTROINTESTINAL:  Neg Appetite changes,nausea,vomiting,or diarrhea. ? GENITOURINARY:  Neg Urinary frequency, hesitancy. ? HEMATOLOGIC/LYMPHATIC:  Neg Anemia. ? MUSCULOSKELETAL: Pos B/l hip pain. Neg New myalgias,bone pain,joint pain. ? NEUROLOGICAL: Neg Loss of Consciousness,memeory loss, forgetfulness, periods of confusion. ? SKIN :  Neg skin or hair changes,and has no itching,rashes,sores. ? PSYCHIATRIC:  Neg depression. ? ENDOCRINE:  Neg polydipsia,polyuria. ? ALL/IMM :  Neg      PHYSICAL EXAM:       Vitals: There were no vitals taken for this visit. I/O:  No intake or output data in the 24 hours ending 11/30/21 0936  [unfilled]  [unfilled]    Physical Examination:   ? General appearance: alert, appears stated age and cooperative. ? Skin: Skin color, texture, turgor normal.   ? HEENT: Head: Normocephalic. ? Neck: no adenopathy. ? Lungs: clear to auscultation bilaterally  ? Heart: regular rate and rhythm. ? Abdomen: soft, non-tender; bowel sounds normal.  ? Extremities: extremities normal.  ? Neurologic: Mental status: Alert, oriented, thought content appropriate. DATA:       Labs:  No results found for this visit on 11/30/21. U/A:No results for input(s): NITRITE, COLORU, PHUR, LABCAST, WBCUA, RBCUA, MUCUS, TRICHOMONAS, YEAST, BACTERIA, CLARITYU, SPECGRAV, LEUKOCYTESUR, UROBILINOGEN, BILIRUBINUR, BLOODU, GLUCOSEU, AMORPHOUS in the last 72 hours. Invalid input(s): Anh Gupta    Rad:   No orders to display       ASSESSMENT AND PLAN:     Osteoarthritis of both hip  Patient have seen Dr. Indigo Lara.  - Need to stop smoking in order to get hip replacement surgery  - Lidocaine patch 4 %     Massive bilateral recurrent leg DVTs (while already anticoagulated for previous DVT on Xarelto.  Patient is currently on Coumadin) and bilateral PE (2018) status post IVC filter  -SOB   Patient complained today of mild chronic shortness of breath especially with exertion and bilateral pleural lower extremity edema. - Continue home warfarin. Weekly INR checks in the warfarin clinic  - Echo  - F/U hypercoagulable test     HTN  Blood pressure today 149/89. Patient does not measure his blood pressure. Previously, patient was no compliant with his home nifedipine 30 mg p.o. daily and was asked to measure his BP and start taking it if his SBP is constantly > 140. Per the patient, Nifedipine gives him headaches. - Stop taking Nifedipine.  - Start taking Amlodipine 5 mg (aware that patient was on that medication and had SE of b/l LE edema. Patient request to try this medication again and he is ok with the SE risk   - Patient was asked to monitor his blood pressure at home and to bring it to our next appointment    Hx of rectal mucosal polyp removal (08/30/2019)  Hx of colon cancer.  - Patient need to get colonoscopy every 5 years. Tobacco abuse  Started smoking when he was 39 y/o. He was smoking cigars FT. He said that he has been decreasing in quality. Trying to quit in order to get hip surgery replacement     Hx of heroin abuse  Compliant with his home Methadone. Patient goes to  to get his medications.     - Patient received his COVID and Flu shots     This patient has been staffed and discussed with Colby Monroe MD.  -----------------------------  Cari Maradiaga MD, PGY-2

## 2021-11-30 NOTE — PATIENT INSTRUCTIONS
- Please apply Lidocaine patch 4% as needed for hip pain  - Please stop taking Nifedipine. Please start taking Amlodipine 5 mg one a day. - Please come back in 2 months.

## 2021-12-02 DIAGNOSIS — I82.413 ACUTE DEEP VEIN THROMBOSIS (DVT) OF FEMORAL VEIN OF BOTH LOWER EXTREMITIES (HCC): ICD-10-CM

## 2021-12-02 RX ORDER — WARFARIN SODIUM 5 MG/1
5 TABLET ORAL DAILY
Qty: 30 TABLET | Refills: 3 | Status: SHIPPED | OUTPATIENT
Start: 2021-12-02 | End: 2022-03-22

## 2021-12-06 NOTE — TELEPHONE ENCOUNTER
Tried calling pt. Mailbox full. Sent SMS notification.      Kristi Collado, PharmD, [facility]  Medication Management Clinic   [facility] Ph: [phone]  [facility] Ph: [phone]  12/6/2021 10:23 AM unable to view

## 2021-12-08 NOTE — TELEPHONE ENCOUNTER
Tried calling pt, same result. Will send letter next week if no return call.      Duke Houston, PharmD, [facility]  Medication Management Clinic   [address] Ph: [phone]  Richa Franks Ph: [phone]  12/8/2021 2:47 PM

## 2021-12-14 ENCOUNTER — ANTI-COAG VISIT (OUTPATIENT)
Dept: PHARMACY | Age: 66
End: 2021-12-14
Payer: MEDICARE

## 2021-12-14 DIAGNOSIS — I26.99 PULMONARY EMBOLISM, BILATERAL (HCC): Primary | ICD-10-CM

## 2021-12-14 DIAGNOSIS — I82.413 ACUTE DEEP VEIN THROMBOSIS (DVT) OF FEMORAL VEIN OF BOTH LOWER EXTREMITIES (HCC): ICD-10-CM

## 2021-12-14 LAB — INTERNATIONAL NORMALIZATION RATIO, POC: 2.2

## 2021-12-14 PROCEDURE — 99211 OFF/OP EST MAY X REQ PHY/QHP: CPT

## 2021-12-14 PROCEDURE — 85610 PROTHROMBIN TIME: CPT

## 2021-12-14 NOTE — PROGRESS NOTES
Dee Guerrero is a 78 y.o. male with PMHx significant for bilateral PE, DVT (6/4/21) while on Xarelto, HTN who presents to clinic 12/14/2021 for anticoagulation monitoring and adjustment. Anticoagulation Indication(s):  PE 2018, bilateral DVT 6/4/21 while on Xarelto  Referring Physician:   Dr. Alexandre Perez  Goal INR Range:  2-3  Duration of Anticoagulation Therapy:  indefinite  Time of day dose taken: prefers to take in AM  Product patient has at home: warfarin 5 mg (peach color)    INR Summary                           Warfarin regimen (mg)  Date INR   A/P   Sun Mon Tue Wed Thu Fri Sat Mg/wk   12/14   2.2       At goal, no change      5 5 5 5 5 5 5 35   11/18 2.8 At goal, no change 5 5 5 5 5 5 5 35    11/11 1.3 Below goal, cont 5 5 5 5 5 5 5 35   11/4 5.1 Above goal, hold+dec 5 5 5 5 0/5 0/5 5 35   10/25 5.3 Above goal, hold+dec  5 5 0/5 0/5 7.5 5 5 37.5   10/13 1.8 Below goal, increase 5 5 7.5 5 7.5 5 5 40   10/6 1.3 Below goal (missed) 5 5 5 10/5 7.5 5 5 37.5  9/13 2.2 At goal, continue 5 5 5 5 7.5 5 5 37.5  8/30     1.8       Below goal, cont 5 5 5 5 7.5 5 5 37.5  7/26     3.1       At goal, no change 5 5 5 5 7.5 5 5 37.5  7/15 2.1 At goal, increase 5 5 5 5 7.5 5 5 37.5  7/8 5.6 Above goal, holdx1 5 5 5 5 5 5 5 35  6/28 2.1 At goal, no change 5 7.5 5 7.5 5 7.5 5 42.5  6/21 4.3 Above goal, holdx1 5 x 5 7.5 5 7.5 5   6/14 4.2 Above goal, hold 7.5 10 0/7.5 5/7.5 7.5 7.5 7.5   6/10 1.3 Below goal, increase 7.5 INR   10 10 7.5     Started warfarin 5 mg daily on 6/7/21    Last CBC:  Lab Results   Component Value Date    RBC 3.30 (L) 06/04/2021    HGB 11.3 (L) 06/04/2021    HCT 33.6 (L) 06/04/2021    .9 (H) 06/04/2021    MCH 34.3 (H) 06/04/2021    MPV 7.9 06/04/2021    RDW 14.2 06/04/2021     06/04/2021       Patient History:  Recent hospitalizations/HC visits Pt dx with bilat DVT 6/4/21 while on Xarelto 2yrs for hx of PE.  Pt stopped Xarelto on 6/6/21 and started warfarin + lovenox bridge on 6/7/21. Recent medication changes None reported   Medications taken regularly that may interact with warfarin or alter INR No significant drug interactions identified   Warfarin dose taken as prescribed 11/4/21: Pt did not hold warfarin for 2 days last week as directed  11/18/21: started using pillbox, no missed doses   Signs/symptoms of bleeding None reported   Vitamin K intake Normally has 0-1 serving of green, leafy vegetables per week   Recent vomiting/diarrhea/fever, changes in weight or activity level None reported   Tobacco or alcohol use Patient quit smoking in May 2021.  7/8/21: pt reports having 3-4 large glasses of wine last night, but states each glass only had ~4 oz wine mixed with grape juice. It is normal for him to drink this much wine ~2-3 days per week. 7/15/21: reports cutting back on wine to 2-3 glasses per day (1/2 is grape juice)  7/26/21-2/day still 1/2 grape juice  10/6/21: 2-3 glasses every other night   10/25/21: 5-6 glasses last night   11/4/21-current: 4 glasses wine/grape juice mix (16 oz wine) past few nights  11/18/21: only cut back on alcohol slightly. Smoking 2 cig per day, wants to try nrt 14 mg patch  12/14: 3-4 glasses wine/night. Still smoking couple cig per day + 14 mg patch. Upcoming surgeries or procedures 12/14: Hip surgery - nothing scheduled yet. Assessment/Plan:  Patient's INR was therapeutic (2.2). INR now therapeutic, but very labile the past few visits. INR fluctuations could be from missing and taking incorrect dosing, changes in alcohol intake, or inconsistent vit k intake. He states he was compliant with dosing, but upon further probing, he admits there are sometimes he doesn't remember if he took the dose or not, so he skips it. He did start using a pillbox last month, and this has helped. Vit k intake varies: He has been avoiding for the most part. Admits that this can change based on \"what's available. \"  He denies any s/sxs bleeding,

## 2022-01-06 ENCOUNTER — ANTI-COAG VISIT (OUTPATIENT)
Dept: PHARMACY | Age: 67
End: 2022-01-06
Payer: MEDICARE

## 2022-01-06 DIAGNOSIS — I82.413 ACUTE DEEP VEIN THROMBOSIS (DVT) OF FEMORAL VEIN OF BOTH LOWER EXTREMITIES (HCC): ICD-10-CM

## 2022-01-06 DIAGNOSIS — I26.99 PULMONARY EMBOLISM, BILATERAL (HCC): Primary | ICD-10-CM

## 2022-01-06 LAB — INTERNATIONAL NORMALIZATION RATIO, POC: 3.6

## 2022-01-06 PROCEDURE — 99212 OFFICE O/P EST SF 10 MIN: CPT

## 2022-01-06 PROCEDURE — 85610 PROTHROMBIN TIME: CPT

## 2022-01-06 NOTE — PROGRESS NOTES
Sandra Rodríguez is a 78 y.o. male with PMHx significant for bilateral PE, DVT (6/4/21) while on Xarelto, HTN who presents to clinic 1/6/2022 for anticoagulation monitoring and adjustment. Anticoagulation Indication(s):  PE 2018, bilateral DVT 6/4/21 while on Xarelto  Referring Physician:   Dr. Tiana Braun  Goal INR Range:  2-3  Duration of Anticoagulation Therapy:  indefinite  Time of day dose taken: prefers to take in AM  Product patient has at home: warfarin 5 mg (peach color)    INR Summary                           Warfarin regimen (mg)  Date INR   A/P   Sun Mon Tue Wed Thu Fri Sat Mg/wk  1/5 3.6 Above goal, hold 5 5 5 5 5 0/5 5 35  12/14   2.2       At goal, no change      5 5 5 5 5 5 5 35  11/18 2.8 At goal, no change 5 5 5 5 5 5 5 35   11/11 1.3 Below goal, cont 5 5 5 5 5 5 5 35  11/4 5.1 Above goal, hold+dec 5 5 5 5 0/5 0/5 5 35  10/25 5.3 Above goal, hold+dec  5 5 0/5 0/5 7.5 5 5 37.5  10/13 1.8 Below goal, increase 5 5 7.5 5 7.5 5 5 40  10/6 1.3 Below goal (missed) 5 5 5 10/5 7.5 5 5 37.5  9/13 2.2 At goal, continue 5 5 5 5 7.5 5 5 37.5  8/30     1.8       Below goal, cont 5 5 5 5 7.5 5 5 37.5  7/26     3.1       At goal, no change 5 5 5 5 7.5 5 5 37.5  7/15 2.1 At goal, increase 5 5 5 5 7.5 5 5 37.5  7/8 5.6 Above goal, holdx1 5 5 5 5 5 5 5 35  6/28 2.1 At goal, no change 5 7.5 5 7.5 5 7.5 5 42.5  6/21 4.3 Above goal, holdx1 5 x 5 7.5 5 7.5 5   6/14 4.2 Above goal, hold 7.5 10 0/7.5 5/7.5 7.5 7.5 7.5   6/10 1.3 Below goal, increase 7.5 INR   10 10 7.5     Started warfarin 5 mg daily on 6/7/21    Last CBC:  Lab Results   Component Value Date    RBC 3.30 (L) 06/04/2021    HGB 11.3 (L) 06/04/2021    HCT 33.6 (L) 06/04/2021    .9 (H) 06/04/2021    MCH 34.3 (H) 06/04/2021    MPV 7.9 06/04/2021    RDW 14.2 06/04/2021     06/04/2021       Patient History:  Recent hospitalizations/HC visits Pt dx with bilat DVT 6/4/21 while on Xarelto 2yrs for hx of PE.  Pt stopped Xarelto on 6/6/21 and started warfarin + lovenox bridge on 6/7/21. Recent medication changes None reported   Medications taken regularly that may interact with warfarin or alter INR No significant drug interactions identified   Warfarin dose taken as prescribed 11/4/21: Pt did not hold warfarin for 2 days last week as directed  11/18/21: started using pillbox, no missed doses  1/5/22: no missed or extra doses    Signs/symptoms of bleeding None reported   Vitamin K intake Normally has 0-1 serving of green, leafy vegetables per week  1/5/22: considering adding more vit K foods (thinks maybe 1-2x/week would be sustainable)   Recent vomiting/diarrhea/fever, changes in weight or activity level None reported   Tobacco or alcohol use Patient quit smoking in May 2021.  7/8/21: pt reports having 3-4 large glasses of wine last night, but states each glass only had ~4 oz wine mixed with grape juice. It is normal for him to drink this much wine ~2-3 days per week. 7/15/21: reports cutting back on wine to 2-3 glasses per day (1/2 is grape juice)  7/26/21-2/day still 1/2 grape juice  10/6/21: 2-3 glasses every other night   10/25/21: 5-6 glasses last night   11/4/21-current: 4 glasses wine/grape juice mix (16 oz wine) past few nights  11/18/21: only cut back on alcohol slightly. Smoking 2 cig per day, wants to try nrt 14 mg patch  12/14: 3-4 glasses wine/night. Still smoking couple cig per day + 14 mg patch. 1/5/22:3-quit smoking last week using 14mg patches (has been on for ~3 weeks)   Upcoming surgeries or procedures 12/14: Hip surgery - nothing scheduled yet. Assessment/Plan:  Patient's INR was supretherapeutic (3.6) for unknown reason but INR has been labile the past few visits. INR fluctuations could be from missing and taking incorrect dosing, changes in alcohol intake, or inconsistent vit k intake.  He states he is compliant with dosing, but upon further probing, he admits there are sometimes he doesn't remember if he took the dose or not, so he skips it. He did start using a pillbox and this has helped. Vit k intake varies. He has been avoiding for the most part but is now interested in maybe trying to start eating more as he likes broccoli. He denies any s/sxs bleeding, changes in medications, illness, etc.      He was prescribed 14 mg nicotine patches and is currently using those. He has stopped smoking cigarettes. Counseled on decreasing to 7mg patches when he is ready or in ~3 weeks. Reinforced the benefits of quitting prior to surgery. Pt also endorses hip pain, and is using diclofenac gel for pain relief. States he may be getting hip surgery once he quits smoking, in which case would recommend bridging with lovenox. Asked pt to notify clinic when scheduled to coordinate periop anticoagulation management. Patient was instructed to HOLD warfarin tomorrow (already took dose today) and then continue warfarin 5 mg daily for now. Repeat INR in 2 wk. Patient was reminded to maintain consistent vitamin K intake and call with any bleeding, medication changes, or fever/vomiting/diarrhea. Patient understands dosing directions and information discussed. Dosing schedule and follow up appointment given to patient. Progress note routed to referring physician's office. Patient acknowledges working in consult agreement with pharmacist as referred by his/her physician. Next Clinic Appointment:  1/20    Please call [facility] at [phone] with any questions. Thanks!   Maine Sierra, PharmD  PGY1 Pharmacy Resident  Medication Management Clinic  1/6/2022 2:21 PM    For Pharmacy Admin Tracking Only     Intervention Detail: Dose Adjustment: 1, reason: Therapy De-escalation   Total # of Interventions Recommended: 1   Total # of Interventions Accepted: 1   Time Spent (min): 15

## 2022-01-18 DIAGNOSIS — E55.9 HYPOVITAMINOSIS D: ICD-10-CM

## 2022-01-19 RX ORDER — ERGOCALCIFEROL 1.25 MG/1
CAPSULE ORAL
Qty: 12 CAPSULE | Refills: 0 | Status: SHIPPED | OUTPATIENT
Start: 2022-01-19 | End: 2022-03-23 | Stop reason: ALTCHOICE

## 2022-01-20 ENCOUNTER — ANTI-COAG VISIT (OUTPATIENT)
Dept: PHARMACY | Age: 67
End: 2022-01-20
Payer: MEDICARE

## 2022-01-20 DIAGNOSIS — I26.99 PULMONARY EMBOLISM, BILATERAL (HCC): Primary | ICD-10-CM

## 2022-01-20 DIAGNOSIS — I82.413 ACUTE DEEP VEIN THROMBOSIS (DVT) OF FEMORAL VEIN OF BOTH LOWER EXTREMITIES (HCC): ICD-10-CM

## 2022-01-20 LAB — INTERNATIONAL NORMALIZATION RATIO, POC: 3.7

## 2022-01-20 PROCEDURE — 85610 PROTHROMBIN TIME: CPT

## 2022-01-20 PROCEDURE — 99212 OFFICE O/P EST SF 10 MIN: CPT

## 2022-01-20 NOTE — PROGRESS NOTES
Remedios Kirkland is a 78 y.o. male with PMHx significant for bilateral PE, DVT (6/4/21) while on Xarelto, HTN who presents to clinic 1/20/2022 for anticoagulation monitoring and adjustment.     Anticoagulation Indication(s):  PE 2018, bilateral DVT 6/4/21 while on Xarelto  Referring Physician:   Dr. Gera Martinez (Also send notes to Dr Concepcion Lloyd, or responsible resident)  Goal INR Range:  2-3  Duration of Anticoagulation Therapy:  indefinite  Time of day dose taken: prefers to take in AM  Product patient has at home: warfarin 5 mg (peach color)    INR Summary                           Warfarin regimen (mg)  Date INR   A/P   Sun Mon Tue Wed Thu Fri Sat Mg/wk  1/20 3.7 Above goal, decrease 5 5 5 5 5 2.5 5 32.5  1/5 3.6 Above goal, hold 5 5 5 5 5 0/5 5 35  12/14   2.2       At goal, no change      5 5 5 5 5 5 5 35  11/18 2.8 At goal, no change 5 5 5 5 5 5 5 35   11/11 1.3 Below goal, cont 5 5 5 5 5 5 5 35  11/4 5.1 Above goal, hold+dec 5 5 5 5 0/5 0/5 5 35  10/25 5.3 Above goal, hold+dec  5 5 0/5 0/5 7.5 5 5 37.5  10/13 1.8 Below goal, increase 5 5 7.5 5 7.5 5 5 40  10/6 1.3 Below goal (missed) 5 5 5 10/5 7.5 5 5 37.5  9/13 2.2 At goal, continue 5 5 5 5 7.5 5 5 37.5  8/30     1.8       Below goal, cont 5 5 5 5 7.5 5 5 37.5  7/26     3.1       At goal, no change 5 5 5 5 7.5 5 5 37.5  7/15 2.1 At goal, increase 5 5 5 5 7.5 5 5 37.5  7/8 5.6 Above goal, holdx1 5 5 5 5 5 5 5 35  6/28 2.1 At goal, no change 5 7.5 5 7.5 5 7.5 5 42.5  6/21 4.3 Above goal, holdx1 5 x 5 7.5 5 7.5 5   6/14 4.2 Above goal, hold 7.5 10 0/7.5 5/7.5 7.5 7.5 7.5   6/10 1.3 Below goal, increase 7.5 INR   10 10 7.5     Started warfarin 5 mg daily on 6/7/21    Last CBC:  Lab Results   Component Value Date    RBC 3.30 (L) 06/04/2021    HGB 11.3 (L) 06/04/2021    HCT 33.6 (L) 06/04/2021    .9 (H) 06/04/2021    MCH 34.3 (H) 06/04/2021    MPV 7.9 06/04/2021    RDW 14.2 06/04/2021     06/04/2021       Patient History:  Recent hospitalizations/HC visits Pt dx with bilat DVT 6/4/21 while on Xarelto 2yrs for hx of PE. Pt stopped Xarelto on 6/6/21 and started warfarin + lovenox bridge on 6/7/21. Recent medication changes None reported   Medications taken regularly that may interact with warfarin or alter INR No significant drug interactions identified   Warfarin dose taken as prescribed 11/4/21: Pt did not hold warfarin for 2 days last week as directed  11/18/21: started using pillbox, no missed doses  1/5/22: no missed or extra doses    Signs/symptoms of bleeding None reported   Vitamin K intake Normally has 0-1 serving of green, leafy vegetables per week  1/5/22: considering adding more vit K foods (thinks maybe 1-2x/week would be sustainable)  1/20/22: decreased appetite    Recent vomiting/diarrhea/fever, changes in weight or activity level None reported   Tobacco or alcohol use Patient quit smoking in May 2021.  7/8/21: pt reports having 3-4 large glasses of wine last night, but states each glass only had ~4 oz wine mixed with grape juice. It is normal for him to drink this much wine ~2-3 days per week. 7/15/21: reports cutting back on wine to 2-3 glasses per day (1/2 is grape juice)  7/26/21-2/day still 1/2 grape juice  10/6/21: 2-3 glasses every other night   10/25/21: 5-6 glasses last night   11/4/21-current: 4 glasses wine/grape juice mix (16 oz wine) past few nights  11/18/21: only cut back on alcohol slightly. Smoking 2 cig per day, wants to try nrt 14 mg patch  12/14: 3-4 glasses wine/night. Still smoking couple cig per day + 14 mg patch. 1/5/22:3-quit smoking last week using 14mg patches (has been on for ~3 weeks)  1/20/22: Continues to remain smoke free!! Has not been using patches also. Upcoming surgeries or procedures 12/14: Hip surgery - nothing scheduled yet. Assessment/Plan:  Patient's INR was supretherapeutic (3.7) after a supratherapeutic INR at last visit.  INR likely increased due to patient no longer smoking. He did start using a pillbox and this has helped with adherence. Vit k intake varies. He has been avoiding for the most part but is now interested in maybe trying to start eating more in the future as he likes broccoli. He denies any s/sxs bleeding, changes in medications, illness, etc.      He has stopped smoking and no longer using patches. Reinforced the benefits of quitting prior to surgery. Pt also endorses hip pain, and is using diclofenac gel for pain relief. States he may be getting hip surgery once he quits smoking, in which case would recommend bridging with lovenox. Asked pt to notify clinic when scheduled to coordinate periop anticoagulation management. Patient was instructed to decrease warfarin to 2.5 mg on Thursday and 5mg all other days. Repeat INR in 2 wk. Patient was reminded to maintain consistent vitamin K intake and call with any bleeding, medication changes, or fever/vomiting/diarrhea. Patient understands dosing directions and information discussed. Dosing schedule and follow up appointment given to patient. Progress note routed to referring physician's office. Patient acknowledges working in consult agreement with pharmacist as referred by his/her physician. Next Clinic Appointment:  2/3    Please call [facility] at [phone] with any questions. Please call The [address] [place] [address] Avenue [phone] with any questions. Thanks!   Michelle Epps, PharmD  PGY1 Pharmacy Resident  Medication Management Clinic  1/20/2022 12:49 PM    For Pharmacy Admin Tracking Only     Intervention Detail: Dose Adjustment: 1, reason: Therapy De-escalation   Total # of Interventions Recommended: 1   Total # of Interventions Accepted: 1   Time Spent (min): 15

## 2022-01-25 ENCOUNTER — OFFICE VISIT (OUTPATIENT)
Dept: INTERNAL MEDICINE CLINIC | Age: 67
End: 2022-01-25
Payer: MEDICARE

## 2022-01-25 VITALS
BODY MASS INDEX: 25 KG/M2 | SYSTOLIC BLOOD PRESSURE: 147 MMHG | WEIGHT: 174.6 LBS | HEART RATE: 56 BPM | DIASTOLIC BLOOD PRESSURE: 100 MMHG | OXYGEN SATURATION: 99 % | TEMPERATURE: 97.2 F | HEIGHT: 70 IN

## 2022-01-25 DIAGNOSIS — Z13.1 DIABETES MELLITUS SCREENING: ICD-10-CM

## 2022-01-25 DIAGNOSIS — Z12.5 PROSTATE CANCER SCREENING: ICD-10-CM

## 2022-01-25 DIAGNOSIS — Z13.220 SCREENING CHOLESTEROL LEVEL: ICD-10-CM

## 2022-01-25 DIAGNOSIS — Z13.21 ENCOUNTER FOR VITAMIN DEFICIENCY SCREENING: ICD-10-CM

## 2022-01-25 DIAGNOSIS — F51.01 PRIMARY INSOMNIA: ICD-10-CM

## 2022-01-25 DIAGNOSIS — F11.11 HISTORY OF HEROIN ABUSE (HCC): ICD-10-CM

## 2022-01-25 DIAGNOSIS — I26.99 PULMONARY EMBOLISM, BILATERAL (HCC): ICD-10-CM

## 2022-01-25 DIAGNOSIS — Z00.00 WELLNESS EXAMINATION: Primary | ICD-10-CM

## 2022-01-25 DIAGNOSIS — I10 ESSENTIAL HYPERTENSION: ICD-10-CM

## 2022-01-25 DIAGNOSIS — M16.0 PRIMARY OSTEOARTHRITIS OF BOTH HIPS: ICD-10-CM

## 2022-01-25 PROCEDURE — 99213 OFFICE O/P EST LOW 20 MIN: CPT

## 2022-01-25 PROCEDURE — 6360000002 HC RX W HCPCS

## 2022-01-25 PROCEDURE — 96374 THER/PROPH/DIAG INJ IV PUSH: CPT

## 2022-01-25 PROCEDURE — 96372 THER/PROPH/DIAG INJ SC/IM: CPT

## 2022-01-25 RX ORDER — TRIAMCINOLONE ACETONIDE 40 MG/ML
40 INJECTION, SUSPENSION INTRA-ARTICULAR; INTRAMUSCULAR ONCE
Status: COMPLETED | OUTPATIENT
Start: 2022-01-25 | End: 2022-01-25

## 2022-01-25 RX ORDER — AMLODIPINE BESYLATE 5 MG/1
5 TABLET ORAL DAILY
Qty: 30 TABLET | Refills: 3 | Status: SHIPPED | OUTPATIENT
Start: 2022-01-25 | End: 2022-03-17 | Stop reason: DRUGHIGH

## 2022-01-25 RX ORDER — LANOLIN ALCOHOL/MO/W.PET/CERES
3 CREAM (GRAM) TOPICAL NIGHTLY PRN
Status: DISCONTINUED | OUTPATIENT
Start: 2022-01-25 | End: 2022-03-23

## 2022-01-25 RX ORDER — KETOROLAC TROMETHAMINE 30 MG/ML
30 INJECTION, SOLUTION INTRAMUSCULAR; INTRAVENOUS ONCE
Status: COMPLETED | OUTPATIENT
Start: 2022-01-25 | End: 2022-01-25

## 2022-01-25 RX ADMIN — TRIAMCINOLONE ACETONIDE 40 MG: 40 INJECTION, SUSPENSION INTRA-ARTICULAR; INTRAMUSCULAR at 10:23

## 2022-01-25 RX ADMIN — KETOROLAC TROMETHAMINE 30 MG: 30 INJECTION, SOLUTION INTRAMUSCULAR; INTRAVENOUS at 10:24

## 2022-01-25 NOTE — PROGRESS NOTES
Outpatient Clinic Established Patient Note    Patient: Paula Elias  : 1955 (35 y.o.)  Date: 2022    CC:     HPI:    Paula Elias is a 78 y.o. male who  has a past medical history of Anxiety, Back pain, DVT (deep venous thrombosis) ([facility] [address] History of heroin abuse ([facility] [address] Hypertension, and Pulmonary embolism ([facility] [address] Patient presented DVT while he was on Xarelto, currently patient on Warfarin. S/p IVC filter and osteoarthritis of both hip. Patient is Covid vaccinated. - Osteoarthritis of both hip: patient have seen Dr. Can Son. Patient has been having pain while he walks (L>R). He said that he will have hip surgery as soon as he stops smoking. Patient already quit smoking on .     - Massive bilateral recurrent leg DVTs (while already anticoagulated for previous DVT on Xarelto. Patient is currently on Coumadin) and bilateral PE () status post IVC filter. Last INR 3.7. Follow [facility] clinic.     - HTN: Blood pressure today 147/100. Patient does not measure his blood pressure. Patient compliant with his amlodipine 5 mg, will increase the dose to 10 mg and reevaluate in three months    - Tobacco abuse: Started smoking when he was 39 y/o. He was smoking cigars FT. He said he quit on .     - Hx of heroin abuse: Patient stopped taking his Methadone and denies any withdrawal symptoms, just mild insomnia. On today's visit the patient is hemodynamically stable, afebrile. Home Meds:  Prior to Visit Medications    Medication Sig Taking?  Authorizing Provider   diclofenac sodium (VOLTAREN) 1 % GEL Apply 4 g topically 4 times daily as needed for Pain Yes Ezequiel Pool MD   amLODIPine (NORVASC) 5 MG tablet Take 1 tablet by mouth daily Yes Ezequiel Pool MD   vitamin D (ERGOCALCIFEROL) 1.25 MG (26371 UT) CAPS capsule TAKE 1 CAPSULE BY MOUTH ONE TIME PER WEEK Yes Marty Carranza MD   warfarin (COUMADIN) 5 MG tablet Take 1 tablet by mouth daily Yes Wilma Alexander MD   nicotine (NICODERM CQ) 14 MG/24HR Place 1 patch onto the skin daily Yes Reji Wakefield MD   folic acid (FOLVITE) 1 MG tablet Take 1 tablet by mouth daily Yes Zee Michale MD   furosemide (LASIX) 20 MG tablet Take 1 tablet by mouth daily Yes Mariam Chavez MD   methadone (DOLOPHINE) 10 MG/ML solution Take 35 mg by mouth daily. Patient not taking: Reported on 1/25/2022  Historical Provider, MD       Allergies:    Valsartan-hydrochlorothiazide    Health Maintenance Due   Topic Date Due    AAA screen  Never done    Shingles Vaccine (2 of 3) 04/02/2018    Annual Wellness Visit (AWV)  Never done    COVID-19 Vaccine (2 - Moderna 3-dose series) 07/14/2021       Immunization History   Administered Date(s) Administered    COVID-19, Carlos Rosa, Primary or Immunocompromised, PF, 100mcg/0.5mL 06/16/2021    Influenza Virus Vaccine 11/15/2017    Influenza, High Dose (Fluzone 65 yrs and older) 11/22/2019    Influenza, MDCK Quadv, with preserv IM (Flucelvax 2 yrs and older) 08/31/2021    Influenza, Quadv, IM, PF (6 mo and older Fluzone, Flulaval, Fluarix, and 3 yrs and older Afluria) 11/14/2018    Influenza, Quadv, adjuvanted, 65 yrs +, IM, PF (Fluad) 10/08/2020    Influenza, Triv, inactivated, subunit, adjuvanted, IM (Fluad 65 yrs and older) 09/18/2019    Pneumococcal Conjugate 13-valent ([id number]) 01/31/2018    Pneumococcal Polysaccharide ([id number]) 05/01/2018    Tdap (Boostrix, Adacel) 08/31/2021    Zoster Live (Zostavax) 02/05/2018       Review of Systems  A 10-organ Review Of Systems was obtained and otherwise unremarkable except as per HPI. Data: Old records have been reviewed electronically. PHYSICAL EXAM:  BP (!) 147/100 (Site: Left Upper Arm, Position: Sitting, Cuff Size: Medium Adult)   Pulse 56   Temp 97.2 °F (36.2 °C) (Temporal)   Ht 5' 10\" (1.778 m)   Wt 174 lb 9.6 oz (79.2 kg)   SpO2 99%   BMI 25.05 kg/m²   Physical Exam  Constitutional:       Appearance: Normal appearance.    HENT: Head: Normocephalic. Mouth/Throat:      Mouth: Mucous membranes are moist.   Eyes:      Extraocular Movements: Extraocular movements intact. Conjunctiva/sclera: Conjunctivae normal.      Pupils: Pupils are equal, round, and reactive to light. Cardiovascular:      Rate and Rhythm: Normal rate and regular rhythm. Pulses: Normal pulses. Heart sounds: Normal heart sounds. Pulmonary:      Effort: Pulmonary effort is normal.      Breath sounds: Normal breath sounds. Abdominal:      General: Abdomen is flat. Bowel sounds are normal.      Palpations: Abdomen is soft. Musculoskeletal:         General: Normal range of motion. Cervical back: Normal range of motion. Neurological:      General: No focal deficit present. Mental Status: He is alert and oriented to person, place, and time. Mental status is at baseline. Psychiatric:         Mood and Affect: Mood normal.         Behavior: Behavior normal.         Assessment & Plan:      1. Essential hypertension  Blood pressure still elevated, will monitor in two months   - COMPREHENSIVE METABOLIC PANEL; Future  - CBC WITH AUTO DIFFERENTIAL; Future  - amLODIPine (NORVASC) 5 MG tablet; Take 1 tablet by mouth daily  Dispense: 30 tablet; Refill: 3    3. History of heroin abuse [facility])  Patient reports stopping his methadone and denies any withdrawal symptoms. 4. Wellness examination  - COMPREHENSIVE METABOLIC PANEL; Future  - CBC WITH AUTO DIFFERENTIAL; Future    5. Primary osteoarthritis of both hips  Patient was seeing by orthopedics. Reccomended hip surgery once quit smoking. Patient already quit smoking.   -Referral to orthopedic surgery  - diclofenac sodium (VOLTAREN) 1 % GEL; Apply 4 g topically 4 times daily as needed for Pain  Dispense: 50 g; Refill: 3  - triamcinolone acetonide (KENALOG-40) injection 40 mg  - ketorolac (TORADOL) injection 30 mg    6. Screening cholesterol level  - LIPID PANEL;  Future  - LDL CHOLESTEROL, DIRECT; Future    7. Prostate cancer screening  - Psa screening; Future  - URINALYSIS  - MICROSCOPIC URINALYSIS; Future    8. Diabetes mellitus screening  - HEMOGLOBIN A1C; Future    9. Encounter for vitamin deficiency screening  - VITAMIN D 25 HYDROXY; Future    10. Massive bilateral recurrent leg DVTs (while already anticoagulated for previous DVT on Xarelto. Patient is currently on Coumadin) and bilateral PE (2018) status post IVC filter  -SOB   Patient complained today of mild chronic shortness of breath especially with exertion and bilateral pleural lower extremity edema. Last INR 3.7  - Continue home warfarin. Weekly INR checks in the warfarin clinic       11. Hx of rectal mucosal polyp removal (08/30/2019)  Hx of colon cancer.  - Patient need to get colonoscopy every 5 years.     12. Tobacco abuse  Started smoking when he was 37 y/o. He was smoking cigars FT. Patient already quit smoking on January 1st        Return in about 2 months (around 3/25/2022) for HTN follow up, labs .       Dispo: Pt has been staffed with Dr. Gaby Wilhelm  _______________  Monie Resendez MD, 1/25/2022 10:47 AM   PGY-1

## 2022-01-25 NOTE — PATIENT INSTRUCTIONS
Patient Education        amlodipine  Pronunciation:  kat khan  Brand:  Mark Abreu  What is the most important information I should know about amlodipine? Follow all directions on your medicine label and package. Tell each of your healthcare providers about all your medical conditions, allergies, and all medicines you use. What is amlodipine? Amlodipine is a calcium channel blocker that dilates (widens) blood vessels and improves blood flow. Amlodipine is used to treat chest pain (angina) and other conditions caused by coronary artery disease. Amlodipine is also used to treat high blood pressure (hypertension) in adults and children at least 10years old. Lowering blood pressure may lower your risk of a stroke or heart attack. Amlodipine may also be used for purposes not listed in this medication guide. What should I discuss with my healthcare provider before taking amlodipine? You should not take amlodipine if you are allergic to it. Tell your doctor if you have ever had:  · liver disease; or  · a heart valve problem called aortic stenosis. Tell your doctor if you are pregnant or plan to become pregnant. It is not known whether amlodipine will harm an unborn baby. However, having high blood pressure during pregnancy may cause complications such as diabetes or eclampsia (dangerously high blood pressure that can lead to medical problems in both mother and baby). The benefit of treating hypertension may outweigh any risks to the baby. Tell your doctor if you are breastfeeding. How should I take amlodipine? Follow all directions on your prescription label and read all medication guides or instruction sheets. Your doctor may occasionally change your dose. Use the medicine exactly as directed. Take the medicine at the same time each day, with or without food. Shake the oral suspension (liquid) before you measure a dose.  Use the dosing syringe provided, or use a medicine dose-measuring device (not a kitchen spoon). Your blood pressure will need to be checked often. Your chest pain may become worse when you first start taking amlodipine or when your dose is increased. Call your doctor if your chest pain is severe or ongoing. If you are being treated for high blood pressure, keep using amlodipine even if you feel well. High blood pressure often has no symptoms. You may need to use blood pressure medicine for the rest of your life. Your hypertension or heart condition may be treated with a combination of drugs. Use all medications as directed and read all medication guides you receive. Do not change your doses or stop taking any of your medications without your doctor's advice. This is especially important if you also take nitroglycerin. Amlodipine is only part of a complete program of treatment that may also include diet, exercise, weight control, and other medications. Follow your diet, medication, and exercise routines very closely. Store at room temperature away from moisture, heat, and light. What happens if I miss a dose? Take the medicine as soon as you can, but skip the missed dose if you are more than 12 hours late for the dose. Do not take two doses at one time. What happens if I overdose? Seek emergency medical attention or call the Poison Help line at 1-298.619.1478. Overdose symptoms may include rapid heartbeats, redness or warmth in your arms or legs, or fainting. What should I avoid while taking amlodipine? Avoid getting up too fast from a sitting or lying position, or you may feel dizzy. What are the possible side effects of amlodipine? Get emergency medical help if you have signs of an allergic reaction:  hives; difficulty breathing; swelling of your face, lips, tongue, or throat. In rare cases, when you first start taking amlodipine, your angina may get worse or you could have a heart attack.  Seek emergency medical attention or call your doctor right away if you have symptoms such as: chest pain or pressure, pain spreading to your jaw or shoulder, nausea, sweating. Call your doctor at once if you have:  · pounding heartbeats or fluttering in your chest;  · worsening chest pain;  · swelling in your feet or ankles;  · severe drowsiness; or  · a light-headed feeling, like you might pass out. Common side effects may include:  · dizziness, drowsiness;  · feeling tired;  · stomach pain, nausea; or  · flushing (warmth, redness, or tingly feeling). This is not a complete list of side effects and others may occur. Call your doctor for medical advice about side effects. You may report side effects to FDA at 5-328-FCH-4266. What other drugs will affect amlodipine? Tell your doctor about all your other medicines, especially:  · nitroglycerin;  · simvastatin (Zocor, Simcor, Vytorin); or  · any other heart or blood pressure medications. This list is not complete. Other drugs may affect amlodipine, including prescription and over-the-counter medicines, vitamins, and herbal products. Not all possible drug interactions are listed here. Where can I get more information? Your pharmacist can provide more information about amlodipine. Remember, keep this and all other medicines out of the reach of children, never share your medicines with others, and use this medication only for the indication prescribed. Every effort has been made to ensure that the information provided by [facility] [address] [facility] is accurate, up-to-date, and complete, but no guarantee is made to that effect. Drug information contained herein may be time sensitive. [facility] information has been compiled for use by healthcare practitioners and consumers in the [place] and therefore [employer] does not warrant that uses outside of the [place] are appropriate, unless specifically indicated otherwise. [facility] drug information does not endorse drugs, diagnose patients or recommend therapy.  [facility] drug information is an informational resource designed to assist licensed healthcare practitioners in caring for their patients and/or to serve consumers viewing this service as a supplement to, and not a substitute for, the expertise, skill, knowledge and judgment of healthcare practitioners. The absence of a warning for a given drug or drug combination in no way should be construed to indicate that the drug or drug combination is safe, effective or appropriate for any given patient. [facility] does not assume any responsibility for any aspect of healthcare administered with the aid of information [facility] provides. The information contained herein is not intended to cover all possible uses, directions, precautions, warnings, drug interactions, allergic reactions, or adverse effects. If you have questions about the drugs you are taking, check with your doctor, nurse or pharmacist.  Copyright 3148-4265 [address]: 15.01. Revision date: 10/28/2019. Care instructions adapted under license by [facility] ([facility]). If you have questions about a medical condition or this instruction, always ask your healthcare professional. [place] [address] any warranty or liability for your use of this information. Patient Education        diclofenac topical  Pronunciation:  dye KLOE fen ak TOP ik al  Brand:  Pennsaid, Rexaphenac, Solaraze, Voltaren Topical  What is the most important information I should know about diclofenac topical?  Diclofenac topical can increase your risk of fatal heart attack or stroke. Do not use this medicine just before or after heart bypass surgery (coronary artery bypass graft, or CABG). Diclofenac topical may also cause stomach or intestinal bleeding, which can be fatal.  What is diclofenac topical?  Diclofenac is a nonsteroidal anti-inflammatory drug (NSAID). Diclofenac topical (for the skin) is used to treat joint pain caused by osteoarthritis.  This medicine is for use on the hands, wrists, elbows, knees, ankles, or feet. Diclofenac topical may not be effective in treating arthritis pain elsewhere in the body. Pennsaid is for use only on the knees. Solaraze is used to treat warty overgrowths of skin (actinic keratoses) on sun-exposed areas of the body. Diclofenac topical may also be used for purposes not listed in this medication guide. What should I discuss with my healthcare provider before using diclofenac topical?  Diclofenac topical can increase your risk of fatal heart attack or stroke, even if you don't have any risk factors. Do not use this medicine just before or after heart bypass surgery (coronary artery bypass graft, or CABG). Diclofenac topical may also cause stomach or intestinal bleeding, which can be fatal. These conditions can occur without warning while you are using diclofenac topical, especially in older adults. You should not use this medicine if you are allergic to diclofenac (Voltaren, Cataflam, Flector, and others), or if you have ever had an asthma attack or severe allergic reaction after taking aspirin or an NSAID. Diclofenac topical is not approved for use by anyone younger than 25years old. Tell your doctor if you have ever had:  · heart disease, high blood pressure, high cholesterol, diabetes, or if you smoke;  · a heart attack, stroke, or blood clot;  · stomach ulcers, bleeding in your stomach or intestines;  · asthma;  · liver or kidney disease; or  · fluid retention. Diclofenac can affect ovulation and it may be harder to get pregnant while you are using this medicine. If you are pregnant, you should not take diclofenac topical unless your doctor tells you to. Taking an NSAID during the last 20 weeks of pregnancy can cause serious heart or kidney problems in the unborn baby and possible complications with your pregnancy. It may not be safe to breastfeed while using this medicine. Ask your doctor about any risk.   How should I use diclofenac topical?  Follow all directions on your prescription label and read all medication guides. Use the lowest dose that is effective in treating your condition. Do not take by mouth. Topical medicine is for use only on the skin. Rinse with water if this medicine gets in your eyes or mouth. Read and carefully follow any Instructions for Use provided with your medicine. Ask your doctor or pharmacist if you do not understand these instructions. Do not apply diclofenac topical to an open skin wound, or on areas of infection, rash, burn, or peeling skin. Store at room temperature away from moisture and heat. Do not freeze. What happens if I miss a dose? Apply the medicine as soon as you can, but skip the missed dose if it is almost time for your next dose. Do not apply two doses at one time. What happens if I overdose? Seek emergency medical attention or call the Poison Help line at 1-440.504.2360. What should I avoid while using diclofenac topical?  Ask a doctor or pharmacist before using other medicines for pain, fever, swelling, or cold/flu symptoms. They may contain ingredients similar to diclofenac (such as aspirin, ibuprofen, ketoprofen, or naproxen). Avoid drinking alcohol. It may increase your risk of stomach bleeding. Avoid exposing treated skin to heat, sunlight, or tanning beds. Heat can increase the amount of diclofenac you absorb through your skin. Avoid getting this medicine in your eyes. If contact does occur, rinse with water. Call your doctor if you have eye irritation that lasts longer than 1 hour.   Do not use cosmetics, sunscreen, lotions, insect repellant, or other medicated skin products on the same area you treat with diclofenac topical.  What are the possible side effects of diclofenac topical?  Get emergency medical help if you have signs of an allergic reaction (hives, sneezing, runny or stuffy nose, wheezing or trouble breathing, swelling in your face or throat) or a severe skin reaction (fever, sore throat, burning eyes, skin pain, red or purple skin rash with blistering and peeling). Although the risk of serious side effects is low when diclofenac is applied to the skin, this medicine can be absorbed through the skin, which may cause steroid side effects throughout the body. Stop using diclofenac and seek emergency medical attention if you have signs of a heart attack or stroke: chest pain spreading to your jaw or shoulder, sudden numbness or weakness on one side of the body, slurred speech, feeling short of breath. Also call your doctor at once if you have:  · a skin rash, no matter how mild;  · swelling, rapid weight gain;  · severe headache, blurred vision, pounding in your neck or ears;  · little or no urination;  · liver problems --nausea, diarrhea, stomach pain (upper right side), tiredness, itching, dark urine, den-colored stools, jaundice (yellowing of the skin or eyes);  · low red blood cells (anemia) --pale skin, unusual tiredness, feeling light-headed or short of breath, cold hands and feet; or  · signs of stomach bleeding --bloody or tarry stools, coughing up blood or vomit that looks like coffee grounds. Common side effects may include:  · heartburn, gas, stomach pain, nausea, vomiting;  · diarrhea, constipation;  · headache, dizziness, drowsiness;  · stuffy nose;  · itching, increased sweating;  · increased blood pressure; or  · skin redness, itching, dryness, scaling, or peeling where the medicine was applied. This is not a complete list of side effects and others may occur. Call your doctor for medical advice about side effects. You may report side effects to FDA at 4-954-FDA-4149. What other drugs will affect diclofenac topical?  Ask your doctor before using diclofenac if you take an antidepressant. Taking certain antidepressants with an NSAID may cause you to bruise or bleed easily.   Tell your doctor about all your current medicines, especially:  · cyclosporine;  · lithium;  · methotrexate;  · a blood thinner (warfarin, Coumadin, Jantoven);  · heart or blood pressure medication, including a diuretic or \"water pill\"; or  · steroid medicine (prednisone and others). This list is not complete and many other drugs may affect diclofenac. This includes prescription and over-the-counter medicines, vitamins, and herbal products. Not all possible drug interactions are listed here. Where can I get more information? Your pharmacist can provide more information about diclofenac topical.  Remember, keep this and all other medicines out of the reach of children, never share your medicines with others, and use this medication only for the indication prescribed. Every effort has been made to ensure that the information provided by [address] [place] [address]  is accurate, up-to-date, and complete, but no guarantee is made to that effect. Drug information contained herein may be time sensitive. [facility] information has been compiled for use by healthcare practitioners and consumers in the [place] and therefore [facility] does not warrant that uses outside of the [place] are appropriate, unless specifically indicated otherwise. [facility]'s drug information does not endorse drugs, diagnose patients or recommend therapy. [facility] drug information is an informational resource designed to assist licensed healthcare practitioners in caring for their patients and/or to serve consumers viewing this service as a supplement to, and not a substitute for, the expertise, skill, knowledge and judgment of healthcare practitioners. The absence of a warning for a given drug or drug combination in no way should be construed to indicate that the drug or drug combination is safe, effective or appropriate for any given patient. [facility] does not assume any responsibility for any aspect of healthcare administered with the aid of information [facility] provides.  The information contained herein is not intended to cover all possible uses, directions, precautions, warnings, drug interactions, allergic reactions, or adverse effects. If you have questions about the drugs you are taking, check with your doctor, nurse or pharmacist.  Copyright 9664-4499 [address]: 10.03. Revision date: 10/28/2020. Care instructions adapted under license by [facility] ([facility]). If you have questions about a medical condition or this instruction, always ask your healthcare professional. [place] [address] any warranty or liability for your use of this information.

## 2022-01-27 ENCOUNTER — TELEPHONE (OUTPATIENT)
Dept: PHARMACY | Age: 67
End: 2022-01-27

## 2022-01-27 DIAGNOSIS — I26.99 PULMONARY EMBOLISM, BILATERAL (HCC): Primary | ICD-10-CM

## 2022-01-27 NOTE — TELEPHONE ENCOUNTER
Dr. Britton Stringer,     This patients warfarin is currently being managed by the [facility]. The current referral on file is from Dr. Juancarlos Sullivan. Our consult agreements are active for two years, and due to the turnover of medical residents we are asking that you also sign the referrals so we can continue to stay compliant. We will route all notes to the medical resident that entered the original referral so they can continue to follow the patient. A new referral has been entered, please sign pended referral for patient to continue care with the anticoagulation clinic.      Thank you,   Rafat Gallegos, PharmD, BCPS  [facility] [address] [place] [address]: [address] [place] [address] Street: [phone]  1/27/2022 12:24 PM

## 2022-02-08 ENCOUNTER — ANTI-COAG VISIT (OUTPATIENT)
Dept: PHARMACY | Age: 67
End: 2022-02-08
Payer: MEDICARE

## 2022-02-08 ENCOUNTER — TELEPHONE (OUTPATIENT)
Dept: PHARMACY | Age: 67
End: 2022-02-08

## 2022-02-08 DIAGNOSIS — I26.99 PULMONARY EMBOLISM, BILATERAL (HCC): Primary | ICD-10-CM

## 2022-02-08 DIAGNOSIS — I82.413 ACUTE DEEP VEIN THROMBOSIS (DVT) OF FEMORAL VEIN OF BOTH LOWER EXTREMITIES (HCC): ICD-10-CM

## 2022-02-08 LAB — INTERNATIONAL NORMALIZATION RATIO, POC: 1.7

## 2022-02-08 PROCEDURE — 99212 OFFICE O/P EST SF 10 MIN: CPT

## 2022-02-08 PROCEDURE — 85610 PROTHROMBIN TIME: CPT

## 2022-02-08 RX ORDER — AMLODIPINE BESYLATE 10 MG/1
10 TABLET ORAL DAILY
Qty: 90 TABLET | Refills: 1 | Status: SHIPPED | OUTPATIENT
Start: 2022-02-08 | End: 2022-06-22

## 2022-02-08 RX ORDER — NICOTINE 21 MG/24HR
1 PATCH, TRANSDERMAL 24 HOURS TRANSDERMAL DAILY
Qty: 28 PATCH | Refills: 1 | Status: SHIPPED | OUTPATIENT
Start: 2022-02-08 | End: 2022-04-25

## 2022-02-08 NOTE — TELEPHONE ENCOUNTER
Hello,  Patient was seen for an INR check today at [address] [place] [address]. He has quit tobacco, but would like to continue using 14 mg nicotine patch for another few weeks before cutting down to 7 mg patch. He is requesting a refill. Order pended if you agree. Last OV note (1/25/22) states BP elevated and plan was to increase amlodipine from 5 mg to 10 mg. It looks like the 5 mg RX was refilled instead. Order for 10 mg tabs pended if you'd like to increase the dose. I can call pt to discuss change in dose. Thanks!   Rhoda Goldberg, PharmD, [facility]  [facility]   Jayden O[address]nor [address] Ph: [phone]  Tunde Ivan Ph: [phone]  2/8/2022 4:42 PM

## 2022-02-08 NOTE — PROGRESS NOTES
Becky Villarreal is a 78 y.o. male with PMHx significant for bilateral PE, DVT (6/4/21) while on Xarelto, HTN who presents to clinic 2/8/2022 for anticoagulation monitoring and adjustment.     Anticoagulation Indication(s):  PE 2018, bilateral DVT 6/4/21 while on Xarelto  Referring Physician:   Dr. Danica Hammer (Also send notes to Dr Hoa Huynh, or responsible resident)  Goal INR Range:  2-3  Duration of Anticoagulation Therapy:  indefinite  Time of day dose taken: prefers to take in AM  Product patient has at home: warfarin 5 mg (peach color)    INR Summary                           Warfarin regimen (mg)  Date INR   A/P   Sun Mon Tue Wed Thu Fri Sat Mg/wk  2/8 1.7 Below goal, bolus 5 5 7.5/5 5 5 5 5 35  1/20 3.7 Above goal, decrease 5 5 5 5 5 2.5 5 32.5  1/5 3.6 Above goal, hold 5 5 5 5 5 0/5 5 35  12/14   2.2       At goal, no change      5 5 5 5 5 5 5 35  11/18 2.8 At goal, no change 5 5 5 5 5 5 5 35   11/11 1.3 Below goal, cont 5 5 5 5 5 5 5 35  11/4 5.1 Above goal, hold+dec 5 5 5 5 0/5 0/5 5 35  10/25 5.3 Above goal, hold+dec  5 5 0/5 0/5 7.5 5 5 37.5  10/13 1.8 Below goal, increase 5 5 7.5 5 7.5 5 5 40  10/6 1.3 Below goal (missed) 5 5 5 10/5 7.5 5 5 37.5  9/13 2.2 At goal, continue 5 5 5 5 7.5 5 5 37.5  8/30     1.8       Below goal, cont 5 5 5 5 7.5 5 5 37.5  7/26     3.1       At goal, no change 5 5 5 5 7.5 5 5 37.5  7/15 2.1 At goal, increase 5 5 5 5 7.5 5 5 37.5  7/8 5.6 Above goal, holdx1 5 5 5 5 5 5 5 35  6/28 2.1 At goal, no change 5 7.5 5 7.5 5 7.5 5 42.5  6/21 4.3 Above goal, holdx1 5 x 5 7.5 5 7.5 5   6/14 4.2 Above goal, hold 7.5 10 0/7.5 5/7.5 7.5 7.5 7.5   6/10 1.3 Below goal, increase 7.5 INR   10 10 7.5     Started warfarin 5 mg daily on 6/7/21    Last CBC:  Lab Results   Component Value Date    RBC 3.30 (L) 06/04/2021    HGB 11.3 (L) 06/04/2021    HCT 33.6 (L) 06/04/2021    .9 (H) 06/04/2021    MCH 34.3 (H) 06/04/2021    MPV 7.9 06/04/2021    RDW 14.2 06/04/2021     06/04/2021       Patient History:  Recent hospitalizations/HC visits Pt dx with bilat DVT 6/4/21 while on Xarelto 2yrs for hx of PE. Pt stopped Xarelto on 6/6/21 and started warfarin + lovenox bridge on 6/7/21. Recent medication changes Amlodipine was supposed to be increased to 10 mg per PCP note but RX not updated. Medications taken regularly that may interact with warfarin or alter INR No significant drug interactions identified   Warfarin dose taken as prescribed 2/8/22: has been taking 5 mg QD  11/4/21: Pt did not hold warfarin for 2 days last week as directed  using pillbox   Signs/symptoms of bleeding None reported   Vitamin K intake Normally has 0-1 serving of green, leafy vegetables per week  1/5/22: considering adding more vit K foods (thinks maybe 1-2x/week would be sustainable)  1/20/22: decreased appetite   2/8/22: only some lettuce on [facility]   Recent vomiting/diarrhea/fever, changes in weight or activity level None reported   Tobacco or alcohol use Often drinks 2-3 large glasses of wine, but states each glass only has ~4 oz wine mixed with grape juice. 10/25/21: 5-6 glasses last night   11/4/21-current: 4 glasses wine/grape juice mix (16 oz wine) past few nights  12/14/21: 3-4 glasses wine/night. Still smoking couple cig per day + 14 mg patch. 1/5/22: quit smoking using 14mg patches   2/8/22: Continues to remain smoke free! Would like a refill on patch   Upcoming surgeries or procedures Hip surgery TBD     Assessment/Plan:  Patient's INR was subtherapeutic (1.7) despite taking a higher warfarin dose than prescribed (5mg QD). He had been using a pillbox, but didn't seem to be using it this time. He doesn't think he missed any doses, but didn't seem confident. In the past, this has helped with adherence. Vit k intake varies. He has been avoiding for the most part but has expressed interest in eating more in the future as he likes broccoli.  He denies any s/sxs bleeding, changes in medications, illness, etc. Will send message to PCP re: amlodipine dose. He has stopped smoking and would like a refill on 14 mg nicotine patch- message sent to PCP. Encouraged pt to cut back to 7 mg patch after another couple weeks, if ready. Reinforced the benefits of quitting prior to surgery. Pt also endorses hip pain, and is using diclofenac gel for pain relief. States he may be getting hip surgery, in which case would recommend bridging with lovenox. Asked pt to notify clinic when scheduled to coordinate periop anticoagulation management. Because it's possible he missed a dose, patient was instructed to bolus with 7.5 mg tonight, then continue warfarin 5 mg daily. Repeat INR in 2 wk. Patient was reminded to maintain consistent vitamin K intake and call with any bleeding, medication changes, or fever/vomiting/diarrhea. Patient understands dosing directions and information discussed. Dosing schedule and follow up appointment given to patient. Progress note routed to referring physician's office. Patient acknowledges working in consult agreement with pharmacist as referred by his/her physician. Next Clinic Appointment:  2/24    Please call [facility] at [phone] with any questions. Please call The [address] [place] [address] Avenue [phone] with any questions. Thanks!   Lola Joshi, PharmD, [facility]  [facility]   [address] Ph: [phone]  Tatyana Trevino Ph: [phone]  2/8/2022 4:35 PM    For Pharmacy Admin Tracking Only     Intervention Detail: Dose Adjustment: 2, reason: Therapy Optimization and Refill(s) Provided   Total # of Interventions Recommended: 3   Total # of Interventions Accepted: 3   Time Spent (min): 30

## 2022-02-18 ENCOUNTER — OFFICE VISIT (OUTPATIENT)
Dept: ORTHOPEDIC SURGERY | Age: 67
End: 2022-02-18
Payer: MEDICARE

## 2022-02-18 VITALS — HEIGHT: 70 IN | WEIGHT: 174 LBS | BODY MASS INDEX: 24.91 KG/M2

## 2022-02-18 DIAGNOSIS — Z00.00 WELLNESS EXAMINATION: ICD-10-CM

## 2022-02-18 DIAGNOSIS — Z13.220 SCREENING CHOLESTEROL LEVEL: ICD-10-CM

## 2022-02-18 DIAGNOSIS — Z13.21 ENCOUNTER FOR VITAMIN DEFICIENCY SCREENING: ICD-10-CM

## 2022-02-18 DIAGNOSIS — Z86.718 HISTORY OF DVT (DEEP VEIN THROMBOSIS): ICD-10-CM

## 2022-02-18 DIAGNOSIS — F17.210 CIGARETTE NICOTINE DEPENDENCE WITHOUT COMPLICATION: ICD-10-CM

## 2022-02-18 DIAGNOSIS — I10 ESSENTIAL HYPERTENSION: ICD-10-CM

## 2022-02-18 DIAGNOSIS — Z86.711 HISTORY OF PULMONARY EMBOLISM: ICD-10-CM

## 2022-02-18 DIAGNOSIS — Z12.5 PROSTATE CANCER SCREENING: ICD-10-CM

## 2022-02-18 DIAGNOSIS — M16.12 PRIMARY OSTEOARTHRITIS OF LEFT HIP: Primary | ICD-10-CM

## 2022-02-18 DIAGNOSIS — Z13.1 DIABETES MELLITUS SCREENING: ICD-10-CM

## 2022-02-18 LAB
APTT: 35.4 SEC (ref 26.2–38.6)
BASOPHILS ABSOLUTE: 0 K/UL (ref 0–0.2)
BASOPHILS ABSOLUTE: 0 K/UL (ref 0–0.2)
BASOPHILS RELATIVE PERCENT: 0.6 %
BASOPHILS RELATIVE PERCENT: 0.8 %
BILIRUBIN URINE: NEGATIVE
BLOOD, URINE: NEGATIVE
CLARITY: CLEAR
COLOR: YELLOW
EOSINOPHILS ABSOLUTE: 0 K/UL (ref 0–0.6)
EOSINOPHILS ABSOLUTE: 0 K/UL (ref 0–0.6)
EOSINOPHILS RELATIVE PERCENT: 0 %
EOSINOPHILS RELATIVE PERCENT: 0 %
EPITHELIAL CELLS, UA: 2 /HPF (ref 0–5)
GLUCOSE URINE: NEGATIVE MG/DL
HCT VFR BLD CALC: 42.6 % (ref 40.5–52.5)
HCT VFR BLD CALC: 43.1 % (ref 40.5–52.5)
HEMOGLOBIN: 14.2 G/DL (ref 13.5–17.5)
HEMOGLOBIN: 14.5 G/DL (ref 13.5–17.5)
HYALINE CASTS: 3 /LPF (ref 0–8)
INR BLD: 1.96 (ref 0.88–1.12)
KETONES, URINE: NEGATIVE MG/DL
LEUKOCYTE ESTERASE, URINE: NEGATIVE
LYMPHOCYTES ABSOLUTE: 1.6 K/UL (ref 1–5.1)
LYMPHOCYTES ABSOLUTE: 1.6 K/UL (ref 1–5.1)
LYMPHOCYTES RELATIVE PERCENT: 27.6 %
LYMPHOCYTES RELATIVE PERCENT: 28 %
MCH RBC QN AUTO: 33.3 PG (ref 26–34)
MCH RBC QN AUTO: 33.6 PG (ref 26–34)
MCHC RBC AUTO-ENTMCNC: 33.4 G/DL (ref 31–36)
MCHC RBC AUTO-ENTMCNC: 33.7 G/DL (ref 31–36)
MCV RBC AUTO: 99.5 FL (ref 80–100)
MCV RBC AUTO: 99.8 FL (ref 80–100)
MICROSCOPIC EXAMINATION: NORMAL
MONOCYTES ABSOLUTE: 0.3 K/UL (ref 0–1.3)
MONOCYTES ABSOLUTE: 0.4 K/UL (ref 0–1.3)
MONOCYTES RELATIVE PERCENT: 5.8 %
MONOCYTES RELATIVE PERCENT: 6.8 %
NEUTROPHILS ABSOLUTE: 3.8 K/UL (ref 1.7–7.7)
NEUTROPHILS ABSOLUTE: 3.8 K/UL (ref 1.7–7.7)
NEUTROPHILS RELATIVE PERCENT: 65 %
NEUTROPHILS RELATIVE PERCENT: 65.4 %
NITRITE, URINE: NEGATIVE
PDW BLD-RTO: 14.3 % (ref 12.4–15.4)
PDW BLD-RTO: 14.5 % (ref 12.4–15.4)
PH UA: 6 (ref 5–8)
PLATELET # BLD: 270 K/UL (ref 135–450)
PLATELET # BLD: 277 K/UL (ref 135–450)
PMV BLD AUTO: 7.8 FL (ref 5–10.5)
PMV BLD AUTO: 7.9 FL (ref 5–10.5)
PROTEIN UA: NEGATIVE MG/DL
PROTHROMBIN TIME: 22.8 SEC (ref 9.9–12.7)
RBC # BLD: 4.28 M/UL (ref 4.2–5.9)
RBC # BLD: 4.32 M/UL (ref 4.2–5.9)
RBC UA: 3 /HPF (ref 0–4)
SPECIFIC GRAVITY UA: 1.02 (ref 1–1.03)
URINE TYPE: NORMAL
URINE TYPE: NORMAL
UROBILINOGEN, URINE: 1 E.U./DL
WBC # BLD: 5.8 K/UL (ref 4–11)
WBC # BLD: 5.8 K/UL (ref 4–11)
WBC UA: 2 /HPF (ref 0–5)

## 2022-02-18 PROCEDURE — 99215 OFFICE O/P EST HI 40 MIN: CPT | Performed by: ORTHOPAEDIC SURGERY

## 2022-02-18 NOTE — PROGRESS NOTES
Dr Maris Coon      Date /Time 2/18/2022       10:52 AM EST  Name Nilsa Anne             1955   Location  [facility]  MRN [id number]                Chief Complaint   Patient presents with    Hip Pain     bilateral hips, L>R        History of Present Illness    Nilsa Anne is a 78 y.o. male who presents with  bilateral hip pain. Current history: He presents for bilateral hip pain, left worse than right. He is interested in surgical solution for this at this point. His pain is gotten significantly worse than previous visits. He reports he has quit smoking almost completely for the last month and a half. He still reports that his heroin addiction is in remission. He has switched to taking Coumadin instead of Xarelto for history of venous thrombolic disease      Previous history: At patient's last visit he was complaining of bilateral extremity swelling. We were concerned about a DVT. We did order a bilateral lower extremity venous Doppler. They were positive for massive DVTs of bilateral lower extremity while taking Xarelto. Since then he has been placed on Coumadin. He states his lower extremity swelling is improving but continues to have significant hip pain. He does have advanced osteoarthritis of his hip. Previous history: Patient presents the office today for a new problem. Patient is here with a chief complaint of left greater than right hip pain. Patient has had pain for several months with increased symptoms over the last few weeks. His symptoms are concentrated lateral greater than groin. Patient does have increased pain with weightbearing activities. He also complains of lumbar pain and bilateral thigh pain. He does not complain of any pain symptoms in his lower legs or feet. No numbness and tingling. He has tried NSAIDs, activity modifications and assistive devices without any significant improvement.   He does have a history of a PE and is taking Xarelto. He also has a history of heroin addiction but states he has been clean for 1 year.   He continues to smoke    Pain Assessment  Location of Pain: Other (Comment)  Location Modifiers: Right,Left  Severity of Pain: 10  Quality of Pain: Other (Comment)  Duration of Pain: Other (Comment)]    Past History  Past Medical History:   Diagnosis Date    Anxiety 2010    Back pain 2012    [facility]     [facility] of [facility] [address] [facility]     Hypertension 10/25/2011    Pulmonary embolism ([facility] [address]     FRANCHESKA     Past Surgical History:   Procedure Laterality Date    COLONOSCOPY N/A 2019    COLONOSCOPY performed by Everett Ogden MD at [facility]  2019    COLONOSCOPY WITH BIOPSY performed by Everett Ogden MD at [facility] Right     ENDOSCOPY, COLON, DIAGNOSTIC      UPPER GASTROINTESTINAL ENDOSCOPY N/A 2020    EGD DIAGNOSTIC ONLY performed by Everett Ogden MD at [facility] N/A 3/18/2020    PUSH ENTEROSCOPY performed by Everett Ogden MD at [facility] ENDOSCOPY     Social History     Tobacco Use    Smoking status: Former Smoker     Packs/day: 0.25     Years: 20.00     Pack years: 5.00     Types: Cigars     Start date:      Quit date: 2021     Years since quittin.8    Smokeless tobacco: Never Used    Tobacco comment: 1 DAILY   Substance Use Topics    Alcohol use: Yes     Comment: pint of wine a day      Current Outpatient Medications on File Prior to Visit   Medication Sig Dispense Refill    nicotine (NICODERM CQ) 14 MG/24HR Place 1 patch onto the skin daily 28 patch 1    amLODIPine (NORVASC) 10 MG tablet Take 1 tablet by mouth daily 90 tablet 1    diclofenac sodium (VOLTAREN) 1 % GEL Apply 4 g topically 4 times daily as needed for Pain 50 g 3    amLODIPine (NORVASC) 5 MG tablet Take 1 tablet by mouth daily 30 tablet 3    vitamin D (ERGOCALCIFEROL) 1.25 MG (80578 UT) CAPS capsule TAKE 1 CAPSULE BY MOUTH ONE TIME PER WEEK 12 capsule 0    warfarin (COUMADIN) 5 MG tablet Take 1 tablet by mouth daily 30 tablet 3    folic acid (FOLVITE) 1 MG tablet Take 1 tablet by mouth daily 90 tablet 1    furosemide (LASIX) 20 MG tablet Take 1 tablet by mouth daily 60 tablet 3    methadone (DOLOPHINE) 10 MG/ML solution Take 35 mg by mouth daily. (Patient not taking: Reported on 1/25/2022)       Current Facility-Administered Medications on File Prior to Visit   Medication Dose Route Frequency Provider Last Rate Last Admin    melatonin tablet 3 mg  3 mg Oral Nightly PRN Kenney Carias MD            ASCVD 10-YEAR RISK SCORE  The 10-year ASCVD risk score (Eboni Grossman., et al., 2013) is: 20.9%    Values used to calculate the score:      Age: 78 years      Sex: Male      Is Non- : Yes      Diabetic: No      Tobacco smoker: No      Systolic Blood Pressure: 352 mmHg      Is BP treated: Yes      HDL Cholesterol: 51 mg/dL      Total Cholesterol: 174 mg/dL   . Review of Systems  10-point ROS is negative other than HPI. Physical Exam  Based off 1997 Exam Criteria  Ht 5' 10\" (1.778 m)   Wt 174 lb (78.9 kg)   BMI 24.97 kg/m²      Constitutional:       General: He is not in acute distress. Appearance: Normal appearance. Cardiovascular:      Rate and Rhythm: Normal rate and regular rhythm. Pulses: Normal pulses. Pulmonary:      Effort: Pulmonary effort is normal. No respiratory distress. Neurological:      Mental Status: He is alert and oriented to person, place, and time. Mental status is at baseline.      Musculoskeletal:  Gait:  antalgic    Spine / Hip Exam:      RIGHT  LEFT    Lumbar Spine Exam  [] All Neg    [] All Neg     Straight leg raise  []  []Not tested   []  []Not tested    Clonus  []  []Not tested   []  []Not tested    Pain with motion  [x]  []Not tested   [x]  []Not tested    Radiculopathy  [x]  []Not tested   [x]  []Not tested    Paraspinal muscle tenderness [x] Paraspinal  [x]Midline   [x] Paraspinal  [x]Midline   Sensation RIGHT  LEFT    L3  [x] Normal []Decreased    [x] Normal []Decreased   L4  [] Normal  [x]Decreased   [] Normal [x]Decreased   L5  [] Normal [x]Decreased   [] Normal [x]Decreased   S1  [] Normal  [x]Decreased   [] Normal [x]Decreased   Pelvis       Scoliosis  [] Nml  [x] Present     Leg-length discrepency  [x] Equal  [] Right longer   [] Left longer   Range of Motion Active Passive Active Passive   Hip Flexion 90  90    Abduction 20  20    External Rotation @ 90 flex 35  35    Internal Rotation @ 90 flex -10  -10           Hip Impingement / Dysplasia  [] All Neg  [] Not tested   [] All Neg  [] Not tested    Hip impingement test  [x]  []Not tested   [x]  []Not tested    C-sign  [x]  []Not tested   [x]  []Not tested    Anterior instability apprehension  []  []Not tested   []  []Not tested    Posterior instability apprehension  []  []Not tested   []  []Not tested    Uncontained Internal rotation  []  []Not tested  []  []Not tested          Abductors  [x] All Neg  [] Not tested   [x] All Neg  [] Not tested    Medius strength  []  []Not tested   []  []Not tested    Minimum strength  []  []Not tested   []  []Not tested    IT band tendonitis  []  []Not tested   []  []Not tested    Trochanteric tenderness  []  []Not tested  []  []Not tested   Sciatic neuropathic pain  []  []Not tested   []  []Not tested           Post-arthroplasty  [] All Neg  [] Not tested   [] All Neg  [] Not tested    Rectus tendonitis  []  []Not tested   []  []Not tested    Iliopsoas tendonitis       Start-up pain  []  []Not tested   []  []Not tested      Markedly stiff hips, stiff painful back as well. Some radiculopathy. Calf swelling improved compared to previous exam    Imaging    Previous Bilateral hip: [facility] [place]  Radiographs: End-stage osteoarthritis with severe osteophyte formation, joint space narrowing, cyst and sclerosis.   Arthritic and lordotic lumbar spine as well. Some spondylolisthesis present in the lower lumbar segments. Enthesophyte bone formation present with likely DISH of the lumbar spine. Assessment and Plan  Yandel Doshi was seen today for hip pain. Diagnoses and all orders for this visit:    Primary osteoarthritis of left hip  -     UDAY Lin MD, Radiation Oncology, [facility]    History of pulmonary embolism    History of DVT (deep vein thrombosis)    Cigarette nicotine dependence without complication      Patient is suffering from advanced osteoarthritis osteoarthritis of bilateral hips. Patient is currently anticoagulated for massive bilateral DVTs. He most likely will require extensive work-up for hypercoagulable state from a hematologist or his primary care physician before proceeding with surgery. He did develop massive bilateral lower extremity DVTs while already anticoagulated for previous DVTs on Xarelto. He may benefit from a inferior vena cava filter at some point in the future. Also he will need to be cleared to come off of his chronic anticoagulation before surgery. I do not expect this at any time in the near future. We will perform a right hip intra-articular cortisone injection today. We will do the left hip in 2 weeks. He will continue on his Coumadin with his primary care physician. I discussed with Dao Amezquita that his history, symptoms, signs and imaging are most consistent with hip arthritis    We reviewed the natural history of these conditions and treatment options ranging from conservative measures (rest, icing, activity modification, physical therapy, pain meds, cortisone injection)  to surgical options. We had a long discussion with the patient about their hip. We discussed surgical and non surgical options for hip arthritis. The most important thing is to work to maintain their range of motion. Next they can try medications including tylenol and NSAIDs.  They can try glucosamine or chondroitin. They should also ice frequently and avoid activities that make their hip hurt. Cortisone injections and Synvisc injections are also options when medicine has failed. We finally discussed surgical options including arthroscopic debridement versus hip replacement. Often the arthritis is too far gone for an arthroscopic debridement and pain relief will be short term. Their ultimate solution will be a hip replacement when they are ready for it. They should put it off until they can no longer stand the pain and when nothing else has worked. Conservative measures have failed. He is not interested in cortisone injections. I think he is an appropriate candidate for surgery due to his ongoing symptoms and dysfunction despite conservative measures. The procedure would be  00681 Total Hip Arthroplasty, left direct anterior    Perioperative considerations include: Preop PCP eval, DVT History, PE History and Chronic anticoagulation other than Aspirin. We reviewed the risks, benefits, alternatives of this approach. We discussed risks including, but not limited to, bleeding, pain, infection, scarring, damage to the neurovascular structures, blood clots, pulmonary embolus, stiffness, implant instability or loosening, implant failure, incomplete relief of pain, and incomplete return of function. His perioperative risk is significantly higher than a standard replacement. First, he has a history of venous thrombolic disease, last known clot was 2 years ago. He now is on Coumadin and remains anticoagulated. It is possible that he requires a preoperative IVC filter prior to elective surgery. Second, he has a history of nicotine dependence and smoking. We had a discussion regarding its implications. He says he is quit for now. We will require preoperative blood work as well as nicotine test with carboxyhemoglobin to determine status.   In addition, he has a history of previous heroin addiction, which has been in remission for over a year. And lastly, I believe he has a high risk for heterotopic ossification following this operation. We will plan for 7 Gy preoperative radiation therapy the day before the operation. We also reviewed the surgical details, expected recovery, and rehabilitation (6-9 months). He expressed understanding and will undergo preoperative medical evaluation and optimization. He is definitely an inpatient candidate at the time of surgery considering all the comorbidities and close monitoring we will need to perform.

## 2022-02-18 NOTE — LETTER
Attention    These are medical screening labs, not pre-admission surgery labs. Yimi Vallecillo M.D. Phone: [phone] / [phone] ([phone])   Fax:  [phone] Plummer, [address] [place] [address]    Date:  2022    Name: Wilder Heaton    : 1955    Please take this form with you.       THE FOLLOWING LABS NEED TO BE COMPLETED:    _x__UA  _x__URINE C/S (THIS NEEDS TO BE DONE EVEN IF THE UA IS NORMAL)  _x__CBC W/ DIFF  _x__PT/INR  _x__PTT  _x__TRANSFERRIN  _x__ALBUMIN  _x__CHEM 7  _x__HEMAGLOBIN A1-C  _x___OTHER: ______ carboxyhemoglobin _______________________________________                         Diagnosis: Left OSTEOARTHRITIS            RT KNEE OA M17.11      LT KNEE OA M17.12         RT HIP OA  M16.11         LT HIP OA M16.12         RT SHLD OA  M19.011   LT SHLD OA  M19.012             Z01.812  (Pre-op examination code)      2022 12:00 PM  Rolan Cobian PA-C

## 2022-02-19 LAB
A/G RATIO: 1.6 (ref 1.1–2.2)
ALBUMIN SERPL-MCNC: 4.3 G/DL (ref 3.4–5)
ALBUMIN SERPL-MCNC: 4.4 G/DL (ref 3.4–5)
ALP BLD-CCNC: 155 U/L (ref 40–129)
ALT SERPL-CCNC: 32 U/L (ref 10–40)
ANION GAP SERPL CALCULATED.3IONS-SCNC: 13 MMOL/L (ref 3–16)
ANION GAP SERPL CALCULATED.3IONS-SCNC: 15 MMOL/L (ref 3–16)
AST SERPL-CCNC: 47 U/L (ref 15–37)
BILIRUB SERPL-MCNC: 0.6 MG/DL (ref 0–1)
BUN BLDV-MCNC: 11 MG/DL (ref 7–20)
BUN BLDV-MCNC: 11 MG/DL (ref 7–20)
CALCIUM SERPL-MCNC: 9.2 MG/DL (ref 8.3–10.6)
CALCIUM SERPL-MCNC: 9.5 MG/DL (ref 8.3–10.6)
CHLORIDE BLD-SCNC: 100 MMOL/L (ref 99–110)
CHLORIDE BLD-SCNC: 99 MMOL/L (ref 99–110)
CHOLESTEROL, TOTAL: 232 MG/DL (ref 0–199)
CO2: 26 MMOL/L (ref 21–32)
CO2: 27 MMOL/L (ref 21–32)
CREAT SERPL-MCNC: 0.7 MG/DL (ref 0.8–1.3)
CREAT SERPL-MCNC: 0.8 MG/DL (ref 0.8–1.3)
ESTIMATED AVERAGE GLUCOSE: 131.2 MG/DL
ESTIMATED AVERAGE GLUCOSE: 131.2 MG/DL
GFR AFRICAN AMERICAN: >60
GFR AFRICAN AMERICAN: >60
GFR NON-AFRICAN AMERICAN: >60
GFR NON-AFRICAN AMERICAN: >60
GLUCOSE BLD-MCNC: 121 MG/DL (ref 70–99)
GLUCOSE BLD-MCNC: 122 MG/DL (ref 70–99)
HBA1C MFR BLD: 6.2 %
HBA1C MFR BLD: 6.2 %
HDLC SERPL-MCNC: 58 MG/DL (ref 40–60)
LDL CHOLESTEROL CALCULATED: ABNORMAL MG/DL
LDL CHOLESTEROL DIRECT: 117 MG/DL
POTASSIUM SERPL-SCNC: 3.8 MMOL/L (ref 3.5–5.1)
POTASSIUM SERPL-SCNC: 3.8 MMOL/L (ref 3.5–5.1)
PROSTATE SPECIFIC ANTIGEN: 2.82 NG/ML (ref 0–4)
SODIUM BLD-SCNC: 139 MMOL/L (ref 136–145)
SODIUM BLD-SCNC: 141 MMOL/L (ref 136–145)
TOTAL PROTEIN: 7.2 G/DL (ref 6.4–8.2)
TRANSFERRIN: 336 MG/DL (ref 200–360)
TRIGL SERPL-MCNC: 366 MG/DL (ref 0–150)
URINE CULTURE, ROUTINE: NORMAL
VITAMIN D 25-HYDROXY: 41 NG/ML
VLDLC SERPL CALC-MCNC: ABNORMAL MG/DL

## 2022-02-22 ENCOUNTER — TELEPHONE (OUTPATIENT)
Dept: ORTHOPEDIC SURGERY | Age: 67
End: 2022-02-22

## 2022-02-22 NOTE — TELEPHONE ENCOUNTER
General Question     Subject: PATIENT NEEDS SWETHA TO CONTACT RHEUOTOLOGY AND NEEDS TO KNOW EXACTLY WHAT PATIENT IS COMING IN FOR.  PLEASE ADVISE   Patient: Alejandrodidier Castleman  Contact Number: [phone]

## 2022-02-28 ENCOUNTER — TELEPHONE (OUTPATIENT)
Dept: ORTHOPEDIC SURGERY | Age: 67
End: 2022-02-28

## 2022-02-28 NOTE — TELEPHONE ENCOUNTER
I did speak with Dr. Dony Bernal today. He is a radiation oncologist.  We did request consultation for preoperative radiation to prevent HO. Dr. Braden Hudson recommendation is to have surgery performed at [facility]..  This will allow him to bring the patient down after surgery directly to the oncology suite in the hospital and perform the radiation postoperatively.

## 2022-03-01 ENCOUNTER — ANTI-COAG VISIT (OUTPATIENT)
Dept: PHARMACY | Age: 67
End: 2022-03-01
Payer: MEDICARE

## 2022-03-01 DIAGNOSIS — I82.413 ACUTE DEEP VEIN THROMBOSIS (DVT) OF FEMORAL VEIN OF BOTH LOWER EXTREMITIES (HCC): ICD-10-CM

## 2022-03-01 DIAGNOSIS — I26.99 PULMONARY EMBOLISM, BILATERAL (HCC): Primary | ICD-10-CM

## 2022-03-01 LAB — INTERNATIONAL NORMALIZATION RATIO, POC: 1.5

## 2022-03-01 PROCEDURE — 85610 PROTHROMBIN TIME: CPT

## 2022-03-01 PROCEDURE — 99212 OFFICE O/P EST SF 10 MIN: CPT

## 2022-03-01 NOTE — PROGRESS NOTES
Hair Robison is a 78 y.o. male with PMHx significant for bilateral PE, DVT (6/4/21) while on Xarelto, HTN who presents to clinic 2/8/2022 for anticoagulation monitoring and adjustment.     Anticoagulation Indication(s):  PE 2018, bilateral DVT 6/4/21 while on Xarelto  Referring Physician:   Dr. Leslee Torres (Also send notes to Dr Yanira Clemente, or responsible resident)  Goal INR Range:  2-3  Duration of Anticoagulation Therapy:  indefinite  Time of day dose taken: prefers to take in AM  Product patient has at home: warfarin 5 mg (peach color)    INR Summary                           Warfarin regimen (mg)  Date INR   A/P   Sun Mon Tue Wed Thu Fri Sat Mg/wk  3/1 1.5 Below goal, (Missed) 5 5 7.5/5 5 5 5 5 35  2/8 1.7 Below goal, bolus 5 5 7.5/5 5 5 5 5 35  1/20 3.7 Above goal, decrease 5 5 5 5 5 2.5 5 32.5  1/5 3.6 Above goal, hold 5 5 5 5 5 0/5 5 35  12/14   2.2       At goal, no change      5 5 5 5 5 5 5 35  11/18 2.8 At goal, no change 5 5 5 5 5 5 5 35   11/11 1.3 Below goal, cont 5 5 5 5 5 5 5 35  11/4 5.1 Above goal, hold+dec 5 5 5 5 0/5 0/5 5 35  10/25 5.3 Above goal, hold+dec  5 5 0/5 0/5 7.5 5 5 37.5  10/13 1.8 Below goal, increase 5 5 7.5 5 7.5 5 5 40  10/6 1.3 Below goal (missed) 5 5 5 10/5 7.5 5 5 37.5  9/13 2.2 At goal, continue 5 5 5 5 7.5 5 5 37.5  8/30     1.8       Below goal, cont 5 5 5 5 7.5 5 5 37.5  7/26     3.1       At goal, no change 5 5 5 5 7.5 5 5 37.5  7/15 2.1 At goal, increase 5 5 5 5 7.5 5 5 37.5  7/8 5.6 Above goal, holdx1 5 5 5 5 5 5 5 35  6/28 2.1 At goal, no change 5 7.5 5 7.5 5 7.5 5 42.5  6/21 4.3 Above goal, holdx1 5 x 5 7.5 5 7.5 5   6/14 4.2 Above goal, hold 7.5 10 0/7.5 5/7.5 7.5 7.5 7.5   6/10 1.3 Below goal, increase 7.5 INR   10 10 7.5     Started warfarin 5 mg daily on 6/7/21    Last CBC:  Lab Results   Component Value Date    RBC 3.30 (L) 06/04/2021    HGB 11.3 (L) 06/04/2021    HCT 33.6 (L) 06/04/2021    .9 (H) 06/04/2021    MCH 34.3 (H) 06/04/2021    MPV 7.9 06/04/2021    RDW 14.2 06/04/2021     06/04/2021       Patient History:  Recent hospitalizations/HC visits Pt dx with bilat DVT 6/4/21 while on Xarelto 2yrs for hx of PE. Pt stopped Xarelto on 6/6/21 and started warfarin + lovenox bridge on 6/7/21. Recent medication changes Amlodipine was supposed to be increased to 10 mg per PCP note but RX not updated. Medications taken regularly that may interact with warfarin or alter INR No significant drug interactions identified   Warfarin dose taken as prescribed 2/8/22: has been taking 5 mg QD  11/4/21: Pt did not hold warfarin for 2 days last week as directed  using pillbox   Signs/symptoms of bleeding None reported   Vitamin K intake Normally has 0-1 serving of green, leafy vegetables per week  1/5/22: considering adding more vit K foods (thinks maybe 1-2x/week would be sustainable)  1/20/22: decreased appetite   2/8/22: only some lettuce on [facility]   Recent vomiting/diarrhea/fever, changes in weight or activity level None reported   Tobacco or alcohol use Often drinks 2-3 large glasses of wine, but states each glass only has ~4 oz wine mixed with grape juice. 10/25/21: 5-6 glasses last night   11/4/21-current: 4 glasses wine/grape juice mix (16 oz wine) past few nights  12/14/21: 3-4 glasses wine/night. Still smoking couple cig per day + 14 mg patch. 1/5/22: quit smoking using 14mg patches   2/8/22: Continues to remain smoke free! Would like a refill on patch   Upcoming surgeries or procedures Hip surgery TBD     Assessment/Plan:  Patient's INR was subtherapeutic (1.5). He states that he missed a dose on Saturday. He has been using his pill box but has been having trouble walking due to knee pain, so he missed his dose. Vit k intake varies. Provided patient with chart of vitamin K in foods today. Patient also likes to eat liver.  He has been avoiding for the most part but has expressed interest in eating more in the future as he likes adams. He denies any s/sxs bleeding, changes in medications, illness, etc.     Pt also endorses hip pain, and is using diclofenac gel for pain relief. States he may be getting hip surgery, in which case would recommend bridging with lovenox. This is not yet planned. Asked pt to notify clinic when scheduled to coordinate periop anticoagulation management. Again, because it's possible he missed a dose, patient was instructed to bolus with 7.5 mg tonight, then continue warfarin 5 mg daily. Repeat INR in 2 wk. Patient was reminded to maintain consistent vitamin K intake and call with any bleeding, medication changes, or fever/vomiting/diarrhea. Patient understands dosing directions and information discussed. Dosing schedule and follow up appointment given to patient. Progress note routed to referring physician's office. Patient acknowledges working in consult agreement with pharmacist as referred by his/her physician. Next Clinic Appointment:  3/15    Please call [facility] at [phone] with any questions. Please call The [address] [place] [address] Avenue [phone] with any questions. Thanks!   Jamie Holguin, PharmD  Medication Management Clinic   [facility] [place] Ph: [phone]  [facility] Ph: [phone]  2022 4:35 PM    For Pharmacy Admin Tracking Only     Intervention Detail: Adherence Monitorin and Dose Adjustment: 1, reason: Therapy Optimization   Total # of Interventions Recommended: 2   Total # of Interventions Accepted: 2   Time Spent (min): 20

## 2022-03-13 DIAGNOSIS — E53.8 LOW FOLATE: ICD-10-CM

## 2022-03-15 RX ORDER — FOLIC ACID 1 MG/1
TABLET ORAL
Qty: 90 TABLET | Refills: 1 | Status: SHIPPED | OUTPATIENT
Start: 2022-03-15 | End: 2022-09-21

## 2022-03-17 ENCOUNTER — ANTI-COAG VISIT (OUTPATIENT)
Dept: PHARMACY | Age: 67
End: 2022-03-17
Payer: MEDICARE

## 2022-03-17 DIAGNOSIS — I82.413 ACUTE DEEP VEIN THROMBOSIS (DVT) OF FEMORAL VEIN OF BOTH LOWER EXTREMITIES (HCC): ICD-10-CM

## 2022-03-17 DIAGNOSIS — I26.99 PULMONARY EMBOLISM, BILATERAL (HCC): Primary | ICD-10-CM

## 2022-03-17 LAB — INTERNATIONAL NORMALIZATION RATIO, POC: 3.2

## 2022-03-17 PROCEDURE — 99212 OFFICE O/P EST SF 10 MIN: CPT

## 2022-03-17 PROCEDURE — 85610 PROTHROMBIN TIME: CPT

## 2022-03-17 NOTE — PROGRESS NOTES
surgery. Would recommend bridging with lovenox if having surgery, so asked pt to notify clinic when scheduled to coordinate periop anticoagulation management. Patient was instructed to continue warfarin 5 mg daily. Repeat INR in 2 wk. Patient was reminded to maintain consistent vitamin K intake and call with any bleeding, medication changes, or fever/vomiting/diarrhea. Patient understands dosing directions and information discussed. Dosing schedule and follow up appointment given to patient. Progress note routed to referring physician's office. Patient acknowledges working in consult agreement with pharmacist as referred by his/her physician. Next Clinic Appointment:  3/31    Please call [facility] at [phone] with any questions. Please call The [address] [place] [address] Avenue [phone] with any questions. Thanks!   Liliana Munoz, PharmD, [facility]  Medication Management Clinic   [facility] [address] [address] Ph: [phone]  Dean Jacinto Ph: [phone]  3/17/2022 12:42 PM    For Pharmacy Admin Tracking Only     Total # of Interventions Recommended: 0   Total # of Interventions Accepted: 0   Time Spent (min): 20

## 2022-03-19 DIAGNOSIS — I82.413 ACUTE DEEP VEIN THROMBOSIS (DVT) OF FEMORAL VEIN OF BOTH LOWER EXTREMITIES (HCC): ICD-10-CM

## 2022-03-22 RX ORDER — WARFARIN SODIUM 5 MG/1
TABLET ORAL
Qty: 90 TABLET | Refills: 1 | Status: SHIPPED | OUTPATIENT
Start: 2022-03-22 | End: 2022-09-21

## 2022-03-23 ENCOUNTER — OFFICE VISIT (OUTPATIENT)
Dept: INTERNAL MEDICINE CLINIC | Age: 67
End: 2022-03-23
Payer: MEDICARE

## 2022-03-23 VITALS
SYSTOLIC BLOOD PRESSURE: 130 MMHG | OXYGEN SATURATION: 99 % | BODY MASS INDEX: 26.43 KG/M2 | RESPIRATION RATE: 24 BRPM | TEMPERATURE: 97.3 F | HEIGHT: 68 IN | DIASTOLIC BLOOD PRESSURE: 86 MMHG | WEIGHT: 174.4 LBS | HEART RATE: 63 BPM

## 2022-03-23 DIAGNOSIS — E55.9 HYPOVITAMINOSIS D: ICD-10-CM

## 2022-03-23 DIAGNOSIS — Z12.2 SCREENING FOR LUNG CANCER: ICD-10-CM

## 2022-03-23 DIAGNOSIS — I10 PRIMARY HYPERTENSION: Primary | ICD-10-CM

## 2022-03-23 PROBLEM — E78.5 HLD (HYPERLIPIDEMIA): Status: ACTIVE | Noted: 2022-03-23

## 2022-03-23 PROCEDURE — 99213 OFFICE O/P EST LOW 20 MIN: CPT | Performed by: STUDENT IN AN ORGANIZED HEALTH CARE EDUCATION/TRAINING PROGRAM

## 2022-03-23 RX ORDER — LANOLIN ALCOHOL/MO/W.PET/CERES
3 CREAM (GRAM) TOPICAL NIGHTLY PRN
Qty: 30 TABLET | Refills: 3 | Status: SHIPPED | OUTPATIENT
Start: 2022-03-23 | End: 2022-08-05 | Stop reason: SDUPTHER

## 2022-03-23 RX ORDER — MELATONIN
1000 DAILY
Qty: 90 TABLET | Refills: 1 | Status: SHIPPED | OUTPATIENT
Start: 2022-03-23 | End: 2022-09-23

## 2022-03-23 ASSESSMENT — VISUAL ACUITY: OU: 1

## 2022-03-23 NOTE — PROGRESS NOTES
3/23/2022    Patient's Name: Nilsa Anne        : 1955)    CC: HTN follow up    HPI: 57-year-old male with a past medical history of HTN, HLD, vitamin D deficiency here for follow-up. Patient reports he feels well and has no acute complaints. BP today 130/86 which is consistent with home measurements. He has chronic bilateral hip pain for which she follows orthopedic surgery who is planning left hip replacement soon. Patient reports he quit smoking a month ago in anticipation for surgery and feels his quality of life is significantly improved. He states he breathes much better. He denies  F/C, dizziness, HA, CP, palpitations, URI sx, abd pain, N/V/D/C and urinary sx. Review of Systems   As noted in HPI above    Prior to Visit Medications    Medication Sig Taking? Authorizing Provider   vitamin D3 (CHOLECALCIFEROL) 25 MCG (1000 UT) TABS tablet Take 1 tablet by mouth daily Yes Darrell Ham MD   melatonin (RA MELATONIN) 3 MG TABS tablet Take 1 tablet by mouth nightly as needed (Sleep) Yes Darrell Ham MD   warfarin (COUMADIN) 5 MG tablet TAKE 1 TABLET BY MOUTH EVERY DAY Yes Wendi Espinal MD   folic acid (FOLVITE) 1 MG tablet TAKE 1 TABLET BY MOUTH EVERY DAY Yes Torey Christianson MD   nicotine (NICODERM CQ) 14 MG/24HR Place 1 patch onto the skin daily Yes Flora San MD   amLODIPine (NORVASC) 10 MG tablet Take 1 tablet by mouth daily Yes Flora San MD   diclofenac sodium (VOLTAREN) 1 % GEL Apply 4 g topically 4 times daily as needed for Pain Yes Flora San MD   furosemide (LASIX) 20 MG tablet Take 1 tablet by mouth daily Yes Bob Valencia MD   methadone (DOLOPHINE) 10 MG/ML solution Take 35 mg by mouth daily.   Yes Historical Provider, MD        PHYSICAL EXAM    Vitals:    22 1045 22 1048   BP: (!) 143/90 130/86   Site: Right Upper Arm Right Upper Arm   Position: Sitting Sitting   Cuff Size: Large Adult Large Adult   Pulse: 63    Resp: 24    Temp: 97.3 °F (36.3 °C)    TempSrc: Temporal    SpO2: 99%    Weight: 174 lb 6.4 oz (79.1 kg)    Height: 5' 8\" (1.727 m)       Estimated body mass index is 26.52 kg/m² as calculated from the following:    Height as of this encounter: 5' 8\" (1.727 m). Weight as of this encounter: 174 lb 6.4 oz (79.1 kg). Physical Exam  Constitutional:       General: He is not in acute distress. Appearance: Normal appearance. He is not ill-appearing. HENT:      Head: Normocephalic and atraumatic. Eyes:      General: Vision grossly intact. Conjunctiva/sclera: Conjunctivae normal.   Cardiovascular:      Rate and Rhythm: Normal rate and regular rhythm. Pulses: Normal pulses. Heart sounds: Normal heart sounds, S1 normal and S2 normal. No murmur heard. No friction rub. No gallop. Pulmonary:      Effort: Pulmonary effort is normal. No respiratory distress. Breath sounds: Normal breath sounds and air entry. No wheezing or rales. Abdominal:      General: Bowel sounds are normal. There is no distension. Palpations: Abdomen is soft. Tenderness: There is no abdominal tenderness. Musculoskeletal:         General: Normal range of motion. Cervical back: Full passive range of motion without pain, normal range of motion and neck supple. Right lower leg: No edema. Left lower leg: No edema. Lymphadenopathy:      Cervical: No cervical adenopathy. Skin:     Capillary Refill: Capillary refill takes less than 2 seconds. Coloration: Skin is not pale. Neurological:      Mental Status: He is alert and oriented to person, place, and time. Mental status is at baseline. Psychiatric:         Mood and Affect: Mood normal.         Speech: Speech normal.         Judgment: Judgment normal.          ASSESSMENT AND PLAN    1. Primary hypertension  Controlled  - Continue current regiment    2. Hypovitaminosis D  - STOP Weekly Vit D  - vitamin D3 (CHOLECALCIFEROL) 25 MCG (1000 UT) TABS tablet;  Take 1 tablet by mouth daily  Dispense: 90 tablet; Refill: 1    3. Screening for lung cancer  - CT Lung Screen (Initial or Annual); Future       Return in about 6 months (around 9/23/2022). Pt discussed and staffed with Dr. Cristina Mccallum.      Electronically signed by Lynnette Franz MD on 3/23/2022 at 11:50 AM

## 2022-03-23 NOTE — PATIENT INSTRUCTIONS
-Schedule an appointment to get your lung scan we will call with the results  -Medications have been refilled and sent to [facility] on [address]  -Follow-up in 3 months

## 2022-03-25 ENCOUNTER — OFFICE VISIT (OUTPATIENT)
Dept: ORTHOPEDIC SURGERY | Age: 67
End: 2022-03-25
Payer: MEDICARE

## 2022-03-25 VITALS — WEIGHT: 174 LBS | HEIGHT: 68 IN | BODY MASS INDEX: 26.37 KG/M2

## 2022-03-25 DIAGNOSIS — Z86.718 HISTORY OF DVT (DEEP VEIN THROMBOSIS): ICD-10-CM

## 2022-03-25 DIAGNOSIS — F17.210 CIGARETTE NICOTINE DEPENDENCE WITHOUT COMPLICATION: ICD-10-CM

## 2022-03-25 DIAGNOSIS — M16.12 PRIMARY OSTEOARTHRITIS OF LEFT HIP: Primary | ICD-10-CM

## 2022-03-25 DIAGNOSIS — M16.12 PRIMARY OSTEOARTHRITIS OF LEFT HIP: ICD-10-CM

## 2022-03-25 LAB
AMPHETAMINE SCREEN, URINE: ABNORMAL
BARBITURATE SCREEN URINE: ABNORMAL
BENZODIAZEPINE SCREEN, URINE: ABNORMAL
CANNABINOID SCREEN URINE: POSITIVE
COCAINE METABOLITE SCREEN URINE: ABNORMAL
Lab: ABNORMAL
METHADONE SCREEN, URINE: ABNORMAL
OPIATE SCREEN URINE: ABNORMAL
OXYCODONE URINE: ABNORMAL
PH UA: 6
PHENCYCLIDINE SCREEN URINE: ABNORMAL
PROPOXYPHENE SCREEN: ABNORMAL

## 2022-03-25 PROCEDURE — 99215 OFFICE O/P EST HI 40 MIN: CPT | Performed by: ORTHOPAEDIC SURGERY

## 2022-03-25 NOTE — LETTER
[facility]  Surgery Scheduling Form:    22     DEMOGRAPHICS    Patient Name:  Dee Andres  Patient :  1955   Patient SS#:  [id number]    Patient Phone:  [phone] (home)  Alt. Patient Phone:    Patient Address:  [address] [address]    PCP:  Sera Shearer MD  Insurance:  Payor: [employer] / Plan: Stan Sofia ESSENTIAL/PLUS / Product Type: *No Product type* /   Insurance ID Number:  Payor/Plan Subscr  Sex Relation Sub. Ins. ID Effective Group Num   1. BCBS MEDICAREBertrand Frizzle 1955 Male Self [id number] [id number]                                   [address]   2.  Raydell Karo 1955 Male Self [id number] 21                                    [address] [address]       DIAGNOSIS & PROCEDURE    Diagnosis:   M16.12 Left hip primary osteoarthritis    Operation: LEFT  03144 Total Hip Arthroplasty Minimally-Invasive Direct Anterior     Provider:  Vinnie Tarango MD    Location:  [facility] INFORMATION    Requested Date:  ***   Requested Time:  ***      Patient Arrival Time:  ***  OR Time Required:  100 Minutes   Admission:  []Outpatient   []23 hour  [x]Same Day Admit:   1-2  days  []Inpatient    Anesthesia:  [x]General  []Spinal  []MAC/Sedation  Regional Anesthesia:  [x]None  []OrthoMix  []Exparel  []Fascia Iliaca / PENG       EQUIPMENT    Position:  [x]Supine  []Lateral  []Beach-chair  []Prone    OR Bed:  []Regular  [x]Bath  []Ramos  []Hip pedroza  []Beach-chair  [][facility]  Radiology:  [x]Large C-arm  []Small C-arm  []Portable X-ray    Implants:  Tiana[employer]Biomet Hip:  [x]Primary Set  []Revision Set  Medacta Hip:  []Dual-modular acetabulum (DM)    SUTURE: []#5 Ethibond  [x]#2 Ethibond  [x]#2 Quill  []#1 PDS  [x]#1 Vicryl                   [x]2-0 Vicryl  [x]3-0 Monocryl  []2-0 Nylon  []3-0 Nylon  []3-0 PDS                    []Dermabond  []Steri-strips (in half)  DRESSING:  [x]eo dermabond  []4x4 gauze  [x]ABDs [x]Tegaderm  BRACE: []Pelvic Binder  []Hip X-ACT  []Knee TROM  []Knee immobilizer                 []Shoulder Immob. (w/abd. pillow)  []Sling  []Ice Unit  []Ace-Wrap      [x]Tiana Biomet:  Chaneta Pallas [phone], Russel Tierney. [email or web address]  []Medacta: Yoselin Menezes [phone], [email or web address]  []Fx Shoulder: Lurdes Dueñas [phone], Jodee Fam. [email or web address]  []Nichols: Dixie Cancer [phone], Loli [email or web address]. [email or web address]    Comments: ***      Lidia Schwartz MD  [address] [place] [address] Physicians  3/25/2022       12:33 PM EDT

## 2022-03-25 NOTE — PROGRESS NOTES
Dr Janette Catalan      Date /Time 3/25/2022       10:52 AM EST  Name Alannah Vasques             1955   Location  [address] SUR[facility]  MRN [id number]                Chief Complaint   Patient presents with    Hip Pain     left        History of Present Illness    Alannah Vasques is a 78 y.o. male who presents with  bilateral hip pain. Current history: His pain is gotten worse and is interested in surgery. He has had recent blood work done. Previous history: He presents for bilateral hip pain, left worse than right. He is interested in surgical solution for this at this point. His pain is gotten significantly worse than previous visits. He reports he has quit smoking almost completely for the last month and a half. He still reports that his heroin addiction is in remission. He has switched to taking Coumadin instead of Xarelto for history of venous thrombolic disease  He says he is quit smoking. Previous history: At patient's last visit he was complaining of bilateral extremity swelling. We were concerned about a DVT. We did order a bilateral lower extremity venous Doppler. They were positive for massive DVTs of bilateral lower extremity while taking Xarelto. Since then he has been placed on Coumadin. He states his lower extremity swelling is improving but continues to have significant hip pain. He does have advanced osteoarthritis of his hip. Previous history: Patient presents the office today for a new problem. Patient is here with a chief complaint of left greater than right hip pain. Patient has had pain for several months with increased symptoms over the last few weeks. His symptoms are concentrated lateral greater than groin. Patient does have increased pain with weightbearing activities. He also complains of lumbar pain and bilateral thigh pain. He does not complain of any pain symptoms in his lower legs or feet. No numbness and tingling.   He has tried NSAIDs, activity 1    folic acid (FOLVITE) 1 MG tablet TAKE 1 TABLET BY MOUTH EVERY DAY 90 tablet 1    nicotine (NICODERM CQ) 14 MG/24HR Place 1 patch onto the skin daily 28 patch 1    amLODIPine (NORVASC) 10 MG tablet Take 1 tablet by mouth daily 90 tablet 1    diclofenac sodium (VOLTAREN) 1 % GEL Apply 4 g topically 4 times daily as needed for Pain 50 g 3    furosemide (LASIX) 20 MG tablet Take 1 tablet by mouth daily 60 tablet 3    methadone (DOLOPHINE) 10 MG/ML solution Take 35 mg by mouth daily. No current facility-administered medications on file prior to visit. ASCVD 10-YEAR RISK SCORE  The 10-year ASCVD risk score (Mar Osborne, et al., 2013) is: 17.7%    Values used to calculate the score:      Age: 78 years      Sex: Male      Is Non- : Yes      Diabetic: No      Tobacco smoker: No      Systolic Blood Pressure: 374 mmHg      Is BP treated: Yes      HDL Cholesterol: 58 mg/dL      Total Cholesterol: 232 mg/dL   . Review of Systems  10-point ROS is negative other than HPI. Physical Exam  Based off 1997 Exam Criteria  Ht 5' 7.99\" (1.727 m)   Wt 174 lb (78.9 kg)   BMI 26.46 kg/m²      Constitutional:       General: He is not in acute distress. Appearance: Normal appearance. Cardiovascular:      Rate and Rhythm: Normal rate and regular rhythm. Pulses: Normal pulses. Pulmonary:      Effort: Pulmonary effort is normal. No respiratory distress. Neurological:      Mental Status: He is alert and oriented to person, place, and time. Mental status is at baseline.      Musculoskeletal:  Gait:  antalgic    Spine / Hip Exam:      RIGHT  LEFT    Lumbar Spine Exam  [] All Neg    [] All Neg     Straight leg raise  []  []Not tested   []  []Not tested    Clonus  []  []Not tested   []  []Not tested    Pain with motion  [x]  []Not tested   [x]  []Not tested    Radiculopathy  [x]  []Not tested   [x]  []Not tested    Paraspinal muscle tenderness  [x] Paraspinal  [x]Midline   [x] Paraspinal  [x]Midline   Sensation RIGHT  LEFT    L3  [x] Normal []Decreased    [x] Normal []Decreased   L4  [] Normal  [x]Decreased   [] Normal [x]Decreased   L5  [] Normal [x]Decreased   [] Normal [x]Decreased   S1  [] Normal  [x]Decreased   [] Normal [x]Decreased   Pelvis       Scoliosis  [] Nml  [x] Present     Leg-length discrepency  [x] Equal  [] Right longer   [] Left longer   Range of Motion Active Passive Active Passive   Hip Flexion 90  90    Abduction 20  20    External Rotation @ 90 flex 35  35    Internal Rotation @ 90 flex -10  -10           Hip Impingement / Dysplasia  [] All Neg  [] Not tested   [] All Neg  [] Not tested    Hip impingement test  [x]  []Not tested   [x]  []Not tested    C-sign  [x]  []Not tested   [x]  []Not tested    Anterior instability apprehension  []  []Not tested   []  []Not tested    Posterior instability apprehension  []  []Not tested   []  []Not tested    Uncontained Internal rotation  []  []Not tested  []  []Not tested          Abductors  [x] All Neg  [] Not tested   [x] All Neg  [] Not tested    Medius strength  []  []Not tested   []  []Not tested    Minimum strength  []  []Not tested   []  []Not tested    IT band tendonitis  []  []Not tested   []  []Not tested    Trochanteric tenderness  []  []Not tested  []  []Not tested   Sciatic neuropathic pain  []  []Not tested   []  []Not tested           Post-arthroplasty  [] All Neg  [] Not tested   [] All Neg  [] Not tested    Rectus tendonitis  []  []Not tested   []  []Not tested    Iliopsoas tendonitis       Start-up pain  []  []Not tested   []  []Not tested      Markedly stiff hips, stiff painful back as well. Some radiculopathy. Calf swelling improved compared to previous exam.    Imaging    Previous Bilateral hip: [address]  Radiographs: End-stage osteoarthritis with severe osteophyte formation, joint space narrowing, cyst and sclerosis. Arthritic and lordotic lumbar spine as well.   Some spondylolisthesis present in the lower lumbar segments. Enthesophyte bone formation present with likely DISH of the lumbar spine. Assessment and Plan  Robbie Polanco was seen today for hip pain. Diagnoses and all orders for this visit:    Primary osteoarthritis of left hip  -     XR HIP LEFT (2-3 VIEWS); Future  -     Isi Hernandez MD, Vascular Surgery, [facility]; Future  -     DRUG SCREEN MULTI URINE; Future    Cigarette nicotine dependence without complication  -     Isi Hernandez MD, Vascular Surgery, [facility]; Future  -     DRUG SCREEN MULTI URINE; Future    History of DVT (deep vein thrombosis)  -     Isi Hernandez MD, Vascular Surgery, [facility]  -     CARBOXYHEMOGLOBIN; Future  -     DRUG SCREEN MULTI URINE; Future      Patient is suffering from advanced osteoarthritis osteoarthritis of bilateral hips. Patient is currently anticoagulated for massive bilateral DVTs. He most likely will require extensive work-up for hypercoagulable state from a hematologist or his primary care physician before proceeding with surgery. He did develop massive bilateral lower extremity DVTs while already anticoagulated for previous DVTs on Xarelto. He may benefit from a inferior vena cava filter at some point in the future. Also he will need to be cleared to come off of his chronic anticoagulation before surgery. I do not expect this at any time in the near future. We will perform a right hip intra-articular cortisone injection today. We will do the left hip in 2 weeks. He will continue on his Coumadin with his primary care physician.     I discussed with Ashely Alatorre that his history, symptoms, signs and imaging are most consistent with hip arthritis    We reviewed the natural history of these conditions and treatment options ranging from conservative measures (rest, icing, activity modification, physical therapy, pain meds, cortisone injection)  to surgical options. We had a long discussion with the patient about their hip. We discussed surgical and non surgical options for hip arthritis. The most important thing is to work to maintain their range of motion. Next they can try medications including tylenol and NSAIDs. They can try glucosamine or chondroitin. They should also ice frequently and avoid activities that make their hip hurt. Cortisone injections and Synvisc injections are also options when medicine has failed. We finally discussed surgical options including arthroscopic debridement versus hip replacement. Often the arthritis is too far gone for an arthroscopic debridement and pain relief will be short term. Their ultimate solution will be a hip replacement when they are ready for it. They should put it off until they can no longer stand the pain and when nothing else has worked. Conservative measures have failed. He is not interested in cortisone injections. I think he is an appropriate candidate for surgery due to his ongoing symptoms and dysfunction despite conservative measures. The procedure would be  [address]105 Total Hip Arthroplasty, left direct anterior    Perioperative considerations include: Preop PCP eval, DVT History, PE History and Chronic anticoagulation other than Aspirin. We reviewed the risks, benefits, alternatives of this approach. We discussed risks including, but not limited to, bleeding, pain, infection, scarring, damage to the neurovascular structures, blood clots, pulmonary embolus, stiffness, implant instability or loosening, implant failure, incomplete relief of pain, and incomplete return of function. His perioperative risk is significantly higher than a standard replacement. First, he has a history of venous thrombolic disease, last known clot was 2 years ago. He now is on Coumadin and remains anticoagulated.   It is possible that he requires a preoperative IVC filter prior to elective surgery. Second, he has a history of nicotine dependence and smoking. We had a discussion regarding its implications. He says he has quit for now. We will require preoperative blood work as well as nicotine test with carboxyhemoglobin to determine status. In addition, he has a history of previous heroin addiction, which has been in remission for over a year. And lastly, I believe he has a high risk for heterotopic ossification following this operation. We will plan for 7 Gy preoperative radiation therapy the day before the operation. We also reviewed the surgical details, expected recovery, and rehabilitation (6-9 months). He expressed understanding and will undergo preoperative medical evaluation and optimization. He is definitely an inpatient candidate at the time of surgery considering all the comorbidities and close monitoring we will need to perform.

## 2022-03-25 NOTE — LETTER
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 I would recommend left hip replacement, I would typically perform with a direct anterior approach. He does have some concerns medically however that I think he could benefit from some optimization. His blood work looks fantastic. I am waiting on a drug screen and nicotine screen, before we schedule surgery. I think he could also benefit from preop radiation therapy to prevent heterotopic bone formation after surgery. I have organize this with radiation oncology for when we do this. Dr. Emmett Zamora,  He has a high thrombotic history with multiple large venous thrombosis bilateral DVT, and pulmonary embolus history. I am wondering if you think an IVC filter could be useful prior to hip replacement surgery in this current setting. I would love for him to come off of Xarelto, without any chemical bridge therapy around the time of surgery, and resume Xarelto postop day 2. Please let me know your thoughts. I appreciate seeing the patient. I have referred him to you.     Thank you sincerely  Minus Peoples

## 2022-03-31 ENCOUNTER — TELEPHONE (OUTPATIENT)
Dept: INTERNAL MEDICINE CLINIC | Age: 67
End: 2022-03-31

## 2022-03-31 ENCOUNTER — ANTI-COAG VISIT (OUTPATIENT)
Dept: PHARMACY | Age: 67
End: 2022-03-31
Payer: MEDICARE

## 2022-03-31 DIAGNOSIS — I82.413 ACUTE DEEP VEIN THROMBOSIS (DVT) OF FEMORAL VEIN OF BOTH LOWER EXTREMITIES (HCC): ICD-10-CM

## 2022-03-31 DIAGNOSIS — I26.99 PULMONARY EMBOLISM, BILATERAL (HCC): ICD-10-CM

## 2022-03-31 DIAGNOSIS — I26.99 PULMONARY EMBOLISM, BILATERAL (HCC): Primary | ICD-10-CM

## 2022-03-31 LAB
HCT VFR BLD CALC: 37.6 % (ref 40.5–52.5)
HEMOGLOBIN: 12.7 G/DL (ref 13.5–17.5)
INR BLD: 7.63 (ref 0.88–1.12)
INTERNATIONAL NORMALIZATION RATIO, POC: >8
MCH RBC QN AUTO: 33.9 PG (ref 26–34)
MCHC RBC AUTO-ENTMCNC: 33.8 G/DL (ref 31–36)
MCV RBC AUTO: 100.4 FL (ref 80–100)
PDW BLD-RTO: 14 % (ref 12.4–15.4)
PLATELET # BLD: 200 K/UL (ref 135–450)
PMV BLD AUTO: 8 FL (ref 5–10.5)
PROTHROMBIN TIME: 93.9 SEC (ref 9.9–12.7)
RBC # BLD: 3.75 M/UL (ref 4.2–5.9)
WBC # BLD: 4.8 K/UL (ref 4–11)

## 2022-03-31 PROCEDURE — 85610 PROTHROMBIN TIME: CPT

## 2022-03-31 PROCEDURE — 99212 OFFICE O/P EST SF 10 MIN: CPT

## 2022-03-31 NOTE — TELEPHONE ENCOUNTER
Lab from [facility] called critical INR/PT of 7.63/ 93.9.  on unit and notified. Jorge Alberto Shelton [facility]. notified and she spoke with Dr. Kayla Mcgovern.

## 2022-03-31 NOTE — PROGRESS NOTES
Ha Aldana is a 78 y.o. male with PMHx significant for bilateral PE, DVT (6/4/21) while on Xarelto, HTN who presents to clinic 2/8/2022 for anticoagulation monitoring and adjustment.     Anticoagulation Indication(s):  PE 2018, bilateral DVT 6/4/21 while on Xarelto  Referring Physician:   Dr. Jessica Dan (Also send notes to Dr Antoinette Pratt, or responsible resident)  Goal INR Range:  2-3  Duration of Anticoagulation Therapy:  indefinite  Time of day dose taken: prefers to take in AM  Product patient has at home: warfarin 5 mg (peach color)    INR Summary                           Warfarin regimen (mg)  Date INR   A/P   Sun Mon Tue Wed Thu Fri Sat Mg/wk  3/31 7.63 Hold x 3 days  3/17 3.2 Above goal, continue 5 5 5 5 5 5 5 35  3/1 1.5 Below goal, (Missed) 5 5 7.5/5 5 5 5 5 35  2/8 1.7 Below goal, bolus 5 5 7.5/5 5 5 5 5 35  1/20 3.7 Above goal, decrease 5 5 5 5 5 2.5 5 32.5  1/5 3.6 Above goal, hold 5 5 5 5 5 0/5 5 35  12/14   2.2       At goal, no change      5 5 5 5 5 5 5 35  11/18 2.8 At goal, no change 5 5 5 5 5 5 5 35   11/11 1.3 Below goal, cont 5 5 5 5 5 5 5 35  11/4 5.1 Above goal, hold+dec 5 5 5 5 0/5 0/5 5 35  10/25 5.3 Above goal, hold+dec  5 5 0/5 0/5 7.5 5 5 37.5  10/13 1.8 Below goal, increase 5 5 7.5 5 7.5 5 5 40  10/6 1.3 Below goal (missed) 5 5 5 10/5 7.5 5 5 37.5  9/13 2.2 At goal, continue 5 5 5 5 7.5 5 5 37.5  8/30     1.8       Below goal, cont 5 5 5 5 7.5 5 5 37.5  7/26     3.1       At goal, no change 5 5 5 5 7.5 5 5 37.5  7/15 2.1 At goal, increase 5 5 5 5 7.5 5 5 37.5  7/8 5.6 Above goal, holdx1 5 5 5 5 5 5 5 35  6/28 2.1 At goal, no change 5 7.5 5 7.5 5 7.5 5 42.5  6/21 4.3 Above goal, holdx1 5 x 5 7.5 5 7.5 5   6/14 4.2 Above goal, hold 7.5 10 0/7.5 5/7.5 7.5 7.5 7.5   6/10 1.3 Below goal, increase 7.5 INR   10 10 7.5     Started warfarin 5 mg daily on 6/7/21    Last CBC:  Lab Results   Component Value Date    RBC 4.32 02/18/2022    HGB 14.5 02/18/2022    HCT 43.1 02/18/2022    MCV 99.8 02/18/2022    MCH 33.6 02/18/2022    MPV 7.9 02/18/2022    RDW 14.5 02/18/2022     02/18/2022     Patient History:  Recent hospitalizations/HC visits Pt dx with bilat DVT 6/4/21 while on Xarelto 2yrs for hx of PE. Pt stopped Xarelto on 6/6/21 and started warfarin + lovenox bridge on 6/7/21. Recent medication changes None reported   Medications taken regularly that may interact with warfarin or alter INR No significant drug interactions identified   Warfarin dose taken as prescribed 2/8/22: has been taking 5 mg QD  11/4/21: Pt did not hold warfarin as directed  using pillbox   Signs/symptoms of bleeding None reported   Vitamin K intake Normally has 0-1 serving of green, leafy vegetables per week; 1c cranberry juice qd  1/5/22: considering adding more vit K foods (thinks maybe 1-2x/week would be sustainable)  3/31/22: continues to endorse a decreased appetite, but no change from baseline   Recent vomiting/diarrhea/fever, changes in weight or activity level None reported   Tobacco or alcohol use Often drinks 2-3 large glasses of wine, but states each glass only has ~4 oz wine mixed with grape juice. 10/25/21: 5-6 glasses last night   11/4/21-current: 4 glasses wine/grape juice mix (16 oz wine) past few nights  12/14/21: 3-4 glasses wine/night. Still smoking couple cig per day + 14 mg patch. 1/5/22: quit smoking using 14mg patches   3/17/22: Continues to remain smoke free! More wine last night than normal   3/31/22: Wine at 2AM, a couple glasses every day. No more than normal   Upcoming surgeries or procedures Hip surgery TBD, needs to see vascular surgeon     Assessment/Plan:  Patient's INR was supratherapeutic (>8 via POC, 7.63 ) today, unclear etiology. Previous INRs thought to be elevated possibly due to alcohol intake, but pt does not believe he's had any more than his normal 2 drinks per day lately. At previous visit, provided patient with chart of vitamin K in foods. Patient likes to eat liver and brocolli. He has been avoiding for the most part but has expressed interest in eating more in the future, but has not changed his intake yet. He states he has been taking warfarin as directed and has not taken extra doses. He denies any changes in medications, illness, or s/sxs bleeding,. He is feeling well today without any dizziness, lightheadedness, SOB, fatigue, etc. His Hgb does show a drop of ~2 points in the past month. Discussed with Dr Liz Monahan: because patient is feeling well and showing no s/sxs of bleeding, will plan to repeat CBC and CMP with next INR check. Reviewed s/sxs of bleeding and when to go to ER. Pt may be getting hip surgery. Would recommend bridging with lovenox if having surgery, so asked pt to notify clinic when scheduled to coordinate periop anticoagulation management. Patient was instructed to hold warfarin until further notice. Repeat INR on . Patient was reminded to maintain consistent vitamin K intake and call with any bleeding, medication changes, or fever/vomiting/diarrhea. Patient understands dosing directions and information discussed. Dosing schedule and follow up appointment given to patient. Progress note routed to referring physician's office. Patient acknowledges working in consult agreement with pharmacist as referred by his/her physician. Next Clinic Appointment:      Please call [facility] at [phone] with any questions. Please call The [address] [place] [address] Avenue [phone] with any questions. Thanks!   Mil Steve, PharmD, [facility]  Medication Management Clinic   [facility] Ph: [phone]  Wade Ramsey Ph: [phone]  3/31/2022 11:16 AM    For Pharmacy Admin Tracking Only     Intervention Detail: Adherence Monitorin, Dose Adjustment: 1, reason: Therapy De-escalation and Lab(s) Ordered   Total # of Interventions Recommended: 3   Total # of Interventions Accepted: 3   Time Spent (min): 15

## 2022-04-04 ENCOUNTER — ANTI-COAG VISIT (OUTPATIENT)
Dept: PHARMACY | Age: 67
End: 2022-04-04
Payer: MEDICARE

## 2022-04-04 VITALS — HEART RATE: 74 BPM | SYSTOLIC BLOOD PRESSURE: 138 MMHG | DIASTOLIC BLOOD PRESSURE: 92 MMHG

## 2022-04-04 DIAGNOSIS — I26.99 PULMONARY EMBOLISM, BILATERAL (HCC): ICD-10-CM

## 2022-04-04 DIAGNOSIS — I26.99 PULMONARY EMBOLISM, BILATERAL (HCC): Primary | ICD-10-CM

## 2022-04-04 DIAGNOSIS — I82.413 ACUTE DEEP VEIN THROMBOSIS (DVT) OF FEMORAL VEIN OF BOTH LOWER EXTREMITIES (HCC): ICD-10-CM

## 2022-04-04 LAB
A/G RATIO: 1.5 (ref 1.1–2.2)
ALBUMIN SERPL-MCNC: 4.4 G/DL (ref 3.4–5)
ALP BLD-CCNC: 209 U/L (ref 40–129)
ALT SERPL-CCNC: 32 U/L (ref 10–40)
ANION GAP SERPL CALCULATED.3IONS-SCNC: 19 MMOL/L (ref 3–16)
AST SERPL-CCNC: 44 U/L (ref 15–37)
BILIRUB SERPL-MCNC: 1.2 MG/DL (ref 0–1)
BUN BLDV-MCNC: 8 MG/DL (ref 7–20)
CALCIUM SERPL-MCNC: 10 MG/DL (ref 8.3–10.6)
CHLORIDE BLD-SCNC: 100 MMOL/L (ref 99–110)
CO2: 23 MMOL/L (ref 21–32)
CREAT SERPL-MCNC: 0.7 MG/DL (ref 0.8–1.3)
GFR AFRICAN AMERICAN: >60
GFR NON-AFRICAN AMERICAN: >60
GLUCOSE BLD-MCNC: 109 MG/DL (ref 70–99)
HCT VFR BLD CALC: 40.2 % (ref 40.5–52.5)
HEMOGLOBIN: 13.5 G/DL (ref 13.5–17.5)
INTERNATIONAL NORMALIZATION RATIO, POC: 2.7
MCH RBC QN AUTO: 33.9 PG (ref 26–34)
MCHC RBC AUTO-ENTMCNC: 33.5 G/DL (ref 31–36)
MCV RBC AUTO: 101 FL (ref 80–100)
PDW BLD-RTO: 14.4 % (ref 12.4–15.4)
PLATELET # BLD: 257 K/UL (ref 135–450)
PMV BLD AUTO: 7.7 FL (ref 5–10.5)
POTASSIUM SERPL-SCNC: 4.1 MMOL/L (ref 3.5–5.1)
RBC # BLD: 3.98 M/UL (ref 4.2–5.9)
SODIUM BLD-SCNC: 142 MMOL/L (ref 136–145)
TOTAL PROTEIN: 7.3 G/DL (ref 6.4–8.2)
WBC # BLD: 7.5 K/UL (ref 4–11)

## 2022-04-04 PROCEDURE — 99212 OFFICE O/P EST SF 10 MIN: CPT

## 2022-04-04 PROCEDURE — 85610 PROTHROMBIN TIME: CPT

## 2022-04-04 NOTE — PROGRESS NOTES
Citlalli Deleon is a 78 y.o. male with PMHx significant for bilateral PE, DVT (6/4/21) while on Xarelto, HTN who presents to clinic 2/8/2022 for anticoagulation monitoring and adjustment.     Anticoagulation Indication(s):  PE 2018, bilateral DVT 6/4/21 while on Xarelto  Referring Physician:   Dr. Salvatore Johnson (Also send notes to Dr Maritza Rose, or responsible resident)  Goal INR Range:  2-3  Duration of Anticoagulation Therapy:  indefinite  Time of day dose taken: prefers to take in AM  Product patient has at home: warfarin 5 mg (peach color)    INR Summary                           Warfarin regimen (mg)  Date INR   A/P   Sun Mon Tue Wed Thu Fri Sat Mg/wk  4/4 2.7 At goal, resume 5 2.5 5 2.5 5 2.5 5 27.5  3/31 7.63 Hold x 3 days  3/17 3.2 Above goal, continue 5 5 5 5 5 5 5 35  3/1 1.5 Below goal, (Missed) 5 5 7.5/5 5 5 5 5 35  2/8 1.7 Below goal, bolus 5 5 7.5/5 5 5 5 5 35  1/20 3.7 Above goal, decrease 5 5 5 5 5 2.5 5 32.5  1/5 3.6 Above goal, hold 5 5 5 5 5 0/5 5 35  12/14   2.2       At goal, no change      5 5 5 5 5 5 5 35  11/18 2.8 At goal, no change 5 5 5 5 5 5 5 35   11/11 1.3 Below goal, cont 5 5 5 5 5 5 5 35  11/4 5.1 Above goal, hold+dec 5 5 5 5 0/5 0/5 5 35  10/25 5.3 Above goal, hold+dec  5 5 0/5 0/5 7.5 5 5 37.5  10/13 1.8 Below goal, increase 5 5 7.5 5 7.5 5 5 40  10/6 1.3 Below goal (missed) 5 5 5 10/5 7.5 5 5 37.5  9/13 2.2 At goal, continue 5 5 5 5 7.5 5 5 37.5  8/30     1.8       Below goal, cont 5 5 5 5 7.5 5 5 37.5  7/26     3.1       At goal, no change 5 5 5 5 7.5 5 5 37.5  7/15 2.1 At goal, increase 5 5 5 5 7.5 5 5 37.5  7/8 5.6 Above goal, holdx1 5 5 5 5 5 5 5 35  6/28 2.1 At goal, no change 5 7.5 5 7.5 5 7.5 5 42.5  6/21 4.3 Above goal, holdx1 5 x 5 7.5 5 7.5 5   6/14 4.2 Above goal, hold 7.5 10 0/7.5 5/7.5 7.5 7.5 7.5   6/10 1.3 Below goal, increase 7.5 INR   10 10 7.5     Started warfarin 5 mg daily on 6/7/21    Last CBC:  Lab Results   Component Value Date RBC 3.75 (L) 03/31/2022    HGB 12.7 (L) 03/31/2022    HCT 37.6 (L) 03/31/2022    .4 (H) 03/31/2022    MCH 33.9 03/31/2022    MPV 8.0 03/31/2022    RDW 14.0 03/31/2022     03/31/2022     Patient History:  Recent hospitalizations/HC visits Scheduled to see GI on 4/25/22  Pt dx with bilat DVT 6/4/21 while on Xarelto 2yrs for hx of PE. Pt stopped Xarelto on 6/6/21 and started warfarin + lovenox bridge on 6/7/21. Recent medication changes None reported   Medications taken regularly that may interact with warfarin or alter INR No significant drug interactions identified   Warfarin dose taken as prescribed 2/8/22: has been taking 5 mg QD  11/4/21: Pt did not hold warfarin as directed  using pillbox   Signs/symptoms of bleeding 4/4/22: reports black stools past few days   Vitamin K intake Normally has 0-1 serving of green, leafy vegetables per week; 1c cranberry juice qd  1/5/22: considering adding more vit K foods (thinks maybe 1-2x/week would be sustainable)  3/31/22: continues to endorse a decreased appetite, but no change from baseline  4/4/22: states decrease in appetite is slightly worse more recently   Recent vomiting/diarrhea/fever, changes in weight or activity level None reported   Tobacco or alcohol use Often drinks 2-3 large glasses of wine, but states each glass only has ~4 oz wine mixed with grape juice. 10/25/21: 5-6 glasses last night   11/4/21-current: 4 glasses wine/grape juice mix (16 oz wine) past few nights  12/14/21: 3-4 glasses wine/night. Still smoking couple cig per day + 14 mg patch. 1/5/22: quit smoking using 14mg patches   3/17/22: Continues to remain smoke free! More wine last night than normal   3/31/22: Wine at 2AM, a couple glasses every day.  No more than normal   Upcoming surgeries or procedures Hip surgery TBD, needs to see vascular surgeon re: IVC filter  Colonoscopy TBD     Assessment/Plan:  Patient's INR was therapeutic (2.7) today, after holding warfarin over the weekend. Now patient admits that his appetite has been declining more recently. Previous INRs thought to be elevated possibly due to alcohol intake, but pt does not believe he's had any more than his normal 2 drinks per day lately. At previous visit, provided patient with chart of vitamin K in foods. Patient likes to eat liver and brocolli. He has been avoiding for the most part but has expressed interest in eating more in the future, but has not changed his intake yet. He states he has been taking warfarin as directed. He denies any changes in medications, illness, or s/sxs bleeding,. He is feeling well today without any dizziness, lightheadedness, SOB, fatigue, etc; however he does report noticing black stools over the past few days. His Hgb does show a drop of ~2 points in the past month, so CBC + CMP was repeated today, along with a stool hemoccult test. Pt is scheduled with GI on 4/25/22. Pt may be getting hip surgery. Would recommend bridging with lovenox if having surgery, so asked pt to notify clinic when scheduled to coordinate periop anticoagulation management. Patient was instructed to resume warfarin at a lower maintenance dose:  2.5 mg on MWF, 5 mg on all other days. Repeat INR on 4/12. Patient was reminded to maintain consistent vitamin K intake and call with any bleeding, medication changes, or fever/vomiting/diarrhea. Patient understands dosing directions and information discussed. Dosing schedule and follow up appointment given to patient. Progress note routed to referring physician's office. Patient acknowledges working in consult agreement with pharmacist as referred by his/her physician. Next Clinic Appointment:  4/12    Please call [place] [address] Anticoagulation Clinic at [phone] with any questions. Please call The [facility] [address] [phone] with any questions. Thanks!   Penny Child, PharmD, BCACP  Medication Management Clinic   [address] Ph: [phone]  [address] Ph: [phone]  2022 10:51 AM    For Pharmacy Admin Tracking Only     Intervention Detail: Adherence Monitorin, Dose Adjustment: 1, reason: Therapy De-escalation and Lab(s) Ordered   Total # of Interventions Recommended: 3   Total # of Interventions Accepted: 3   Time Spent (min): 15

## 2022-04-04 NOTE — Clinical Note
Pt reports today that he has noticed black stools the past few days. I sent him for repeat CBC and stool hemoccult. INR back down to 2.7 today, and I resumed him at a lower warfarin dose. Aside from a decrease in appetite (he complains of this at a baseline, but says it's been a little worse lately), he is feeling fine. Vitals okay. He denies fatigue, lightheadedness, SOB, stomach pains, etc. He has an appointment with GI on 4/25/22.

## 2022-04-05 DIAGNOSIS — I26.99 PULMONARY EMBOLISM, BILATERAL (HCC): ICD-10-CM

## 2022-04-05 DIAGNOSIS — I82.413 ACUTE DEEP VEIN THROMBOSIS (DVT) OF FEMORAL VEIN OF BOTH LOWER EXTREMITIES (HCC): ICD-10-CM

## 2022-04-05 DIAGNOSIS — I82.413 ACUTE DEEP VEIN THROMBOSIS (DVT) OF FEMORAL VEIN OF BOTH LOWER EXTREMITIES (HCC): Primary | ICD-10-CM

## 2022-04-05 LAB
REASON FOR REJECTION: NORMAL
REJECTED TEST: NORMAL

## 2022-04-06 DIAGNOSIS — I82.413 ACUTE DEEP VEIN THROMBOSIS (DVT) OF FEMORAL VEIN OF BOTH LOWER EXTREMITIES (HCC): ICD-10-CM

## 2022-04-06 LAB — OCCULT BLOOD DIAGNOSTIC: NORMAL

## 2022-04-06 NOTE — PROGRESS NOTES
Reviewed labs and stool hemoccult with pt. He still c/o black stools. Called Dr Stuart Mathew office and scheduled pt for 4/7/22 at 3:30pm.  [address], [facility] Doctor [address], [facility], [address] [place] [address]. Pt informed.      Laya Murillo, PharmD, [facility]  Medication Management Clinic   [address] Ph: [phone]  Omid Love Ph: [phone]  4/6/2022 4:35 PM

## 2022-04-12 ENCOUNTER — ANTI-COAG VISIT (OUTPATIENT)
Dept: PHARMACY | Age: 67
End: 2022-04-12
Payer: MEDICARE

## 2022-04-12 DIAGNOSIS — I26.99 PULMONARY EMBOLISM, BILATERAL (HCC): Primary | ICD-10-CM

## 2022-04-12 DIAGNOSIS — I82.413 ACUTE DEEP VEIN THROMBOSIS (DVT) OF FEMORAL VEIN OF BOTH LOWER EXTREMITIES (HCC): ICD-10-CM

## 2022-04-12 LAB — INTERNATIONAL NORMALIZATION RATIO, POC: 2.3

## 2022-04-12 PROCEDURE — 99211 OFF/OP EST MAY X REQ PHY/QHP: CPT

## 2022-04-12 PROCEDURE — 85610 PROTHROMBIN TIME: CPT

## 2022-04-12 NOTE — PROGRESS NOTES
Corrinne Guppy is a 78 y.o. male with PMHx significant for bilateral PE, DVT (6/4/21) while on Xarelto, HTN who presents to clinic 4/12/2022 for anticoagulation monitoring and adjustment.     Anticoagulation Indication(s):  PE 2018, bilateral DVT 6/4/21 while on Xarelto  Referring Physician:   Dr. Boy Morton (Also send notes to Dr Carlos Mosley, or responsible resident)  Goal INR Range:  2-3  Duration of Anticoagulation Therapy:  indefinite  Time of day dose taken: prefers to take in AM  Product patient has at home: warfarin 5 mg (peach color)    INR Summary                           Warfarin regimen (mg)  Date INR   A/P   Sun Mon Tue Wed Thu Fri Sat Mg/wk  4/12 2.3 At goal, no change 5 2.5 5 2.5 5 2.5 5 27.5  4/4 2.7 At goal, resume 5 2.5 5 2.5 5 2.5 5 27.5  3/31 7.63 Hold x 3 days  3/17 3.2 Above goal, continue 5 5 5 5 5 5 5 35  3/1 1.5 Below goal, (Missed) 5 5 7.5/5 5 5 5 5 35  2/8 1.7 Below goal, bolus 5 5 7.5/5 5 5 5 5 35  1/20 3.7 Above goal, decrease 5 5 5 5 5 2.5 5 32.5  1/5 3.6 Above goal, hold 5 5 5 5 5 0/5 5 35  12/14   2.2       At goal, no change      5 5 5 5 5 5 5 35  11/18 2.8 At goal, no change 5 5 5 5 5 5 5 35   11/11 1.3 Below goal, cont 5 5 5 5 5 5 5 35  11/4 5.1 Above goal, hold+dec 5 5 5 5 0/5 0/5 5 35  10/25 5.3 Above goal, hold+dec  5 5 0/5 0/5 7.5 5 5 37.5  10/13 1.8 Below goal, increase 5 5 7.5 5 7.5 5 5 40  10/6 1.3 Below goal (missed) 5 5 5 10/5 7.5 5 5 37.5  9/13 2.2 At goal, continue 5 5 5 5 7.5 5 5 37.5  8/30     1.8       Below goal, cont 5 5 5 5 7.5 5 5 37.5  7/26     3.1       At goal, no change 5 5 5 5 7.5 5 5 37.5  7/15 2.1 At goal, increase 5 5 5 5 7.5 5 5 37.5  7/8 5.6 Above goal, holdx1 5 5 5 5 5 5 5 35  6/28 2.1 At goal, no change 5 7.5 5 7.5 5 7.5 5 42.5  6/21 4.3 Above goal, holdx1 5 x 5 7.5 5 7.5 5   6/14 4.2 Above goal, hold 7.5 10 0/7.5 5/7.5 7.5 7.5 7.5   6/10 1.3 Below goal, increase 7.5 INR   10 10 7.5     Started warfarin 5 mg daily on 6/7/21    Last CBC:  Lab Results   Component Value Date    RBC 3.98 (L) 04/04/2022    HGB 13.5 04/04/2022    HCT 40.2 (L) 04/04/2022    .0 (H) 04/04/2022    MCH 33.9 04/04/2022    MPV 7.7 04/04/2022    RDW 14.4 04/04/2022     04/04/2022     Patient History:  Recent hospitalizations/HC visits OV GI on 4/7/22- Pt was told he needs to repeat stool hemoccult testing, then schedule colonoscopy  Pt dx with bilat DVT 6/4/21 while on Xarelto 2yrs for hx of PE. Pt stopped Xarelto on 6/6/21 and started warfarin + lovenox bridge on 6/7/21. Recent medication changes None reported   Medications taken regularly that may interact with warfarin or alter INR No significant drug interactions identified   Warfarin dose taken as prescribed 2/8/22: has been taking 5 mg QD  11/4/21: Pt did not hold warfarin as directed  using pillbox   Signs/symptoms of bleeding Black stools now resolved, stool hemoccult negative 4/6/22   Vitamin K intake Normally has 0-1 serving of green, leafy vegetables per week; 1c cranberry juice qd  Pt often notes that he has very little appetite. Recent vomiting/diarrhea/fever, changes in weight or activity level None reported   Tobacco or alcohol use Often drinks 2-3 large glasses of wine, but states each glass only has ~4 oz wine mixed with grape juice. Occasionally has more. 1/5/22: quit smoking using 14mg patches (was smoking 2 cig/d)   3/31/22: Wine at 2AM, a couple glasses QD   Upcoming surgeries or procedures Hip surgery TBD, needs to see vascular surgeon re: IVC filter  Colonoscopy TBD     Assessment/Plan:  Patient's INR was therapeutic (2.3) today. Decreased warfarin requirements could be due to decreased appetite or changes in alcohol consumption. At previous visit, provided patient with chart of vitamin K in foods. Patient likes to eat liver and brocolli. He has been avoiding for the most part but has expressed interest in eating more in the future.  He has not changed his intake yet. He states he has been taking warfarin as directed. He denies any changes in medications, illness, or s/sxs bleeding. He reports that he no longer has black stools and has seen the GI doctor to address this. He is to notify the clinic as soon as he is scheduled for a colonoscopy and/or hip surgery so we can instruct him on periop anticoagulation plan. Patient was instructed to continue warfarin 2.5 mg on MWF, 5 mg on all other days. Repeat INR in 2 wks. Patient was reminded to maintain consistent vitamin K intake and call with any bleeding, medication changes, or fever/vomiting/diarrhea. Patient understands dosing directions and information discussed. Dosing schedule and follow up appointment given to patient. Progress note routed to referring physician's office. Patient acknowledges working in consult agreement with pharmacist as referred by his/her physician. Next Clinic Appointment:  2wk    Please call [facility] at [phone] with any questions. Please call The [address] [place] [address] Avenue [phone] with any questions. Thanks!   Peter Galloway, PharmD, [facility]  Medication Management Clinic   [address] Ph: [phone]  Brayden Nguyen Ph: [phone]  4/12/2022 11:41 AM    For Pharmacy Admin Tracking Only     Total # of Interventions Recommended: 0   Total # of Interventions Accepted: 0   Time Spent (min): 15

## 2022-04-18 RX ORDER — LANOLIN ALCOHOL/MO/W.PET/CERES
3 CREAM (GRAM) TOPICAL NIGHTLY PRN
Qty: 30 TABLET | Refills: 3 | OUTPATIENT
Start: 2022-04-18

## 2022-04-21 ENCOUNTER — ANTI-COAG VISIT (OUTPATIENT)
Dept: PHARMACY | Age: 67
End: 2022-04-21
Payer: MEDICARE

## 2022-04-21 DIAGNOSIS — I82.413 ACUTE DEEP VEIN THROMBOSIS (DVT) OF FEMORAL VEIN OF BOTH LOWER EXTREMITIES (HCC): ICD-10-CM

## 2022-04-21 DIAGNOSIS — I26.99 PULMONARY EMBOLISM, BILATERAL (HCC): Primary | ICD-10-CM

## 2022-04-21 PROCEDURE — 85610 PROTHROMBIN TIME: CPT

## 2022-04-21 PROCEDURE — 99212 OFFICE O/P EST SF 10 MIN: CPT

## 2022-04-21 NOTE — PROGRESS NOTES
Smita Roe is a 78 y.o. male with PMHx significant for bilateral PE, DVT (6/4/21) while on Xarelto, HTN who presents to clinic 4/21/2022 for anticoagulation monitoring and adjustment.     Anticoagulation Indication(s):  PE 2018, bilateral DVT 6/4/21 while on Xarelto  Referring Physician:   Dr. Yung Records (Also send notes to Dr Jessy William, or responsible resident)  Goal INR Range:  2-3  Duration of Anticoagulation Therapy:  indefinite  Time of day dose taken: prefers to take in AM  Product patient has at home: warfarin 5 mg (peach color)    INR Summary                           Warfarin regimen (mg)  Date INR   A/P   Sun Mon Tue Wed Thu Fri Sat Mg/wk  4/21 3.1 Above goal, decrease 5 2.5 5 2.5 5 2.5 2.5 25  4/12 2.3 At goal, no change 5 2.5 5 2.5 5 2.5 5 27.5  4/4 2.7 At goal, resume 5 2.5 5 2.5 5 2.5 5 27.5  3/31 7.63 Hold x 3 days  3/17 3.2 Above goal, continue 5 5 5 5 5 5 5 35  3/1 1.5 Below goal, (Missed) 5 5 7.5/5 5 5 5 5 35  2/8 1.7 Below goal, bolus 5 5 7.5/5 5 5 5 5 35  1/20 3.7 Above goal, decrease 5 5 5 5 5 2.5 5 32.5  1/5 3.6 Above goal, hold 5 5 5 5 5 0/5 5 35  12/14   2.2       At goal, no change      5 5 5 5 5 5 5 35  11/18 2.8 At goal, no change 5 5 5 5 5 5 5 35   11/11 1.3 Below goal, cont 5 5 5 5 5 5 5 35  11/4 5.1 Above goal, hold+dec 5 5 5 5 0/5 0/5 5 35  10/25 5.3 Above goal, hold+dec  5 5 0/5 0/5 7.5 5 5 37.5  10/13 1.8 Below goal, increase 5 5 7.5 5 7.5 5 5 40  10/6 1.3 Below goal (missed) 5 5 5 10/5 7.5 5 5 37.5  9/13 2.2 At goal, continue 5 5 5 5 7.5 5 5 37.5  8/30     1.8       Below goal, cont 5 5 5 5 7.5 5 5 37.5  7/26     3.1       At goal, no change 5 5 5 5 7.5 5 5 37.5  7/15 2.1 At goal, increase 5 5 5 5 7.5 5 5 37.5  7/8 5.6 Above goal, holdx1 5 5 5 5 5 5 5 35  6/28 2.1 At goal, no change 5 7.5 5 7.5 5 7.5 5 42.5  6/21 4.3 Above goal, holdx1 5 x 5 7.5 5 7.5 5   6/14 4.2 Above goal, hold 7.5 10 0/7.5 5/7.5 7.5 7.5 7.5   6/10 1.3 Below goal, increase 7.5 INR   10 10 7.5     Started warfarin 5 mg daily on 6/7/21    Last CBC:  Lab Results   Component Value Date    RBC 3.98 (L) 04/04/2022    HGB 13.5 04/04/2022    HCT 40.2 (L) 04/04/2022    .0 (H) 04/04/2022    MCH 33.9 04/04/2022    MPV 7.7 04/04/2022    RDW 14.4 04/04/2022     04/04/2022     Patient History:  Recent hospitalizations/HC visits OV GI on 4/7/22- Pt was told he needs to repeat stool hemoccult testing, then schedule colonoscopy  Pt dx with bilat DVT 6/4/21 while on Xarelto 2yrs for hx of PE. Pt stopped Xarelto on 6/6/21 and started warfarin + lovenox bridge on 6/7/21. Recent medication changes None reported   Medications taken regularly that may interact with warfarin or alter INR No significant drug interactions identified   Warfarin dose taken as prescribed 2/8/22: has been taking 5 mg QD  11/4/21: Pt did not hold warfarin as directed  using pillbox   Signs/symptoms of bleeding Black stools now resolved, stool hemoccult negative 4/6/22   Vitamin K intake Normally has 0-1 serving of green, leafy vegetables per week; 1c cranberry juice qd  Pt often notes that he has very little appetite. Recent vomiting/diarrhea/fever, changes in weight or activity level None reported   Tobacco or alcohol use Often drinks 2-3 large glasses of wine, but states each glass only has ~4 oz wine mixed with grape juice. Occasionally has more. 1/5/22: quit smoking using 14mg patches (was smoking 2 cig/d)   3/31/22: Wine at 2AM, a couple glasses QD   Upcoming surgeries or procedures Hip surgery TBD, needs to see vascular surgeon re: IVC filter  Colonoscopy TBD     Assessment/Plan:  Patient's INR was supratherapeutic (3.1) today and increasing from just 2.3 last week. Decreased warfarin requirements could be due to decreased appetite or changes in alcohol consumption. At previous visit, provided patient with chart of vitamin K in foods. Patient likes to eat liver and brocolli.  He has been avoiding for the most part but has expressed interest in eating more in the future. He has not changed his intake yet. He states he has been taking warfarin as directed. He denies any changes in medications, illness, or s/sxs bleeding. He reports that he no longer has black stools and has seen the GI doctor to address this. He is to notify the clinic as soon as he is scheduled for a colonoscopy and/or hip surgery so we can instruct him on periop anticoagulation plan. As INR was trending back up, patient was instructed to decrease warfarin dose to 5 mg on Sun/Tue/Thur, 2.5 mg on all other days. Repeat INR in 2 wks. Patient was reminded to maintain consistent vitamin K intake and call with any bleeding, medication changes, or fever/vomiting/diarrhea. Patient understands dosing directions and information discussed. Dosing schedule and follow up appointment given to patient. Progress note routed to referring physician's office. Patient acknowledges working in consult agreement with pharmacist as referred by his/her physician. Next Clinic Appointment:      Please call [facility] at [phone] with any questions. Please call The [address] [place] [address] Avenue [phone] with any questions. Thanks!   Niko William, PharmD  [facility] Medication Management Clinic  [place]: [address] [place] [address] Street: [phone]    2022 10:34 AM    For Pharmacy Admin Tracking Only     Intervention Detail: Adherence Monitorin and Dose Adjustment: 1, reason: Therapy Optimization   Total # of Interventions Recommended: 2   Total # of Interventions Accepted: 2   Time Spent (min): 15

## 2022-04-25 RX ORDER — NICOTINE 21 MG/24HR
PATCH, TRANSDERMAL 24 HOURS TRANSDERMAL
Qty: 28 PATCH | Refills: 1 | Status: SHIPPED | OUTPATIENT
Start: 2022-04-25 | End: 2022-10-31 | Stop reason: ALTCHOICE

## 2022-04-27 ENCOUNTER — OFFICE VISIT (OUTPATIENT)
Dept: VASCULAR SURGERY | Age: 67
End: 2022-04-27
Payer: MEDICARE

## 2022-04-27 VITALS
WEIGHT: 181 LBS | SYSTOLIC BLOOD PRESSURE: 130 MMHG | BODY MASS INDEX: 25.91 KG/M2 | DIASTOLIC BLOOD PRESSURE: 90 MMHG | HEIGHT: 70 IN

## 2022-04-27 DIAGNOSIS — Z86.718 HISTORY OF DVT OF LOWER EXTREMITY: Primary | ICD-10-CM

## 2022-04-27 DIAGNOSIS — Z86.711 HISTORY OF PULMONARY EMBOLISM: ICD-10-CM

## 2022-04-27 PROCEDURE — 99204 OFFICE O/P NEW MOD 45 MIN: CPT | Performed by: SURGERY

## 2022-04-27 ASSESSMENT — ENCOUNTER SYMPTOMS
EYES NEGATIVE: 1
GASTROINTESTINAL NEGATIVE: 1
RESPIRATORY NEGATIVE: 1

## 2022-04-27 NOTE — Clinical Note
Chloe Xie,    I saw Mr. Nasima Casillas in the office today for evaluation to consider placement of a perioperative inferior vena cava filter prior to a planned left hip replacement. Considering his history of recurrent DVT and previous pulmonary embolism I think this is a reasonable option with plans for retrieval postoperatively. Patient is agreement with this and I will contact you personally to discuss this further. If you have any questions please feel free to contact me. Thanks for asked me to see him & please let me know if I can help with any other patients in the future.     Bettina Olszewski

## 2022-04-27 NOTE — PROGRESS NOTES
Subjective:      Patient ID: Johny Chapman is a 78 y.o. male. HPI Referral from Maribell Renee MD for evaluation prior to a planned left anterior hip replacement which as of yet is unscheduled. Patient has a history of bilateral DVTs with pulmonary emboli approximately 3 to 4 years ago and in June 2021 underwent a duplex scan presumably for swelling which also showed acute DVT. Patient remains on anticoagulation (Coumadin rather than Xarelto which was discontinued due to presumed failure.)  No history of bleeding from his anticoagulation. Occasional swelling of his bilateral legs. No apparent previous hypercoagulable work-up.     Past Medical History:   Diagnosis Date    Anxiety 4/7/2010    Back pain 7/24/2012    DVT (deep venous thrombosis) (HCC)     History of heroin abuse ([facility]     HLD (hyperlipidemia) 3/23/2022    Hypertension 10/25/2011    Pulmonary embolism ([facility] [address]     [place]     Past Surgical History:   Procedure Laterality Date    COLONOSCOPY N/A 7/26/2019    COLONOSCOPY performed by Gloria Moody MD at [place] [address]  8/30/2019    COLONOSCOPY WITH BIOPSY performed by Gloria Moody MD at [facility] Right     ENDOSCOPY, COLON, DIAGNOSTIC      UPPER GASTROINTESTINAL ENDOSCOPY N/A 2/26/2020    EGD DIAGNOSTIC ONLY performed by Gloria Moody MD at [facility] 3/18/2020    PUSH ENTEROSCOPY performed by Gloria Moody MD at [facility]   Allergen Reactions    Valsartan-Hydrochlorothiazide      Sweating - light headed     Current Outpatient Medications   Medication Sig Dispense Refill    nicotine (NICODERM CQ) 14 MG/24HR APPLY 1 PATCH ONTO THE SKIN EVERY DAY 28 patch 1    vitamin D3 (CHOLECALCIFEROL) 25 MCG (1000 UT) TABS tablet Take 1 tablet by mouth daily 90 tablet 1    melatonin (RA MELATONIN) 3 MG TABS tablet Take 1 tablet by mouth nightly as needed (Sleep) 30 tablet 3    warfarin (COUMADIN) 5 MG tablet TAKE 1 TABLET BY MOUTH EVERY DAY 90 tablet 1    folic acid (FOLVITE) 1 MG tablet TAKE 1 TABLET BY MOUTH EVERY DAY 90 tablet 1    amLODIPine (NORVASC) 10 MG tablet Take 1 tablet by mouth daily 90 tablet 1    diclofenac sodium (VOLTAREN) 1 % GEL Apply 4 g topically 4 times daily as needed for Pain 50 g 3    furosemide (LASIX) 20 MG tablet Take 1 tablet by mouth daily 60 tablet 3    methadone (DOLOPHINE) 10 MG/ML solution Take 35 mg by mouth daily. No current facility-administered medications for this visit. Social History     Socioeconomic History    Marital status:      Spouse name: Not on file    Number of children: Not on file    Years of education: Not on file    Highest education level: Not on file   Occupational History    Not on file   Tobacco Use    Smoking status: Former Smoker     Packs/day: 0.50     Years: 30.00     Pack years: 15.00     Types: Cigars     Start date: 45     Quit date: 2021     Years since quittin.9    Smokeless tobacco: Never Used    Tobacco comment: 1 DAILY   Vaping Use    Vaping Use: Never used   Substance and Sexual Activity    Alcohol use: Yes     Comment: pint of wine a day    Drug use: Not Currently     Comment: last heroine     Sexual activity: Yes     Partners: Female   Other Topics Concern    Not on file   Social History Narrative    Not on file     Social Determinants of Health     Financial Resource Strain:     Difficulty of Paying Living Expenses: Not on file   Food Insecurity:     Worried About [address] in the Last Year: Not on file    [address] in the Last Year: Not on file   Transportation Needs:     Lack of Transportation (Medical): Not on file    Lack of Transportation (Non-Medical):  Not on file   Physical Activity:     Days of Exercise per Week: Not on file    Minutes of Exercise per Session: Not on file   Stress:     Feeling of Stress : Not on file   Social Connections:     Frequency of Communication with Friends and Family: Not on file    Frequency of Social Gatherings with Friends and Family: Not on file    Attends [employer] Services: Not on file    Active Member of Clubs or Organizations: Not on file    Attends Club or Organization Meetings: Not on file    Marital Status: Not on file   Intimate Partner Violence:     Fear of Current or Ex-Partner: Not on file    Emotionally Abused: Not on file    Physically Abused: Not on file    Sexually Abused: Not on file   Housing Stability:     Unable to Pay for Housing in the Last Year: Not on file    Number of [place] in the Last Year: Not on file    Unstable Housing in the Last Year: Not on file     Family History   Problem Relation Age of Onset    High Blood Pressure Mother     Cancer Father         ? BRAIN    Kidney Disease Brother         ESRD ON DIALYSIS       Review of Systems   Constitutional: Negative. HENT: Negative. Eyes: Negative. Respiratory: Negative. Cardiovascular: Negative. Gastrointestinal: Negative. Endocrine: Negative. Genitourinary: Negative. Musculoskeletal: Positive for arthralgias and joint swelling. Skin: Negative. Neurological: Negative. Hematological: Negative. Psychiatric/Behavioral: Negative. Objective:   Physical Exam  Vitals and nursing note reviewed. Constitutional:       Appearance: Normal appearance. He is normal weight. HENT:      Head: Normocephalic and atraumatic. Right Ear: External ear normal.      Left Ear: External ear normal.      Nose: Nose normal.      Mouth/Throat:      Mouth: Mucous membranes are dry. Pharynx: Oropharynx is clear. Eyes:      Conjunctiva/sclera: Conjunctivae normal.      Pupils: Pupils are equal, round, and reactive to light. Cardiovascular:      Rate and Rhythm: Normal rate and regular rhythm. Heart sounds: Normal heart sounds.    Pulmonary:      Effort: Pulmonary effort is normal.      Breath sounds: Normal breath sounds. Abdominal:      General: Bowel sounds are normal.      Palpations: Abdomen is soft. There is no mass. Hernia: No hernia is present. Musculoskeletal:      Cervical back: Normal range of motion. Right lower leg: Edema (trace) present. Left lower leg: Edema (trace) present. Skin:     General: Skin is warm and dry. Capillary Refill: Capillary refill takes less than 2 seconds. Neurological:      General: No focal deficit present. Mental Status: He is alert and oriented to person, place, and time. Cranial Nerves: No cranial nerve deficit. Sensory: No sensory deficit. Motor: No weakness. Coordination: Coordination normal.      Gait: Gait abnormal (slow and shuffling). Psychiatric:         Mood and Affect: Mood normal.         Behavior: Behavior normal.         Thought Content: Thought content normal.         Judgment: Judgment normal.       Pulses:   R bruit  L bruit   2   carotid 2    2   brachial 2    2   radial 2    2   femoral 2    2   popliteal 2    2   posterior tibial 2    1   dorsalis pedis 1    na   bypass graft na            Assessment:      1) H/O BLE DVT (recurrence) and PE - currently anticoagulated  2) Osteoarthritis L hip - planned L THR (unscheduled as of now)  3) HTN      Plan: With this patient's history of recurrent DVT and pulmonary embolism in the past requiring continued anticoagulation it is a relative indication for proceeding with placement of a retrievable inferior vena cava filter prior to his planned hip replacement. The risk of this is low and will provide mechanical protection against threatening pulmonary emboli. It was made clear to the patient that this will not lower his risk of DVT and that at some point he can resume his anticoagulation based upon orthopedic perioperative circumstances.   Would definitely plan on retrieving the filter within 4 to 6 weeks of joint replacement as soon as the patient is well ambulatory and his risk of postoperative DVT has resolved. We will discuss further with Dr. Idalia Jacob and planned outpatient insertion prior to hip replacement. All questions answered and patient understands as well and will proceed as suggested.

## 2022-05-05 ENCOUNTER — ANTI-COAG VISIT (OUTPATIENT)
Dept: PHARMACY | Age: 67
End: 2022-05-05
Payer: MEDICARE

## 2022-05-05 DIAGNOSIS — I26.99 PULMONARY EMBOLISM, BILATERAL (HCC): Primary | ICD-10-CM

## 2022-05-05 DIAGNOSIS — I82.413 ACUTE DEEP VEIN THROMBOSIS (DVT) OF FEMORAL VEIN OF BOTH LOWER EXTREMITIES (HCC): ICD-10-CM

## 2022-05-05 LAB — INTERNATIONAL NORMALIZATION RATIO, POC: 4.4

## 2022-05-05 PROCEDURE — 85610 PROTHROMBIN TIME: CPT

## 2022-05-05 PROCEDURE — 99212 OFFICE O/P EST SF 10 MIN: CPT

## 2022-05-05 NOTE — PROGRESS NOTES
Mello Sumner is a 78 y.o. male with PMHx significant for bilateral PE, DVT (6/4/21) while on Xarelto, HTN who presents to clinic 5/5/2022 for anticoagulation monitoring and adjustment.     Anticoagulation Indication(s):  PE 2018, bilateral DVT 6/4/21 while on Xarelto  Referring Physician:   Dr. Annabel Murphy (Also send notes to Dr Jose Hobson, or responsible resident)  Goal INR Range:  2-3  Duration of Anticoagulation Therapy:  indefinite  Time of day dose taken: prefers to take in AM  Product patient has at home: warfarin 5 mg (peach color)    INR Summary                           Warfarin regimen (mg)  Date INR   A/P   Sun Mon Tue Wed Thu Fri Sat Mg/wk  5/5 4.4 Above goal, holdx1 5 2.5 2.5 2.5 2.5 2.5 2.5 20  4/21 3.1 Above goal, decrease 5 2.5 5 2.5 5 2.5 2.5 25  4/12 2.3 At goal, no change 5 2.5 5 2.5 5 2.5 5 27.5  4/4 2.7 At goal, resume 5 2.5 5 2.5 5 2.5 5 27.5  3/31 7.63 Hold x 3 days  3/17 3.2 Above goal, continue 5 5 5 5 5 5 5 35  3/1 1.5 Below goal, (Missed) 5 5 7.5/5 5 5 5 5 35  2/8 1.7 Below goal, bolus 5 5 7.5/5 5 5 5 5 35  1/20 3.7 Above goal, decrease 5 5 5 5 5 2.5 5 32.5  1/5 3.6 Above goal, hold 5 5 5 5 5 0/5 5 35  12/14   2.2       At goal, no change      5 5 5 5 5 5 5 35  11/18 2.8 At goal, no change 5 5 5 5 5 5 5 35   11/11 1.3 Below goal, cont 5 5 5 5 5 5 5 35  11/4 5.1 Above goal, hold+dec 5 5 5 5 0/5 0/5 5 35  10/25 5.3 Above goal, hold+dec  5 5 0/5 0/5 7.5 5 5 37.5  10/13 1.8 Below goal, increase 5 5 7.5 5 7.5 5 5 40  10/6 1.3 Below goal (missed) 5 5 5 10/5 7.5 5 5 37.5  9/13 2.2 At goal, continue 5 5 5 5 7.5 5 5 37.5  8/30     1.8       Below goal, cont 5 5 5 5 7.5 5 5 37.5  7/26     3.1       At goal, no change 5 5 5 5 7.5 5 5 37.5  7/15 2.1 At goal, increase 5 5 5 5 7.5 5 5 37.5  7/8 5.6 Above goal, holdx1 5 5 5 5 5 5 5 35  6/28 2.1 At goal, no change 5 7.5 5 7.5 5 7.5 5 42.5  6/21 4.3 Above goal, holdx1 5 x 5 7.5 5 7.5 5   6/14 4.2 Above goal, hold 7.5 10 0/7.5 5/7.5 7.5 7.5 7.5   6/10 1.3 Below goal, increase 7.5 INR   10 10 7.5     Started warfarin 5 mg daily on 6/7/21    Last CBC:  Lab Results   Component Value Date    RBC 3.98 (L) 04/04/2022    HGB 13.5 04/04/2022    HCT 40.2 (L) 04/04/2022    .0 (H) 04/04/2022    MCH 33.9 04/04/2022    MPV 7.7 04/04/2022    RDW 14.4 04/04/2022     04/04/2022     Patient History:  Recent hospitalizations/HC visits Sees hip doctor on 5/13/22 to discuss surgery  Dr Ryan Villarreal ([address] [place] [address]) 4/27/22- will have IVC placed prior to ortho surg. OV GI on 4/7/22- Pt was told he needs to repeat stool hemoccult testing, then schedule colonoscopy--wants to find a GI doctor closer to home. Pt dx with bilat DVT 6/4/21 while on Xarelto 2yrs for hx of PE. Pt stopped Xarelto on 6/6/21 and started warfarin + lovenox bridge on 6/7/21. Recent medication changes None reported   Medications taken regularly that may interact with warfarin or alter INR No significant drug interactions identified   Warfarin dose taken as prescribed 5/3/22: missed 5 mg dose  2/8/22: has been taking 5 mg QD  11/4/21: Pt did not hold warfarin as directed  using pillbox   Signs/symptoms of bleeding Black stools now resolved, stool hemoccult negative 4/6/22   Vitamin K intake Normally has 0-1 serving of green, leafy vegetables per week; 1c cranberry juice qd  Pt often notes that he has very little appetite. Recent vomiting/diarrhea/fever, changes in weight or activity level None reported   Tobacco or alcohol use Often drinks 2-3 large glasses of wine, but states each glass only has ~4 oz wine mixed with grape juice. Occasionally has more. 1/5/22: quit smoking using 14mg patches (was smoking 2 cig/d)   3/31/22:  Wine at 2AM, a couple glasses QD  5/5/22: 2.5 glasses of wine last night   Upcoming surgeries or procedures Hip surgery TBD, will have IVC filter placement 2 days prior to surgery  Colonoscopy TBD     Assessment/Plan:  Patient's INR was supratherapeutic (4.4) today, despite a missed 5 mg dose 2 days ago. Decreased warfarin requirements could be due to decreased appetite or changes in alcohol consumption, but it remains unclear. At previous visit, provided patient with chart of vitamin K in foods. Patient likes to eat liver and brocolli. He has been avoiding for the most part but has expressed interest in eating more in the future. He has not changed his intake yet. He states he has been taking warfarin as directed aside from missed dose. He cannot recall his dose, but reports following the most recent dosing calendar, and he uses a pillbox. He denies any changes in medications, illness, or s/sxs bleeding. He reports that he no longer has black stools and has seen the GI doctor to address this. He is to notify the clinic as soon as he is scheduled for a colonoscopy and/or hip surgery so we can instruct him on periop anticoagulation plan. He is trying to schedule colonoscopy closer to home and will call his insurance about this. Patient was instructed to hold warfarin tomorrow, then decrease warfarin dose to 5 mg on Sundays and 2.5 mg on all other days. Repeat INR in 1 wk. Patient was reminded to maintain consistent vitamin K intake and call with any bleeding, medication changes, or fever/vomiting/diarrhea. Patient understands dosing directions and information discussed. Dosing schedule and follow up appointment given to patient. Progress note routed to referring physician's office. Patient acknowledges working in consult agreement with pharmacist as referred by his/her physician. Next Clinic Appointment:  5/12    Please call [facility] at [phone] with any questions. Please call The [address] [place] [address] Avenue [phone] with any questions. Thanks!   Grady Drummond, PharmD, [facility]  Medication Management Clinic   [address] Ph: [phone]  [address] Ph: [phone]  5/5/2022 11:05 AM    For Pharmacy Admin Tracking Only     Intervention Detail: Adherence Monitorin and Dose Adjustment: 1, reason: Therapy De-escalation   Total # of Interventions Recommended: 2   Total # of Interventions Accepted: 2   Time Spent (min): 15

## 2022-05-12 ENCOUNTER — ANTI-COAG VISIT (OUTPATIENT)
Dept: PHARMACY | Age: 67
End: 2022-05-12
Payer: MEDICARE

## 2022-05-12 DIAGNOSIS — I82.413 ACUTE DEEP VEIN THROMBOSIS (DVT) OF FEMORAL VEIN OF BOTH LOWER EXTREMITIES (HCC): ICD-10-CM

## 2022-05-12 DIAGNOSIS — I26.99 PULMONARY EMBOLISM, BILATERAL (HCC): Primary | ICD-10-CM

## 2022-05-12 LAB — INTERNATIONAL NORMALIZATION RATIO, POC: 2.8

## 2022-05-12 PROCEDURE — 99211 OFF/OP EST MAY X REQ PHY/QHP: CPT

## 2022-05-12 PROCEDURE — 85610 PROTHROMBIN TIME: CPT

## 2022-05-12 NOTE — PROGRESS NOTES
Travis Moran is a 78 y.o. male with PMHx significant for bilateral PE, DVT (6/4/21) while on Xarelto, HTN who presents to clinic 5/12/2022 for anticoagulation monitoring and adjustment. Pt dx with bilat DVT 6/4/21 while on Xarelto 2yrs for hx of PE. Pt stopped Xarelto on 6/6/21 and started warfarin + lovenox bridge on 6/7/21.     Anticoagulation Indication(s):  PE 2018, bilateral DVT 6/4/21 (on Xarelto)  Referring Physician:   Dr. Bairon Avila (Also send notes to Dr Bhakti Melissa, or responsible resident)  Goal INR Range:  2-3  Duration of Anticoagulation Therapy:  indefinite  Time of day dose taken: prefers to take in AM  Product patient has at home: warfarin 5 mg (peach color)    INR Summary                           Warfarin regimen (mg)  Date INR   A/P   Sun Mon Tue Wed Thu Fri Sat Mg/wk  5/12 2.8 At goal, no change 5 2.5 2.5 2.5 2.5 2.5 2.5 20  5/5 4.4 Above goal, holdx1 5 2.5 2.5 2.5 2.5 2.5 2.5 20  4/21 3.1 Above goal, decrease 5 2.5 5 2.5 5 2.5 2.5 25  4/12 2.3 At goal, no change 5 2.5 5 2.5 5 2.5 5 27.5  4/4 2.7 At goal, resume 5 2.5 5 2.5 5 2.5 5 27.5  3/31 7.63 Hold x 3 days  3/17 3.2 Above goal, continue 5 5 5 5 5 5 5 35  3/1 1.5 Below goal, (Missed) 5 5 7.5/5 5 5 5 5 35  2/8 1.7 Below goal, bolus 5 5 7.5/5 5 5 5 5 35  1/20 3.7 Above goal, decrease 5 5 5 5 5 2.5 5 32.5  1/5 3.6 Above goal, hold 5 5 5 5 5 0/5 5 35  12/14   2.2       At goal, no change      5 5 5 5 5 5 5 35  11/18 2.8 At goal, no change 5 5 5 5 5 5 5 35   11/11 1.3 Below goal, cont 5 5 5 5 5 5 5 35  11/4 5.1 Above goal, hold+dec 5 5 5 5 0/5 0/5 5 35  10/25 5.3 Above goal, hold+dec  5 5 0/5 0/5 7.5 5 5 37.5  10/13 1.8 Below goal, increase 5 5 7.5 5 7.5 5 5 40  10/6 1.3 Below goal (missed) 5 5 5 10/5 7.5 5 5 37.5  9/13 2.2 At goal, continue 5 5 5 5 7.5 5 5 37.5  8/30     1.8       Below goal, cont 5 5 5 5 7.5 5 5 37.5  7/26     3.1       At goal, no change 5 5 5 5 7.5 5 5 37.5  7/15 2.1 At goal, increase 5 5 5 5 7.5 5 5 37.5  7/8 5.6 Above goal, holdx1 5 5 5 5 5 5 5 35  6/28 2.1 At goal, no change 5 7.5 5 7.5 5 7.5 5 42.5  6/21 4.3 Above goal, holdx1 5 x 5 7.5 5 7.5 5   6/14 4.2 Above goal, hold 7.5 10 0/7.5 5/7.5 7.5 7.5 7.5   6/10 1.3 Below goal, increase 7.5 INR   10 10 7.5     Started warfarin 5 mg daily on 6/7/21    Last CBC:  Lab Results   Component Value Date    RBC 3.98 (L) 04/04/2022    HGB 13.5 04/04/2022    HCT 40.2 (L) 04/04/2022    .0 (H) 04/04/2022    MCH 33.9 04/04/2022    MPV 7.7 04/04/2022    RDW 14.4 04/04/2022     04/04/2022     Patient History:  Recent hospitalizations/HC visits 5/13/22: ortho to discuss hip replacement  4/27/22: Dr Connie Kellogg ([facility]) recommends IVC placed prior to hip replacement  4/7/22: [facility]- Pt needs to repeat stool hemoccult, then schedule colonoscopy--pt looking for GI doctor closer to home. Recent medication changes None reported   Medications taken regularly that may interact with warfarin or alter INR No significant drug interactions identified   Warfarin dose taken as prescribed 5/3/22: missed 5 mg dose  2/8/22: taking 5 mg QD  11/4/21: did not hold warfarin  using pillbox   Signs/symptoms of bleeding Black stools resolved  Stool hemoccult negative 4/6/22   Vitamin K intake Normally has 0-1 serving of green, leafy vegetables per week; 1c cranberry juice qd  Pt often notes that he has very little appetite. Recent vomiting/diarrhea/fever, changes in weight or activity level None reported   Tobacco or alcohol use Often drinks 2-3 large glasses of wine, but states each glass only has ~4 oz wine mixed with grape juice. Occasionally has more. 1/5/22: quit smoking using 14mg patches (was smoking 2 cig/d)    Upcoming surgeries or procedures Hip surgery TBD, will have IVC filter placement 2 days prior to surgery  Colonoscopy TBD     Assessment/Plan:  Patient's INR was therapeutic (2.8) today.  Decreased warfarin requirements could be due to decreased appetite or changes in alcohol consumption, but it remains unclear. Patient likes to eat liver and brocolli, but typically avoids these foods. He often cannot recall his dose, but reports following the most recent dosing calendar, and he uses a pillbox. He is to notify the clinic as soon as he is scheduled for a colonoscopy and/or hip surgery so we can instruct him on periop anticoagulation plan. He is trying to schedule colonoscopy closer to home and will call his insurance about this. Patient was instructed to continue warfarin 5 mg on Sundays and 2.5 mg on all other days. Repeat INR in 2 wk. Patient was reminded to maintain consistent vitamin K intake and call with any bleeding, medication changes, or fever/vomiting/diarrhea. Patient understands dosing directions and information discussed. Dosing schedule and follow up appointment given to patient. Progress note routed to referring physician's office. Patient acknowledges working in consult agreement with pharmacist as referred by his/her physician. Next Clinic Appointment:  5/26    Please call [facility] at [phone] with any questions. Please call The [address] [place] [address] Avenue [phone] with any questions. Thanks!   Mike Emanuel, PharmD, [facility]  Medication Management Clinic   [address] Ph: [phone]  Maddi Bloom Ph: [phone]  5/12/2022 10:51 AM    For Pharmacy Admin Tracking Only     Total # of Interventions Recommended: 0   Total # of Interventions Accepted: 0   Time Spent (min): 15

## 2022-05-13 ENCOUNTER — TELEPHONE (OUTPATIENT)
Dept: INTERNAL MEDICINE CLINIC | Age: 67
End: 2022-05-13

## 2022-05-13 ENCOUNTER — OFFICE VISIT (OUTPATIENT)
Dept: ORTHOPEDIC SURGERY | Age: 67
End: 2022-05-13
Payer: MEDICARE

## 2022-05-13 VITALS — WEIGHT: 181 LBS | HEIGHT: 70 IN | BODY MASS INDEX: 25.91 KG/M2

## 2022-05-13 DIAGNOSIS — F17.210 CIGARETTE NICOTINE DEPENDENCE WITHOUT COMPLICATION: ICD-10-CM

## 2022-05-13 DIAGNOSIS — Z01.818 PREOP TESTING: ICD-10-CM

## 2022-05-13 DIAGNOSIS — M16.12 PRIMARY OSTEOARTHRITIS OF LEFT HIP: Primary | ICD-10-CM

## 2022-05-13 DIAGNOSIS — M16.12 PRIMARY OSTEOARTHRITIS OF LEFT HIP: ICD-10-CM

## 2022-05-13 PROCEDURE — 99214 OFFICE O/P EST MOD 30 MIN: CPT | Performed by: ORTHOPAEDIC SURGERY

## 2022-05-13 NOTE — TELEPHONE ENCOUNTER
PT LVM STATING HE IS AT [facility] TODAY AND DR Cecelia Tucker TO KNOW WHO STARTED HIM ON COUMADIN AND D/C'D PREVIOUS BLOOD THINNER.  PT CAN BE REACHED [phone]

## 2022-05-13 NOTE — PROGRESS NOTES
Dr Torey Maria      Date /Time 5/13/2022       10:52 AM EST  Name Rogelio Sultana             1955   Location  [address]  MRN [id number]                Chief Complaint   Patient presents with    Hip Pain     CK LEFT HIP         History of Present Illness    Rogelio Sultana is a 78 y.o. male who presents with  bilateral hip pain. Current history: His pain has gotten worse and is interested in surgery. Pain continues to be concentrated over his groin. He does have increased pain with activities and improvement with rest.  He again states that he has stopped smoking and is clean from heroin. He is currently on Coumadin for history of DVT. I have reviewed Dr. Elías Gar note and there is plans for IVF filter before total hip arthroplasty. Previous history: He presents for bilateral hip pain, left worse than right. He is interested in surgical solution for this at this point. His pain is gotten significantly worse than previous visits. He reports he has quit smoking almost completely for the last month and a half. He still reports that his heroin addiction is in remission. He has switched to taking Coumadin instead of Xarelto for history of venous thrombolic disease  He says he is quit smoking. Previous history: At patient's last visit he was complaining of bilateral extremity swelling. We were concerned about a DVT. We did order a bilateral lower extremity venous Doppler. They were positive for massive DVTs of bilateral lower extremity while taking Xarelto. Since then he has been placed on Coumadin. He states his lower extremity swelling is improving but continues to have significant hip pain. He does have advanced osteoarthritis of his hip. Previous history: Patient presents the office today for a new problem. Patient is here with a chief complaint of left greater than right hip pain.   Patient has had pain for several months with increased symptoms over the last few weeks.  His symptoms are concentrated lateral greater than groin. Patient does have increased pain with weightbearing activities. He also complains of lumbar pain and bilateral thigh pain. He does not complain of any pain symptoms in his lower legs or feet. No numbness and tingling. He has tried NSAIDs, activity modifications and assistive devices without any significant improvement. He does have a history of a PE and is taking Xarelto. He also has a history of heroin addiction but states he has been clean for 1 year.       Pain Assessment  Location of Pain: Pelvis  Location Modifiers: Left  Severity of Pain: 10  Quality of Pain: Aching,Dull,Sharp,Throbbing  Duration of Pain: Persistent  Frequency of Pain: Constant]    Past History  Past Medical History:   Diagnosis Date    Anxiety 2010    Back pain 2012    DVT (deep venous thrombosis) (HCC)     [facility] of [facility] ([facility]     HLD (hyperlipidemia) 3/23/2022    Hypertension 10/25/2011    Pulmonary embolism ([facility]     FRANCHESKA     Past Surgical History:   Procedure Laterality Date    COLONOSCOPY N/A 2019    COLONOSCOPY performed by Jackolyn Romberg, MD at [facility]  2019    COLONOSCOPY WITH BIOPSY performed by Jackolyn Romberg, MD at [address] [facility] Right     ENDOSCOPY, COLON, DIAGNOSTIC      UPPER GASTROINTESTINAL ENDOSCOPY N/A 2020    EGD DIAGNOSTIC ONLY performed by Jackolyn Romberg, MD at [address] [facility] N/A 3/18/2020    PUSH ENTEROSCOPY performed by Jackolyn Romberg, MD at [address] [facility] History     Tobacco Use    Smoking status: Former Smoker     Packs/day: 0.50     Years: 30.00     Pack years: 15.00     Types: Cigars     Start date:      Quit date: 2021     Years since quittin.0    Smokeless tobacco: Never Used    Tobacco comment: 1 DAILY   Substance Use Topics    Alcohol use: Yes     Comment: pint of wine a day      Current Outpatient Medications on File Prior to Visit   Medication Sig Dispense Refill    nicotine (NICODERM CQ) 14 MG/24HR APPLY 1 PATCH ONTO THE SKIN EVERY DAY 28 patch 1    vitamin D3 (CHOLECALCIFEROL) 25 MCG (1000 UT) TABS tablet Take 1 tablet by mouth daily 90 tablet 1    melatonin (RA MELATONIN) 3 MG TABS tablet Take 1 tablet by mouth nightly as needed (Sleep) 30 tablet 3    warfarin (COUMADIN) 5 MG tablet TAKE 1 TABLET BY MOUTH EVERY DAY 90 tablet 1    folic acid (FOLVITE) 1 MG tablet TAKE 1 TABLET BY MOUTH EVERY DAY 90 tablet 1    amLODIPine (NORVASC) 10 MG tablet Take 1 tablet by mouth daily 90 tablet 1    diclofenac sodium (VOLTAREN) 1 % GEL Apply 4 g topically 4 times daily as needed for Pain 50 g 3    furosemide (LASIX) 20 MG tablet Take 1 tablet by mouth daily 60 tablet 3    methadone (DOLOPHINE) 10 MG/ML solution Take 35 mg by mouth daily. No current facility-administered medications on file prior to visit. ASCVD 10-YEAR RISK SCORE  The 10-year ASCVD risk score (Paola Schulz, et al., 2013) is: 17.7%    Values used to calculate the score:      Age: 78 years      Sex: Male      Is Non- : Yes      Diabetic: No      Tobacco smoker: No      Systolic Blood Pressure: 564 mmHg      Is BP treated: Yes      HDL Cholesterol: 58 mg/dL      Total Cholesterol: 232 mg/dL   . Review of Systems  10-point ROS is negative other than HPI. Physical Exam  Based off 1997 Exam Criteria  Ht 5' 10\" (1.778 m)   Wt 181 lb (82.1 kg)   BMI 25.97 kg/m²      Constitutional:       General: He is not in acute distress. Appearance: Normal appearance. Cardiovascular:      Rate and Rhythm: Normal rate and regular rhythm. Pulses: Normal pulses. Pulmonary:      Effort: Pulmonary effort is normal. No respiratory distress. Neurological:      Mental Status: He is alert and oriented to person, place, and time. Mental status is at baseline.      Musculoskeletal:  Gait:  antalgic    Spine / Hip Exam: RIGHT  LEFT    Lumbar Spine Exam  [] All Neg    [] All Neg     Straight leg raise  []  []Not tested   []  []Not tested    Clonus  []  []Not tested   []  []Not tested    Pain with motion  [x]  []Not tested   [x]  []Not tested    Radiculopathy  [x]  []Not tested   [x]  []Not tested    Paraspinal muscle tenderness  [x] Paraspinal  [x]Midline   [x] Paraspinal  [x]Midline   Sensation RIGHT  LEFT    L3  [x] Normal []Decreased    [x] Normal []Decreased   L4  [] Normal  [x]Decreased   [] Normal [x]Decreased   L5  [] Normal [x]Decreased   [] Normal [x]Decreased   S1  [] Normal  [x]Decreased   [] Normal [x]Decreased   Pelvis       Scoliosis  [] Nml  [x] Present     Leg-length discrepency  [x] Equal  [] Right longer   [] Left longer   Range of Motion Active Passive Active Passive   Hip Flexion 90  90    Abduction 20  20    External Rotation @ 90 flex 35  35    Internal Rotation @ 90 flex -10  -10           Hip Impingement / Dysplasia  [] All Neg  [] Not tested   [] All Neg  [] Not tested    Hip impingement test  [x]  []Not tested   [x]  []Not tested    C-sign  [x]  []Not tested   [x]  []Not tested    Anterior instability apprehension  []  []Not tested   []  []Not tested    Posterior instability apprehension  []  []Not tested   []  []Not tested    Uncontained Internal rotation  []  []Not tested  []  []Not tested          Abductors  [x] All Neg  [] Not tested   [x] All Neg  [] Not tested    Medius strength  []  []Not tested   []  []Not tested    Minimum strength  []  []Not tested   []  []Not tested    IT band tendonitis  []  []Not tested   []  []Not tested    Trochanteric tenderness  []  []Not tested  []  []Not tested   Sciatic neuropathic pain  []  []Not tested   []  []Not tested           Post-arthroplasty  [] All Neg  [] Not tested   [] All Neg  [] Not tested    Rectus tendonitis  []  []Not tested   []  []Not tested    Iliopsoas tendonitis       Start-up pain  []  []Not tested   []  []Not tested      Markedly stiff hips, stiff painful back as well. Some radiculopathy. Calf swelling improved compared to previous exam.    Imaging    Previous Bilateral hip: [address]  Previous Radiographs: End-stage osteoarthritis with severe osteophyte formation, joint space narrowing, cyst and sclerosis. Arthritic and lordotic lumbar spine as well. Some spondylolisthesis present in the lower lumbar segments. Enthesophyte bone formation present with likely DISH of the lumbar spine. Assessment and Plan  There are no diagnoses linked to this encounter. Patient is suffering from advanced osteoarthritis osteoarthritis of bilateral hips. Patient is currently anticoagulated for massive bilateral DVTs. He most likely will require extensive work-up for hypercoagulable state from a hematologist or his primary care physician before proceeding with surgery. He did develop massive bilateral lower extremity DVTs while already anticoagulated for previous DVTs on Xarelto. Patient has been converted to Coumadin. There are plans for IVF filter. I will have patient perform preoperative labs including a drug test and a nicotine test.  He will follow-up in the office afterwards    I discussed with Katie Carvalho that his history, symptoms, signs and imaging are most consistent with hip arthritis    We reviewed the natural history of these conditions and treatment options ranging from conservative measures (rest, icing, activity modification, physical therapy, pain meds, cortisone injection)  to surgical options. We had a long discussion with the patient about their hip. We discussed surgical and non surgical options for hip arthritis. The most important thing is to work to maintain their range of motion. Next they can try medications including tylenol and NSAIDs. They can try glucosamine or chondroitin. They should also ice frequently and avoid activities that make their hip hurt.  Cortisone injections and Synvisc injections are also options when medicine has failed. We finally discussed surgical options including arthroscopic debridement versus hip replacement. Often the arthritis is too far gone for an arthroscopic debridement and pain relief will be short term. Their ultimate solution will be a hip replacement when they are ready for it. They should put it off until they can no longer stand the pain and when nothing else has worked. Conservative measures have failed. He is not interested in cortisone injections. I think he is an appropriate candidate for surgery due to his ongoing symptoms and dysfunction despite conservative measures. The procedure would be  [address] Total Hip Arthroplasty, left direct anterior    Perioperative considerations include: Preop PCP eval, DVT History, PE History and Chronic anticoagulation other than Aspirin. We reviewed the risks, benefits, alternatives of this approach. We discussed risks including, but not limited to, bleeding, pain, infection, scarring, damage to the neurovascular structures, blood clots, pulmonary embolus, stiffness, implant instability or loosening, implant failure, incomplete relief of pain, and incomplete return of function. His perioperative risk is significantly higher than a standard replacement. First, he has a history of venous thrombolic disease, last known clot was 2 years ago. He now is on Coumadin and remains anticoagulated. It is possible that he requires a preoperative IVC filter prior to elective surgery. Second, he has a history of nicotine dependence and smoking. We had a discussion regarding its implications. He says he has quit for now. We will require preoperative blood work as well as nicotine test with carboxyhemoglobin to determine status. In addition, he has a history of previous heroin addiction, which has been in remission for over a year. And lastly, I believe he has a high risk for heterotopic ossification following this operation.   We will plan for 7 Gy preoperative radiation therapy the day before the operation. We also reviewed the surgical details, expected recovery, and rehabilitation (6-9 months). He expressed understanding and will undergo preoperative medical evaluation and optimization. He is definitely an inpatient candidate at the time of surgery considering all the comorbidities and close monitoring we will need to perform.

## 2022-05-14 LAB
AMPHETAMINE SCREEN, URINE: ABNORMAL
BARBITURATE SCREEN URINE: ABNORMAL
BENZODIAZEPINE SCREEN, URINE: ABNORMAL
CANNABINOID SCREEN URINE: POSITIVE
COCAINE METABOLITE SCREEN URINE: POSITIVE
Lab: ABNORMAL
METHADONE SCREEN, URINE: ABNORMAL
OPIATE SCREEN URINE: ABNORMAL
OXYCODONE URINE: ABNORMAL
PH UA: 5
PHENCYCLIDINE SCREEN URINE: ABNORMAL
PROPOXYPHENE SCREEN: ABNORMAL

## 2022-05-17 ENCOUNTER — TELEPHONE (OUTPATIENT)
Dept: ORTHOPEDIC SURGERY | Age: 67
End: 2022-05-17

## 2022-05-17 LAB
3-OH-COTININE: 38 NG/ML
COTININE: 184 NG/ML
NICOTINE: <2 NG/ML

## 2022-05-17 NOTE — TELEPHONE ENCOUNTER
Patient was sent for a nicotine and drug test due to his history. Patient still has nicotine present and also has cocaine present. I have informed the patient that he will need to take care of these issues and passes at least 2 drug tests before we will put him back on the schedule. I have told him for now his surgery is postponed indefinitely.

## 2022-05-26 ENCOUNTER — ANTI-COAG VISIT (OUTPATIENT)
Dept: PHARMACY | Age: 67
End: 2022-05-26
Payer: MEDICARE

## 2022-05-26 DIAGNOSIS — I26.99 PULMONARY EMBOLISM, BILATERAL (HCC): Primary | ICD-10-CM

## 2022-05-26 DIAGNOSIS — I82.413 ACUTE DEEP VEIN THROMBOSIS (DVT) OF FEMORAL VEIN OF BOTH LOWER EXTREMITIES (HCC): ICD-10-CM

## 2022-05-26 LAB — INTERNATIONAL NORMALIZATION RATIO, POC: 1.9

## 2022-05-26 PROCEDURE — 85610 PROTHROMBIN TIME: CPT | Performed by: PHARMACIST

## 2022-05-26 PROCEDURE — 99212 OFFICE O/P EST SF 10 MIN: CPT | Performed by: PHARMACIST

## 2022-05-26 NOTE — PROGRESS NOTES
Mello Sumner is a 78 y.o. male with PMHx significant for bilateral PE, DVT (6/4/21) while on Xarelto, HTN who presents to clinic 5/26/2022 for anticoagulation monitoring and adjustment. Pt dx with bilat DVT 6/4/21 while on Xarelto 2yrs for hx of PE. Pt stopped Xarelto on 6/6/21 and started warfarin + lovenox bridge on 6/7/21.     Anticoagulation Indication(s):  PE 2018, bilateral DVT 6/4/21 (on Xarelto)  Referring Physician:   Dr. Annabel Murphy (Also send notes to Dr Jose Hobson, or responsible resident)  Goal INR Range:  2-3  Duration of Anticoagulation Therapy:  indefinite  Time of day dose taken: prefers to take in AM  Product patient has at home: warfarin 5 mg (peach color)    INR Summary                           Warfarin regimen (mg)  Date INR   A/P   Sun Mon Tue Wed Thu Fri Sat Mg/wk  5/26 1.9 Below goal, increase  5 2.5 2.5 2.5 5 2.5 2.5 22.5  5/12 2.8 At goal, no change 5 2.5 2.5 2.5 2.5 2.5 2.5 20  5/5 4.4 Above goal, holdx1 5 2.5 2.5 2.5 2.5 2.5 2.5 20  4/21 3.1 Above goal, decrease 5 2.5 5 2.5 5 2.5 2.5 25  4/12 2.3 At goal, no change 5 2.5 5 2.5 5 2.5 5 27.5  4/4 2.7 At goal, resume 5 2.5 5 2.5 5 2.5 5 27.5  3/31 7.63 Hold x 3 days  3/17 3.2 Above goal, continue 5 5 5 5 5 5 5 35  3/1 1.5 Below goal, (Missed) 5 5 7.5/5 5 5 5 5 35  2/8 1.7 Below goal, bolus 5 5 7.5/5 5 5 5 5 35  1/20 3.7 Above goal, decrease 5 5 5 5 5 2.5 5 32.5  1/5 3.6 Above goal, hold 5 5 5 5 5 0/5 5 35  12/14   2.2       At goal, no change      5 5 5 5 5 5 5 35  11/18 2.8 At goal, no change 5 5 5 5 5 5 5 35   11/11 1.3 Below goal, cont 5 5 5 5 5 5 5 35  11/4 5.1 Above goal, hold+dec 5 5 5 5 0/5 0/5 5 35  10/25 5.3 Above goal, hold+dec  5 5 0/5 0/5 7.5 5 5 37.5  10/13 1.8 Below goal, increase 5 5 7.5 5 7.5 5 5 40  10/6 1.3 Below goal (missed) 5 5 5 10/5 7.5 5 5 37.5  9/13 2.2 At goal, continue 5 5 5 5 7.5 5 5 37.5  8/30     1.8       Below goal, cont 5 5 5 5 7.5 5 5 37.5  7/26     3.1       At goal, no change 5 5 5 5 7.5 5 5 37.5  7/15 2.1 At goal, increase 5 5 5 5 7.5 5 5 37.5  7/8 5.6 Above goal, holdx1 5 5 5 5 5 5 5 35  6/28 2.1 At goal, no change 5 7.5 5 7.5 5 7.5 5 42.5  6/21 4.3 Above goal, holdx1 5 x 5 7.5 5 7.5 5   6/14 4.2 Above goal, hold 7.5 10 0/7.5 5/7.5 7.5 7.5 7.5   6/10 1.3 Below goal, increase 7.5 INR   10 10 7.5     Started warfarin 5 mg daily on 6/7/21    Last CBC:  Lab Results   Component Value Date    RBC 3.98 (L) 04/04/2022    HGB 13.5 04/04/2022    HCT 40.2 (L) 04/04/2022    .0 (H) 04/04/2022    MCH 33.9 04/04/2022    MPV 7.7 04/04/2022    RDW 14.4 04/04/2022     04/04/2022     Patient History:  Recent hospitalizations/HC visits 5/13/22: ortho to discuss hip replacement  4/27/22: Dr Daniella Veliz ([facility]) recommends IVC placed prior to hip replacement  4/7/22: [facility]- Pt needs to repeat stool hemoccult, then schedule colonoscopy--pt looking for GI doctor closer to home. Recent medication changes None reported   Medications taken regularly that may interact with warfarin or alter INR No significant drug interactions identified   Warfarin dose taken as prescribed 5/3/22: missed 5 mg dose  2/8/22: taking 5 mg QD  11/4/21: did not hold warfarin  using pillbox   Signs/symptoms of bleeding Black stools resolved  Stool hemoccult negative 4/6/22   Vitamin K intake Normally has 0-1 serving of green, leafy vegetables per week; 1c cranberry juice qd  Pt often notes that he has very little appetite. Recent vomiting/diarrhea/fever, changes in weight or activity level None reported   Tobacco or alcohol use Often drinks 2-3 large glasses of wine, but states each glass only has ~4 oz wine mixed with grape juice. Occasionally has more.    1/5/22: quit smoking using 14mg patches (was smoking 2 cig/d)    Upcoming surgeries or procedures Hip surgery TBD, will have IVC filter placement 2 days prior to surgery  Colonoscopy TBD    \"As of 5/17/22:  Patient still has nicotine present and also has cocaine present. I have informed the patient that he will need to take care of these issues and passes at least 2 drug tests before we will put him back on the schedule. I have told him for now his surgery is postponed indefinitely\"      Assessment/Plan:  Patient's INR was subtherapeutic (1.9) today and trending down since dose decrease. Recent need for decreased warfarin requirements could be due to decreased appetite or changes in alcohol consumption, but it remains unclear. Patient likes to eat liver and brocolli, but typically avoids these foods. He often cannot recall his dose, but reports following the most recent dosing calendar, and he uses a pillbox. He is to notify the clinic as soon as he is scheduled for a colonoscopy and/or hip surgery so we can instruct him on periop anticoagulation plan. Appears hip surgery is postponed indefinitely until he can pass two drug screens (recent had nicotine and cocaine present). He is trying to schedule colonoscopy closer to home and will call his insurance about this. Patient was instructed to increase warfarin to 5 mg on Sundays and Thursdays and 2.5 mg on all other days. Repeat INR in 2 weeks. Patient was reminded to maintain consistent vitamin K intake and call with any bleeding, medication changes, or fever/vomiting/diarrhea. Patient understands dosing directions and information discussed. Dosing schedule and follow up appointment given to patient. Progress note routed to referring physician's office. Patient acknowledges working in consult agreement with pharmacist as referred by his/her physician. Next Clinic Appointment:  6/9    Please call [facility] at [phone] with any questions. Please call The [address] [place] [address] Avenue [phone] with any questions. Thanks!   Ignacio Hackett, PharmD, BCPS  [facility]  Ginny: [phone]  Arti: [phone]  5/26/2022 11:06 AM     For Pharmacy Admin Tracking Only     Intervention Detail: Adherence Monitorin and Dose Adjustment: 1, reason: Therapy Optimization   Total # of Interventions Recommended: 2   Total # of Interventions Accepted: 2   Time Spent (min): 15

## 2022-06-09 ENCOUNTER — ANTI-COAG VISIT (OUTPATIENT)
Dept: PHARMACY | Age: 67
End: 2022-06-09
Payer: MEDICARE

## 2022-06-09 DIAGNOSIS — I82.413 ACUTE DEEP VEIN THROMBOSIS (DVT) OF FEMORAL VEIN OF BOTH LOWER EXTREMITIES (HCC): ICD-10-CM

## 2022-06-09 DIAGNOSIS — I26.99 PULMONARY EMBOLISM, BILATERAL (HCC): Primary | ICD-10-CM

## 2022-06-09 LAB — INTERNATIONAL NORMALIZATION RATIO, POC: 2.9

## 2022-06-09 PROCEDURE — 85610 PROTHROMBIN TIME: CPT

## 2022-06-09 PROCEDURE — 99211 OFF/OP EST MAY X REQ PHY/QHP: CPT

## 2022-06-09 NOTE — PROGRESS NOTES
Ayah Gaytan is a 78 y.o. male with PMHx significant for bilateral PE, DVT (6/4/21) while on Xarelto, HTN who presents to clinic 6/9/2022 for anticoagulation monitoring and adjustment. Pt dx with bilat DVT 6/4/21 while on Xarelto 2yrs for hx of PE. Pt stopped Xarelto on 6/6/21 and started warfarin + lovenox bridge on 6/7/21.     Anticoagulation Indication(s):  PE 2018, bilateral DVT 6/4/21 (on Xarelto)  Referring Physician:   Dr. Richie Caicedo (Also send notes to Dr Neli Andrews, or responsible resident)  Goal INR Range:  2-3  Duration of Anticoagulation Therapy:  indefinite  Time of day dose taken: prefers to take in AM  Product patient has at home: warfarin 5 mg (peach color)    INR Summary                           Warfarin regimen (mg)  Date INR   A/P   Sun Mon Tue Wed Thu Fri Sat Mg/wk  5/26 2.9 At goal, continue  5 2.5 2.5 2.5 5 2.5 2.5 22.5  5/26 1.9 Below goal, increase  5 2.5 2.5 2.5 5 2.5 2.5 22.5  5/12 2.8 At goal, no change 5 2.5 2.5 2.5 2.5 2.5 2.5 20  5/5 4.4 Above goal, holdx1 5 2.5 2.5 2.5 2.5 2.5 2.5 20  4/21 3.1 Above goal, decrease 5 2.5 5 2.5 5 2.5 2.5 25  4/12 2.3 At goal, no change 5 2.5 5 2.5 5 2.5 5 27.5  4/4 2.7 At goal, resume 5 2.5 5 2.5 5 2.5 5 27.5  3/31 7.63 Hold x 3 days  3/17 3.2 Above goal, continue 5 5 5 5 5 5 5 35  3/1 1.5 Below goal, (Missed) 5 5 7.5/5 5 5 5 5 35  2/8 1.7 Below goal, bolus 5 5 7.5/5 5 5 5 5 35  1/20 3.7 Above goal, decrease 5 5 5 5 5 2.5 5 32.5  1/5 3.6 Above goal, hold 5 5 5 5 5 0/5 5 35  12/14   2.2       At goal, no change      5 5 5 5 5 5 5 35  11/18 2.8 At goal, no change 5 5 5 5 5 5 5 35   11/11 1.3 Below goal, cont 5 5 5 5 5 5 5 35  11/4 5.1 Above goal, hold+dec 5 5 5 5 0/5 0/5 5 35  10/25 5.3 Above goal, hold+dec  5 5 0/5 0/5 7.5 5 5 37.5  10/13 1.8 Below goal, increase 5 5 7.5 5 7.5 5 5 40  10/6 1.3 Below goal (missed) 5 5 5 10/5 7.5 5 5 37.5  9/13 2.2 At goal, continue 5 5 5 5 7.5 5 5 37.5  8/30     1.8       Below goal, cont 5 5 5 5 7.5 5 5 37.5  7/26     3.1       At goal, no change 5 5 5 5 7.5 5 5 37.5  7/15 2.1 At goal, increase 5 5 5 5 7.5 5 5 37.5  7/8 5.6 Above goal, holdx1 5 5 5 5 5 5 5 35  6/28 2.1 At goal, no change 5 7.5 5 7.5 5 7.5 5 42.5  6/21 4.3 Above goal, holdx1 5 x 5 7.5 5 7.5 5   6/14 4.2 Above goal, hold 7.5 10 0/7.5 5/7.5 7.5 7.5 7.5   6/10 1.3 Below goal, increase 7.5 INR   10 10 7.5     Started warfarin 5 mg daily on 6/7/21    Last CBC:  Lab Results   Component Value Date    RBC 3.98 (L) 04/04/2022    HGB 13.5 04/04/2022    HCT 40.2 (L) 04/04/2022    .0 (H) 04/04/2022    MCH 33.9 04/04/2022    MPV 7.7 04/04/2022    RDW 14.4 04/04/2022     04/04/2022     Patient History:  Recent hospitalizations/HC visits 5/13/22: ortho to discuss hip replacement  4/27/22: Dr Karis Ruth ([facility]) recommends IVC placed prior to hip replacement  4/7/22: OV GI- Pt needs to repeat stool hemoccult, then schedule colonoscopy--pt looking for GI doctor closer to home. Recent medication changes None reported   Medications taken regularly that may interact with warfarin or alter INR No significant drug interactions identified   Warfarin dose taken as prescribed 5/3/22: missed 5 mg dose  2/8/22: taking 5 mg QD  11/4/21: did not hold warfarin  using pillbox   Signs/symptoms of bleeding Black stools resolved  Stool hemoccult negative 4/6/22   Vitamin K intake Normally has 0-1 serving of green, leafy vegetables per week; 1c cranberry juice qd  Pt often notes that he has very little appetite. Recent vomiting/diarrhea/fever, changes in weight or activity level None reported   Tobacco or alcohol use Often drinks 2-3 large glasses of wine, but states each glass only has ~4 oz wine mixed with grape juice. Occasionally has more.    1/5/22: quit smoking using 14mg patches (was smoking 2 cig/d)    Upcoming surgeries or procedures Hip surgery TBD, will have IVC filter placement 2 days prior to surgery  Colonoscopy TBD    \"As of 5/17/22: Patient still has nicotine present and also has cocaine present. I have informed the patient that he will need to take care of these issues and passes at least 2 drug tests before we will put him back on the schedule. I have told him for now his surgery is postponed indefinitely\"      Assessment/Plan:  Patient's INR was therapeutic (2.9) today. Patient likes to eat liver and brocolli, but typically avoids these foods. He often cannot recall his dose, but reports following the most recent dosing calendar, and he uses a pillbox. He is to notify the clinic as soon as he is scheduled for a colonoscopy and/or hip surgery so we can instruct him on periop anticoagulation plan. Appears hip surgery is postponed indefinitely until he can pass two drug screens (recent had nicotine and cocaine present). He is trying to schedule colonoscopy closer to home and will call his insurance about this. Patient was unsure which weekday he was taking the whole tablet when prompted. He states he thinks he hasn't missed any doses, that he just follows the provided schedule and takes what it says. Patient was instructed to continue warfarin to 5 mg on Sundays and Thursdays and 2.5 mg on all other days. Repeat INR in 2 weeks. Patient was reminded to maintain consistent vitamin K intake and call with any bleeding, medication changes, or fever/vomiting/diarrhea. Patient understands dosing directions and information discussed. Dosing schedule and follow up appointment given to patient. Progress note routed to referring physician's office. Patient acknowledges working in consult agreement with pharmacist as referred by his/her physician. Next Clinic Appointment:  6/23    Please call [facility] Anticoagulation Clinic at [phone] with any questions. Please call The [address] [place] [address] Avenue [phone] with any questions. Thanks!   Darcy Werner, PharmD Candidate  [facility]ication [facility]  Arti: [phone]  22, 10:49 AM     For Pharmacy Admin Tracking Only     Intervention Detail: Adherence Monitorin   Total # of Interventions Recommended: 1   Total # of Interventions Accepted: 1   Time Spent (min): 15

## 2022-06-22 ENCOUNTER — OFFICE VISIT (OUTPATIENT)
Dept: INTERNAL MEDICINE CLINIC | Age: 67
End: 2022-06-22
Payer: MEDICARE

## 2022-06-22 VITALS
RESPIRATION RATE: 18 BRPM | SYSTOLIC BLOOD PRESSURE: 118 MMHG | BODY MASS INDEX: 26.63 KG/M2 | HEART RATE: 77 BPM | WEIGHT: 179.8 LBS | HEIGHT: 69 IN | TEMPERATURE: 97.9 F | DIASTOLIC BLOOD PRESSURE: 80 MMHG | OXYGEN SATURATION: 96 %

## 2022-06-22 DIAGNOSIS — R60.0 BILATERAL LOWER EXTREMITY EDEMA: ICD-10-CM

## 2022-06-22 DIAGNOSIS — I10 PRIMARY HYPERTENSION: Primary | ICD-10-CM

## 2022-06-22 DIAGNOSIS — F17.210 CIGARETTE NICOTINE DEPENDENCE WITHOUT COMPLICATION: ICD-10-CM

## 2022-06-22 PROCEDURE — 99213 OFFICE O/P EST LOW 20 MIN: CPT | Performed by: SURGERY

## 2022-06-22 RX ORDER — OLMESARTAN MEDOXOMIL 20 MG/1
20 TABLET ORAL DAILY
Qty: 30 TABLET | Refills: 3 | Status: SHIPPED | OUTPATIENT
Start: 2022-06-22

## 2022-06-22 RX ORDER — AMLODIPINE BESYLATE 5 MG/1
5 TABLET ORAL DAILY
Qty: 30 TABLET | Refills: 3 | Status: SHIPPED | OUTPATIENT
Start: 2022-06-22

## 2022-06-22 ASSESSMENT — PATIENT HEALTH QUESTIONNAIRE - PHQ9
2. FEELING DOWN, DEPRESSED OR HOPELESS: 0
SUM OF ALL RESPONSES TO PHQ QUESTIONS 1-9: 0
SUM OF ALL RESPONSES TO PHQ QUESTIONS 1-9: 0
1. LITTLE INTEREST OR PLEASURE IN DOING THINGS: 0
SUM OF ALL RESPONSES TO PHQ QUESTIONS 1-9: 0
SUM OF ALL RESPONSES TO PHQ QUESTIONS 1-9: 0
SUM OF ALL RESPONSES TO PHQ9 QUESTIONS 1 & 2: 0

## 2022-06-22 NOTE — PROGRESS NOTES
2022    Salvador Valverde (:  1955) is a 78 y.o. male, here for a preventive medicine evaluation. He complains of new onset bilaterla LE edema that began around the time his blood pressure medications were changed. He was started on amlodipine 10mg. He denies any congestive symptoms including SOB, orthopnea, weight gain. He has no further complaints. Patient Active Problem List   Diagnosis    Shoulder pain    Anxiety    Sleepiness    Hypertension    Back pain    Cigarette nicotine dependence without complication    Pulmonary emboli (HCC)    Pulmonary embolism, bilateral (HCC)    Hyperpigmented skin lesion    History of heroin abuse ([facility] [address] [facility]    Acute deep vein thrombosis (DVT) of femoral vein of both lower extremities (HCC)    Hypovitaminosis D    Low folate    HLD (hyperlipidemia)    Bilateral lower extremity edema       Review of Systems  13 point ROS negative except as stated above  Prior to Visit Medications    Medication Sig Taking?  Authorizing Provider   olmesartan (BENICAR) 20 MG tablet Take 1 tablet by mouth daily Yes Ahmet Koroma MD   amLODIPine (NORVASC) 5 MG tablet Take 1 tablet by mouth daily Yes Ahmet Koroma MD   nicotine (NICODERM CQ) 14 MG/24HR APPLY 1 [facility] Yes Allyssa Ceballos MD   vitamin D3 (CHOLECALCIFEROL) 25 MCG (1000 UT) TABS tablet Take 1 tablet by mouth daily Yes Ny Kidd MD   melatonin (RA MELATONIN) 3 MG TABS tablet Take 1 tablet by mouth nightly as needed (Sleep) Yes Ny Kidd MD   warfarin (COUMADIN) 5 MG tablet TAKE 1 TABLET BY MOUTH EVERY DAY Yes Romel Winters MD   folic acid (FOLVITE) 1 MG tablet TAKE 1 TABLET BY MOUTH EVERY DAY Yes Hua Mayo MD   diclofenac sodium (VOLTAREN) 1 % GEL Apply 4 g topically 4 times daily as needed for Pain Yes Allyssa Ceballos MD   furosemide (LASIX) 20 MG tablet Take 1 tablet by mouth daily Yes Pranay Morocho MD   methadone (DOLOPHINE) 10 MG/ML solution Take 35 mg by mouth daily. Patient not taking: Reported on 2022  Historical Provider, MD        Allergies   Allergen Reactions    Valsartan-Hydrochlorothiazide      Sweating - light headed       Past Medical History:   Diagnosis Date    Anxiety 2010    Back pain 2012    [facility]) [facility]     History of heroin abuse ([facility]     HLD (hyperlipidemia) 3/23/2022    Hypertension 10/25/2011    Pulmonary embolism ([facility] [address]     FRANCHESKA       Past Surgical History:   Procedure Laterality Date    COLONOSCOPY N/A 2019    COLONOSCOPY performed by Mervin Howell MD at [address] [facility]  2019    COLONOSCOPY WITH BIOPSY performed by Mervin Howell MD at [address] [facility] [address]     ENDOSCOPY, COLON, DIAGNOSTIC      UPPER GASTROINTESTINAL ENDOSCOPY N/A 2020    EGD DIAGNOSTIC ONLY performed by Mervin Howell MD at [place] [address] N/A 3/18/2020    PUSH ENTEROSCOPY performed by Mervin Howell MD at [address] [place] [address] Marital status:       Spouse name: Not on file    Number of children: Not on file    Years of education: Not on file    Highest education level: Not on file   Occupational History    Not on file   Tobacco Use    Smoking status: Former Smoker     Packs/day: 0.50     Years: 30.00     Pack years: 15.00     Types: Cigars     Start date: 45     Quit date: 2021     Years since quittin.1    Smokeless tobacco: Never Used    Tobacco comment: 1 DAILY   Vaping Use    Vaping Use: Never used   Substance and Sexual Activity    Alcohol use: Yes     Comment: pint of wine a day    Drug use: Not Currently     Comment: last heroine     Sexual activity: Yes     Partners: Female   Other Topics Concern    Not on file   Social History Narrative    Not on file     Social Determinants of Health     Financial Resource Strain:     Difficulty of Paying Living Expenses: Not on file   Food Insecurity:     Worried About Running Out of Food in the Last Year: Not on file    Meg of Food in the Last Year: Not on file   Transportation Needs:     Lack of Transportation (Medical): Not on file    Lack of Transportation (Non-Medical): Not on file   Physical Activity:     Days of Exercise per Week: Not on file    Minutes of Exercise per Session: Not on file   Stress:     Feeling of Stress : Not on file   Social Connections:     Frequency of Communication with Friends and Family: Not on file    Frequency of Social Gatherings with Friends and Family: Not on file    Attends [employer] Services: Not on file    Active Member of [address] [place] [address] "[employer] or Organizations: Not on file    Attends Club or Organization Meetings: Not on file    Marital Status: Not on file   Intimate Partner Violence:     Fear of Current or Ex-Partner: Not on file    Emotionally Abused: Not on file    Physically Abused: Not on file    Sexually Abused: Not on file   Housing Stability:     Unable to Pay for Housing in the Last Year: Not on file    Number of [place] in the Last Year: Not on file    Unstable Housing in the Last Year: Not on file        Family History   Problem Relation Age of Onset    High Blood Pressure Mother     Cancer Father         ? BRAIN    Kidney Disease Brother         ESRD ON DIALYSIS       ADVANCE DIRECTIVE: N, <no information>    Vitals:    06/22/22 1034   BP: 118/80   Site: Left Upper Arm   Position: Sitting   Cuff Size: Medium Adult   Pulse: 77   Resp: 18   Temp: 97.9 °F (36.6 °C)   TempSrc: Temporal   SpO2: 96%   Weight: 179 lb 12.8 oz (81.6 kg)   Height: 5' 9\" (1.753 m)     Estimated body mass index is 26.55 kg/m² as calculated from the following:    Height as of this encounter: 5' 9\" (1.753 m). Weight as of this encounter: 179 lb 12.8 oz (81.6 kg). Physical Exam  HENT:      Head: Normocephalic and atraumatic. Eyes:      Extraocular Movements: Extraocular movements intact.       Pupils: Pupils are equal, round, and reactive to light. Cardiovascular:      Rate and Rhythm: Normal rate and regular rhythm. Pulses: Normal pulses. Heart sounds: Normal heart sounds. Comments: No JVD, negative hepatojugular reflex  Pulmonary:      Effort: Pulmonary effort is normal.      Breath sounds: Normal breath sounds. Abdominal:      Palpations: Abdomen is soft. Tenderness: There is no abdominal tenderness. Musculoskeletal:      Cervical back: Normal range of motion. Right lower leg: Edema present. Left lower leg: Edema present. Comments: 3+ bilateral LE pitting edema   Skin:     Capillary Refill: Capillary refill takes less than 2 seconds. Neurological:      General: No focal deficit present. Mental Status: He is alert and oriented to person, place, and time. Psychiatric:         Mood and Affect: Mood normal.         Behavior: Behavior normal.         Thought Content: Thought content normal.         Judgment: Judgment normal.         No flowsheet data found.     Lab Results   Component Value Date    CHOL 232 02/18/2022    CHOL 174 07/20/2020    CHOL 204 01/16/2019    TRIG 366 02/18/2022    TRIG 253 07/20/2020    TRIG 230 01/16/2019    HDL 58 02/18/2022    HDL 51 07/20/2020    HDL 49 01/16/2019    LDLCALC see below 02/18/2022    LDLCALC 72 07/20/2020    LDLCALC 109 01/16/2019    GLUCOSE 109 04/04/2022    LABA1C 6.2 02/18/2022    LABA1C 6.2 02/18/2022    LABA1C 5.7 08/06/2021       The 10-year ASCVD risk score (Deborah Laura et al., 2013) is: 14.9%    Values used to calculate the score:      Age: 78 years      Sex: Male      Is Non- : Yes      Diabetic: No      Tobacco smoker: No      Systolic Blood Pressure: 946 mmHg      Is BP treated: Yes      HDL Cholesterol: 58 mg/dL      Total Cholesterol: 232 mg/dL    Immunization History   Administered Date(s) Administered    COVID-19, Moderna, Primary or Immunocompromised, PF, 100mcg/0.5mL 05/18/2021, 06/16/2021    Influenza Virus Vaccine 11/15/2017    Influenza, High Dose (Fluzone 65 yrs and older) 11/22/2019    Influenza, MDCK Quadv, with preserv IM (Flucelvax 2 yrs and older) 08/31/2021    Influenza, Quadv, IM, (6 mo and older Fluzone, Flulaval, Fluarix and 3 yrs and older Afluria) 11/15/2017    Influenza, Quadv, IM, PF (6 mo and older Fluzone, Flulaval, Fluarix, and 3 yrs and older Afluria) 11/14/2018    Influenza, Quadv, adjuvanted, 65 yrs +, IM, PF (Fluad) 10/08/2020    Influenza, Triv, inactivated, subunit, adjuvanted, IM (Fluad 65 yrs and older) 09/18/2019    Pneumococcal Conjugate 13-valent ([id number]) 01/31/2018    Pneumococcal Polysaccharide ([id number]) 05/01/2018    Tdap (Boostrix, Adacel) 08/31/2021    Zoster Live (Zostavax) 02/05/2018       Health Maintenance   Topic Date Due    Annual Wellness Visit (AWV)  Never done    Shingles vaccine (2 of 3) 04/02/2018    COVID-19 Vaccine (3 - Booster for Moderna series) 11/16/2021    Depression Screen  02/22/2022    A1C test (Diabetic or Prediabetic)  02/18/2023    Prostate Specific Antigen (PSA) Screening or Monitoring  02/18/2023    Pneumococcal 65+ years Vaccine (2 - PPSV23 or PCV20) 05/01/2023    Lipids  02/18/2027    Colorectal Cancer Screen  08/30/2029    DTaP/Tdap/Td vaccine (2 - Td or Tdap) 08/31/2031    Flu vaccine  Completed    AAA screen  Completed    Hepatitis C screen  Completed    Hepatitis A vaccine  Aged Out    Hepatitis B vaccine  Aged Out    Hib vaccine  Aged Out    Meningococcal (ACWY) vaccine  Aged Out       Assessment & Plan   Primary hypertension  - decrease amlodipine to 5mg and add new Rx for olmesartan 20mg     Bilateral lower extremity edema  -     Compression Stocking  - see above    Cigarette nicotine dependence without complication  - counseled on smoking cessation    No follow-ups on file.          --Carrie Anglin MD

## 2022-06-23 ENCOUNTER — ANTI-COAG VISIT (OUTPATIENT)
Dept: PHARMACY | Age: 67
End: 2022-06-23
Payer: MEDICARE

## 2022-06-23 DIAGNOSIS — I26.99 PULMONARY EMBOLISM, BILATERAL (HCC): Primary | ICD-10-CM

## 2022-06-23 DIAGNOSIS — I82.413 ACUTE DEEP VEIN THROMBOSIS (DVT) OF FEMORAL VEIN OF BOTH LOWER EXTREMITIES (HCC): ICD-10-CM

## 2022-06-23 LAB — INTERNATIONAL NORMALIZATION RATIO, POC: 3.1

## 2022-06-23 PROCEDURE — 85610 PROTHROMBIN TIME: CPT

## 2022-06-23 PROCEDURE — 99211 OFF/OP EST MAY X REQ PHY/QHP: CPT

## 2022-06-23 RX ORDER — AMLODIPINE BESYLATE 10 MG/1
TABLET ORAL
Qty: 90 TABLET | Refills: 1 | Status: SHIPPED | OUTPATIENT
Start: 2022-06-23 | End: 2022-08-05 | Stop reason: DRUGHIGH

## 2022-06-23 NOTE — PROGRESS NOTES
Tiki Nance is a 78 y.o. male with PMHx significant for bilateral PE, DVT (6/4/21) while on Xarelto, HTN who presents to clinic 6/23/2022 for anticoagulation monitoring and adjustment. Pt dx with bilat DVT 6/4/21 while on Xarelto 2yrs for hx of PE. Pt stopped Xarelto on 6/6/21 and started warfarin + lovenox bridge on 6/7/21.     Anticoagulation Indication(s):  PE 2018, bilateral DVT 6/4/21 (on Xarelto)  Referring Physician:   Dr. Branden Brand (Also send notes to Dr Jessica Washington, or responsible resident)  Goal INR Range:  2-3  Duration of Anticoagulation Therapy:  indefinite  Time of day dose taken: prefers to take in AM  Product patient has at home: warfarin 5 mg (peach color)    INR Summary                           Warfarin regimen (mg)  Date INR   A/P   Sun Mon Tue Wed Thu Fri Sat Mg/wk  6/23 3.1 Above goal, continue 5 2.5 2.5 2.5 5 2.5 2.5 22.5  6/9 2.9 At goal, continue  5 2.5 2.5 2.5 5 2.5 2.5 22.5  5/26 1.9 Below goal, increase  5 2.5 2.5 2.5 5 2.5 2.5 22.5  5/12 2.8 At goal, no change 5 2.5 2.5 2.5 2.5 2.5 2.5 20  5/5 4.4 Above goal, holdx1 5 2.5 2.5 2.5 2.5 2.5 2.5 20  4/21 3.1 Above goal, decrease 5 2.5 5 2.5 5 2.5 2.5 25  4/12 2.3 At goal, no change 5 2.5 5 2.5 5 2.5 5 27.5  4/4 2.7 At goal, resume 5 2.5 5 2.5 5 2.5 5 27.5  3/31 7.63 Hold x 3 days  3/17 3.2 Above goal, continue 5 5 5 5 5 5 5 35  3/1 1.5 Below goal, (Missed) 5 5 7.5/5 5 5 5 5 35  2/8 1.7 Below goal, bolus 5 5 7.5/5 5 5 5 5 35  1/20 3.7 Above goal, decrease 5 5 5 5 5 2.5 5 32.5  1/5 3.6 Above goal, hold 5 5 5 5 5 0/5 5 35  12/14   2.2       At goal, no change      5 5 5 5 5 5 5 35  11/18 2.8 At goal, no change 5 5 5 5 5 5 5 35   11/11 1.3 Below goal, cont 5 5 5 5 5 5 5 35  11/4 5.1 Above goal, hold+dec 5 5 5 5 0/5 0/5 5 35  10/25 5.3 Above goal, hold+dec  5 5 0/5 0/5 7.5 5 5 37.5  10/13 1.8 Below goal, increase 5 5 7.5 5 7.5 5 5 40  10/6 1.3 Below goal (missed) 5 5 5 10/5 7.5 5 5 37.5  9/13 2.2 At goal, continue 5 5 5 5 7.5 5 5 37.5  8/30     1.8       Below goal, cont 5 5 5 5 7.5 5 5 37.5  7/26     3.1       At goal, no change 5 5 5 5 7.5 5 5 37.5  7/15 2.1 At goal, increase 5 5 5 5 7.5 5 5 37.5  7/8 5.6 Above goal, holdx1 5 5 5 5 5 5 5 35  6/28 2.1 At goal, no change 5 7.5 5 7.5 5 7.5 5 42.5  6/21 4.3 Above goal, holdx1 5 x 5 7.5 5 7.5 5   6/14 4.2 Above goal, hold 7.5 10 0/7.5 5/7.5 7.5 7.5 7.5   6/10 1.3 Below goal, increase 7.5 INR   10 10 7.5     Started warfarin 5 mg daily on 6/7/21    Last CBC:  Lab Results   Component Value Date    RBC 3.98 (L) 04/04/2022    HGB 13.5 04/04/2022    HCT 40.2 (L) 04/04/2022    .0 (H) 04/04/2022    MCH 33.9 04/04/2022    MPV 7.7 04/04/2022    RDW 14.4 04/04/2022     04/04/2022     Patient History:  Recent hospitalizations/HC visits 5/13/22: ortho to discuss hip replacement  4/27/22: Dr Carson Moreno ([facility]) recommends IVC placed prior to hip replacement  4/7/22: OV GI- Pt needs to repeat stool hemoccult, then schedule colonoscopy--pt looking for GI doctor closer to home. Recent medication changes 6/23/22: dec dose of amlodipine (now 5mg QD)  6/23/22: start olmesartan 20mg QD   Medications taken regularly that may interact with warfarin or alter INR No significant drug interactions identified   Warfarin dose taken as prescribed 5/3/22: missed 5 mg dose  2/8/22: taking 5 mg QD  using pillbox   Signs/symptoms of bleeding Black stools resolved  Stool hemoccult negative 4/6/22   Vitamin K intake Normally has 0-1 serving of green, leafy vegetables per week; 1c cranberry juice qd  Pt often notes that he has very little appetite. Recent vomiting/diarrhea/fever, changes in weight or activity level None reported   Tobacco or alcohol use Often drinks 2-3 large glasses of wine, but states each glass only has ~4 oz wine mixed with grape juice. Occasionally has more.    1/5/22: quit smoking using 14mg patches (was smoking 2 cig/d)    Upcoming surgeries or procedures Hip surgery TBD, will have IVC filter placement 2 days prior to surgery  Colonoscopy TBD    \"As of 5/17/22:  Patient still has nicotine present and also has cocaine present. I have informed the patient that he will need to take care of these issues and passes at least 2 drug tests before we will put him back on the schedule. I have told him for now his surgery is postponed indefinitely\"      Assessment/Plan:  Patient's INR was supratherapeutic (3.1) stable trend from last visit. Patient reports still on 14 mg nicotine patches and no cigarettes; also states no additional alcohol or vitamin K intake from usual. No complaint of peripheral edema. Patient denies any changes to medication, diet, lifestyle, or significant bleeding events. Patient likes to eat liver and brocolli, but typically avoids these foods. He often cannot recall his dose, but reports following the most recent dosing calendar, and he uses a pillbox. Patient was instructed to continue warfarin to 5 mg on Sundays and Thursdays and 2.5 mg on all other days. Repeat INR in 3 weeks. Patient was reminded to maintain consistent vitamin K intake and call with any bleeding, medication changes, or fever/vomiting/diarrhea. He is to notify the clinic as soon as he is scheduled for a colonoscopy and/or hip surgery so we can instruct him on periop anticoagulation plan. Appears hip surgery is postponed indefinitely until he can pass two drug screens (recent had nicotine and cocaine present). He is trying to schedule colonoscopy closer to home and will call his insurance about this. Patient understands dosing directions and information discussed. Dosing schedule and follow up appointment given to patient. Progress note routed to referring physician's office. Patient acknowledges working in consult agreement with pharmacist as referred by his/her physician.      Next Clinic Appointment:  7/14    Please call [facility] Anticoagulation Clinic at [phone] with any questions. Please call The [address] [place] [address] Avenue [phone] with any questions. Thanks!   Wendy Flores, PharmD Candidate  [facility]  [facility]:  [phone]  06/23/22, 11:06 AM     For Pharmacy Admin Tracking Only     Total # of Interventions Recommended: 0   Total # of Interventions Accepted: 0   Time Spent (min): 15

## 2022-07-21 ENCOUNTER — ANTI-COAG VISIT (OUTPATIENT)
Dept: PHARMACY | Age: 67
End: 2022-07-21
Payer: MEDICAID

## 2022-07-21 DIAGNOSIS — I26.99 PULMONARY EMBOLISM, BILATERAL (HCC): Primary | ICD-10-CM

## 2022-07-21 DIAGNOSIS — I82.413 ACUTE DEEP VEIN THROMBOSIS (DVT) OF FEMORAL VEIN OF BOTH LOWER EXTREMITIES (HCC): ICD-10-CM

## 2022-07-21 LAB — INTERNATIONAL NORMALIZATION RATIO, POC: 5.5

## 2022-07-21 PROCEDURE — 85610 PROTHROMBIN TIME: CPT

## 2022-07-21 PROCEDURE — 99212 OFFICE O/P EST SF 10 MIN: CPT

## 2022-07-21 NOTE — PROGRESS NOTES
I have seen and evaluated the patient with the PharmD Candidate, Jennifer Lovell, under my direct supervision. I have reviewed the PharmD Candidate's note and agree with the assessment and plan of care as documented.     Duke Houston, PharmD, [address] [facility]  Medication Management Clinic   Jayden Gonzalez3 Ph: [phone]  Richa Franks Ph: [phone]  7/21/2022 4:45 PM

## 2022-07-21 NOTE — PROGRESS NOTES
Dioni Bowie is a 78 y.o. male with PMHx significant for bilateral PE, DVT (6/4/21) while on Xarelto, HTN who presents to clinic 7/21/2022 for anticoagulation monitoring and adjustment. Pt dx with bilat DVT 6/4/21 while on Xarelto 2yrs for hx of PE. Pt stopped Xarelto on 6/6/21 and started warfarin + lovenox bridge on 6/7/21.     Anticoagulation Indication(s):  PE 2018, bilateral DVT 6/4/21 (on Xarelto)  Referring Physician:   Dr. Alise Hong (Also send notes to Dr Frank Reyes, or responsible resident)  Goal INR Range:  2-3  Duration of Anticoagulation Therapy:  indefinite  Time of day dose taken: prefers to take in AM  Product patient has at home: warfarin 5 mg (peach color)    INR Summary                           Warfarin regimen (mg)  Date INR   A/P   Sun Mon Tue Wed Thu Fri Sat Mg/wk  7/21 5.5 Above goal, holdx1  2.5 2.5 2.5 2.5 5 0/2.5 2.5 20  decrease   6/23 3.1 Above goal, continue 5 2.5 2.5 2.5 5 2.5 2.5 22.5  6/9 2.9 At goal, continue  5 2.5 2.5 2.5 5 2.5 2.5 22.5  5/26 1.9 Below goal, increase  5 2.5 2.5 2.5 5 2.5 2.5 22.5  5/12 2.8 At goal, no change 5 2.5 2.5 2.5 2.5 2.5 2.5 20  5/5 4.4 Above goal, holdx1 5 2.5 2.5 2.5 2.5 2.5 2.5 20  4/21 3.1 Above goal, decrease 5 2.5 5 2.5 5 2.5 2.5 25  4/12 2.3 At goal, no change 5 2.5 5 2.5 5 2.5 5 27.5  4/4 2.7 At goal, resume 5 2.5 5 2.5 5 2.5 5 27.5  3/31 7.63 Hold x 3 days  3/17 3.2 Above goal, continue 5 5 5 5 5 5 5 35  3/1 1.5 Below goal, (Missed) 5 5 7.5/5 5 5 5 5 35  2/8 1.7 Below goal, bolus 5 5 7.5/5 5 5 5 5 35  1/20 3.7 Above goal, decrease 5 5 5 5 5 2.5 5 32.5  1/5 3.6 Above goal, hold 5 5 5 5 5 0/5 5 35  12/14   2.2       At goal, no change      5 5 5 5 5 5 5 35  11/18 2.8 At goal, no change 5 5 5 5 5 5 5 35   11/11 1.3 Below goal, cont 5 5 5 5 5 5 5 35  11/4 5.1 Above goal, hold+dec 5 5 5 5 0/5 0/5 5 35  10/25 5.3 Above goal, hold+dec  5 5 0/5 0/5 7.5 5 5 37.5  10/13 1.8 Below goal, Progress note routed to referring physician's office. Patient acknowledges working in consult agreement with pharmacist as referred by his/her physician. Next Clinic Appointment:  7/28    Please call [facility] at [phone] with any questions. Please call The [address] [place] [address] Avenue [phone] with any questions. Thanks!   Lexi Lawson, PharmD [facility]: [address] [place] [address] Street: [phone]    07/21/22, 12:07 PM         For Pharmacy Admin Tracking Only    Intervention Detail: Dose Adjustment: 1, reason: Therapy De-escalation  Total # of Interventions Recommended: 1  Total # of Interventions Accepted: 1  Time Spent (min): 20

## 2022-07-26 NOTE — TELEPHONE ENCOUNTER
Refill furosemide  Last ov 6/22/22 Luna  Next appt not scheduled  F/u 7/22/22 cancelled per pt    Rx request #90 R-3 changed to #30 R-0

## 2022-07-27 RX ORDER — FUROSEMIDE 20 MG/1
TABLET ORAL
Qty: 30 TABLET | Refills: 0 | Status: SHIPPED | OUTPATIENT
Start: 2022-07-27 | End: 2022-08-18

## 2022-07-28 ENCOUNTER — ANTI-COAG VISIT (OUTPATIENT)
Dept: PHARMACY | Age: 67
End: 2022-07-28
Payer: MEDICAID

## 2022-07-28 DIAGNOSIS — I82.413 ACUTE DEEP VEIN THROMBOSIS (DVT) OF FEMORAL VEIN OF BOTH LOWER EXTREMITIES (HCC): ICD-10-CM

## 2022-07-28 DIAGNOSIS — I26.99 PULMONARY EMBOLISM, BILATERAL (HCC): Primary | ICD-10-CM

## 2022-07-28 LAB — INTERNATIONAL NORMALIZATION RATIO, POC: 3.2

## 2022-07-28 PROCEDURE — 85610 PROTHROMBIN TIME: CPT

## 2022-07-28 PROCEDURE — 99212 OFFICE O/P EST SF 10 MIN: CPT

## 2022-07-28 NOTE — PROGRESS NOTES
Anthony Campuzano is a 78 y.o. male with PMHx significant for bilateral PE, DVT (6/4/21) while on Xarelto, HTN who presents to clinic 7/28/2022 for anticoagulation monitoring and adjustment. Pt dx with bilat DVT 6/4/21 while on Xarelto 2yrs for hx of PE. Pt stopped Xarelto on 6/6/21 and started warfarin + lovenox bridge on 6/7/21.     Anticoagulation Indication(s):  PE 2018, bilateral DVT 6/4/21 (on Xarelto)  Referring Physician:   Dr. Cristina Taylor (Also send notes to Dr Leora Molina, or responsible resident)  Goal INR Range:  2-3  Duration of Anticoagulation Therapy:  indefinite  Time of day dose taken: prefers to take in AM  Product patient has at home: warfarin 5 mg (peach color)    INR Summary                           Warfarin regimen (mg)  Date INR   A/P   Sun Mon Tue Wed Thu Fri Sat Mg/wk  7/28 3.2 Above goal, continue 2.5 2.5 2.5 2.5 2.5 2.5 2.5 17.5  7/21 5.5 Above goal, holdx1  2.5 2.5 2.5 2.5 5 0/2.5 2.5 20  decrease   6/23 3.1 Above goal, continue 5 2.5 2.5 2.5 5 2.5 2.5 22.5  6/9 2.9 At goal, continue  5 2.5 2.5 2.5 5 2.5 2.5 22.5  5/26 1.9 Below goal, increase  5 2.5 2.5 2.5 5 2.5 2.5 22.5  5/12 2.8 At goal, no change 5 2.5 2.5 2.5 2.5 2.5 2.5 20  5/5 4.4 Above goal, holdx1 5 2.5 2.5 2.5 2.5 2.5 2.5 20  4/21 3.1 Above goal, decrease 5 2.5 5 2.5 5 2.5 2.5 25  4/12 2.3 At goal, no change 5 2.5 5 2.5 5 2.5 5 27.5  4/4 2.7 At goal, resume 5 2.5 5 2.5 5 2.5 5 27.5  3/31 7.63 Hold x 3 days  3/17 3.2 Above goal, continue 5 5 5 5 5 5 5 35  3/1 1.5 Below goal, (Missed) 5 5 7.5/5 5 5 5 5 35  2/8 1.7 Below goal, bolus 5 5 7.5/5 5 5 5 5 35  1/20 3.7 Above goal, decrease 5 5 5 5 5 2.5 5 32.5  1/5 3.6 Above goal, hold 5 5 5 5 5 0/5 5 35  12/14   2.2       At goal, no change      5 5 5 5 5 5 5 35  11/18 2.8 At goal, no change 5 5 5 5 5 5 5 35   11/11 1.3 Below goal, cont 5 5 5 5 5 5 5 35  11/4 5.1 Above goal, hold+dec 5 5 5 5 0/5 0/5 5 35  10/25 5.3 Above goal, hold+dec 5 5 0/5 0/5 7.5 5 5 37.5  10/13 1.8 Below goal, increase 5 5 7.5 5 7.5 5 5 40  10/6 1.3 Below goal (missed) 5 5 5 10/5 7.5 5 5 37.5  9/13 2.2 At goal, continue 5 5 5 5 7.5 5 5 37.5  8/30     1.8       Below goal, cont 5 5 5 5 7.5 5 5 37.5  7/26     3.1       At goal, no change 5 5 5 5 7.5 5 5 37.5  7/15 2.1 At goal, increase 5 5 5 5 7.5 5 5 37.5  7/8 5.6 Above goal, holdx1 5 5 5 5 5 5 5 35  6/28 2.1 At goal, no change 5 7.5 5 7.5 5 7.5 5 42.5  6/21 4.3 Above goal, holdx1 5 x 5 7.5 5 7.5 5   6/14 4.2 Above goal, hold 7.5 10 0/7.5 5/7.5 7.5 7.5 7.5   6/10 1.3 Below goal, increase 7.5 INR   10 10 7.5     Started warfarin 5 mg daily on 6/7/21    Last CBC:  Lab Results   Component Value Date    RBC 3.98 (L) 04/04/2022    HGB 13.5 04/04/2022    HCT 40.2 (L) 04/04/2022    .0 (H) 04/04/2022    MCH 33.9 04/04/2022    MPV 7.7 04/04/2022    RDW 14.4 04/04/2022     04/04/2022     Patient History:  Recent hospitalizations/HC visits 5/13/22: ortho to discuss hip replacement  4/27/22: Dr Gerson Velásquez ([facility]) recommends IVC placed prior to hip replacement  4/7/22: OV GI- Pt needs to repeat stool hemoccult, then schedule colonoscopy--pt looking for GI doctor closer to home. Recent medication changes None reported   Medications taken regularly that may interact with warfarin or alter INR No significant drug interactions identified   Warfarin dose taken as prescribed 5/3/22: missed 5 mg dose  2/8/22: taking 5 mg QD  using pillbox   Signs/symptoms of bleeding Black stools resolved  Stool hemoccult negative 4/6/22   Vitamin K intake Normally has 0-1 serving of green, leafy vegetables per week; 1c cranberry juice qd  Pt often notes that he has very little appetite. Recent vomiting/diarrhea/fever, changes in weight or activity level None reported  7/21/22: decreased appetite in the past month   Tobacco or alcohol use Often drinks 2-3 large glasses of wine, but states each glass only has ~4 oz wine mixed with grape juice. Occasionally has more. 1/5/22: quit smoking using 14mg patches (was smoking 2 cig/d)   7/21/22: 1.5 pint of wine a couple nights in the past week   Upcoming surgeries or procedures Hip surgery TBD, will have IVC filter placement 2 days prior to surgery  Colonoscopy TBD    \"As of 5/17/22:  Patient still has nicotine present and also has cocaine present. I have informed the patient that he will need to take care of these issues and passes at least 2 drug tests before we will put him back on the schedule. I have told him for now his surgery is postponed indefinitely\"      Assessment/Plan:  Patient's INR was supratherapeutic (3.2) today which is a decent decrease since last week. He reports drinking his usual 3 glasses of wine these past 2 days, he also reports that he is not eating well, sometimes about half a sandwich for the entire day. He has been instructed to talk to his doctor during the next visit. Patient likes to eat liver and brocolli, but typically avoids these foods. He often cannot recall his dose, but reports taking 1/2 tablet daily for the past week, and he uses a pillbox. Patient was instructed to continue warfarin to 2.5 mg daily till he sees us next week (8/4). Repeat INR in 1 week. Patient was reminded to maintain consistent vitamin K intake and call with any bleeding, medication changes, or fever/vomiting/diarrhea. He is to notify the clinic as soon as he is scheduled for a colonoscopy and/or hip surgery so we can instruct him on periop anticoagulation plan. Appears hip surgery is postponed indefinitely until he can pass two drug screens (recent had nicotine and cocaine present). He sees his ortho-doctor and getting the drug screen on 8/5 to determine whether he can have his surgery date scheduled. He is trying to schedule colonoscopy closer to home and will call his insurance about this. Patient understands dosing directions and information discussed.  Dosing schedule and follow up appointment given to patient. Progress note routed to referring physician's office. Patient acknowledges working in consult agreement with pharmacist as referred by his/her physician. Next Clinic Appointment:      Please call [facility] at [phone] with any questions. Please call The [address] [place] [address] Avenue [phone] with any questions. Thanks!   Elsie Clark, PharmD [facility] [address] [place] [address] Street: [phone]    22, 10:49 AM         For Pharmacy Admin Tracking Only    Intervention Detail: Adherence Monitorin  Total # of Interventions Recommended: 1  Total # of Interventions Accepted: 1  Time Spent (min): 15

## 2022-08-03 ENCOUNTER — OFFICE VISIT (OUTPATIENT)
Dept: INTERNAL MEDICINE CLINIC | Age: 67
End: 2022-08-03
Payer: MEDICARE

## 2022-08-03 VITALS
SYSTOLIC BLOOD PRESSURE: 116 MMHG | WEIGHT: 168.4 LBS | OXYGEN SATURATION: 98 % | DIASTOLIC BLOOD PRESSURE: 83 MMHG | HEART RATE: 76 BPM | BODY MASS INDEX: 28.75 KG/M2 | TEMPERATURE: 97.3 F | HEIGHT: 64 IN

## 2022-08-03 DIAGNOSIS — R19.09 ABDOMINAL MASS OF OTHER SITE: ICD-10-CM

## 2022-08-03 DIAGNOSIS — R63.0 ANOREXIA: Primary | ICD-10-CM

## 2022-08-03 DIAGNOSIS — R19.05 PERIUMBILICAL MASS: ICD-10-CM

## 2022-08-03 DIAGNOSIS — R63.4 UNINTENTIONAL WEIGHT LOSS OF MORE THAN 10 POUNDS: ICD-10-CM

## 2022-08-03 DIAGNOSIS — R63.4 WEIGHT LOSS: ICD-10-CM

## 2022-08-03 LAB
A/G RATIO: 1.4 (ref 1.1–2.2)
ALBUMIN SERPL-MCNC: 4 G/DL (ref 3.4–5)
ALP BLD-CCNC: 213 U/L (ref 40–129)
ALT SERPL-CCNC: 44 U/L (ref 10–40)
ANION GAP SERPL CALCULATED.3IONS-SCNC: 17 MMOL/L (ref 3–16)
AST SERPL-CCNC: 77 U/L (ref 15–37)
BASOPHILS ABSOLUTE: 0 K/UL (ref 0–0.2)
BASOPHILS RELATIVE PERCENT: 0.7 %
BILIRUB SERPL-MCNC: 1.1 MG/DL (ref 0–1)
BUN BLDV-MCNC: 7 MG/DL (ref 7–20)
CALCIUM SERPL-MCNC: 9.3 MG/DL (ref 8.3–10.6)
CHLORIDE BLD-SCNC: 94 MMOL/L (ref 99–110)
CO2: 28 MMOL/L (ref 21–32)
CREAT SERPL-MCNC: 0.7 MG/DL (ref 0.8–1.3)
EOSINOPHILS ABSOLUTE: 0 K/UL (ref 0–0.6)
EOSINOPHILS RELATIVE PERCENT: 0 %
GFR AFRICAN AMERICAN: >60
GFR NON-AFRICAN AMERICAN: >60
GLUCOSE BLD-MCNC: 118 MG/DL (ref 70–99)
HCT VFR BLD CALC: 41 % (ref 40.5–52.5)
HEMOGLOBIN: 13.6 G/DL (ref 13.5–17.5)
LIPASE: 20 U/L (ref 13–60)
LYMPHOCYTES ABSOLUTE: 1.3 K/UL (ref 1–5.1)
LYMPHOCYTES RELATIVE PERCENT: 21.5 %
MCH RBC QN AUTO: 32.6 PG (ref 26–34)
MCHC RBC AUTO-ENTMCNC: 33.2 G/DL (ref 31–36)
MCV RBC AUTO: 98.2 FL (ref 80–100)
MONOCYTES ABSOLUTE: 0.4 K/UL (ref 0–1.3)
MONOCYTES RELATIVE PERCENT: 6.5 %
NEUTROPHILS ABSOLUTE: 4.4 K/UL (ref 1.7–7.7)
NEUTROPHILS RELATIVE PERCENT: 71.3 %
PDW BLD-RTO: 15.9 % (ref 12.4–15.4)
PLATELET # BLD: 262 K/UL (ref 135–450)
PMV BLD AUTO: 7.6 FL (ref 5–10.5)
POTASSIUM SERPL-SCNC: 3.9 MMOL/L (ref 3.5–5.1)
RBC # BLD: 4.17 M/UL (ref 4.2–5.9)
SODIUM BLD-SCNC: 139 MMOL/L (ref 136–145)
TOTAL PROTEIN: 6.9 G/DL (ref 6.4–8.2)
WBC # BLD: 6.2 K/UL (ref 4–11)

## 2022-08-03 PROCEDURE — 83036 HEMOGLOBIN GLYCOSYLATED A1C: CPT

## 2022-08-03 PROCEDURE — 99213 OFFICE O/P EST LOW 20 MIN: CPT

## 2022-08-03 NOTE — PATIENT INSTRUCTIONS
Call scheduling at [phone] to get your CT scheduled    Please get labs and CT Scan done today. Go to the ED if your symptoms worsen.

## 2022-08-03 NOTE — PROGRESS NOTES
Outpatient Clinic Established Patient Note    Patient: Gwen Valdivia  : 1955 (78 y.o.)  Date: 8/3/2022    CC: Nausea, abdominal distension, anorexia x 1 week    HPI:      The patient is a 50-year-old male with past medical history of hypertension, hyperlipidemia, vitamin D deficiency, DVT on Coumadin and was recently seen in the office for lower extremity swelling due to medication change. He reports that he has been doing well from blood pressure point of view but reports 1 week of anorexia associated with abdominal distention and has not been tolerating diet well. He reports vomiting clear liquid and denies any abdominal pain. He also denied any emotional stressors that could potentially lead to anorexia. He denies any fevers, chills, chest pain, shortness of breath, change in diet, abdominal pain. Physical exam remarkable for distended abdomen with masses palpated in the epigastric as well as umbilical region. No tenderness or rebound tenderness noted in the abdomen. No suprapubic tenderness. Chest clear to auscultation. Bilateral lower extremity without any edema. Of note, the patient had CT scan of the abdomen done 3 years ago that did not show any concern. He follows up with Dr. Rocío Hendrix from GI. Home Meds:  Prior to Visit Medications    Medication Sig Taking?  Authorizing Provider   furosemide (LASIX) 20 MG tablet TAKE 1 TABLET BY MOUTH EVERY DAY Yes Santosh Damico MD   olmesartan (BENICAR) 20 MG tablet Take 1 tablet by mouth daily Yes Isidro Donovan MD   amLODIPine (NORVASC) 5 MG tablet Take 1 tablet by mouth daily Yes Isidro Donovan MD   nicotine (NICODERM CQ) 14 MG/24HR APPLY 1 3[facility] Yes Fanny Mcclure MD   vitamin D3 (CHOLECALCIFEROL) 25 MCG (1000 UT) TABS tablet Take 1 tablet by mouth daily Yes Radha Roblero MD   melatonin (RA MELATONIN) 3 MG TABS tablet Take 1 tablet by mouth nightly as needed (Sleep) Yes Radha Roblero MD   warfarin (COUMADIN) 5 MG tablet TAKE 1 TABLET BY MOUTH EVERY DAY Yes Mauricio Pompa MD   folic acid (FOLVITE) 1 MG tablet TAKE 1 TABLET BY MOUTH EVERY DAY Yes Kelli Mosquera MD   diclofenac sodium (VOLTAREN) 1 % GEL Apply 4 g topically 4 times daily as needed for Pain Yes Atul Betancourt MD   amLODIPine (NORVASC) 10 MG tablet TAKE 1 TABLET BY MOUTH EVERY DAY  Patient not taking: Reported on 8/3/2022  Atul Betancourt MD   methadone (DOLOPHINE) 10 MG/ML solution Take 35 mg by mouth daily. Patient not taking: No sig reported  Historical Provider, MD       Allergies:    Valsartan-hydrochlorothiazide    Health Maintenance Due   Topic Date Due    Annual Wellness Visit (AWV)  Never done    Shingles vaccine (2 of 3) 04/02/2018    COVID-19 Vaccine (3 - Booster for Moderna series) 11/16/2021       Immunization History   Administered Date(s) Administered    COVID-19, MODERNA BLUE border, Primary or Immunocompromised, (age 12y+), IM, 100 mcg/0.5mL 05/18/2021, 06/16/2021    Influenza Virus Vaccine 11/15/2017    Influenza, High Dose (Fluzone 65 yrs and older) 11/22/2019    Influenza, MDCK Quadv, with preserv IM (Flucelvax 2 yrs and older) 08/31/2021    Influenza, Quadv, IM, (6 mo and older Fluzone, Flulaval, Fluarix and 3 yrs and older Afluria) 11/15/2017    Influenza, Quadv, IM, PF (6 mo and older Fluzone, Flulaval, Fluarix, and 3 yrs and older Afluria) 11/14/2018    Influenza, Quadv, adjuvanted, 65 yrs +, IM, PF (Fluad) 10/08/2020    Influenza, Triv, inactivated, subunit, adjuvanted, IM (Fluad 65 yrs and older) 09/18/2019    Pneumococcal Conjugate 13-valent (Onnrnim28) 01/31/2018    Pneumococcal Polysaccharide ([id number]) 05/01/2018    Tdap (Boostrix, Adacel) 08/31/2021    Zoster Live (Zostavax) 02/05/2018       Review of Systems  A 10-organ Review Of Systems was obtained and otherwise unremarkable except as per HPI. Data: Old records have been reviewed electronically.     PHYSICAL EXAM:  /83 (Site: Left Upper Arm, Position: Sitting, Cuff Size: Medium Adult)   Pulse 76   Temp 97.3 °F (36.3 °C) (Temporal)   Ht 5' 4\" (1.626 m)   Wt 168 lb 6.4 oz (76.4 kg)   SpO2 98%   BMI 28.91 kg/m²   Physical Exam  Constitutional:       General: He is not in acute distress. Appearance: He is not ill-appearing or toxic-appearing. HENT:      Mouth/Throat:      Mouth: Mucous membranes are dry. Cardiovascular:      Rate and Rhythm: Normal rate and regular rhythm. Pulses: Normal pulses. Heart sounds: Normal heart sounds. Pulmonary:      Effort: Pulmonary effort is normal.      Breath sounds: Normal breath sounds. Abdominal:      General: There is distension. Palpations: There is mass. Tenderness: There is no abdominal tenderness. There is no right CVA tenderness, left CVA tenderness, guarding or rebound. Hernia: A hernia is present. Musculoskeletal:         General: No swelling. Right lower leg: No edema. Left lower leg: No edema. Skin:     General: Skin is warm. Neurological:      General: No focal deficit present. Mental Status: He is alert. Psychiatric:         Mood and Affect: Mood normal.         Behavior: Behavior normal.         Thought Content: Thought content normal.         Judgment: Judgment normal.       Assessment & Plan:      1. Anorexia  Reportedly of more than 1 week duration. Almost 10 pounds of weight loss in the last 1 month. Abdominal distention reported with the patient. No abdominal tenderness. Reports vomiting within 30 minutes of eating or drinking. Reports no dysphagia. No odynophagia. Previous CT scan done in 2018 showed no mass. Follows up with Dr. Reji Bender from GI.  - Lipase  - CBC with Auto Differential  - Comprehensive Metabolic Panel  - CT ABDOMEN PELVIS W IV CONTRAST Additional Contrast? Oral  Plan: Follow-up outpatient after getting stat lab results and CT scan.   If unremarkable, have the patient follow-up with GI.    2. Periumbilical mass  Known history of abdominal masses. Patient coming in with abdominal distention without tenderness but with inability to tolerate diet including fluids.  - Lipase  - CBC with Auto Differential  - Comprehensive Metabolic Panel  - CT ABDOMEN PELVIS W IV CONTRAST Additional Contrast? Oral    3. Unintentional weight loss of more than 10 pounds  0 pounds weight loss in the last 1 month. Anorexia. Patient has a significant smoking history. Has not followed up with a CT scan of the chest that was ordered this year. Is currently coming in with loss of appetite. - Lipase  - CBC with Auto Differential  - Comprehensive Metabolic Panel  - CT ABDOMEN PELVIS W IV CONTRAST Additional Contrast? Oral    Return in about 2 days (around 8/5/2022).     Dispo: Pt has been staffed with Dr. Luana Houston MD.   _______________  Moses Lamb MD, 8/3/2022 12:44 PM   PGY-2

## 2022-08-04 ENCOUNTER — ANTI-COAG VISIT (OUTPATIENT)
Dept: PHARMACY | Age: 67
End: 2022-08-04
Payer: MEDICARE

## 2022-08-04 DIAGNOSIS — I82.413 ACUTE DEEP VEIN THROMBOSIS (DVT) OF FEMORAL VEIN OF BOTH LOWER EXTREMITIES (HCC): ICD-10-CM

## 2022-08-04 DIAGNOSIS — I26.99 PULMONARY EMBOLISM, BILATERAL (HCC): Primary | ICD-10-CM

## 2022-08-04 LAB — INTERNATIONAL NORMALIZATION RATIO, POC: 2.1

## 2022-08-04 PROCEDURE — 85610 PROTHROMBIN TIME: CPT

## 2022-08-04 PROCEDURE — 99212 OFFICE O/P EST SF 10 MIN: CPT

## 2022-08-04 NOTE — PROGRESS NOTES
Holly Chow is a 78 y.o. male with PMHx significant for bilateral PE, DVT (6/4/21) while on Xarelto, HTN who presents to clinic 8/4/2022 for anticoagulation monitoring and adjustment. Pt dx with bilat DVT 6/4/21 while on Xarelto 2yrs for hx of PE. Pt stopped Xarelto on 6/6/21 and started warfarin + lovenox bridge on 6/7/21.     Anticoagulation Indication(s):  PE 2018, bilateral DVT 6/4/21 (on Xarelto)  Referring Physician:   Dr. Airam Zuniga (Also send notes to Dr Liz Pollock, or responsible resident)  Goal INR Range:  2-3  Duration of Anticoagulation Therapy:  indefinite  Time of day dose taken: prefers to take in AM  Product patient has at home: warfarin 5 mg (peach color)    INR Summary                           Warfarin regimen (mg)  Date INR   A/P   Sun Mon Tue Wed Thu Fri Sat Mg/wk  8/4 2.1 At goal, increase 5 2.5 2.5 2.5 2.5 2.5 2.5 20  7/28 3.2 Above goal, continue 2.5 2.5 2.5 2.5 2.5 2.5 2.5 17.5  7/21 5.5 Above goal, holdx1  2.5 2.5 2.5 2.5 5 0/2.5 2.5 20  decrease   6/23 3.1 Above goal, continue 5 2.5 2.5 2.5 5 2.5 2.5 22.5  6/9 2.9 At goal, continue  5 2.5 2.5 2.5 5 2.5 2.5 22.5  5/26 1.9 Below goal, increase  5 2.5 2.5 2.5 5 2.5 2.5 22.5  5/12 2.8 At goal, no change 5 2.5 2.5 2.5 2.5 2.5 2.5 20  5/5 4.4 Above goal, holdx1 5 2.5 2.5 2.5 2.5 2.5 2.5 20  4/21 3.1 Above goal, decrease 5 2.5 5 2.5 5 2.5 2.5 25  4/12 2.3 At goal, no change 5 2.5 5 2.5 5 2.5 5 27.5  4/4 2.7 At goal, resume 5 2.5 5 2.5 5 2.5 5 27.5  3/31 7.63 Hold x 3 days  3/17 3.2 Above goal, continue 5 5 5 5 5 5 5 35  3/1 1.5 Below goal, (Missed) 5 5 7.5/5 5 5 5 5 35  2/8 1.7 Below goal, bolus 5 5 7.5/5 5 5 5 5 35  1/20 3.7 Above goal, decrease 5 5 5 5 5 2.5 5 32.5  1/5 3.6 Above goal, hold 5 5 5 5 5 0/5 5 35  12/14   2.2       At goal, no change      5 5 5 5 5 5 5 35  11/18 2.8 At goal, no change 5 5 5 5 5 5 5 35   11/11 1.3 Below goal, cont 5 5 5 5 5 5 5 35  11/4 5.1 Above goal, hold+dec 5 5 5 5 0/5 0/5 5 35  10/25 5.3 Above goal, hold+dec  5 5 0/5 0/5 7.5 5 5 37.5  10/13 1.8 Below goal, increase 5 5 7.5 5 7.5 5 5 40  10/6 1.3 Below goal (missed) 5 5 5 10/5 7.5 5 5 37.5  9/13 2.2 At goal, continue 5 5 5 5 7.5 5 5 37.5  8/30     1.8       Below goal, cont 5 5 5 5 7.5 5 5 37.5  7/26     3.1       At goal, no change 5 5 5 5 7.5 5 5 37.5  7/15 2.1 At goal, increase 5 5 5 5 7.5 5 5 37.5  7/8 5.6 Above goal, holdx1 5 5 5 5 5 5 5 35  6/28 2.1 At goal, no change 5 7.5 5 7.5 5 7.5 5 42.5  6/21 4.3 Above goal, holdx1 5 x 5 7.5 5 7.5 5   6/14 4.2 Above goal, hold 7.5 10 0/7.5 5/7.5 7.5 7.5 7.5   6/10 1.3 Below goal, increase 7.5 INR   10 10 7.5     Started warfarin 5 mg daily on 6/7/21    Last CBC:  Lab Results   Component Value Date    RBC 4.17 (L) 08/03/2022    HGB 13.6 08/03/2022    HCT 41.0 08/03/2022    MCV 98.2 08/03/2022    MCH 32.6 08/03/2022    MPV 7.6 08/03/2022    RDW 15.9 (H) 08/03/2022     08/03/2022     Patient History:  Recent hospitalizations/HC visits 5/13/22: ortho to discuss hip replacement  4/27/22: Dr Susanne Leon ([facility]) recommends IVC placed prior to hip replacement  4/7/22: OV GI- Pt needs to repeat stool hemoccult, then schedule colonoscopy--pt looking for GI doctor closer to home. Recent medication changes None reported   Medications taken regularly that may interact with warfarin or alter INR No significant drug interactions identified   Warfarin dose taken as prescribed 5/3/22: missed 5 mg dose  2/8/22: taking 5 mg QD  using pillbox   Signs/symptoms of bleeding Black stools resolved  Stool hemoccult negative 4/6/22   Vitamin K intake Normally has 0-1 serving of green, leafy vegetables per week; 1c cranberry juice qd  Pt often notes that he has very little appetite.     Recent vomiting/diarrhea/fever, changes in weight or activity level None reported  7/21/22: decreased appetite in the past month   Tobacco or alcohol use Often drinks 2-3 large glasses of wine, but states each glass only has ~4 oz wine mixed with grape juice. Occasionally has more. 1/5/22: quit smoking using 14mg patches (was smoking 2 cig/d)   7/21/22: 1.5 pint of wine a couple nights in the past week   Upcoming surgeries or procedures Hip surgery TBD, will have IVC filter placement 2 days prior to surgery  Colonoscopy TBD    \"As of 5/17/22:  Patient still has nicotine present and also has cocaine present. I have informed the patient that he will need to take care of these issues and passes at least 2 drug tests before we will put him back on the schedule. I have told him for now his surgery is postponed indefinitely\"      Assessment/Plan:  Patient's INR was therapeutic (2.1) today, despite taking a higher warfarin dose than prescribed last week. He is pretty sure he took 2.5 mg daily, except for 5 mg on Sunday. He denies any missed doses. Would not expect INR to trend down this much since he took a higher dose, but INR is often labile. He reports drinking ~2 glasses of wine per day since last visit, which is not a significant change from baseline. He continues to have poor appetite, poor PO intake, and weight loss. He will have CT scan of abdomen soon. Patient likes to eat liver and broccoli, but typically avoids these foods. He often cannot recall his warfarin dose, but he uses a pillbox. Since patient took 20 mg of warfarin over the past week and INR is therapeutic, will try this dose. Patient was instructed to increase warfarin dose to 2.5 mg daily, except 5 mg on Sundays. Repeat INR in 1 week. Patient was reminded to maintain consistent vitamin K intake and call with any bleeding, medication changes, or fever/vomiting/diarrhea. He is to notify the clinic as soon as he is scheduled for a colonoscopy and/or hip surgery so we can instruct him on periop anticoagulation plan. Appears hip surgery is postponed indefinitely until he can pass two drug screens (recent had nicotine and cocaine present).  He sees his ortho-doctor and getting the drug screen on  to determine whether he can have his surgery date scheduled. He is trying to schedule colonoscopy closer to home and will call his insurance about this. Patient understands dosing directions and information discussed. Dosing schedule and follow up appointment given to patient. Progress note routed to referring physician's office. Patient acknowledges working in consult agreement with pharmacist as referred by his/her physician. Next Clinic Appointment:     Please call The [address] [place] [address] Avenue [phone] with any questions. Thanks! Danny Singh.  Sina Flood, PharmD, BCPS  [facility]  Ph: [phone]  2022 6:01 PM    For Pharmacy Admin Tracking Only    Intervention Detail: Adherence Monitorin and Dose Adjustment: 1, reason: Therapy Optimization  Total # of Interventions Recommended: 1  Total # of Interventions Accepted: 1  Time Spent (min): 20

## 2022-08-05 ENCOUNTER — OFFICE VISIT (OUTPATIENT)
Dept: INTERNAL MEDICINE CLINIC | Age: 67
End: 2022-08-05
Payer: MEDICARE

## 2022-08-05 ENCOUNTER — OFFICE VISIT (OUTPATIENT)
Dept: ORTHOPEDIC SURGERY | Age: 67
End: 2022-08-05
Payer: MEDICARE

## 2022-08-05 VITALS
DIASTOLIC BLOOD PRESSURE: 80 MMHG | BODY MASS INDEX: 23.84 KG/M2 | SYSTOLIC BLOOD PRESSURE: 111 MMHG | WEIGHT: 166.5 LBS | RESPIRATION RATE: 20 BRPM | OXYGEN SATURATION: 99 % | HEIGHT: 70 IN | HEART RATE: 72 BPM | TEMPERATURE: 97.2 F

## 2022-08-05 VITALS — HEIGHT: 70 IN | WEIGHT: 166 LBS | BODY MASS INDEX: 23.77 KG/M2

## 2022-08-05 DIAGNOSIS — R63.0 ANOREXIA: ICD-10-CM

## 2022-08-05 DIAGNOSIS — M16.12 PRIMARY OSTEOARTHRITIS OF LEFT HIP: ICD-10-CM

## 2022-08-05 DIAGNOSIS — F17.210 CIGARETTE NICOTINE DEPENDENCE WITHOUT COMPLICATION: ICD-10-CM

## 2022-08-05 DIAGNOSIS — R63.4 UNINTENTIONAL WEIGHT LOSS OF MORE THAN 10 POUNDS: Primary | ICD-10-CM

## 2022-08-05 DIAGNOSIS — R63.4 UNINTENTIONAL WEIGHT LOSS OF MORE THAN 10 POUNDS: ICD-10-CM

## 2022-08-05 DIAGNOSIS — Z01.818 PREOP TESTING: Primary | ICD-10-CM

## 2022-08-05 DIAGNOSIS — F10.10 ALCOHOL ABUSE: ICD-10-CM

## 2022-08-05 DIAGNOSIS — Z01.818 PREOP TESTING: ICD-10-CM

## 2022-08-05 LAB
A/G RATIO: 1.4 (ref 1.1–2.2)
ALBUMIN SERPL-MCNC: 4 G/DL (ref 3.4–5)
ALP BLD-CCNC: 188 U/L (ref 40–129)
ALT SERPL-CCNC: 34 U/L (ref 10–40)
AMPHETAMINE SCREEN, URINE: ABNORMAL
ANION GAP SERPL CALCULATED.3IONS-SCNC: 18 MMOL/L (ref 3–16)
APTT: 33.5 SEC (ref 23–34.3)
AST SERPL-CCNC: 47 U/L (ref 15–37)
BACTERIA: ABNORMAL /HPF
BARBITURATE SCREEN URINE: ABNORMAL
BASOPHILS ABSOLUTE: 0 K/UL (ref 0–0.2)
BASOPHILS RELATIVE PERCENT: 0.4 %
BENZODIAZEPINE SCREEN, URINE: ABNORMAL
BILIRUB SERPL-MCNC: 1.3 MG/DL (ref 0–1)
BILIRUBIN URINE: ABNORMAL
BLOOD, URINE: NEGATIVE
BUN BLDV-MCNC: 8 MG/DL (ref 7–20)
CALCIUM SERPL-MCNC: 9.6 MG/DL (ref 8.3–10.6)
CANNABINOID SCREEN URINE: POSITIVE
CHLORIDE BLD-SCNC: 92 MMOL/L (ref 99–110)
CLARITY: CLEAR
CO2: 26 MMOL/L (ref 21–32)
COCAINE METABOLITE SCREEN URINE: ABNORMAL
COLOR: ABNORMAL
CREAT SERPL-MCNC: 0.6 MG/DL (ref 0.8–1.3)
EOSINOPHILS ABSOLUTE: 0 K/UL (ref 0–0.6)
EOSINOPHILS RELATIVE PERCENT: 0 %
EPITHELIAL CELLS, UA: 6 /HPF (ref 0–5)
GFR AFRICAN AMERICAN: >60
GFR NON-AFRICAN AMERICAN: >60
GLUCOSE BLD-MCNC: 111 MG/DL (ref 70–99)
GLUCOSE URINE: 100 MG/DL
HCT VFR BLD CALC: 40.1 % (ref 40.5–52.5)
HEMOGLOBIN: 13.4 G/DL (ref 13.5–17.5)
HYALINE CASTS: 0 /LPF (ref 0–8)
INR BLD: 1.59 (ref 0.87–1.14)
KETONES, URINE: NEGATIVE MG/DL
LEUKOCYTE ESTERASE, URINE: NEGATIVE
LYMPHOCYTES ABSOLUTE: 1.4 K/UL (ref 1–5.1)
LYMPHOCYTES RELATIVE PERCENT: 20.4 %
Lab: ABNORMAL
MCH RBC QN AUTO: 32.9 PG (ref 26–34)
MCHC RBC AUTO-ENTMCNC: 33.4 G/DL (ref 31–36)
MCV RBC AUTO: 98.4 FL (ref 80–100)
METHADONE SCREEN, URINE: ABNORMAL
MICROSCOPIC EXAMINATION: YES
MONOCYTES ABSOLUTE: 0.4 K/UL (ref 0–1.3)
MONOCYTES RELATIVE PERCENT: 6.7 %
NEUTROPHILS ABSOLUTE: 4.9 K/UL (ref 1.7–7.7)
NEUTROPHILS RELATIVE PERCENT: 72.5 %
NITRITE, URINE: POSITIVE
OPIATE SCREEN URINE: ABNORMAL
OXYCODONE URINE: ABNORMAL
PDW BLD-RTO: 15.9 % (ref 12.4–15.4)
PH UA: 6.5
PH UA: 6.5 (ref 5–8)
PHENCYCLIDINE SCREEN URINE: ABNORMAL
PLATELET # BLD: 283 K/UL (ref 135–450)
PMV BLD AUTO: 7.7 FL (ref 5–10.5)
POTASSIUM SERPL-SCNC: 3.8 MMOL/L (ref 3.5–5.1)
PROPOXYPHENE SCREEN: ABNORMAL
PROTEIN UA: ABNORMAL MG/DL
PROTHROMBIN TIME: 19 SEC (ref 11.7–14.5)
RBC # BLD: 4.07 M/UL (ref 4.2–5.9)
RBC UA: ABNORMAL /HPF (ref 0–4)
SODIUM BLD-SCNC: 136 MMOL/L (ref 136–145)
SPECIFIC GRAVITY UA: 1.02 (ref 1–1.03)
TOTAL PROTEIN: 6.9 G/DL (ref 6.4–8.2)
URINE TYPE: ABNORMAL
UROBILINOGEN, URINE: 4 E.U./DL
WBC # BLD: 6.7 K/UL (ref 4–11)
WBC UA: 2 /HPF (ref 0–5)

## 2022-08-05 PROCEDURE — 99213 OFFICE O/P EST LOW 20 MIN: CPT | Performed by: ORTHOPAEDIC SURGERY

## 2022-08-05 PROCEDURE — 1123F ACP DISCUSS/DSCN MKR DOCD: CPT | Performed by: ORTHOPAEDIC SURGERY

## 2022-08-05 PROCEDURE — 99213 OFFICE O/P EST LOW 20 MIN: CPT

## 2022-08-05 RX ORDER — LANOLIN ALCOHOL/MO/W.PET/CERES
3 CREAM (GRAM) TOPICAL NIGHTLY PRN
Qty: 30 TABLET | Refills: 3 | Status: SHIPPED | OUTPATIENT
Start: 2022-08-05 | End: 2022-11-02

## 2022-08-05 ASSESSMENT — ENCOUNTER SYMPTOMS
SHORTNESS OF BREATH: 0
BLOOD IN STOOL: 0
ABDOMINAL PAIN: 0
ABDOMINAL DISTENTION: 1
NAUSEA: 0
CONSTIPATION: 0
VOMITING: 0
DIARRHEA: 0

## 2022-08-05 NOTE — PATIENT INSTRUCTIONS
Please discontinue alcohol use entirely. Additionally, please begin taking a one-a-day multivitamin (can  from store of your choosing, no prescription needed). Please return in 6 weeks with labs done.  Please complete labs no earlier than 1 week before arriving

## 2022-08-05 NOTE — PROGRESS NOTES
Outpatient Clinic Established Patient Note    Patient: Layo Flores  : 1955 (78 y.o.)  Date: 2022    CC: 2 day f/u for Nausea, Abdominal Distention, Unintentional weight loss [id number]    HPI:      Layo Flores is a 78year old male with Hx with hypertension, hyperlipidemia, DVT on Coumadin (lifelong) presents for 2 day follow up. He was seen  2 days ago for unintentional weight loss of 1 month, nausea, anorexia, abdominal distention. He reports nausea and vomiting is improving but reports continued inability to tolerate diet. His labs done after last visit show elevated liver enzymes consistent with alcohol abuse; patient admits to drinking a bottle of wine and multiple cans of beer nightly. He denies every experiencing withdrawal symptoms. He denies abdominal pain, diarrhea, changes in urination, chest pain, shortness of breath. Home Meds:  Prior to Visit Medications    Medication Sig Taking?  Authorizing Provider   melatonin (RA MELATONIN) 3 MG TABS tablet Take 1 tablet by mouth nightly as needed (Sleep) Yes Carmen Dooley MD   furosemide (LASIX) 20 MG tablet TAKE 1 TABLET BY MOUTH EVERY DAY Yes Noe Norris MD   amLODIPine (NORVASC) 5 MG tablet Take 1 tablet by mouth daily Yes Milad aVliente MD   nicotine (NICODERM CQ) 14 MG/24HR APPLY 1 PATCH ONTO THE SKIN EVERY DAY Yes Chelsie Edgar MD   vitamin D3 (CHOLECALCIFEROL) 25 MCG (1000 UT) TABS tablet Take 1 tablet by mouth daily Yes Jose G Young MD   warfarin (COUMADIN) 5 MG tablet TAKE 1 TABLET BY MOUTH EVERY DAY Yes Marlene Suero MD   folic acid (FOLVITE) 1 MG tablet TAKE 1 TABLET BY MOUTH EVERY DAY Yes Ce Hand MD   diclofenac sodium (VOLTAREN) 1 % GEL Apply 4 g topically 4 times daily as needed for Pain Yes Freddy Longoria MD   olmesartan (BENICAR) 20 MG tablet Take 1 tablet by mouth daily  Patient not taking: Reported on 2022  Milad Valiente MD   methadone (DOLOPHINE) 10 MG/ML solution Take 35 mg by mouth daily. Patient not taking: No sig reported  Historical Provider, MD       Allergies:    Valsartan-hydrochlorothiazide    Health Maintenance Due   Topic Date Due    Shingles vaccine (2 of 3) 04/02/2018    COVID-19 Vaccine (3 - Booster for Moderna series) 11/16/2021       Immunization History   Administered Date(s) Administered    COVID-19, MODERNA BLUE border, Primary or Immunocompromised, (age 12y+), IM, 100 mcg/0.5mL 05/18/2021, 06/16/2021    Influenza Virus Vaccine 11/15/2017    Influenza, High Dose (Fluzone 65 yrs and older) 11/22/2019    Influenza, MDCK Quadv, with preserv IM (Flucelvax 2 yrs and older) 08/31/2021    Influenza, Quadv, IM, (6 mo and older Fluzone, Flulaval, Fluarix and 3 yrs and older Afluria) 11/15/2017    Influenza, Quadv, IM, PF (6 mo and older Fluzone, Flulaval, Fluarix, and 3 yrs and older Afluria) 11/14/2018    Influenza, Quadv, adjuvanted, 65 yrs +, IM, PF (Fluad) 10/08/2020    Influenza, Triv, inactivated, subunit, adjuvanted, IM (Fluad 65 yrs and older) 09/18/2019    Pneumococcal Conjugate 13-valent ([id number]) 01/31/2018    Pneumococcal Polysaccharide ([id number]) 05/01/2018    Tdap (Boostrix, Adacel) 08/31/2021    Zoster Live (Zostavax) 02/05/2018       Review of Systems   Respiratory:  Negative for shortness of breath. Cardiovascular:  Negative for chest pain. Gastrointestinal:  Positive for abdominal distention. Negative for abdominal pain, blood in stool, constipation, diarrhea, nausea and vomiting. Genitourinary:  Negative for difficulty urinating, dysuria and frequency. Neurological:  Negative for dizziness, tremors, light-headedness and headaches. A 10-organ Review Of Systems was obtained and otherwise unremarkable except as per HPI. Data: Old records have been reviewed electronically.     PHYSICAL EXAM:  /80 (Site: Left Upper Arm, Position: Sitting, Cuff Size: Medium Adult)   Pulse 72   Temp 97.2 °F (36.2 °C) (Temporal)   Resp 20   Ht 5' 10\" (1.778 m) Wt 166 lb 8 oz (75.5 kg)   SpO2 99%   BMI 23.89 kg/m²   Physical Exam  Constitutional:       Appearance: He is not ill-appearing. HENT:      Head: Normocephalic and atraumatic. Mouth/Throat:      Mouth: Mucous membranes are moist.   Eyes:      General: No scleral icterus. Extraocular Movements: Extraocular movements intact. Pupils: Pupils are equal, round, and reactive to light. Cardiovascular:      Rate and Rhythm: Normal rate and regular rhythm. Pulses: Normal pulses. Heart sounds: No murmur heard. No friction rub. Pulmonary:      Effort: Pulmonary effort is normal.      Breath sounds: Normal breath sounds. No wheezing or rales. Abdominal:      Comments: Diasthesis rectus present. Abdomen soft and distended, no signs of fluid shift. No tenderness to palpation. Hepatomegaly appreciated. Musculoskeletal:      Right lower leg: No edema. Left lower leg: No edema. Neurological:      General: No focal deficit present. Mental Status: He is alert and oriented to person, place, and time. Assessment & Plan:        Unintentional weight loss of more than 10 pounds  15 pounds weight loss in the last 1 month likely due to N/V. Patient has a significant smoking history. Has not followed up with a CT scan of the chest that was ordered this year. Is currently coming in with loss of appetite. Labs from 8/3 showed Lipase 20 (WNL), CBC WNL, CMP shows elevated liver enzymes (ALT 44, AST 77) consistent with heavy alcohol abuse. Patient drinks a pint of wine and multiple cans of beers a week, pt advised to discontinue alcohol use completely  - Begin taking OTC multivitamin  - CMP and CBC before next visit as below    Anorexia  Reportedly of more than 1 week duration. Almost 10 pounds of weight loss in the last 1 month. Abdominal distention reported with the patient. No abdominal tenderness. Reports vomiting after drinking water, but less with food.   Reports no dysphagia. No odynophagia. Previous CT scan done in 2018 showed no mass. Follows up with Dr. Rocío Hendrix from GI. Labs from 8/3 showed Lipase 20 (WNL), CBC WNL, CMP shows elevated liver enzymes (ALT 44, AST 77)  Patient counseled on abstaining from alcohol use given deranged liver enzymes. - Begin taking OTC multivitamin  - CMP and CBC before next visit in 6 weeks    Alcohol Abuse  Patient reportedly drinks one bottle of wine and multiple cans of beer nightly. He denies ever experiencing withdrawal symptoms   - Patient counseled on importance of abstaining from alcohol. Patient is willing to try abstaining.  - will check trend in liver enzymes in 6 weeks. - CMP and CBC    Return in about 6 weeks (around 9/16/2022).     Dispo: Pt has been staffed with Dr. Lukasz Navarro  _______________  Gordon Yee MD, 8/5/2022 10:22 AM   PGY-1

## 2022-08-05 NOTE — PROGRESS NOTES
Dr Princess Mike      Date /Time 8/5/2022       10:52 AM EST  Name Larry Canchola             1955   Location  [facility]  MRN [id number]                Chief Complaint   Patient presents with    Hip Pain     CK LEFT HIP         History of Present Illness    Larry Canchola is a 78 y.o. male who presents with  bilateral hip pain. Current history: His pain has gotten worse and is interested in surgery. Pain continues to be concentrated over his groin. He does have increased pain with activities and improvement with rest.  He again states that he has stopped smoking and is clean from heroin. He is currently on Coumadin for history of DVT. I have reviewed Dr. Celine Polo note and there is plans for IVF filter before total hip arthroplasty. Previous history: He presents for bilateral hip pain, left worse than right. He is interested in surgical solution for this at this point. His pain is gotten significantly worse than previous visits. He reports he has quit smoking almost completely for the last month and a half. He still reports that his heroin addiction is in remission. He has switched to taking Coumadin instead of Xarelto for history of venous thrombolic disease  He says he is quit smoking. Previous history: At patient's last visit he was complaining of bilateral extremity swelling. We were concerned about a DVT. We did order a bilateral lower extremity venous Doppler. They were positive for massive DVTs of bilateral lower extremity while taking Xarelto. Since then he has been placed on Coumadin. He states his lower extremity swelling is improving but continues to have significant hip pain. He does have advanced osteoarthritis of his hip. Previous history: Patient presents the office today for a new problem. Patient is here with a chief complaint of left greater than right hip pain. Patient has had pain for several months with increased symptoms over the last few weeks. His symptoms are concentrated lateral greater than groin. Patient does have increased pain with weightbearing activities. He also complains of lumbar pain and bilateral thigh pain. He does not complain of any pain symptoms in his lower legs or feet. No numbness and tingling. He has tried NSAIDs, activity modifications and assistive devices without any significant improvement. He does have a history of a PE and is taking Xarelto. He also has a history of heroin addiction but states he has been clean for 1 year.       Pain Assessment  Location of Pain: Pelvis  Location Modifiers: Left  Severity of Pain: 8  Quality of Pain: Aching  Duration of Pain: Persistent  Frequency of Pain: Constant]    Past History  Past Medical History:   Diagnosis Date    Anxiety 2010    Back pain 2012    DVT (deep venous thrombosis) (HCC)     History of heroin abuse ([facility] [address]     HLD (hyperlipidemia) 3/23/2022    Hypertension 10/25/2011    Pulmonary embolism ([facility] [address]     FRANCHESKA     Past Surgical History:   Procedure Laterality Date    COLONOSCOPY N/A 2019    COLONOSCOPY performed by Parag Fuentes MD at [place] [address]  2019    COLONOSCOPY WITH BIOPSY performed by Parag Fuentes MD at [address] [place] [address] Right     ENDOSCOPY, COLON, DIAGNOSTIC      UPPER GASTROINTESTINAL ENDOSCOPY N/A 2020    EGD DIAGNOSTIC ONLY performed by Parag Fuentes MD at [facility] N/A 3/18/2020    PUSH ENTEROSCOPY performed by Parag Fuentes MD at [facility] History     Tobacco Use    Smoking status: Former     Packs/day: 0.50     Years: 30.00     Pack years: 15.00     Types: Cigars, Cigarettes     Start date:      Quit date: 2021     Years since quittin.2    Smokeless tobacco: Never    Tobacco comments:     1 DAILY   Substance Use Topics    Alcohol use: Yes     Comment: pint of wine a day      Current Outpatient Medications on File Prior to Visit   Medication Sig Dispense Refill    furosemide (LASIX) 20 MG tablet TAKE 1 TABLET BY MOUTH EVERY DAY 30 tablet 0    olmesartan (BENICAR) 20 MG tablet Take 1 tablet by mouth daily (Patient not taking: Reported on 8/5/2022) 30 tablet 3    amLODIPine (NORVASC) 5 MG tablet Take 1 tablet by mouth daily 30 tablet 3    nicotine (NICODERM CQ) 14 MG/24HR APPLY 1 PATCH ONTO THE SKIN EVERY DAY 28 patch 1    vitamin D3 (CHOLECALCIFEROL) 25 MCG (1000 UT) TABS tablet Take 1 tablet by mouth daily 90 tablet 1    warfarin (COUMADIN) 5 MG tablet TAKE 1 TABLET BY MOUTH EVERY DAY 90 tablet 1    folic acid (FOLVITE) 1 MG tablet TAKE 1 TABLET BY MOUTH EVERY DAY 90 tablet 1    diclofenac sodium (VOLTAREN) 1 % GEL Apply 4 g topically 4 times daily as needed for Pain 50 g 3    methadone (DOLOPHINE) 10 MG/ML solution Take 35 mg by mouth daily. (Patient not taking: No sig reported)       No current facility-administered medications on file prior to visit. ASCVD 10-YEAR RISK SCORE  The 10-year ASCVD risk score (Maurizio Nunez, et al., 2013) is: 13.4%    Values used to calculate the score:      Age: 78 years      Sex: Male      Is Non- : Yes      Diabetic: No      Tobacco smoker: No      Systolic Blood Pressure: 020 mmHg      Is BP treated: Yes      HDL Cholesterol: 58 mg/dL      Total Cholesterol: 232 mg/dL   . Review of Systems  10-point ROS is negative other than HPI. Physical Exam  Based off 1997 Exam Criteria  Ht 5' 10\" (1.778 m)   Wt 166 lb (75.3 kg)   BMI 23.82 kg/m²      Constitutional:       General: He is not in acute distress. Appearance: Normal appearance. Cardiovascular:      Rate and Rhythm: Normal rate and regular rhythm. Pulses: Normal pulses. Pulmonary:      Effort: Pulmonary effort is normal. No respiratory distress. Neurological:      Mental Status: He is alert and oriented to person, place, and time. Mental status is at baseline.      Musculoskeletal:  Gait:  antalgic    Spine / Hip Exam: RIGHT  LEFT    Lumbar Spine Exam  [] All Neg    [] All Neg     Straight leg raise  []  []Not tested   []  []Not tested    Clonus  []  []Not tested   []  []Not tested    Pain with motion  [x]  []Not tested   [x]  []Not tested    Radiculopathy  [x]  []Not tested   [x]  []Not tested    Paraspinal muscle tenderness  [x] Paraspinal  [x]Midline   [x] Paraspinal  [x]Midline   Sensation RIGHT  LEFT    L3  [x] Normal []Decreased    [x] Normal []Decreased   L4  [] Normal  [x]Decreased   [] Normal [x]Decreased   L5  [] Normal [x]Decreased   [] Normal [x]Decreased   S1  [] Normal  [x]Decreased   [] Normal [x]Decreased   Pelvis       Scoliosis  [] Nml  [x] Present     Leg-length discrepency  [x] Equal  [] Right longer   [] Left longer   Range of Motion Active Passive Active Passive   Hip Flexion 90  90    Abduction 20  20    External Rotation @ 90 flex 35  35    Internal Rotation @ 90 flex -10  -10           Hip Impingement / Dysplasia  [] All Neg  [] Not tested   [] All Neg  [] Not tested    Hip impingement test  [x]  []Not tested   [x]  []Not tested    C-sign  [x]  []Not tested   [x]  []Not tested    Anterior instability apprehension  []  []Not tested   []  []Not tested    Posterior instability apprehension  []  []Not tested   []  []Not tested    Uncontained Internal rotation  []  []Not tested  []  []Not tested          Abductors  [x] All Neg  [] Not tested   [x] All Neg  [] Not tested    Medius strength  []  []Not tested   []  []Not tested    Minimum strength  []  []Not tested   []  []Not tested    IT band tendonitis  []  []Not tested   []  []Not tested    Trochanteric tenderness  []  []Not tested  []  []Not tested   Sciatic neuropathic pain  []  []Not tested   []  []Not tested           Post-arthroplasty  [] All Neg  [] Not tested   [] All Neg  [] Not tested    Rectus tendonitis  []  []Not tested   []  []Not tested    Iliopsoas tendonitis       Start-up pain  []  []Not tested   []  []Not tested      Markedly stiff hips, stiff painful back as well. Some radiculopathy. Calf swelling improved compared to previous exam.    Imaging    Previous Bilateral hip: [facility] [place]  Previous Radiographs: End-stage osteoarthritis with severe osteophyte formation, joint space narrowing, cyst and sclerosis. Arthritic and lordotic lumbar spine as well. Some spondylolisthesis present in the lower lumbar segments. Enthesophyte bone formation present with likely DISH of the lumbar spine. Assessment and Plan  There are no diagnoses linked to this encounter. Patient is suffering from advanced osteoarthritis osteoarthritis of bilateral hips. Patient is currently anticoagulated for massive bilateral DVTs. He did develop massive bilateral lower extremity DVTs while already anticoagulated for previous DVTs on Xarelto. Patient has been converted to Coumadin. There are plans for IVF filter. I will have patient perform preoperative labs including a drug test and a nicotine test.  He will follow-up in the office afterwards    I discussed with Madhu Wattsson that his history, symptoms, signs and imaging are most consistent with hip arthritis    We reviewed the natural history of these conditions and treatment options ranging from conservative measures (rest, icing, activity modification, physical therapy, pain meds, cortisone injection)  to surgical options. We had a long discussion with the patient about their hip. We discussed surgical and non surgical options for hip arthritis. The most important thing is to work to maintain their range of motion. Next they can try medications including tylenol and NSAIDs. They can try glucosamine or chondroitin. They should also ice frequently and avoid activities that make their hip hurt. Cortisone injections and Synvisc injections are also options when medicine has failed. We finally discussed surgical options including arthroscopic debridement versus hip replacement.  Often the arthritis is too far gone for an arthroscopic debridement and pain relief will be short term. Their ultimate solution will be a hip replacement when they are ready for it. They should put it off until they can no longer stand the pain and when nothing else has worked. Conservative measures have failed. He is not interested in cortisone injections. I think he is an appropriate candidate for surgery due to his ongoing symptoms and dysfunction despite conservative measures. The procedure would be  [address]83 Total Hip Arthroplasty, left direct anterior    Perioperative considerations include:  Preop PCP eval, DVT History, PE History and Chronic anticoagulation other than Aspirin. We reviewed the risks, benefits, alternatives of this approach. We discussed risks including, but not limited to, bleeding, pain, infection, scarring, damage to the neurovascular structures, blood clots, pulmonary embolus, stiffness, implant instability or loosening, implant failure, incomplete relief of pain, and incomplete return of function. His perioperative risk is significantly higher than a standard replacement. First, he has a history of venous thrombolic disease, last known clot was 2 years ago. He now is on Coumadin and remains anticoagulated. It is possible that he requires a preoperative IVC filter prior to elective surgery. Second, he has a history of nicotine dependence and smoking. We had a discussion regarding its implications. He says he has quit for now. We will require preoperative blood work as well as nicotine test with carboxyhemoglobin to determine status. In addition, he has a history of previous heroin addiction, which has been in remission for over a year. And lastly, I believe he has a high risk for heterotopic ossification following this operation. We will plan for 7 Gy preoperative radiation therapy the day before the operation.       We also reviewed the surgical details, expected recovery, and rehabilitation (6-9 months). He expressed understanding and will undergo preoperative medical evaluation and optimization. He is definitely an inpatient candidate at the time of surgery considering all the comorbidities and close monitoring we will need to perform.

## 2022-08-06 LAB
ABO/RH: NORMAL
ANTIBODY SCREEN: NORMAL
ESTIMATED AVERAGE GLUCOSE: 131.2 MG/DL
HBA1C MFR BLD: 6.2 %

## 2022-08-07 LAB
TRANSFERRIN: 253 MG/DL (ref 200–360)
URINE CULTURE, ROUTINE: NORMAL

## 2022-08-10 LAB
3-OH-COTININE: 14 NG/ML
COTININE: 33 NG/ML
NICOTINE: <2 NG/ML

## 2022-08-16 ENCOUNTER — ANTI-COAG VISIT (OUTPATIENT)
Dept: PHARMACY | Age: 67
End: 2022-08-16
Payer: MEDICARE

## 2022-08-16 DIAGNOSIS — I82.413 ACUTE DEEP VEIN THROMBOSIS (DVT) OF FEMORAL VEIN OF BOTH LOWER EXTREMITIES (HCC): ICD-10-CM

## 2022-08-16 DIAGNOSIS — I26.99 PULMONARY EMBOLISM, BILATERAL (HCC): Primary | ICD-10-CM

## 2022-08-16 LAB — INTERNATIONAL NORMALIZATION RATIO, POC: 1.4

## 2022-08-16 PROCEDURE — 85610 PROTHROMBIN TIME: CPT

## 2022-08-16 PROCEDURE — 99212 OFFICE O/P EST SF 10 MIN: CPT

## 2022-08-16 NOTE — PROGRESS NOTES
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 Pt dx with bilat DVT 6/4/21 while on Xarelto 2yrs for hx of PE. Pt stopped Xarelto on 6/6/21 and started warfarin + lovenox bridge on 6/7/21.     Anticoagulation Indication(s):  PE 2018, bilateral DVT 6/4/21 (on Xarelto)  Referring Physician:   Dr. Shahab Sheehan (Also send notes to Dr Neal Roberts, or responsible resident)  Goal INR Range:  2-3  Duration of Anticoagulation Therapy:  indefinite  Time of day dose taken: prefers to take in AM  Product patient has at home: warfarin 5 mg (peach color)    INR Summary                           Warfarin regimen (mg)  Date INR   A/P   Sun Mon Tue Wed Thu Fri Sat Mg/wk  8/16 1.4 Below goal, increase 5 2.5 5 2.5 5 2.5 2.5 25  8/4 2.1 At goal, increase 5 2.5 2.5 2.5 2.5 2.5 2.5 20  7/28 3.2 Above goal, continue 2.5 2.5 2.5 2.5 2.5 2.5 2.5 17.5  7/21 5.5 Above goal, holdx1  2.5 2.5 2.5 2.5 5 0/2.5 2.5 20  decrease   6/23 3.1 Above goal, continue 5 2.5 2.5 2.5 5 2.5 2.5 22.5  6/9 2.9 At goal, continue  5 2.5 2.5 2.5 5 2.5 2.5 22.5  5/26 1.9 Below goal, increase  5 2.5 2.5 2.5 5 2.5 2.5 22.5  5/12 2.8 At goal, no change 5 2.5 2.5 2.5 2.5 2.5 2.5 20  5/5 4.4 Above goal, holdx1 5 2.5 2.5 2.5 2.5 2.5 2.5 20  4/21 3.1 Above goal, decrease 5 2.5 5 2.5 5 2.5 2.5 25  4/12 2.3 At goal, no change 5 2.5 5 2.5 5 2.5 5 27.5  4/4 2.7 At goal, resume 5 2.5 5 2.5 5 2.5 5 27.5  3/31 7.63 Hold x 3 days  3/17 3.2 Above goal, continue 5 5 5 5 5 5 5 35  3/1 1.5 Below goal, (Missed) 5 5 7.5/5 5 5 5 5 35  2/8 1.7 Below goal, bolus 5 5 7.5/5 5 5 5 5 35  1/20 3.7 Above goal, decrease 5 5 5 5 5 2.5 5 32.5  1/5 3.6 Above goal, hold 5 5 5 5 5 0/5 5 35  12/14   2.2       At goal, no change      5 5 5 5 5 5 5 35  11/18 2.8 At goal, no change 5 5 5 5 5 5 5 35   11/11 1.3 Below goal, cont 5 5 5 5 5 5 5 35  11/4 5.1 Above goal, hold+dec 5 5 5 5 0/5 0/5 5 35  10/25 5.3 Above goal, hold+dec  5 5 0/5 0/5 7.5 5 5 37.5  10/13 1.8 Below goal, increase 5 5 7.5 5 7.5 5 5 40  10/6 1.3 Below goal (missed) 5 5 5 10/5 7.5 5 5 37.5  9/13 2.2 At goal, continue 5 5 5 5 7.5 5 5 37.5  8/30     1.8       Below goal, cont 5 5 5 5 7.5 5 5 37.5  7/26     3.1       At goal, no change 5 5 5 5 7.5 5 5 37.5  7/15 2.1 At goal, increase 5 5 5 5 7.5 5 5 37.5  7/8 5.6 Above goal, holdx1 5 5 5 5 5 5 5 35  6/28 2.1 At goal, no change 5 7.5 5 7.5 5 7.5 5 42.5  6/21 4.3 Above goal, holdx1 5 x 5 7.5 5 7.5 5   6/14 4.2 Above goal, hold 7.5 10 0/7.5 5/7.5 7.5 7.5 7.5   6/10 1.3 Below goal, increase 7.5 INR   10 10 7.5     Started warfarin 5 mg daily on 6/7/21    Last CBC:  Lab Results   Component Value Date    RBC 4.07 (L) 08/05/2022    HGB 13.4 (L) 08/05/2022    HCT 40.1 (L) 08/05/2022    MCV 98.4 08/05/2022    MCH 32.9 08/05/2022    MPV 7.7 08/05/2022    RDW 15.9 (H) 08/05/2022     08/05/2022     Patient History:  Recent hospitalizations/HC visits 5/13/22: ortho to discuss hip replacement  4/27/22: Dr Cathleen Peabody ([facility]) recommends IVC placed prior to hip replacement  4/7/22: [facility]- Pt needs to repeat stool hemoccult, then schedule colonoscopy--pt looking for GI doctor closer to home. Recent medication changes Now taking melatonin for sleep (8/16)  Started Multivitamin - unsure of Vit K content as of 8/16   Medications taken regularly that may interact with warfarin or alter INR No significant drug interactions identified   Warfarin dose taken as prescribed 5/3/22: missed 5 mg dose  2/8/22: taking 5 mg QD  using pillbox   Signs/symptoms of bleeding Black stools resolved  Stool hemoccult negative 4/6/22   Vitamin K intake Normally has 0-1 serving of green, leafy vegetables per week; 1c cranberry juice qd  Pt often notes that he has very little appetite.     Recent vomiting/diarrhea/fever, changes in weight or activity level None Repeat INR in 1 week. Patient was reminded to maintain consistent vitamin K intake and call with any bleeding, medication changes, or fever/vomiting/diarrhea. He is to notify the clinic as soon as he is scheduled for a colonoscopy and/or hip surgery so we can instruct him on periop anticoagulation plan. Appears hip surgery is postponed indefinitely until he can pass two drug screens (recent had nicotine and cocaine present). He saw his ortho-doctor and getting the drug screen on 8/5 to determine whether he can have his surgery date scheduled. As of 8/16 he is still waiting on test results for this. He is trying to schedule colonoscopy closer to home and will call his insurance about this. Patient understands dosing directions and information discussed. Dosing schedule and follow up appointment given to patient. Progress note routed to referring physician's office. Patient acknowledges working in consult agreement with pharmacist as referred by his/her physician. Next Clinic Appointment: 8/23    Please call The [address] [place] [address] Avenue [phone] with any questions. Thanks!   Derik Ordaz of Pharmacy   [place] [employer] [address] [place] [address] Med[facility]  Ph: [phone]  8/16/2022 10:20 AM    For Pharmacy Admin Tracking Only    Intervention Detail: Dose Adjustment: 1, reason: Therapy Optimization  Total # of Interventions Recommended: 1  Total # of Interventions Accepted: 1  Time Spent (min): 20

## 2022-08-16 NOTE — PATIENT INSTRUCTIONS
[facility]    [address] [facility] [address]    Phone:  [phone]       Thank you for choosing us for your health care needs. Please help us to ensure your records are accurate. Discuss any inaccuracies with the treating / prescribing physician or your Primary Care Provider.             Nora Saha   2017 11:00 AM   Office Visit   MRN: [id number]    Department:  [facility]   Dept Phone:  [phone]    Description:  Female : 1971   Provider:  Tommie Dorado MD           Your Information     Date Of Birth Race Ethnicity Preferred Language       1971 White Not of  or  Origin English       Information your provider wants you to know    Follow up in 6 months for annual testing           Transplant Information        Txp Date Organ Coordinator Care Team    2013 Heart [place] Maria RN Referring Provider:  Micheal Henderson MD   Transplant Surgeon:  Abdulkadir Chahal MD         Your To Do List     Follow-Up    Return in about 6 months (around 2017).      Allergies     Penicillins Other (See Comments)    As child not sure of reaction    Seasonal HEADACHES    Runny nose      Problem List as of 2017     Acute myocardial infarction    Cardiogenic shock    S/P CABG (coronary artery bypass graft)    Respiratory insufficiency    Thrombocytopenia    LVAD (left ventricular assist device) present    S/P orthotopic heart transplant    Immunosuppression    Dyslipidemia      Your Vitals Were     BP Pulse Height Weight SpO2 BMI    121/69 (BP Location: [facility], Patient Position: Sitting) 104 5' 2\" (1.575 m) 121 lb 12.8 oz (55.2 kg) 98% 22.28 kg/m2    Smoking Status                   Never Smoker                 Current Medications (as reported by you and your prescribing providers):        Disp Refills Start End    tacrolimus (PROGRAF) 1 MG capsule 540 capsule 3 2017     Sig -  Bring multivitamin to next visit.      Call the schedule CT scan    Do not drink alcohol Route: Take 3 capsules by mouth 2 times daily. Take with 0.5 mg in the morning for 3.5 total in the AM and 3 mg in the evening. - Oral    Class: Eprescribe    tacrolimus (PROGRAF) 0.5 MG capsule 90 capsule 3 1/4/2017     Sig: Take 1 tablet by mouth daily.  Take with three 1 mg tablets for a total am dose of 3.5 mg.    Class: Eprescribe    Notes to Pharmacy: Dx: Z94.1 Heart Transplanted 8/24/2013    pravastatin (PRAVACHOL) 40 MG tablet 90 tablet 3 1/4/2017     Sig - Route: Take 1 tablet by mouth nightly. - Oral    Class: Eprescribe    mycophenolate (CELLCEPT) 500 MG tablet 180 tablet 3 1/4/2017     Sig: Take 1 tablet by mouth twice daily.  Take with a 250 mg tablet twice daily for total dose of 750 mg twice daily.    Class: Eprescribe    mycophenolate (CELLCEPT) 250 MG capsule 180 capsule 3 1/4/2017     Sig - Route: Take 1 capsule by mouth 2 times daily. Take with 500 mg BID, Total dose of 750 mg BID - Oral    Class: Eprescribe    cholecalciferol (VITAMIN D3) 1000 UNITS tablet        Sig - Route: Take 1,000 Units by mouth 2 times daily. - Oral    Class: Historical Med    Magnesium Oxide 400 MG CAPS 180 capsule 3 4/2/2014     Sig - Route: Take 400 mg by mouth 2 times daily. - Oral    Class: Eprescribe      Immunization History as of 2/7/2017     Name Date    Hepatitis B Adult 7/25/2013    Pneumococcal Conjugate 13 Valent 5/22/2013    Pneumococcal Polysaccharide Adult  Deferred (Contraindication)            Maintenance Dialysis History     Patient has no recorded history of maintenance dialysis.      Active Patient Thresholds     Patient has no thresholds defined.      Recent Review Flowsheet Data     Transplant Labs Latest Ref Rng 5/6/2015 5/20/2015 8/7/2015 12/8/2015 4/4/2016 8/17/2016 2/3/2017    Tacrolimus Level 5.0 - 20.0 ng/mL - - 9.2 8.4 7.7 5.7 6.9    Hct 36.0 - 46.5 % 40.4 41.2 42.9 42.8 43.6 43.1 44.5    Hgb 12.0 - 15.5 g/dL 14.3 13.9 14.6 14.6 14.9 14.6 15.2    Platelets 140 - 450 K/mcL 180 190 213 174 177  200 183    WBC 4.2 - 11.0 K/mcL 3.7(L) 3.4(L) 3.5(L) 3.4(L) 5.3 5.1 4.5    RBC 4.00 - 5.20 mil/mcL 4.13 4.20 4.38 4.41 4.50 4.54 4.60    Na 135 - 145 mmol/L - - 137 136 138 140 138    K 3.4 - 5.1 mmol/L - - 3.7 4.4 4.1 4.2 4.4    Cl 98 - 107 mmol/L - - 102 102 103 103 104    CO2 21 - 32 mmol/L - - 29 26 28 28 26    Creat 0.51 - 0.95 mg/dL - - 0.99 0.78 0.81 0.80 0.80    BUN 10 - 20 mg/dL - - 12 12 14 15 11    Glucose 65 - 99 mg/dL - - 108(H) 111(H) 112(H) 111(H) 113(H)    Albumin 3.4 - 5.0 g/dL - - 3.7 - - 3.7 -    Alk Phos 45 - 117 Units/L - - 50 - - 47 -    ALT <79 Units/L - - 26 - - 30 -    AST <38 Units/L - - 20 - - 19 -    Total Bili 0.2 - 1.0 mg/dL - - 0.6 - - 0.6 -    Ca 8.4 - 10.2 mg/dL - - 8.8 8.7 8.7 8.9 9.0    Mg 1.7 - 2.4 mg/dL - - 1.5(L) 1.5(L) 1.5(L) 1.7 1.4(L)    Uric Acid 2.6 - 5.9 mg/dL - - 3.6 - - 3.1 -    Hgb A1C 4.5 - 5.6 % - - 5.5 - - 5.3 -    Cholesterol 100 - 200 mg/dL - - 167 - - 175 -    HDL >39 mg/dL - - 73 - - 80 -    LDL Calc <130 mg/dL - - 80 - - 83 -    Triglyceride <150 mg/dL - - 70 - - 59 -    Vit D, 25-Hydroxy 30.0 - 100.0 ng/mL - - 48.9 - - 43.0 -    Creatine 0.51 - 0.95 mg/dL - - 0.99 0.78 0.81 0.80 0.80    T4 Bind Glob 0.8 - 1.5 ng/dL - - 1.1 - - 1.0 -    TSH 0.350 - 5.000 mcUnits/mL - - 1.783 - - 1.343 -            Additional Information     We would appreciate your feedback! When you receive a Patient Satisfaction survey in the mail, we ask that you enter your feedback and return at your earliest convenience.    Thank you

## 2022-08-18 RX ORDER — FUROSEMIDE 20 MG/1
TABLET ORAL
Qty: 30 TABLET | Refills: 0 | Status: SHIPPED | OUTPATIENT
Start: 2022-08-18 | End: 2022-10-21 | Stop reason: SDUPTHER

## 2022-08-23 ENCOUNTER — ANTI-COAG VISIT (OUTPATIENT)
Dept: PHARMACY | Age: 67
End: 2022-08-23
Payer: MEDICARE

## 2022-08-23 DIAGNOSIS — I82.413 ACUTE DEEP VEIN THROMBOSIS (DVT) OF FEMORAL VEIN OF BOTH LOWER EXTREMITIES (HCC): ICD-10-CM

## 2022-08-23 DIAGNOSIS — I26.99 PULMONARY EMBOLISM, BILATERAL (HCC): Primary | ICD-10-CM

## 2022-08-23 LAB — INR BLD: 2.2

## 2022-08-23 PROCEDURE — 85610 PROTHROMBIN TIME: CPT

## 2022-08-23 PROCEDURE — 99211 OFF/OP EST MAY X REQ PHY/QHP: CPT

## 2022-08-23 NOTE — PROGRESS NOTES
Anna Jose is a 78 y.o. male with PMHx significant for bilateral PE, DVT (6/4/21) while on Xarelto, HTN who presents to clinic 8/23/2022 for anticoagulation monitoring and adjustment. Pt dx with bilat DVT 6/4/21 while on Xarelto 2yrs for hx of PE. Pt stopped Xarelto on 6/6/21 and started warfarin + lovenox bridge on 6/7/21.     Anticoagulation Indication(s):  PE 2018, bilateral DVT 6/4/21 (on Xarelto)  Referring Physician:   Dr. Hans Benitez (Also send notes to Dr Rich Montanez, or responsible resident)  Goal INR Range:  2-3  Duration of Anticoagulation Therapy:  indefinite  Time of day dose taken: prefers to take in AM  Product patient has at home: warfarin 5 mg (peach color)    INR Summary                           Warfarin regimen (mg)  Date INR   A/P   Sun Mon Tue Wed Thu Fri Sat Mg/wk  8/23 2.2 At goal, continue 5 2.5 5 2.5 5 2.5 2.5 25  8/16 1.4 Below goal, increase 5 2.5 5 2.5 5 2.5 2.5 25  8/4 2.1 At goal, increase 5 2.5 2.5 2.5 2.5 2.5 2.5 20  7/28 3.2 Above goal, continue 2.5 2.5 2.5 2.5 2.5 2.5 2.5 17.5  7/21 5.5 Above goal, holdx1  2.5 2.5 2.5 2.5 5 0/2.5 2.5 20  decrease   6/23 3.1 Above goal, continue 5 2.5 2.5 2.5 5 2.5 2.5 22.5  6/9 2.9 At goal, continue  5 2.5 2.5 2.5 5 2.5 2.5 22.5  5/26 1.9 Below goal, increase  5 2.5 2.5 2.5 5 2.5 2.5 22.5  5/12 2.8 At goal, no change 5 2.5 2.5 2.5 2.5 2.5 2.5 20  5/5 4.4 Above goal, holdx1 5 2.5 2.5 2.5 2.5 2.5 2.5 20  4/21 3.1 Above goal, decrease 5 2.5 5 2.5 5 2.5 2.5 25  4/12 2.3 At goal, no change 5 2.5 5 2.5 5 2.5 5 27.5  4/4 2.7 At goal, resume 5 2.5 5 2.5 5 2.5 5 27.5  3/31 7.63 Hold x 3 days  3/17 3.2 Above goal, continue 5 5 5 5 5 5 5 35  3/1 1.5 Below goal, (Missed) 5 5 7.5/5 5 5 5 5 35  2/8 1.7 Below goal, bolus 5 5 7.5/5 5 5 5 5 35  1/20 3.7 Above goal, decrease 5 5 5 5 5 2.5 5 32.5  1/5 3.6 Above goal, hold 5 5 5 5 5 0/5 5 35  12/14   2.2       At goal, no change      5 5 5 5 5 5 5 35  11/18 2.8 At goal, no change 5 5 5 5 5 5 5 35   11/11 1.3 Below goal, cont 5 5 5 5 5 5 5 35  11/4 5.1 Above goal, hold+dec 5 5 5 5 0/5 0/5 5 35  10/25 5.3 Above goal, hold+dec  5 5 0/5 0/5 7.5 5 5 37.5  10/13 1.8 Below goal, increase 5 5 7.5 5 7.5 5 5 40  10/6 1.3 Below goal (missed) 5 5 5 10/5 7.5 5 5 37.5  9/13 2.2 At goal, continue 5 5 5 5 7.5 5 5 37.5  8/30     1.8       Below goal, cont 5 5 5 5 7.5 5 5 37.5  7/26     3.1       At goal, no change 5 5 5 5 7.5 5 5 37.5  7/15 2.1 At goal, increase 5 5 5 5 7.5 5 5 37.5  7/8 5.6 Above goal, holdx1 5 5 5 5 5 5 5 35  6/28 2.1 At goal, no change 5 7.5 5 7.5 5 7.5 5 42.5  6/21 4.3 Above goal, holdx1 5 x 5 7.5 5 7.5 5   6/14 4.2 Above goal, hold 7.5 10 0/7.5 5/7.5 7.5 7.5 7.5   6/10 1.3 Below goal, increase 7.5 INR   10 10 7.5     Started warfarin 5 mg daily on 6/7/21    Last CBC:  Lab Results   Component Value Date    RBC 4.07 (L) 08/05/2022    HGB 13.4 (L) 08/05/2022    HCT 40.1 (L) 08/05/2022    MCV 98.4 08/05/2022    MCH 32.9 08/05/2022    MPV 7.7 08/05/2022    RDW 15.9 (H) 08/05/2022     08/05/2022     Patient History:  Recent hospitalizations/HC visits 5/13/22: ortho to discuss hip replacement  4/27/22: Dr Narcisa Richardson ([facility]) recommends IVC placed prior to hip replacement  4/7/22: OV GI- Pt needs to repeat stool hemoccult, then schedule colonoscopy--pt looking for GI doctor closer to home.     Recent medication changes Now taking melatonin for sleep (8/16)  Started Multivitamin - unsure of Vit K content as of 8/16 - pt brought it in on 8/23: it was vitamin D3, not MV   Medications taken regularly that may interact with warfarin or alter INR No significant drug interactions identified   Warfarin dose taken as prescribed 5/3/22: missed 5 mg dose  2/8/22: taking 5 mg QD  using pillbox   Signs/symptoms of bleeding Black stools resolved  Stool hemoccult negative 4/6/22   Vitamin K intake Normally has 0-1 serving of green, leafy vegetables per week; 1c cranberry juice qd  Pt often notes that he has very little appetite. Recent vomiting/diarrhea/fever, changes in weight or activity level None reported  7/21/22: decreased appetite in the past month   Tobacco or alcohol use Often drinks 2-3 large glasses of wine, but states each glass only has ~4 oz wine mixed with grape juice. Occasionally has more. 1/5/22: quit smoking using 14mg patches (was smoking 2 cig/d)   7/21/22: 1.5 pint of wine a couple nights in the past week  8/16/22: Stopped drinking alcohol as of 8/6 due to previous doctor visit - he was told to quit drinking due to elevated liver enzymes   Upcoming surgeries or procedures Hip surgery TBD, will have IVC filter placement 2 days prior to surgery  Colonoscopy TBD    \"As of 5/17/22:  Patient still has nicotine present and also has cocaine present. I have informed the patient that he will need to take care of these issues and passes at least 2 drug tests before we will put him back on the schedule. I have told him for now his surgery is postponed indefinitely\"     8/24 CT scan  Still waiting on results from test to see if can proceed with hip surgery     Assessment/Plan:  Patient's INR was therapeutic (2.2) today. He confirmed that he has been taking 2.5 mg daily, except for 5 mg on Sunday, Tuesday, & Thursday. He denies any missed doses. INR is often labile but he has quit drinking alcohol entirely and is now taking Vitamin D3 daily (no interaction with warfarin). He continues to have poor appetite, poor PO intake, and weight loss. Patient has scheduled his CT scan for tomorrow. Patient likes to eat liver and broccoli, but typically avoids these foods. He often cannot recall his warfarin dose, but he uses a pillbox. Patient was instructed to continue warfarin dose at 2.5 mg daily, except 5 mg on Sundays, Tuesdays, and Thursdays. Repeat INR in 1 week.  Patient was reminded to maintain consistent vitamin K intake and call with any bleeding, medication changes, or fever/vomiting/diarrhea. He is to notify the clinic as soon as he is scheduled for a colonoscopy and/or hip surgery so we can instruct him on periop anticoagulation plan. Appears hip surgery is postponed indefinitely until he can pass two drug screens (recent had nicotine and cocaine present). He saw his ortho-doctor and getting the drug screen on  to determine whether he can have his surgery date scheduled. As of  he is still waiting on test results for this, but is now planning to call the doctor today to follow up. He is trying to schedule colonoscopy closer to home and will call his insurance about this. Patient understands dosing directions and information discussed. Dosing schedule and follow up appointment given to patient. Progress note routed to referring physician's office. Patient acknowledges working in consult agreement with pharmacist as referred by his/her physician. Next Clinic Appointment:     Please call [facility] [address] [phone] with any questions. Thanks!   Derik Ordaz of Pharmacy   [place] [employer] 1930   [facility]  Ph: [phone]  2022 10:40 AM    For Pharmacy Admin Tracking Only    Intervention Detail: Adherence Monitorin  Total # of Interventions Recommended: 0  Total # of Interventions Accepted: 0  Time Spent (min): 15

## 2022-08-24 ENCOUNTER — HOSPITAL ENCOUNTER (OUTPATIENT)
Dept: CT IMAGING | Age: 67
Discharge: HOME | End: 2022-08-24
Payer: MEDICARE

## 2022-08-24 DIAGNOSIS — R63.4 UNINTENTIONAL WEIGHT LOSS OF MORE THAN 10 POUNDS: ICD-10-CM

## 2022-08-24 DIAGNOSIS — R19.09 ABDOMINAL MASS OF OTHER SITE: ICD-10-CM

## 2022-08-24 DIAGNOSIS — R63.4 WEIGHT LOSS: ICD-10-CM

## 2022-08-24 DIAGNOSIS — R63.0 ANOREXIA: ICD-10-CM

## 2022-08-24 DIAGNOSIS — R19.05 PERIUMBILICAL MASS: ICD-10-CM

## 2022-08-24 PROCEDURE — 6360000004 HC RX CONTRAST MEDICATION

## 2022-08-24 PROCEDURE — 74177 CT ABD & PELVIS W/CONTRAST: CPT

## 2022-08-24 RX ADMIN — IOPAMIDOL 75 ML: 755 INJECTION, SOLUTION INTRAVENOUS at 13:08

## 2022-08-25 ENCOUNTER — TELEPHONE (OUTPATIENT)
Dept: ORTHOPEDIC SURGERY | Age: 67
End: 2022-08-25

## 2022-08-25 NOTE — TELEPHONE ENCOUNTER
Test Results     Type of Test: LAB RESULTS  Date of Test: 8/5  Location of Test: [facility]  Patient Contact Number: [phone]    PATIENT IS REQ RETURN CALL REGARDING LAB RESULTS.     PLEASE ADVISE

## 2022-08-30 ENCOUNTER — ANTI-COAG VISIT (OUTPATIENT)
Dept: PHARMACY | Age: 67
End: 2022-08-30
Payer: MEDICARE

## 2022-08-30 DIAGNOSIS — I82.413 ACUTE DEEP VEIN THROMBOSIS (DVT) OF FEMORAL VEIN OF BOTH LOWER EXTREMITIES (HCC): ICD-10-CM

## 2022-08-30 DIAGNOSIS — I26.99 PULMONARY EMBOLISM, BILATERAL (HCC): Primary | ICD-10-CM

## 2022-08-30 LAB — INR BLD: 1.3

## 2022-08-30 PROCEDURE — 99212 OFFICE O/P EST SF 10 MIN: CPT

## 2022-08-30 PROCEDURE — 85610 PROTHROMBIN TIME: CPT

## 2022-08-30 NOTE — PROGRESS NOTES
Adriana Donovan is a 78 y.o. male with PMHx significant for bilateral PE, DVT (6/4/21) while on Xarelto, HTN who presents to clinic 8/30/2022 for anticoagulation monitoring and adjustment. Pt dx with bilat DVT 6/4/21 while on Xarelto 2yrs for hx of PE. Pt stopped Xarelto on 6/6/21 and started warfarin + lovenox bridge on 6/7/21.     Anticoagulation Indication(s):  PE 2018, bilateral DVT 6/4/21 (on Xarelto)  Referring Physician:   Dr. Miranda Manning (Also send notes to Dr Sukhwinder Jo, or responsible resident)  Goal INR Range:  2-3  Duration of Anticoagulation Therapy:  indefinite  Time of day dose taken: prefers to take in AM  Product patient has at home: warfarin 5 mg (peach color)    INR Summary                           Warfarin regimen (mg)  Date INR   A/P   Sun Mon Tue Wed Thu Fri Sat Mg/wk  8/30 1.3 Below goal, increase 5 2.5 5 2.5 5 2.5 5 27.5  8/23 2.2 At goal, continue 5 2.5 5 2.5 5 2.5 2.5 25  8/16 1.4 Below goal, increase 5 2.5 5 2.5 5 2.5 2.5 25  8/4 2.1 At goal, increase 5 2.5 2.5 2.5 2.5 2.5 2.5 20  7/28 3.2 Above goal, continue 2.5 2.5 2.5 2.5 2.5 2.5 2.5 17.5  7/21 5.5 Above goal, holdx1  2.5 2.5 2.5 2.5 5 0/2.5 2.5 20  decrease   6/23 3.1 Above goal, continue 5 2.5 2.5 2.5 5 2.5 2.5 22.5  6/9 2.9 At goal, continue  5 2.5 2.5 2.5 5 2.5 2.5 22.5  5/26 1.9 Below goal, increase  5 2.5 2.5 2.5 5 2.5 2.5 22.5  5/12 2.8 At goal, no change 5 2.5 2.5 2.5 2.5 2.5 2.5 20  5/5 4.4 Above goal, holdx1 5 2.5 2.5 2.5 2.5 2.5 2.5 20  4/21 3.1 Above goal, decrease 5 2.5 5 2.5 5 2.5 2.5 25  4/12 2.3 At goal, no change 5 2.5 5 2.5 5 2.5 5 27.5  4/4 2.7 At goal, resume 5 2.5 5 2.5 5 2.5 5 27.5  3/31 7.63 Hold x 3 days  3/17 3.2 Above goal, continue 5 5 5 5 5 5 5 35  3/1 1.5 Below goal, (Missed) 5 5 7.5/5 5 5 5 5 35  2/8 1.7 Below goal, bolus 5 5 7.5/5 5 5 5 5 35  1/20 3.7 Above goal, decrease 5 5 5 5 5 2.5 5 32.5  1/5 3.6 Above goal, hold 5 5 5 5 5 0/5 5 35  12/14   2.2       At goal, no change 5 5 5 5 5 5 5 35  11/18 2.8 At goal, no change 5 5 5 5 5 5 5 35   11/11 1.3 Below goal, cont 5 5 5 5 5 5 5 35  11/4 5.1 Above goal, hold+dec 5 5 5 5 0/5 0/5 5 35  10/25 5.3 Above goal, hold+dec  5 5 0/5 0/5 7.5 5 5 37.5  10/13 1.8 Below goal, increase 5 5 7.5 5 7.5 5 5 40  10/6 1.3 Below goal (missed) 5 5 5 10/5 7.5 5 5 37.5  9/13 2.2 At goal, continue 5 5 5 5 7.5 5 5 37.5  8/30     1.8       Below goal, cont 5 5 5 5 7.5 5 5 37.5  7/26     3.1       At goal, no change 5 5 5 5 7.5 5 5 37.5  7/15 2.1 At goal, increase 5 5 5 5 7.5 5 5 37.5  7/8 5.6 Above goal, holdx1 5 5 5 5 5 5 5 35  6/28 2.1 At goal, no change 5 7.5 5 7.5 5 7.5 5 42.5  6/21 4.3 Above goal, holdx1 5 x 5 7.5 5 7.5 5   6/14 4.2 Above goal, hold 7.5 10 0/7.5 5/7.5 7.5 7.5 7.5   6/10 1.3 Below goal, increase 7.5 INR   10 10 7.5     Started warfarin 5 mg daily on 6/7/21    Last CBC:  Lab Results   Component Value Date    RBC 4.07 (L) 08/05/2022    HGB 13.4 (L) 08/05/2022    HCT 40.1 (L) 08/05/2022    MCV 98.4 08/05/2022    MCH 32.9 08/05/2022    MPV 7.7 08/05/2022    RDW 15.9 (H) 08/05/2022     08/05/2022     Patient History:  Recent hospitalizations/HC visits 5/13/22: ortho to discuss hip replacement  4/27/22: Dr Nusrat Hernandez ([facility]) recommends IVC placed prior to hip replacement  4/7/22: OV GI- Pt needs to repeat stool hemoccult, then schedule colonoscopy--pt looking for GI doctor closer to home.     Recent medication changes Now taking melatonin for sleep (8/16)  Started Multivitamin - unsure of Vit K content as of 8/16 - pt brought it in on 8/23: it was vitamin D3, not MV   Medications taken regularly that may interact with warfarin or alter INR No significant drug interactions identified   Warfarin dose taken as prescribed 5/3/22: missed 5 mg dose  2/8/22: taking 5 mg QD  using pillbox   Signs/symptoms of bleeding Black stools resolved  Stool hemoccult negative 4/6/22   Vitamin K intake Normally has 0-1 serving of green, leafy vegetables per week; Patient was instructed to take the 2nd half of his tablet to get the full 5 mg dose for today, and then to increase warfarin dose to 5 mg daily, except 2.5 mg on , , and . Repeat INR in 1 week. Patient was reminded to maintain consistent vitamin K intake and call with any bleeding, medication changes, or fever/vomiting/diarrhea. He is to notify the clinic as soon as he is scheduled for a colonoscopy and/or hip surgery so we can instruct him on periop anticoagulation plan. Appears hip surgery is postponed indefinitely until he can pass two drug screens (recent had nicotine and cocaine present). He saw his ortho-doctor and getting the drug screen on  to determine whether he can have his surgery date scheduled. As of  he is still waiting on test results for this, but is now planning to call the doctor today to follow up. He is trying to schedule colonoscopy closer to home and will call his insurance about this. Patient understands dosing directions and information discussed. Dosing schedule and follow up appointment given to patient. Progress note routed to referring physician's office. Patient acknowledges working in consult agreement with pharmacist as referred by his/her physician. Next Clinic Appointment:     Please call The [address] [place] [address] Avenue [phone] with any questions. Thanks!   Derik Ordaz of Pharmacy   [place] [employer] [id number]   [facility]  Ph: [phone]  2022 11:10 AM    For Pharmacy Admin Tracking Only    Intervention Detail: Adherence Monitorin and Dose Adjustment: 1, reason: Therapy Optimization  Total # of Interventions Recommended: 2  Total # of Interventions Accepted: 2  Time Spent (min): 15

## 2022-09-02 ENCOUNTER — OFFICE VISIT (OUTPATIENT)
Dept: ORTHOPEDIC SURGERY | Age: 67
End: 2022-09-02
Payer: MEDICARE

## 2022-09-02 VITALS — WEIGHT: 166 LBS | BODY MASS INDEX: 23.77 KG/M2 | HEIGHT: 70 IN

## 2022-09-02 DIAGNOSIS — M16.12 PRIMARY OSTEOARTHRITIS OF LEFT HIP: Primary | ICD-10-CM

## 2022-09-02 DIAGNOSIS — Z86.718 HISTORY OF DVT (DEEP VEIN THROMBOSIS): ICD-10-CM

## 2022-09-02 DIAGNOSIS — M48.10 DISH (DIFFUSE IDIOPATHIC SKELETAL HYPEROSTOSIS): ICD-10-CM

## 2022-09-02 PROCEDURE — 1123F ACP DISCUSS/DSCN MKR DOCD: CPT | Performed by: ORTHOPAEDIC SURGERY

## 2022-09-02 PROCEDURE — 99215 OFFICE O/P EST HI 40 MIN: CPT | Performed by: ORTHOPAEDIC SURGERY

## 2022-09-02 NOTE — PROGRESS NOTES
Dr Beatrice Cruz      Date /Time 9/2/2022       10:52 AM EST  Name Layo Flores             1955   Location  [facility]  MRN [id number]                Chief Complaint   Patient presents with    Follow-up     Let Hip     Hip Pain        History of Present Illness    Layo Flores is a 78 y.o. male who presents with  bilateral hip pain. Current history: He has passed his nicotine screen thus far. He has continued pain and is interested in surgical solution. Previous history:  His pain has gotten worse and is interested in surgery. Pain continues to be concentrated over his groin. He does have increased pain with activities and improvement with rest.  He again states that he has stopped smoking and is clean from heroin. He is currently on Coumadin for history of DVT. I have reviewed Dr. Ronald Brandon note and there is plans for IVF filter before total hip arthroplasty. Previous history: He presents for bilateral hip pain, left worse than right. He is interested in surgical solution for this at this point. His pain is gotten significantly worse than previous visits. He reports he has quit smoking almost completely for the last month and a half. He still reports that his heroin addiction is in remission. He has switched to taking Coumadin instead of Xarelto for history of venous thrombolic disease  He says he is quit smoking. Previous history: At patient's last visit he was complaining of bilateral extremity swelling. We were concerned about a DVT. We did order a bilateral lower extremity venous Doppler. They were positive for massive DVTs of bilateral lower extremity while taking Xarelto. Since then he has been placed on Coumadin. He states his lower extremity swelling is improving but continues to have significant hip pain. He does have advanced osteoarthritis of his hip. Previous history: Patient presents the office today for a new problem.   Patient is here with a chief complaint of left greater than right hip pain. Patient has had pain for several months with increased symptoms over the last few weeks. His symptoms are concentrated lateral greater than groin. Patient does have increased pain with weightbearing activities. He also complains of lumbar pain and bilateral thigh pain. He does not complain of any pain symptoms in his lower legs or feet. No numbness and tingling. He has tried NSAIDs, activity modifications and assistive devices without any significant improvement. He does have a history of a PE and is taking Xarelto. He also has a history of heroin addiction but states he has been clean for 1 year.       Pain Assessment  Location of Pain: Hip  Location Modifiers: Left  Severity of Pain: 10  Quality of Pain: Sharp, Aching  Duration of Pain: Persistent  Frequency of Pain: Constant  Aggravating Factors: Walking, Bending, Standing]    Past History  Past Medical History:   Diagnosis Date    Anxiety 2010    Back pain 2012    DVT (deep venous thrombosis) (HCC)     History of heroin abuse ([facility] [address]     HLD (hyperlipidemia) 3/23/2022    Hypertension 10/25/2011    Pulmonary embolism ([facility] [address]     FRANCHESKA     Past Surgical History:   Procedure Laterality Date    COLONOSCOPY N/A 2019    COLONOSCOPY performed by Soraya Velez MD at [place] [address]  2019    COLONOSCOPY WITH BIOPSY performed by Soraya Velez MD at [facility] Right     ENDOSCOPY, COLON, DIAGNOSTIC      UPPER GASTROINTESTINAL ENDOSCOPY N/A 2020    EGD DIAGNOSTIC ONLY performed by Soraya Velez MD at [facility] N/A 3/18/2020    PUSH ENTEROSCOPY performed by Soraya Velez MD at [address] History     Tobacco Use    Smoking status: Former     Packs/day: 0.50     Years: 30.00     Pack years: 15.00     Types: Cigars, Cigarettes     Start date:      Quit date: 2021     Years since quittin.3    Smokeless tobacco: Never    Tobacco comments:     1 DAILY   Substance Use Topics    Alcohol use: Yes     Comment: pint of wine a day      Current Outpatient Medications on File Prior to Visit   Medication Sig Dispense Refill    furosemide (LASIX) 20 MG tablet TAKE 1 TABLET BY MOUTH EVERY DAY 30 tablet 0    melatonin (RA MELATONIN) 3 MG TABS tablet Take 1 tablet by mouth nightly as needed (Sleep) 30 tablet 3    olmesartan (BENICAR) 20 MG tablet Take 1 tablet by mouth daily (Patient not taking: Reported on 8/5/2022) 30 tablet 3    amLODIPine (NORVASC) 5 MG tablet Take 1 tablet by mouth daily 30 tablet 3    nicotine (NICODERM CQ) 14 MG/24HR APPLY 1 PATCH ONTO THE SKIN EVERY DAY 28 patch 1    vitamin D3 (CHOLECALCIFEROL) 25 MCG (1000 UT) TABS tablet Take 1 tablet by mouth daily 90 tablet 1    warfarin (COUMADIN) 5 MG tablet TAKE 1 TABLET BY MOUTH EVERY DAY 90 tablet 1    folic acid (FOLVITE) 1 MG tablet TAKE 1 TABLET BY MOUTH EVERY DAY 90 tablet 1    diclofenac sodium (VOLTAREN) 1 % GEL Apply 4 g topically 4 times daily as needed for Pain 50 g 3    methadone (DOLOPHINE) 10 MG/ML solution Take 35 mg by mouth daily. (Patient not taking: No sig reported)       No current facility-administered medications on file prior to visit. ASCVD 10-YEAR RISK SCORE  The 10-year ASCVD risk score (Oma Salinas., et al., 2013) is: 13.4%    Values used to calculate the score:      Age: 78 years      Sex: Male      Is Non- : Yes      Diabetic: No      Tobacco smoker: No      Systolic Blood Pressure: 961 mmHg      Is BP treated: Yes      HDL Cholesterol: 58 mg/dL      Total Cholesterol: 232 mg/dL   . Review of Systems  10-point ROS is negative other than HPI. Physical Exam  Based off 1997 Exam Criteria  Ht 5' 10\" (1.778 m)   Wt 166 lb (75.3 kg)   BMI 23.82 kg/m²      Constitutional:       General: He is not in acute distress. Appearance: Normal appearance.    Cardiovascular:      Rate and Rhythm: Normal rate and regular rhythm. Pulses: Normal pulses. Pulmonary:      Effort: Pulmonary effort is normal. No respiratory distress. Neurological:      Mental Status: He is alert and oriented to person, place, and time. Mental status is at baseline.      Musculoskeletal:  Gait:  antalgic    Spine / Hip Exam:      RIGHT  LEFT    Lumbar Spine Exam  [] All Neg    [] All Neg     Straight leg raise  []  []Not tested   []  []Not tested    Clonus  []  []Not tested   []  []Not tested    Pain with motion  [x]  []Not tested   [x]  []Not tested    Radiculopathy  [x]  []Not tested   [x]  []Not tested    Paraspinal muscle tenderness  [x] Paraspinal  [x]Midline   [x] Paraspinal  [x]Midline   Sensation RIGHT  LEFT    L3  [x] Normal []Decreased    [x] Normal []Decreased   L4  [] Normal  [x]Decreased   [] Normal [x]Decreased   L5  [] Normal [x]Decreased   [] Normal [x]Decreased   S1  [] Normal  [x]Decreased   [] Normal [x]Decreased   Pelvis       Scoliosis  [] Nml  [x] Present     Leg-length discrepency  [x] Equal  [] Right longer   [] Left longer   Range of Motion Active Passive Active Passive   Hip Flexion 90  90    Abduction 20  20    External Rotation @ 90 flex 35  35    Internal Rotation @ 90 flex -10  -10           Hip Impingement / Dysplasia  [] All Neg  [] Not tested   [] All Neg  [] Not tested    Hip impingement test  [x]  []Not tested   [x]  []Not tested    C-sign  [x]  []Not tested   [x]  []Not tested    Anterior instability apprehension  []  []Not tested   []  []Not tested    Posterior instability apprehension  []  []Not tested   []  []Not tested    Uncontained Internal rotation  []  []Not tested  []  []Not tested          Abductors  [x] All Neg  [] Not tested   [x] All Neg  [] Not tested    Medius strength  []  []Not tested   []  []Not tested    Minimum strength  []  []Not tested   []  []Not tested    IT band tendonitis  []  []Not tested   []  []Not tested    Trochanteric tenderness  []  []Not tested  []  []Not tested   Sciatic neuropathic pain  []  []Not tested   []  []Not tested           Post-arthroplasty  [] All Neg  [] Not tested   [] All Neg  [] Not tested    Rectus tendonitis  []  []Not tested   []  []Not tested    Iliopsoas tendonitis       Start-up pain  []  []Not tested   []  []Not tested      Markedly stiff hips, stiff painful back as well. Some radiculopathy. Calf swelling improved compared to previous exam.    Imaging    Previous Bilateral hip: [address]  Previous Radiographs: End-stage osteoarthritis with severe osteophyte formation, joint space narrowing, cyst and sclerosis. Arthritic and lordotic lumbar spine as well. Some spondylolisthesis present in the lower lumbar segments. Enthesophyte bone formation present with likely DISH of the lumbar spine. Assessment and Plan  Rosy Morales was seen today for follow-up and hip pain. Diagnoses and all orders for this visit:    Primary osteoarthritis of left hip    History of DVT (deep vein thrombosis)    DISH (diffuse idiopathic skeletal hyperostosis)       Patient is suffering from advanced osteoarthritis osteoarthritis of bilateral hips. Patient is currently anticoagulated for massive bilateral DVTs. He did develop massive bilateral lower extremity DVTs while already anticoagulated for previous DVTs on Xarelto. Patient has been converted to Coumadin. There are plans for IVF filter. I will have patient perform preoperative labs including a drug test and a nicotine test.  He will follow-up in the office afterwards    I discussed with Layo Flores that his history, symptoms, signs and imaging are most consistent with hip arthritis    We reviewed the natural history of these conditions and treatment options ranging from conservative measures (rest, icing, activity modification, physical therapy, pain meds, cortisone injection)  to surgical options. We had a long discussion with the patient about their hip.  We discussed surgical and non surgical options for hip arthritis. The most important thing is to work to maintain their range of motion. Next they can try medications including tylenol and NSAIDs. They can try glucosamine or chondroitin. They should also ice frequently and avoid activities that make their hip hurt. Cortisone injections and Synvisc injections are also options when medicine has failed. We finally discussed surgical options including arthroscopic debridement versus hip replacement. Often the arthritis is too far gone for an arthroscopic debridement and pain relief will be short term. Their ultimate solution will be a hip replacement when they are ready for it. They should put it off until they can no longer stand the pain and when nothing else has worked. Conservative measures have failed. He is not interested in cortisone injections. I think he is an appropriate candidate for surgery due to his ongoing symptoms and dysfunction despite conservative measures. The procedure would be  [address] Total Hip Arthroplasty, left direct anterior    Perioperative considerations include:  Preop PCP eval, DVT History, PE History and Chronic anticoagulation other than Aspirin. We reviewed the risks, benefits, alternatives of this approach. We discussed risks including, but not limited to, bleeding, pain, infection, scarring, damage to the neurovascular structures, blood clots, pulmonary embolus, stiffness, implant instability or loosening, implant failure, incomplete relief of pain, and incomplete return of function. His perioperative risk is significantly higher than a standard replacement. First, he has a history of venous thrombolic disease, last known clot was 2 years ago. He now is on Coumadin and remains anticoagulated. It is possible that he requires a preoperative IVC filter prior to elective surgery. Second, he has a history of nicotine dependence and smoking. He has quit now, but the history clearly increases his risk nonetheless. He has currently passed his nicotine screen however which is good. In addition, he has a history of previous heroin addiction, which has been in remission for over a year. And lastly, I believe he has a high risk for heterotopic ossification following this operation. We will plan for 7 Gy preoperative radiation therapy the day before the operation. We also reviewed the surgical details, expected recovery, and rehabilitation (6-9 months). He expressed understanding and will undergo preoperative medical evaluation and optimization. He is definitely an inpatient candidate at the time of surgery considering all the comorbidities and close monitoring we will need to perform.

## 2022-09-02 NOTE — LETTER
[facility]  Surgery Scheduling Form:    22     DEMOGRAPHICS    Patient Name:  Raz Zuniga  Patient :  1955   Patient SS#:  [id number]    Patient Phone:  [phone] (home)  Alt. Patient Phone:    Patient Address:  [address] [place] [address] [address] [address]    PCP:  Pat Benavidez MD  Insurance:  Payor: Ting [employer] / Plan: [address] [place] [address] HMO / Product Type: Medicare /   Insurance ID Number:  Payer/Plan Subscr  Sex Relation Sub. Ins. ID Effective Group Num   1.  [facility] A 1955 Male Self [id number] 3/1/20 [id number]                                   [address]       DIAGNOSIS & PROCEDURE    Diagnosis: Left HIP  M16.12 Left hip primary osteoarthritis    Operation: LEFT HIP  34948 Total Hip Arthroplasty Minimally-Invasive Direct Anterior     Provider:  Heriberto Carias MD    Location:  [facility] INFORMATION    Requested Date:  2022   Requested Time:  TBD      Patient Arrival Time:  2 hours prior to planned procedure  OR Time Required:  100 Minutes  Admission:  []Outpatient   []23 hour  [x]Same Day Admit:   1-2  days  []Inpatient    Anesthesia:  [x]General  []Spinal  []MAC/Sedation  Regional Anesthesia:  [x]None  []OrthoMix  []Exparel  []Fascia Iliaca / PENG       EQUIPMENT    Position:  [x]Supine  []Lateral  []Beach-chair  []Prone    OR Bed:  []Regular  [x]Brierfield  []Ramos  []Hip pedroza  []Beach-chair  []Spyder  Radiology:  [x]Large C-arm  []Small C-arm  []Portable X-ray    Implants:  [employer]iana[employer] Hip:  [x]Primary Set  []Revision Set  Medacta Hip:  []Dual-modular acetabulum (DM)    SUTURE: []#5 Ethibond  [x]#2 Ethibond  [x]#2 Quill  []#1 PDS  [x]#1 Vicryl                   [x]2-0 Vicryl  [x]3-0 Monocryl  []2-0 Nylon  []3-0 Nylon  []3-0 PDS                    []Dermabond  []Steri-strips (in half)  DRESSING:  [x]Prineo dermabond  []4x4 gauze  [x]ABDs  [x]Tegaderm  BRACE: []Pelvic Binder  []Hip X-ACT  []Knee TROM  []Knee immobilizer []Shoulder Immob. (w/abd. pillow)  []Sling  []Ice Unit  []Ace-Wrap      [x]Tiana Biomet:  Ben Austin [phone], Lonnie Hicks. [email or web address]  []Medacta: Mee Sever [phone], [email or web address]  []Fx Shoulder: Deyanira Salnias [phone], Rico Marquis. [email or web address]  []Mather: Jameson Romero [phone], [email or web address]. [employer]    Comments:        Cayetano Hargrove MD  [address] [facility]  9/2/2022       11:47 AM EDT

## 2022-09-06 ENCOUNTER — ANTI-COAG VISIT (OUTPATIENT)
Dept: PHARMACY | Age: 67
End: 2022-09-06
Payer: MEDICARE

## 2022-09-06 DIAGNOSIS — I82.413 ACUTE DEEP VEIN THROMBOSIS (DVT) OF FEMORAL VEIN OF BOTH LOWER EXTREMITIES (HCC): ICD-10-CM

## 2022-09-06 DIAGNOSIS — I26.99 PULMONARY EMBOLISM, BILATERAL (HCC): Primary | ICD-10-CM

## 2022-09-06 LAB — INTERNATIONAL NORMALIZATION RATIO, POC: 2.1

## 2022-09-06 PROCEDURE — 99211 OFF/OP EST MAY X REQ PHY/QHP: CPT

## 2022-09-06 PROCEDURE — 85610 PROTHROMBIN TIME: CPT

## 2022-09-06 NOTE — PROGRESS NOTES
Jessica Moctezuma is a 75 y.o. male with PMHx significant for bilateral PE, DVT (6/4/21) while on Xarelto, HTN who presents to clinic 9/6/2022 for anticoagulation monitoring and adjustment. Pt dx with bilat DVT 6/4/21 while on Xarelto 2yrs for hx of PE. Pt stopped Xarelto on 6/6/21 and started warfarin + lovenox bridge on 6/7/21.     Anticoagulation Indication(s):  PE 2018, bilateral DVT 6/4/21 (on Xarelto)  Referring Physician:   Dr. Jack Kevin (Also send notes to Dr Namita Allen, or responsible resident)  Goal INR Range:  2-3  Duration of Anticoagulation Therapy:  indefinite  Time of day dose taken: prefers to take in AM  Product patient has at home: warfarin 5 mg (peach color)    INR Summary                           Warfarin regimen (mg)  Date INR   A/P   Sun Mon Tue Wed Thu Fri Sat Mg/wk  9/6 2.1 At goal, no change 5 2.5 5 2.5 5 2.5 5 27.5  8/30 1.3 Below goal, increase 5 2.5 5 2.5 5 2.5 5 27.5  8/23 2.2 At goal, continue 5 2.5 5 2.5 5 2.5 2.5 25  8/16 1.4 Below goal, increase 5 2.5 5 2.5 5 2.5 2.5 25  8/4 2.1 At goal, increase 5 2.5 2.5 2.5 2.5 2.5 2.5 20  7/28 3.2 Above goal, continue 2.5 2.5 2.5 2.5 2.5 2.5 2.5 17.5  7/21 5.5 Above goal, holdx1  2.5 2.5 2.5 2.5 5 0/2.5 2.5 20  decrease   6/23 3.1 Above goal, continue 5 2.5 2.5 2.5 5 2.5 2.5 22.5  6/9 2.9 At goal, continue  5 2.5 2.5 2.5 5 2.5 2.5 22.5  5/26 1.9 Below goal, increase  5 2.5 2.5 2.5 5 2.5 2.5 22.5  5/12 2.8 At goal, no change 5 2.5 2.5 2.5 2.5 2.5 2.5 20  5/5 4.4 Above goal, holdx1 5 2.5 2.5 2.5 2.5 2.5 2.5 20  4/21 3.1 Above goal, decrease 5 2.5 5 2.5 5 2.5 2.5 25  4/12 2.3 At goal, no change 5 2.5 5 2.5 5 2.5 5 27.5  4/4 2.7 At goal, resume 5 2.5 5 2.5 5 2.5 5 27.5  3/31 7.63 Hold x 3 days  3/17 3.2 Above goal, continue 5 5 5 5 5 5 5 35  3/1 1.5 Below goal, (Missed) 5 5 7.5/5 5 5 5 5 35  2/8 1.7 Below goal, bolus 5 5 7.5/5 5 5 5 5 35  1/20 3.7 Above goal, decrease 5 5 5 5 5 2.5 5 32.5  1/5 3.6 Above goal, hold 5 5 5 5 5 0/5 5 35  12/14   2.2       At goal, no change      5 5 5 5 5 5 5 35  11/18 2.8 At goal, no change 5 5 5 5 5 5 5 35   11/11 1.3 Below goal, cont 5 5 5 5 5 5 5 35  11/4 5.1 Above goal, hold+dec 5 5 5 5 0/5 0/5 5 35  10/25 5.3 Above goal, hold+dec  5 5 0/5 0/5 7.5 5 5 37.5  10/13 1.8 Below goal, increase 5 5 7.5 5 7.5 5 5 40  10/6 1.3 Below goal (missed) 5 5 5 10/5 7.5 5 5 37.5  9/13 2.2 At goal, continue 5 5 5 5 7.5 5 5 37.5  8/30     1.8       Below goal, cont 5 5 5 5 7.5 5 5 37.5  7/26     3.1       At goal, no change 5 5 5 5 7.5 5 5 37.5  7/15 2.1 At goal, increase 5 5 5 5 7.5 5 5 37.5  7/8 5.6 Above goal, holdx1 5 5 5 5 5 5 5 35  6/28 2.1 At goal, no change 5 7.5 5 7.5 5 7.5 5 42.5  6/21 4.3 Above goal, holdx1 5 x 5 7.5 5 7.5 5   6/14 4.2 Above goal, hold 7.5 10 0/7.5 5/7.5 7.5 7.5 7.5   6/10 1.3 Below goal, increase 7.5 INR   10 10 7.5     Started warfarin 5 mg daily on 6/7/21    Last CBC:  Lab Results   Component Value Date    RBC 4.07 (L) 08/05/2022    HGB 13.4 (L) 08/05/2022    HCT 40.1 (L) 08/05/2022    MCV 98.4 08/05/2022    MCH 32.9 08/05/2022    MPV 7.7 08/05/2022    RDW 15.9 (H) 08/05/2022     08/05/2022     Patient History:  Recent hospitalizations/HC visits 5/13/22: ortho to discuss hip replacement  4/27/22: Dr Bassam Love ([facility]) recommends IVC placed prior to hip replacement  4/7/22: [facility] GI- Pt needs to repeat stool hemoccult, then schedule colonoscopy--pt looking for GI doctor closer to home.     Recent medication changes Now taking melatonin for sleep (8/16)  Started Multivitamin - unsure of Vit K content as of 8/16 - pt brought it in on 8/23: it was vitamin D3, not MV   Medications taken regularly that may interact with warfarin or alter INR No significant drug interactions identified   Warfarin dose taken as prescribed 5/3/22: missed 5 mg dose  2/8/22: taking 5 mg QD  using pillbox   Signs/symptoms of bleeding Black stools resolved  Stool hemoccult negative 4/6/22   Vitamin K intake Normally has 0-1 serving of green, leafy vegetables per week; Pt often notes that he has very little appetite. 8/30/22: Pt had a couple extra servings of Vitamin K foods this past week   Recent vomiting/diarrhea/fever, changes in weight or activity level None reported  7/21/22: decreased appetite in the past month   Tobacco or alcohol use Often drinks 2-3 large glasses of wine, but states each glass only has ~4 oz wine mixed with grape juice. Occasionally has more. 1/5/22: quit smoking using 14mg patches (was smoking 2 cig/d)   7/21/22: 1.5 pint of wine a couple nights in the past week  8/16/22: Stopped drinking alcohol as of 8/6 due to previous doctor visit - he was told to quit drinking due to elevated liver enzymes   Upcoming surgeries or procedures Hip surgery on 11/2, will have IVC filter placement 2 days prior to surgery  Colonoscopy TBD    \"As of 5/17/22:  Patient still has nicotine present and also has cocaine present. I have informed the patient that he will need to take care of these issues and passes at least 2 drug tests before we will put him back on the schedule. I have told him for now his surgery is postponed indefinitely\"      Assessment/Plan:  Patient's INR was therapeutic (2.1) today following warfarin dose increase last week. Patient likes to eat liver and broccoli, but typically avoids these foods. He often cannot recall his warfarin dose, but he uses a pillbox and follows the dosing calendar provided by clinic. He denies any dosing errors in the past week. INR is often labile, but he has quit drinking alcohol entirely, which we would expect to help stabilize INR. His hip surgery has been scheduled for 11/2. Will develop chirag-op anticoagulation plan for patient. Patient was instructed to continue warfarin 5 mg daily, except 2.5 mg on Mondays, Wednesdays, and Fridays. Repeat INR in 1 week.  Patient was reminded to maintain consistent vitamin K intake and call with any bleeding, medication changes, or fever/vomiting/diarrhea. He is to notify the clinic as soon as he is scheduled for a colonoscopy and/or hip surgery so we can instruct him on periop anticoagulation plan. Appears hip surgery is postponed indefinitely until he can pass two drug screens (recent had nicotine and cocaine present). He is trying to schedule colonoscopy closer to home and will call his insurance about this. Patient understands dosing directions and information discussed. Dosing schedule and follow up appointment given to patient. Progress note routed to referring physician's office. Patient acknowledges working in consult agreement with pharmacist as referred by his/her physician. Next Clinic Appointment:     Please call The [address] [place] [address] Avenue [phone] with any questions. Thanks! Jon Jones.  Anna Kim, PharmD, [facility]  [facility]  Ph: [phone]  2022 1:51 PM    For Pharmacy Admin Tracking Only    Intervention Detail: Adherence Monitorin  Total # of Interventions Recommended: 0  Total # of Interventions Accepted: 0  Time Spent (min): 15

## 2022-09-13 ENCOUNTER — ANTI-COAG VISIT (OUTPATIENT)
Dept: PHARMACY | Age: 67
End: 2022-09-13
Payer: MEDICARE

## 2022-09-13 DIAGNOSIS — I82.413 ACUTE DEEP VEIN THROMBOSIS (DVT) OF FEMORAL VEIN OF BOTH LOWER EXTREMITIES (HCC): ICD-10-CM

## 2022-09-13 DIAGNOSIS — I26.99 PULMONARY EMBOLISM, BILATERAL (HCC): Primary | ICD-10-CM

## 2022-09-13 LAB — INTERNATIONAL NORMALIZATION RATIO, POC: 1.4

## 2022-09-13 PROCEDURE — 99212 OFFICE O/P EST SF 10 MIN: CPT

## 2022-09-13 PROCEDURE — 85610 PROTHROMBIN TIME: CPT

## 2022-09-13 NOTE — PROGRESS NOTES
Patsy Alvarez is a 75 y.o. male with PMHx significant for bilateral PE, DVT (6/4/21) while on Xarelto, HTN who presents to clinic 9/13/2022 for anticoagulation monitoring and adjustment. Pt dx with bilat DVT 6/4/21 while on Xarelto 2yrs for hx of PE. Pt stopped Xarelto on 6/6/21 and started warfarin + lovenox bridge on 6/7/21.     Anticoagulation Indication(s):  PE 2018, bilateral DVT 6/4/21 (on Xarelto)  Referring Physician:   Dr. Lei Barrientos (Also send notes to Dr Evelia Trejo, or responsible resident)  Goal INR Range:  2-3  Duration of Anticoagulation Therapy:  indefinite  Time of day dose taken: prefers to take in AM  Product patient has at home: warfarin 5 mg (peach color)    INR Summary                           Warfarin regimen (mg)  Date INR   A/P   Sun Mon Tue Wed Thu Fri Sat Mg/wk  9/13 1.4 Below goal, (missed) 5 2.5 7.5/5 2.5 5 2.5 5 27.5  9/6 2.1 At goal, no change 5 2.5 5 2.5 5 2.5 5 27.5  8/30 1.3 Below goal, increase 5 2.5 5 2.5 5 2.5 5 27.5  8/23 2.2 At goal, continue 5 2.5 5 2.5 5 2.5 2.5 25  8/16 1.4 Below goal, increase 5 2.5 5 2.5 5 2.5 2.5 25  8/4 2.1 At goal, increase 5 2.5 2.5 2.5 2.5 2.5 2.5 20  7/28 3.2 Above goal, continue 2.5 2.5 2.5 2.5 2.5 2.5 2.5 17.5  7/21 5.5 Above goal, holdx1  2.5 2.5 2.5 2.5 5 0/2.5 2.5 20  decrease   6/23 3.1 Above goal, continue 5 2.5 2.5 2.5 5 2.5 2.5 22.5  6/9 2.9 At goal, continue  5 2.5 2.5 2.5 5 2.5 2.5 22.5  5/26 1.9 Below goal, increase  5 2.5 2.5 2.5 5 2.5 2.5 22.5  5/12 2.8 At goal, no change 5 2.5 2.5 2.5 2.5 2.5 2.5 20  5/5 4.4 Above goal, holdx1 5 2.5 2.5 2.5 2.5 2.5 2.5 20  4/21 3.1 Above goal, decrease 5 2.5 5 2.5 5 2.5 2.5 25  4/12 2.3 At goal, no change 5 2.5 5 2.5 5 2.5 5 27.5  4/4 2.7 At goal, resume 5 2.5 5 2.5 5 2.5 5 27.5  3/31 7.63 Hold x 3 days  3/17 3.2 Above goal, continue 5 5 5 5 5 5 5 35  3/1 1.5 Below goal, (Missed) 5 5 7.5/5 5 5 5 5 35  2/8 1.7 Below goal, bolus 5 5 7.5/5 5 5 5 5 35  1/20 3.7 Above goal, decrease 5 5 5 5 5 2.5 5 32.5  1/5 3.6 Above goal, hold 5 5 5 5 5 0/5 5 35  12/14   2.2       At goal, no change      5 5 5 5 5 5 5 35  11/18 2.8 At goal, no change 5 5 5 5 5 5 5 35   11/11 1.3 Below goal, cont 5 5 5 5 5 5 5 35  11/4 5.1 Above goal, hold+dec 5 5 5 5 0/5 0/5 5 35  10/25 5.3 Above goal, hold+dec  5 5 0/5 0/5 7.5 5 5 37.5  10/13 1.8 Below goal, increase 5 5 7.5 5 7.5 5 5 40  10/6 1.3 Below goal (missed) 5 5 5 10/5 7.5 5 5 37.5  9/13 2.2 At goal, continue 5 5 5 5 7.5 5 5 37.5  8/30     1.8       Below goal, cont 5 5 5 5 7.5 5 5 37.5  7/26     3.1       At goal, no change 5 5 5 5 7.5 5 5 37.5  7/15 2.1 At goal, increase 5 5 5 5 7.5 5 5 37.5  7/8 5.6 Above goal, holdx1 5 5 5 5 5 5 5 35  6/28 2.1 At goal, no change 5 7.5 5 7.5 5 7.5 5 42.5  6/21 4.3 Above goal, holdx1 5 x 5 7.5 5 7.5 5   6/14 4.2 Above goal, hold 7.5 10 0/7.5 5/7.5 7.5 7.5 7.5   6/10 1.3 Below goal, increase 7.5 INR   10 10 7.5     Started warfarin 5 mg daily on 6/7/21    Last CBC:  Lab Results   Component Value Date    RBC 4.07 (L) 08/05/2022    HGB 13.4 (L) 08/05/2022    HCT 40.1 (L) 08/05/2022    MCV 98.4 08/05/2022    MCH 32.9 08/05/2022    MPV 7.7 08/05/2022    RDW 15.9 (H) 08/05/2022     08/05/2022     Patient History:  Recent hospitalizations/HC visits 5/13/22: ortho to discuss hip replacement  4/27/22: Dr Simone Soni ([facility]) recommends IVC placed prior to hip replacement  4/7/22: OV GI- Pt needs to repeat stool hemoccult, then schedule colonoscopy--pt looking for GI doctor closer to home. Recent medication changes Now taking melatonin for sleep (8/16)  Started Multivitamin - unsure of Vit K content as of 8/16 - pt brought it in on 8/23: it was vitamin D3, not MV   Medications taken regularly that may interact with warfarin or alter INR No significant drug interactions identified   Warfarin dose taken as prescribed 9/10/22: missed 5 mg dose.  Also not confident in current dosing schedule, but states he follows the calendar.  using pillbox   Signs/symptoms of bleeding Black stools resolved  Stool hemoccult negative 4/6/22   Vitamin K intake Normally has 0-1 serving of green, leafy vegetables per week; Pt often notes that he has very little appetite. 8/30/22: Pt had a couple extra servings of Vitamin K foods this past week   Recent vomiting/diarrhea/fever, changes in weight or activity level None reported  7/21/22: decreased appetite in the past month   Tobacco or alcohol use Often drinks 2-3 large glasses of wine, but states each glass only has ~4 oz wine mixed with grape juice. Occasionally has more. 1/5/22: quit smoking using 14mg patches (was smoking 2 cig/d)   7/21/22: 1.5 pint of wine a couple nights in the past week  8/16/22: Stopped drinking alcohol as of 8/6 due to previous doctor visit - he was told to quit drinking due to elevated liver enzymes   Upcoming surgeries or procedures Hip surgery on 11/2, will have IVC filter placement 2 days prior to surgery  Colonoscopy TBD--He is trying to schedule colonoscopy closer to home and will call his insurance about this. \"As of 5/17/22:  Patient still has nicotine present and also has cocaine present. I have informed the patient that he will need to take care of these issues and passes at least 2 drug tests before we will put him back on the schedule. I have told him for now his surgery is postponed indefinitely\"      Assessment/Plan:  Patient's INR was subtherapeutic (1.4) today, likely due to noncompliance. Patient likes to eat liver and broccoli, but typically avoids these foods. He often cannot recall his warfarin dose, but he uses a pillbox and follows the dosing calendar provided by clinic. He does admit to missing his warfarin 5 mg dose on Saturday, which likely contributed to drop in INR. INR is often labile, but he has quit drinking alcohol entirely, which we would expect to help stabilize INR. His hip surgery has been scheduled for 11/2.  Will develop chirag-op anticoagulation plan for patient. Patient was instructed to take 1.5 tablets today only, then continue warfarin 5 mg daily, except 2.5 mg on Mondays, Wednesdays, and Fridays. Repeat INR in 1 week. Patient was reminded to maintain consistent vitamin K intake and call with any bleeding, medication changes, or fever/vomiting/diarrhea. Patient understands dosing directions and information discussed. Dosing schedule and follow up appointment given to patient. Progress note routed to referring physician's office. Patient acknowledges working in consult agreement with pharmacist as referred by his/her physician. Next Clinic Appointment: 9/22    Please call The [address] [place] [address] [phone] with any questions. Thanks!   [facility], PharmD, [facility]  Medication Management Clinic   [address] Ph: [phone]  Tex Soto Ph: [phone]  9/13/2022 12:42 PM    For Pharmacy Admin Tracking Only    Intervention Detail: Dose Adjustment: 1, reason: Therapy Optimization  Total # of Interventions Recommended: 1  Total # of Interventions Accepted: 1  Time Spent (min): 15

## 2022-09-16 ENCOUNTER — OFFICE VISIT (OUTPATIENT)
Dept: INTERNAL MEDICINE CLINIC | Age: 67
End: 2022-09-16
Payer: MEDICARE

## 2022-09-16 VITALS
HEART RATE: 60 BPM | DIASTOLIC BLOOD PRESSURE: 82 MMHG | OXYGEN SATURATION: 94 % | RESPIRATION RATE: 20 BRPM | TEMPERATURE: 97.2 F | SYSTOLIC BLOOD PRESSURE: 129 MMHG | WEIGHT: 170.8 LBS | BODY MASS INDEX: 24.51 KG/M2

## 2022-09-16 DIAGNOSIS — R63.4 UNINTENTIONAL WEIGHT LOSS OF MORE THAN 10 POUNDS: Primary | ICD-10-CM

## 2022-09-16 DIAGNOSIS — R79.89 ABNORMAL LFTS: ICD-10-CM

## 2022-09-16 DIAGNOSIS — I10 PRIMARY HYPERTENSION: ICD-10-CM

## 2022-09-16 DIAGNOSIS — F10.10 ALCOHOL ABUSE: ICD-10-CM

## 2022-09-16 DIAGNOSIS — I82.413 ACUTE DEEP VEIN THROMBOSIS (DVT) OF FEMORAL VEIN OF BOTH LOWER EXTREMITIES (HCC): ICD-10-CM

## 2022-09-16 PROCEDURE — 99213 OFFICE O/P EST LOW 20 MIN: CPT | Performed by: STUDENT IN AN ORGANIZED HEALTH CARE EDUCATION/TRAINING PROGRAM

## 2022-09-16 ASSESSMENT — ENCOUNTER SYMPTOMS
NAUSEA: 0
ANAL BLEEDING: 0
SHORTNESS OF BREATH: 0
BACK PAIN: 0
CONSTIPATION: 0
SORE THROAT: 0
VOMITING: 0
COUGH: 0
ABDOMINAL PAIN: 0
DIARRHEA: 0
BLOOD IN STOOL: 0

## 2022-09-16 NOTE — PROGRESS NOTES
Outpatient Clinic Established Patient Note    Patient: Job Perry  : 1955 (46 y.o.)  Date: 2022    CC: Lab follow-up    HPI:      Job Perry has PMHx of hypertension, hyperlipidemia, DVT on Coumadin (lifelong). Last seen in our clinic 22 for nausea, vomiting, decreased appetite. Advised to repeat labs prior to next visit, start OTC multivitamin, and abstain from alchol. Labs repeated , showed improved ALT from 44->34 and AST from 77->47, bilirubin from 1.1->1.3. CT abdomen pelvis w/ contrast 22 showed mild fatty liver, bladder wall thickening (circumferential), duodenal wall thickening --\"cannot exclude mass or inflammation. \"     Patient has hx of normal EGD in 2020. He had colonoscopy in 2019 with rectal polyp removal, otherwise normal scope. He states that he has cut down on alcohol intake, drinks a glass of wine every 3 days. He quit smoking 2 months ago. Denies nausea, vomiting, diarrhea, constipation, abdominal pain. Reports improvement in his appetite. His weight has been stable. He does not restrict his diet in any way. He reports compliance with his medications. Home Meds:  Prior to Visit Medications    Medication Sig Taking?  Authorizing Provider   mupirocin (BACTROBAN) 2 % ointment Apply twice daily to each nare for 5 days prior to surgical procedure Yes Holly Cevallos PA-C   furosemide (LASIX) 20 MG tablet TAKE 1 TABLET BY MOUTH EVERY DAY Yes Corina Bentley MD   melatonin (RA MELATONIN) 3 MG TABS tablet Take 1 tablet by mouth nightly as needed (Sleep) Yes Layne Osgood, MD   olmesartan (BENICAR) 20 MG tablet Take 1 tablet by mouth daily Yes Marni Moore MD   amLODIPine (NORVASC) 5 MG tablet Take 1 tablet by mouth daily Yes Marni Moore MD   nicotine (NICODERM CQ) 14 MG/24HR APPLY [address] [facility] Yes Oscar Orozco MD   vitamin D3 (CHOLECALCIFEROL) 25 MCG (1000 UT) TABS tablet Take 1 tablet by mouth daily Yes Karli Shore MD warfarin (COUMADIN) 5 MG tablet TAKE 1 TABLET BY MOUTH EVERY DAY Yes Mauricio Pompa MD   folic acid (FOLVITE) 1 MG tablet TAKE 1 TABLET BY MOUTH EVERY DAY Yes Kelli Mosquera MD   diclofenac sodium (VOLTAREN) 1 % GEL Apply 4 g topically 4 times daily as needed for Pain Yes Atul Betancourt MD   methadone (DOLOPHINE) 10 MG/ML solution Take 35 mg by mouth daily. Patient not taking: No sig reported  Historical Provider, MD       Allergies:    Valsartan-hydrochlorothiazide    Health Maintenance Due   Topic Date Due    Shingles vaccine (2 of 3) 04/02/2018    COVID-19 Vaccine (3 - Booster for Vinny Bellevue series) 11/16/2021    Annual Wellness Visit (AWV)  Never done    Flu vaccine (1) 09/01/2022       Immunization History   Administered Date(s) Administered    COVID-19, MODERNA BLUE border, Primary or Immunocompromised, (age 12y+), IM, 100 mcg/0.5mL 05/18/2021, 06/16/2021    Influenza Virus Vaccine 11/15/2017    Influenza, AFLURIA (age 1 yrs+), FLUZONE, (age 10 mo+), MDV, 0.5mL 11/15/2017    Influenza, FLUAD, (age 72 y+), Adjuvanted, 0.5mL 10/08/2020    Influenza, FLUARIX, FLULAVAL, FLUZONE (age 10 mo+) AND AFLURIA, (age 1 y+), PF, 0.5mL 11/14/2018    Influenza, FLUCELVAX, (age 10 mo+), MDCK, MDV, 0.5mL 08/31/2021    Influenza, High Dose (Fluzone 65 yrs and older) 11/22/2019    Influenza, Triv, inactivated, subunit, adjuvanted, IM (Fluad 65 yrs and older) 09/18/2019    Pneumococcal Conjugate 13-valent ([id number]) 01/31/2018    Pneumococcal Polysaccharide ([id number]) 05/01/2018    Tdap (Boostrix, Adacel) 08/31/2021    Zoster Live (Zostavax) 02/05/2018       Review of Systems   Constitutional:  Negative for fatigue and fever. HENT:  Negative for sore throat. Respiratory:  Negative for cough and shortness of breath. Cardiovascular:  Negative for chest pain. Gastrointestinal:  Negative for abdominal pain, anal bleeding, blood in stool, constipation, diarrhea, nausea and vomiting.    Genitourinary:  Negative for difficulty urinating and dysuria. Musculoskeletal:  Negative for back pain. Neurological:  Negative for dizziness, weakness and headaches. A 10-organ Review Of Systems was obtained and otherwise unremarkable except as per HPI. Data: Old records have been reviewed electronically. PHYSICAL EXAM:  /82 (Site: Left Upper Arm, Position: Sitting, Cuff Size: Medium Adult)   Pulse 60   Temp 97.2 °F (36.2 °C) (Temporal)   Resp 20   Wt 170 lb 12.8 oz (77.5 kg)   SpO2 94%   BMI 24.51 kg/m²   Physical Exam  Constitutional:       General: He is not in acute distress. Appearance: Normal appearance. He is normal weight. HENT:      Head: Normocephalic. Mouth/Throat:      Mouth: Mucous membranes are moist.      Pharynx: Oropharynx is clear. Eyes:      Extraocular Movements: Extraocular movements intact. Conjunctiva/sclera: Conjunctivae normal.   Cardiovascular:      Rate and Rhythm: Normal rate and regular rhythm. Pulses: Normal pulses. Heart sounds: Normal heart sounds. No murmur heard. Pulmonary:      Effort: Pulmonary effort is normal. No respiratory distress. Breath sounds: Normal breath sounds. Abdominal:      General: There is no distension. Palpations: Abdomen is soft. There is no mass. Tenderness: There is no abdominal tenderness. There is no guarding. Musculoskeletal:         General: No swelling, tenderness or deformity. Skin:     General: Skin is warm and dry. Neurological:      General: No focal deficit present. Mental Status: He is alert and oriented to person, place, and time. Mental status is at baseline. Assessment & Plan:      1. Unintentional weight loss of more than 10 pounds  Patient's weight has been stable since last visit. Reports improved appetite. CT scan 'cannot exclude duodenal mass vs inflammation'  - continue alcohol cessation  - repeat labs  - consult GI    2. Abnormal LFTs  Likely 2/2 alcohol abuse + fatty liver. Trending down on previous labs, patient has cut down significantly on alcohol.  - repeat labs to monitor for resolution    3. Alcohol abuse  Patient is in the process of decreasing alcohol intake. He now drinks one glass of wine every ~3 days. - continue alcohol cessation    4. Primary hypertension  Well-controlled on current regimen.  - Continue olmesartan, amlodipine    5.  Acute deep vein thrombosis (DVT) of femoral vein of both lower extremities (HCC)  No signs of bleeding on OAC  - Continue warfarin    Follow up in 2 months    Dispo: Pt has been staffed with Dr. Susa Lundborg  _______________  Phil Leon MD, 9/16/2022 9:53 AM   PGY-3

## 2022-09-16 NOTE — PATIENT INSTRUCTIONS
- make an appointment with gastroenterology (Dr. Vanetta Runner)  - continue taking your medications as prescribed  - try to completely stop drinking alcohol. You can do it!   - good job quitting smoking!  Keep up the good work!  - come back and see us in 2 months

## 2022-09-19 DIAGNOSIS — E53.8 LOW FOLATE: ICD-10-CM

## 2022-09-19 DIAGNOSIS — I82.413 ACUTE DEEP VEIN THROMBOSIS (DVT) OF FEMORAL VEIN OF BOTH LOWER EXTREMITIES (HCC): ICD-10-CM

## 2022-09-21 RX ORDER — FOLIC ACID 1 MG/1
TABLET ORAL
Qty: 90 TABLET | Refills: 0 | Status: SHIPPED | OUTPATIENT
Start: 2022-09-21

## 2022-09-21 RX ORDER — WARFARIN SODIUM 5 MG/1
TABLET ORAL
Qty: 90 TABLET | Refills: 1 | Status: SHIPPED | OUTPATIENT
Start: 2022-09-21 | End: 2022-11-30 | Stop reason: SDUPTHER

## 2022-09-22 ENCOUNTER — ANTI-COAG VISIT (OUTPATIENT)
Dept: PHARMACY | Age: 67
End: 2022-09-22
Payer: MEDICARE

## 2022-09-22 DIAGNOSIS — I26.99 PULMONARY EMBOLISM, BILATERAL (HCC): Primary | ICD-10-CM

## 2022-09-22 DIAGNOSIS — I82.413 ACUTE DEEP VEIN THROMBOSIS (DVT) OF FEMORAL VEIN OF BOTH LOWER EXTREMITIES (HCC): ICD-10-CM

## 2022-09-22 LAB — INTERNATIONAL NORMALIZATION RATIO, POC: 1.4

## 2022-09-22 PROCEDURE — 99212 OFFICE O/P EST SF 10 MIN: CPT

## 2022-09-22 PROCEDURE — 85610 PROTHROMBIN TIME: CPT

## 2022-09-22 NOTE — PROGRESS NOTES
Jessica Moctezuma is a 75 y.o. male with PMHx significant for bilateral PE, DVT (6/4/21) while on Xarelto, HTN who presents to clinic 9/22/2022 for anticoagulation monitoring and adjustment. Pt dx with bilat DVT 6/4/21 while on Xarelto 2yrs for hx of PE. Pt stopped Xarelto on 6/6/21 and started warfarin + lovenox bridge on 6/7/21.     Anticoagulation Indication(s):  PE 2018, bilateral DVT 6/4/21 (on Xarelto)  Referring Physician:   Dr. Jack Kevin (Also send notes to Dr Namita Allen, or responsible resident)  Goal INR Range:  2-3  Duration of Anticoagulation Therapy:  indefinite  Time of day dose taken: prefers to take in AM  Product patient has at home: warfarin 5 mg (peach color)    INR Summary                           Warfarin regimen (mg)  Date INR   A/P   Sun Mon Tue Wed Thu Fri Sat Mg/wk  9/22 1.4 Below goal, increase 5 5 5 2.5 5 5 5 32.5  9/13 1.4 Below goal, (missed) 5 2.5 7.5/5 2.5 5 2.5 5 27.5  9/6 2.1 At goal, no change 5 2.5 5 2.5 5 2.5 5 27.5  8/30 1.3 Below goal, increase 5 2.5 5 2.5 5 2.5 5 27.5  8/23 2.2 At goal, continue 5 2.5 5 2.5 5 2.5 2.5 25  8/16 1.4 Below goal, increase 5 2.5 5 2.5 5 2.5 2.5 25  8/4 2.1 At goal, increase 5 2.5 2.5 2.5 2.5 2.5 2.5 20  7/28 3.2 Above goal, continue 2.5 2.5 2.5 2.5 2.5 2.5 2.5 17.5  7/21 5.5 Above goal, holdx1  2.5 2.5 2.5 2.5 5 0/2.5 2.5 20  decrease   6/23 3.1 Above goal, continue 5 2.5 2.5 2.5 5 2.5 2.5 22.5  6/9 2.9 At goal, continue  5 2.5 2.5 2.5 5 2.5 2.5 22.5  5/26 1.9 Below goal, increase  5 2.5 2.5 2.5 5 2.5 2.5 22.5  5/12 2.8 At goal, no change 5 2.5 2.5 2.5 2.5 2.5 2.5 20  5/5 4.4 Above goal, holdx1 5 2.5 2.5 2.5 2.5 2.5 2.5 20  4/21 3.1 Above goal, decrease 5 2.5 5 2.5 5 2.5 2.5 25  4/12 2.3 At goal, no change 5 2.5 5 2.5 5 2.5 5 27.5  4/4 2.7 At goal, resume 5 2.5 5 2.5 5 2.5 5 27.5  3/31 7.63 Hold x 3 days  3/17 3.2 Above goal, continue 5 5 5 5 5 5 5 35  3/1 1.5 Below goal, (Missed) 5 5 7.5/5 5 5 5 5 35  2/8 1.7 Below goal, bolus 5 5 7.5/5 5 5 5 5 35  1/20 3.7 Above goal, decrease 5 5 5 5 5 2.5 5 32.5  1/5 3.6 Above goal, hold 5 5 5 5 5 0/5 5 35  12/14   2.2       At goal, no change      5 5 5 5 5 5 5 35  11/18 2.8 At goal, no change 5 5 5 5 5 5 5 35   11/11 1.3 Below goal, cont 5 5 5 5 5 5 5 35  11/4 5.1 Above goal, hold+dec 5 5 5 5 0/5 0/5 5 35  10/25 5.3 Above goal, hold+dec  5 5 0/5 0/5 7.5 5 5 37.5  10/13 1.8 Below goal, increase 5 5 7.5 5 7.5 5 5 40  10/6 1.3 Below goal (missed) 5 5 5 10/5 7.5 5 5 37.5  9/13 2.2 At goal, continue 5 5 5 5 7.5 5 5 37.5  8/30     1.8       Below goal, cont 5 5 5 5 7.5 5 5 37.5  7/26     3.1       At goal, no change 5 5 5 5 7.5 5 5 37.5  7/15 2.1 At goal, increase 5 5 5 5 7.5 5 5 37.5  7/8 5.6 Above goal, holdx1 5 5 5 5 5 5 5 35  6/28 2.1 At goal, no change 5 7.5 5 7.5 5 7.5 5 42.5  6/21 4.3 Above goal, holdx1 5 x 5 7.5 5 7.5 5   6/14 4.2 Above goal, hold 7.5 10 0/7.5 5/7.5 7.5 7.5 7.5   6/10 1.3 Below goal, increase 7.5 INR   10 10 7.5     Started warfarin 5 mg daily on 6/7/21    Last CBC:  Lab Results   Component Value Date    RBC 4.07 (L) 08/05/2022    HGB 13.4 (L) 08/05/2022    HCT 40.1 (L) 08/05/2022    MCV 98.4 08/05/2022    MCH 32.9 08/05/2022    MPV 7.7 08/05/2022    RDW 15.9 (H) 08/05/2022     08/05/2022     Patient History:  Recent hospitalizations/HC visits 5/13/22: ortho to discuss hip replacement  4/27/22: Dr Merlene Olmedo ([facility]) recommends IVC placed prior to hip replacement  4/7/22: OV GI- Pt needs to repeat stool hemoccult, then schedule colonoscopy--pt looking for GI doctor closer to home. Recent medication changes Now taking melatonin for sleep (8/16)  Started Multivitamin - unsure of Vit K content as of 8/16 - pt brought it in on 8/23: it was vitamin D3, not MV   Medications taken regularly that may interact with warfarin or alter INR No significant drug interactions identified   Warfarin dose taken as prescribed 9/10/22: missed 5 mg dose.  Also not confident in current dosing schedule, but states he follows the calendar.  using pillbox   Signs/symptoms of bleeding Black stools resolved  Stool hemoccult negative 4/6/22   Vitamin K intake Normally has 0-1 serving of green, leafy vegetables per week; Pt often notes that he has very little appetite. 8/30/22: Pt had a couple extra servings of Vitamin K foods this past week   Recent vomiting/diarrhea/fever, changes in weight or activity level None reported  7/21/22: decreased appetite in the past month   Tobacco or alcohol use Often drinks 2-3 large glasses of wine, but states each glass only has ~4 oz wine mixed with grape juice. Occasionally has more. 1/5/22: quit smoking using 14mg patches (was smoking 2 cig/d)   7/21/22: 1.5 pint of wine a couple nights in the past week  8/16/22: Stopped drinking alcohol as of 8/6 due to previous doctor visit - he was told to quit drinking due to elevated liver enzymes   Upcoming surgeries or procedures Hip surgery on 11/2, will have IVC filter placement 2 days prior to surgery  Colonoscopy TBD--He is trying to schedule colonoscopy closer to home and will call his insurance about this. \"As of 5/17/22:  Patient still has nicotine present and also has cocaine present. I have informed the patient that he will need to take care of these issues and passes at least 2 drug tests before we will put him back on the schedule. I have told him for now his surgery is postponed indefinitely\"      Assessment/Plan:  Patient's INR was subtherapeutic (1.4) today, despite bolus warfarin dose last week. Patient denies any missed warfarin doses or dosing errors this time. Patient likes to eat liver and broccoli, but typically avoids these foods. He often cannot recall his warfarin dose, but he uses a pillbox and follows the dosing calendar provided by clinic. INR is labile, but he has quit drinking alcohol entirely, which we would expect to help stabilize INR. His hip surgery has been scheduled for 11/2.  Will

## 2022-09-23 DIAGNOSIS — E55.9 HYPOVITAMINOSIS D: ICD-10-CM

## 2022-09-23 RX ORDER — MELATONIN
Qty: 90 TABLET | Refills: 1 | Status: SHIPPED | OUTPATIENT
Start: 2022-09-23

## 2022-09-29 ENCOUNTER — ANTI-COAG VISIT (OUTPATIENT)
Dept: PHARMACY | Age: 67
End: 2022-09-29
Payer: MEDICARE

## 2022-09-29 DIAGNOSIS — I82.413 ACUTE DEEP VEIN THROMBOSIS (DVT) OF FEMORAL VEIN OF BOTH LOWER EXTREMITIES (HCC): ICD-10-CM

## 2022-09-29 DIAGNOSIS — I26.99 PULMONARY EMBOLISM, BILATERAL (HCC): Primary | ICD-10-CM

## 2022-09-29 LAB — INTERNATIONAL NORMALIZATION RATIO, POC: 1.3

## 2022-09-29 PROCEDURE — 99212 OFFICE O/P EST SF 10 MIN: CPT

## 2022-09-29 PROCEDURE — 85610 PROTHROMBIN TIME: CPT

## 2022-09-29 NOTE — PROGRESS NOTES
Julio Saldana is a 75 y.o. male with PMHx significant for bilateral PE, DVT (6/4/21) while on Xarelto, HTN who presents to clinic 9/29/2022 for anticoagulation monitoring and adjustment. Pt dx with bilat DVT 6/4/21 while on Xarelto 2yrs for hx of PE. Pt stopped Xarelto on 6/6/21 and started warfarin + lovenox bridge on 6/7/21.     Anticoagulation Indication(s):  PE 2018, bilateral DVT 6/4/21 (on Xarelto)  Referring Physician:   Dr. Davis Estrada (Also send notes to Dr Romero Gabriel, or responsible resident)  Goal INR Range:  2-3  Duration of Anticoagulation Therapy:  indefinite  Time of day dose taken: prefers to take in AM  Product patient has at home: warfarin 5 mg (peach color)    INR Summary                           Warfarin regimen (mg)  Date INR   A/P   Sun Mon Tue Wed Thu Fri Sat Mg/wk  9/29 1.3 Below goal, 10mgx1 5 5 5 5 10/7.5 5 5 37.5  9/22 1.4 Below goal, increase 5 5 5 2.5 5 5 5 32.5  9/13 1.4 Below goal, (missed) 5 2.5 7.5/5 2.5 5 2.5 5 27.5  9/6 2.1 At goal, no change 5 2.5 5 2.5 5 2.5 5 27.5  8/30 1.3 Below goal, increase 5 2.5 5 2.5 5 2.5 5 27.5  8/23 2.2 At goal, continue 5 2.5 5 2.5 5 2.5 2.5 25  8/16 1.4 Below goal, increase 5 2.5 5 2.5 5 2.5 2.5 25  8/4 2.1 At goal, increase 5 2.5 2.5 2.5 2.5 2.5 2.5 20  7/28 3.2 Above goal, continue 2.5 2.5 2.5 2.5 2.5 2.5 2.5 17.5  7/21 5.5 Above goal, holdx1  2.5 2.5 2.5 2.5 5 0/2.5 2.5 20  decrease   6/23 3.1 Above goal, continue 5 2.5 2.5 2.5 5 2.5 2.5 22.5  6/9 2.9 At goal, continue  5 2.5 2.5 2.5 5 2.5 2.5 22.5  5/26 1.9 Below goal, increase  5 2.5 2.5 2.5 5 2.5 2.5 22.5  5/12 2.8 At goal, no change 5 2.5 2.5 2.5 2.5 2.5 2.5 20  5/5 4.4 Above goal, holdx1 5 2.5 2.5 2.5 2.5 2.5 2.5 20  4/21 3.1 Above goal, decrease 5 2.5 5 2.5 5 2.5 2.5 25  4/12 2.3 At goal, no change 5 2.5 5 2.5 5 2.5 5 27.5  4/4 2.7 At goal, resume 5 2.5 5 2.5 5 2.5 5 27.5  3/31 7.63 Hold x 3 days  3/17 3.2 Above goal, continue 5 5 5 5 5 5 5 35  3/1 1.5 Below goal, (Missed) 5 5 7.5/5 5 5 5 5 35  2/8 1.7 Below goal, bolus 5 5 7.5/5 5 5 5 5 35  1/20 3.7 Above goal, decrease 5 5 5 5 5 2.5 5 32.5  1/5 3.6 Above goal, hold 5 5 5 5 5 0/5 5 35  12/14   2.2       At goal, no change      5 5 5 5 5 5 5 35  11/18 2.8 At goal, no change 5 5 5 5 5 5 5 35   11/11 1.3 Below goal, cont 5 5 5 5 5 5 5 35  11/4 5.1 Above goal, hold+dec 5 5 5 5 0/5 0/5 5 35  10/25 5.3 Above goal, hold+dec  5 5 0/5 0/5 7.5 5 5 37.5  10/13 1.8 Below goal, increase 5 5 7.5 5 7.5 5 5 40  10/6 1.3 Below goal (missed) 5 5 5 10/5 7.5 5 5 37.5  9/13 2.2 At goal, continue 5 5 5 5 7.5 5 5 37.5  8/30     1.8       Below goal, cont 5 5 5 5 7.5 5 5 37.5  7/26     3.1       At goal, no change 5 5 5 5 7.5 5 5 37.5  7/15 2.1 At goal, increase 5 5 5 5 7.5 5 5 37.5  7/8 5.6 Above goal, holdx1 5 5 5 5 5 5 5 35  6/28 2.1 At goal, no change 5 7.5 5 7.5 5 7.5 5 42.5  6/21 4.3 Above goal, holdx1 5 x 5 7.5 5 7.5 5   6/14 4.2 Above goal, hold 7.5 10 0/7.5 5/7.5 7.5 7.5 7.5   6/10 1.3 Below goal, increase 7.5 INR   10 10 7.5     Started warfarin 5 mg daily on 6/7/21    Last CBC:  Lab Results   Component Value Date    RBC 4.07 (L) 08/05/2022    HGB 13.4 (L) 08/05/2022    HCT 40.1 (L) 08/05/2022    MCV 98.4 08/05/2022    MCH 32.9 08/05/2022    MPV 7.7 08/05/2022    RDW 15.9 (H) 08/05/2022     08/05/2022     Patient History:  Recent hospitalizations/HC visits 10/7/22: ortho to discuss hip replacement  4/27/22: Dr Yolanda Waite ([facility]) recommends IVC placed prior to hip replacement  4/7/22: OV GI- Pt needs to repeat stool hemoccult, then schedule colonoscopy--pt looking for GI doctor closer to home. Recent medication changes Now taking melatonin for sleep (8/16)   Medications taken regularly that may interact with warfarin or alter INR No significant drug interactions identified   Warfarin dose taken as prescribed 9/10/22: missed 5 mg dose.  Also not confident in current dosing schedule, but states he follows the calendar.  using pillbox   Signs/symptoms of bleeding Black stools resolved  Stool hemoccult negative 4/6/22   Vitamin K intake Normally has 0-1 serving of green, leafy vegetables per week; Pt often notes that he has very little appetite. 8/30/22: couple extra servings of Vit K foods    Recent vomiting/diarrhea/fever, changes in weight or activity level None reported  7/21/22: decreased appetite in the past month   Tobacco or alcohol use Often drinks 2-3 large glasses of wine, but states each glass only has ~4 oz wine mixed with grape juice. Occasionally has more. 1/5/22: quit smoking using 14mg patches (was smoking 2 cig/d)   7/21/22: 1.5 pint of wine 2 nights in past week  8/16/22: Stopped drinking alcohol as of 8/6 due to previous doctor visit - he was told to quit drinking due to elevated liver enzymes   Upcoming surgeries or procedures Hip surgery on 11/2, will have IVC filter placement 2 days prior to surgery  Colonoscopy TBD--He is trying to schedule colonoscopy closer to home and will call his insurance about this. \"As of 5/17/22:  Patient still has nicotine present and also has cocaine present. I have informed the patient that he will need to take care of these issues and passes at least 2 drug tests before we will put him back on the schedule. I have told him for now his surgery is postponed indefinitely\"      Assessment/Plan:  Patient's INR was subtherapeutic (1.3) today, despite dose adjustment last week. Patient denies any missed warfarin doses or dosing errors. Patient likes to eat liver and broccoli, but typically avoids these foods. He often cannot recall his warfarin dose, but he uses a pillbox and follows the dosing calendar provided by clinic. Today, he was able to verbalize his dose without prompting. INR is labile, but he has quit drinking alcohol entirely, which we would expect to help stabilize INR. His hip surgery has been scheduled for 11/2.  Will develop chirag-op anticoagulation plan for patient. Patient was instructed to take 10 mg tonight only, then increase warfarin dose to 5 mg daily, except 7.5 mg on Thursdays. Repeat INR in 1 week. Patient was reminded to maintain consistent vitamin K intake and call with any bleeding, medication changes, or fever/vomiting/diarrhea. Patient understands dosing directions and information discussed. Dosing schedule and follow up appointment given to patient. Progress note routed to referring physician's office. Patient acknowledges working in consult agreement with pharmacist as referred by his/her physician. Next Clinic Appointment: 10/6    Please call The [address] [place] [address] Avenue [phone] with any questions. Thanks!   Sue Stark, PharmD, [facility]  Medication Management Clinic   Jayden Oconnor [facility] Ph: [phone]  Isai Raman Ph: [phone]  9/29/2022 10:53 AM      For Pharmacy Admin Tracking Only    Intervention Detail: Dose Adjustment: 1, reason: Therapy Optimization  Total # of Interventions Recommended: 1  Total # of Interventions Accepted: 1  Time Spent (min): 15

## 2022-10-06 ENCOUNTER — TELEPHONE (OUTPATIENT)
Dept: FAMILY MEDICINE CLINIC | Age: 67
End: 2022-10-06

## 2022-10-06 ENCOUNTER — ANTI-COAG VISIT (OUTPATIENT)
Dept: PHARMACY | Age: 67
End: 2022-10-06
Payer: MEDICARE

## 2022-10-06 DIAGNOSIS — I82.413 ACUTE DEEP VEIN THROMBOSIS (DVT) OF FEMORAL VEIN OF BOTH LOWER EXTREMITIES (HCC): ICD-10-CM

## 2022-10-06 DIAGNOSIS — I26.99 PULMONARY EMBOLISM, BILATERAL (HCC): Primary | ICD-10-CM

## 2022-10-06 LAB — INTERNATIONAL NORMALIZATION RATIO, POC: 1.6

## 2022-10-06 PROCEDURE — 85610 PROTHROMBIN TIME: CPT

## 2022-10-06 PROCEDURE — 99212 OFFICE O/P EST SF 10 MIN: CPT

## 2022-10-06 NOTE — PROGRESS NOTES
Yan Anthony is a 75 y.o. male with PMHx significant for bilateral PE, DVT (6/4/21) while on Xarelto, HTN who presents to clinic 10/6/2022 for anticoagulation monitoring and adjustment. Pt dx with bilat DVT 6/4/21 while on Xarelto 2yrs for hx of PE. Pt stopped Xarelto on 6/6/21 and started warfarin + lovenox bridge on 6/7/21.     Anticoagulation Indication(s):  PE 2018, bilateral DVT 6/4/21 (on Xarelto)  Referring Physician:   Dr. Ai Dubois (Also send notes to Dr Vanessa Dominique, or responsible resident)  Goal INR Range:  2-3  Duration of Anticoagulation Therapy:  indefinite  Time of day dose taken: prefers to take in AM  Product patient has at home: warfarin 5 mg (peach color)    INR Summary                           Warfarin regimen (mg)  Date INR   A/P   Sun Mon Tue Wed Thu Fri Sat Mg/wk  10/6 1.6 Below goal, bolus 5 5 5 5 10/5 7.5 5 37.5   9/29 1.3 Below goal, 10mgx1 5 5 5 5 10/7.5 5 5 37.5  9/22 1.4 Below goal, increase 5 5 5 2.5 5 5 5 32.5  9/13 1.4 Below goal, (missed) 5 2.5 7.5/5 2.5 5 2.5 5 27.5  9/6 2.1 At goal, no change 5 2.5 5 2.5 5 2.5 5 27.5  8/30 1.3 Below goal, increase 5 2.5 5 2.5 5 2.5 5 27.5  8/23 2.2 At goal, continue 5 2.5 5 2.5 5 2.5 2.5 25  8/16 1.4 Below goal, increase 5 2.5 5 2.5 5 2.5 2.5 25  8/4 2.1 At goal, increase 5 2.5 2.5 2.5 2.5 2.5 2.5 20  7/28 3.2 Above goal, continue 2.5 2.5 2.5 2.5 2.5 2.5 2.5 17.5  7/21 5.5 Above goal, holdx1  2.5 2.5 2.5 2.5 5 0/2.5 2.5 20  decrease   6/23 3.1 Above goal, continue 5 2.5 2.5 2.5 5 2.5 2.5 22.5  6/9 2.9 At goal, continue  5 2.5 2.5 2.5 5 2.5 2.5 22.5  5/26 1.9 Below goal, increase  5 2.5 2.5 2.5 5 2.5 2.5 22.5  5/12 2.8 At goal, no change 5 2.5 2.5 2.5 2.5 2.5 2.5 20  5/5 4.4 Above goal, holdx1 5 2.5 2.5 2.5 2.5 2.5 2.5 20  4/21 3.1 Above goal, decrease 5 2.5 5 2.5 5 2.5 2.5 25  4/12 2.3 At goal, no change 5 2.5 5 2.5 5 2.5 5 27.5  4/4 2.7 At goal, resume 5 2.5 5 2.5 5 2.5 5 27.5  3/31 7.63 Hold x 3 days  3/17 3.2 Above goal, continue 5 5 5 5 5 5 5 35  3/1 1.5 Below goal, (Missed) 5 5 7.5/5 5 5 5 5 35  2/8 1.7 Below goal, bolus 5 5 7.5/5 5 5 5 5 35  1/20 3.7 Above goal, decrease 5 5 5 5 5 2.5 5 32.5  1/5 3.6 Above goal, hold 5 5 5 5 5 0/5 5 35  12/14   2.2       At goal, no change      5 5 5 5 5 5 5 35  11/18 2.8 At goal, no change 5 5 5 5 5 5 5 35   11/11 1.3 Below goal, cont 5 5 5 5 5 5 5 35  11/4 5.1 Above goal, hold+dec 5 5 5 5 0/5 0/5 5 35  10/25 5.3 Above goal, hold+dec  5 5 0/5 0/5 7.5 5 5 37.5  10/13 1.8 Below goal, increase 5 5 7.5 5 7.5 5 5 40  10/6 1.3 Below goal (missed) 5 5 5 10/5 7.5 5 5 37.5  9/13 2.2 At goal, continue 5 5 5 5 7.5 5 5 37.5  8/30     1.8       Below goal, cont 5 5 5 5 7.5 5 5 37.5  7/26     3.1       At goal, no change 5 5 5 5 7.5 5 5 37.5  7/15 2.1 At goal, increase 5 5 5 5 7.5 5 5 37.5  7/8 5.6 Above goal, holdx1 5 5 5 5 5 5 5 35  6/28 2.1 At goal, no change 5 7.5 5 7.5 5 7.5 5 42.5  6/21 4.3 Above goal, holdx1 5 x 5 7.5 5 7.5 5   6/14 4.2 Above goal, hold 7.5 10 0/7.5 5/7.5 7.5 7.5 7.5   6/10 1.3 Below goal, increase 7.5 INR   10 10 7.5     Started warfarin 5 mg daily on 6/7/21    Last CBC:  Lab Results   Component Value Date    RBC 4.07 (L) 08/05/2022    HGB 13.4 (L) 08/05/2022    HCT 40.1 (L) 08/05/2022    MCV 98.4 08/05/2022    MCH 32.9 08/05/2022    MPV 7.7 08/05/2022    RDW 15.9 (H) 08/05/2022     08/05/2022     Patient History:  Recent hospitalizations/HC visits 10/7/22: ortho to discuss hip replacement  4/27/22: Dr Hung Boone ([facility]) recommends IVC placed prior to hip replacement  4/7/22: OV GI- Pt needs to repeat stool hemoccult, then schedule colonoscopy--pt looking for GI doctor closer to home. Recent medication changes Now taking melatonin for sleep (8/16)   Medications taken regularly that may interact with warfarin or alter INR No significant drug interactions identified   Warfarin dose taken as prescribed 9/10/22: missed 5 mg dose.  Also not confident in current dosing schedule, but states he follows the calendar.  using pillbox   Signs/symptoms of bleeding Black stools resolved  Stool hemoccult negative 4/6/22   Vitamin K intake Normally has 0-1 serving of green, leafy vegetables per week; Pt often notes that he has very little appetite. 8/30/22: couple extra servings of Vit K foods    Recent vomiting/diarrhea/fever, changes in weight or activity level None reported  7/21/22: decreased appetite in the past month   Tobacco or alcohol use Often drinks 2-3 large glasses of wine, but states each glass only has ~4 oz wine mixed with grape juice. Occasionally has more. 1/5/22: quit smoking using 14mg patches (was smoking 2 cig/d)   7/21/22: 1.5 pint of wine 2 nights in past week  8/16/22: Stopped drinking alcohol as of 8/6 due to previous doctor visit - he was told to quit drinking due to elevated liver enzymes   Upcoming surgeries or procedures Hip surgery on 11/2, will have IVC filter placement 2 days prior to surgery  Colonoscopy TBD--He is trying to schedule colonoscopy closer to home and will call his insurance about this. \"As of 5/17/22:  Patient still has nicotine present and also has cocaine present. I have informed the patient that he will need to take care of these issues and passes at least 2 drug tests before we will put him back on the schedule. I have told him for now his surgery is postponed indefinitely\"      Assessment/Plan:  Patient's INR was subtherapeutic (1.6) today, despite dose adjustment last week. Patient states that he missed a dose of warfarin on Tuesday, this may be affecting his INR. Patient likes to eat liver and broccoli, but typically avoids these foods. He states that he has no green foods in the last week. He often cannot recall his warfarin dose, but he uses a pillbox and follows the dosing calendar provided by clinic. Today, he was able to verbalize his dose without prompting.  INR is labile, but he has quit drinking alcohol entirely, which we would expect to help stabilize INR. His hip surgery has been scheduled for 11/2. Will develop chirag-op anticoagulation plan for patient. Patient has an appt with orthopedics tomorrow and will discuss plans with the team. Appears that it is planned for him to have a IVC placed prior to surgery. This is the fourth week that the patients INR has been subtherapeutic. Discussed risk of DVT and PE with patient and why he is at increased risk while his INR is low. As of now he has no s/s of thrombosis but we discussed the s/s and he will be alert and call the clinic/ seek medical help as needed. Patient was instructed to take 10 mg tonight only, then continue  warfarin dose to 5 mg daily, except 7.5 mg on Fridays. Repeat INR in 1 week. Patient was reminded to maintain consistent vitamin K intake and call with any bleeding, medication changes, or fever/vomiting/diarrhea. Patient understands dosing directions and information discussed. Dosing schedule and follow up appointment given to patient. Progress note routed to referring physician's office. Patient acknowledges working in consult agreement with pharmacist as referred by his/her physician. Next Clinic Appointment: 10/13    Please call The [address] [place] [address] [phone] with any questions. Thanks!   Judy Cisneros, PharmD  Medication Management Clinic   [facility] Ph: [phone]  Chela Cancino Ph: [phone]  10/6/2022 10:52 AM      For Pharmacy Admin Tracking Only    Intervention Detail: Dose Adjustment: 1, reason: Therapy Optimization  Total # of Interventions Recommended: 1  Total # of Interventions Accepted: 1  Time Spent (min): 15

## 2022-10-06 NOTE — TELEPHONE ENCOUNTER
----- Message from Deidra Mejíaen sent at 10/6/2022  2:16 PM EDT -----  Subject: Refill Request    QUESTIONS  Name of Medication? furosemide (LASIX) 20 MG tablet  Patient-reported dosage and instructions? as directed   How many days do you have left? 0  Preferred Pharmacy? [id number]  Pharmacy phone number (if available)? [phone]  Additional Information for Provider? 30 day supply   ---------------------------------------------------------------------------  --------------  CALL BACK INFO  What is the best way for the office to contact you? OK to leave message on   voicemail  Preferred Call Back Phone Number? [phone]  ---------------------------------------------------------------------------  --------------  SCRIPT ANSWERS  Relationship to Patient?  Self

## 2022-10-07 ENCOUNTER — OFFICE VISIT (OUTPATIENT)
Dept: ORTHOPEDIC SURGERY | Age: 67
End: 2022-10-07
Payer: MEDICARE

## 2022-10-07 ENCOUNTER — TELEPHONE (OUTPATIENT)
Dept: VASCULAR SURGERY | Age: 67
End: 2022-10-07

## 2022-10-07 VITALS — WEIGHT: 170 LBS | HEIGHT: 70 IN | BODY MASS INDEX: 24.34 KG/M2

## 2022-10-07 DIAGNOSIS — Z86.711 HISTORY OF PULMONARY EMBOLISM: ICD-10-CM

## 2022-10-07 DIAGNOSIS — M16.12 PRIMARY OSTEOARTHRITIS OF LEFT HIP: ICD-10-CM

## 2022-10-07 DIAGNOSIS — Z86.718 HISTORY OF DVT (DEEP VEIN THROMBOSIS): Primary | ICD-10-CM

## 2022-10-07 PROCEDURE — 1123F ACP DISCUSS/DSCN MKR DOCD: CPT | Performed by: ORTHOPAEDIC SURGERY

## 2022-10-07 PROCEDURE — 99214 OFFICE O/P EST MOD 30 MIN: CPT | Performed by: ORTHOPAEDIC SURGERY

## 2022-10-07 NOTE — TELEPHONE ENCOUNTER
Patient called with pre op questions for Dr. Maite Zabala Regarding surgery with Dr. Carey Lerma on 11/2/22. Please advise.

## 2022-10-07 NOTE — PROGRESS NOTES
Dr Blu Pelaez      Date /Time 10/7/2022       10:52 AM EST  Name Sanjuana Barkley             1955   Location  [facility]  MRN [id number]                Chief Complaint   Patient presents with    Hip Pain     CK LEFT HIP         History of Present Illness    Sanjuana Barkley is a 75 y.o. male who presents with left hip pain. Current history: Patient has passed his nicotine and drug test.  His labs appear to be an appropriate order. He has seen the vascular surgeon and there are plans for IVF filter the week before surgery. He has had no new injury or trauma. He continues to complain of global left hip pain. Previous history:  His pain has gotten worse and is interested in surgery. Pain continues to be concentrated over his groin. He does have increased pain with activities and improvement with rest.  He again states that he has stopped smoking and is clean from heroin. He is currently on Coumadin for history of DVT. I have reviewed Dr. Miguel Jc note and there is plans for IVF filter before total hip arthroplasty. Previous history: He presents for bilateral hip pain, left worse than right. He is interested in surgical solution for this at this point. His pain is gotten significantly worse than previous visits. He reports he has quit smoking almost completely for the last month and a half. He still reports that his heroin addiction is in remission. He has switched to taking Coumadin instead of Xarelto for history of venous thrombolic disease  He says he is quit smoking. Previous history: At patient's last visit he was complaining of bilateral extremity swelling. We were concerned about a DVT. We did order a bilateral lower extremity venous Doppler. They were positive for massive DVTs of bilateral lower extremity while taking Xarelto. Since then he has been placed on Coumadin. He states his lower extremity swelling is improving but continues to have significant hip pain.   He does have advanced osteoarthritis of his hip. Previous history: Patient presents the office today for a new problem. Patient is here with a chief complaint of left greater than right hip pain. Patient has had pain for several months with increased symptoms over the last few weeks. His symptoms are concentrated lateral greater than groin. Patient does have increased pain with weightbearing activities. He also complains of lumbar pain and bilateral thigh pain. He does not complain of any pain symptoms in his lower legs or feet. No numbness and tingling. He has tried NSAIDs, activity modifications and assistive devices without any significant improvement. He does have a history of a PE and is taking Xarelto. He also has a history of heroin addiction but states he has been clean for 1 year.       Past History  Past Medical History:   Diagnosis Date    Anxiety 2010    Back pain 2012    DVT (deep venous thrombosis) ([facility]     History of heroin abuse ([facility]     HLD (hyperlipidemia) 3/23/2022    Hypertension 10/25/2011    Pulmonary embolism ([facility]     FRANCHESKA     Past Surgical History:   Procedure Laterality Date    COLONOSCOPY N/A 2019    COLONOSCOPY performed by Timmy Talavera MD at [facility]  2019    COLONOSCOPY WITH BIOPSY performed by Timmy Talavera MD at [address] [facility] Right     ENDOSCOPY, COLON, DIAGNOSTIC      UPPER GASTROINTESTINAL ENDOSCOPY N/A 2020    EGD DIAGNOSTIC ONLY performed by Timmy Talavera MD at [place] [address] N/A 3/18/2020    PUSH ENTEROSCOPY performed by Timmy Talavera MD at [address] [facility] [address] [facility] [address] History     Tobacco Use    Smoking status: Former     Packs/day: 0.50     Years: 30.00     Pack years: 15.00     Types: Cigars, Cigarettes     Start date:      Quit date: 2021     Years since quittin.4    Smokeless tobacco: Never    Tobacco comments:     1 DAILY   Substance Use Topics    Alcohol use: Yes     Comment: pint of wine a day      Current Outpatient Medications on File Prior to Visit   Medication Sig Dispense Refill    vitamin D3 (CHOLECALCIFEROL) 25 MCG (1000 UT) TABS tablet TAKE 1 TABLET BY MOUTH EVERY DAY 90 tablet 1    folic acid (FOLVITE) 1 MG tablet TAKE 1 TABLET BY MOUTH EVERY DAY 90 tablet 0    warfarin (COUMADIN) 5 MG tablet TAKE 1 TABLET BY MOUTH EVERY DAY 90 tablet 1    mupirocin (BACTROBAN) 2 % ointment Apply twice daily to each nare for 5 days prior to surgical procedure 1 g 0    furosemide (LASIX) 20 MG tablet TAKE 1 TABLET BY MOUTH EVERY DAY 30 tablet 0    melatonin (RA MELATONIN) 3 MG TABS tablet Take 1 tablet by mouth nightly as needed (Sleep) 30 tablet 3    olmesartan (BENICAR) 20 MG tablet Take 1 tablet by mouth daily 30 tablet 3    amLODIPine (NORVASC) 5 MG tablet Take 1 tablet by mouth daily 30 tablet 3    nicotine (NICODERM CQ) 14 MG/24HR APPLY 1 PATCH ONTO THE SKIN EVERY DAY 28 patch 1    diclofenac sodium (VOLTAREN) 1 % GEL Apply 4 g topically 4 times daily as needed for Pain 50 g 3    methadone (DOLOPHINE) 10 MG/ML solution Take 35 mg by mouth daily. (Patient not taking: No sig reported)       No current facility-administered medications on file prior to visit. ASCVD 10-YEAR RISK SCORE  The 10-year ASCVD risk score (Chinyere GLYNN, et al., 2019) is: 18%    Values used to calculate the score:      Age: 75 years      Sex: Male      Is Non- : Yes      Diabetic: No      Tobacco smoker: No      Systolic Blood Pressure: 625 mmHg      Is BP treated: Yes      HDL Cholesterol: 58 mg/dL      Total Cholesterol: 232 mg/dL   . Review of Systems  10-point ROS is negative other than HPI. Physical Exam  Based off 1997 Exam Criteria  Ht 5' 10\" (1.778 m)   Wt 170 lb (77.1 kg)   BMI 24.39 kg/m²      Constitutional:       General: He is not in acute distress. Appearance: Normal appearance. Cardiovascular:      Rate and Rhythm: Normal rate and regular rhythm. Pulses: Normal pulses. Pulmonary:      Effort: Pulmonary effort is normal. No respiratory distress. Neurological:      Mental Status: He is alert and oriented to person, place, and time. Mental status is at baseline.      Musculoskeletal:  Gait:  antalgic    Spine / Hip Exam:      RIGHT  LEFT    Lumbar Spine Exam  [] All Neg    [] All Neg     Straight leg raise  []  []Not tested   []  []Not tested    Clonus  []  []Not tested   []  []Not tested    Pain with motion  [x]  []Not tested   [x]  []Not tested    Radiculopathy  [x]  []Not tested   [x]  []Not tested    Paraspinal muscle tenderness  [x] Paraspinal  [x]Midline   [x] Paraspinal  [x]Midline   Sensation RIGHT  LEFT    L3  [x] Normal []Decreased    [x] Normal []Decreased   L4  [] Normal  [x]Decreased   [] Normal [x]Decreased   L5  [] Normal [x]Decreased   [] Normal [x]Decreased   S1  [] Normal  [x]Decreased   [] Normal [x]Decreased   Pelvis       Scoliosis  [] Nml  [x] Present     Leg-length discrepency  [x] Equal  [] Right longer   [] Left longer   Range of Motion Active Passive Active Passive   Hip Flexion 90  90    Abduction 20  20    External Rotation @ 90 flex 35  35    Internal Rotation @ 90 flex -10  -10           Hip Impingement / Dysplasia  [] All Neg  [] Not tested   [] All Neg  [] Not tested    Hip impingement test  [x]  []Not tested   [x]  []Not tested    C-sign  [x]  []Not tested   [x]  []Not tested    Anterior instability apprehension  []  []Not tested   []  []Not tested    Posterior instability apprehension  []  []Not tested   []  []Not tested    Uncontained Internal rotation  []  []Not tested  []  []Not tested          Abductors  [x] All Neg  [] Not tested   [x] All Neg  [] Not tested    Medius strength  []  []Not tested   []  []Not tested    Minimum strength  []  []Not tested   []  []Not tested    IT band tendonitis  []  []Not tested   []  []Not tested    Trochanteric tenderness  []  []Not tested  []  []Not tested   Sciatic neuropathic pain []  []Not tested   []  []Not tested           Post-arthroplasty  [] All Neg  [] Not tested   [] All Neg  [] Not tested    Rectus tendonitis  []  []Not tested   []  []Not tested    Iliopsoas tendonitis       Start-up pain  []  []Not tested   []  []Not tested      Markedly stiff hips, stiff painful back as well. Some radiculopathy. Calf swelling improved compared to previous exam.    Imaging    Previous Bilateral hip: [facility] [place]  Previous Radiographs: End-stage osteoarthritis with severe osteophyte formation, joint space narrowing, cyst and sclerosis. Arthritic and lordotic lumbar spine as well. Some spondylolisthesis present in the lower lumbar segments. Lumbar spine x-ray from February 2021: 2 view x-ray demonstrates facet joint arthropathy at multiple levels without evidence of overt DISH. They also demonstrate previous IVC filter. CT abdomen from August 2022: Demonstrates indwelling IVC filter placement it seems. Assessment and Plan  Julio Greene was seen today for hip pain. Diagnoses and all orders for this visit:    History of DVT (deep vein thrombosis)    Primary osteoarthritis of left hip    History of pulmonary embolism         Patient is suffering from advanced osteoarthritis osteoarthritis of bilateral hips. Patient is currently anticoagulated for massive bilateral DVTs. He did develop massive bilateral lower extremity DVTs while already anticoagulated for previous DVTs on Xarelto. Patient has been converted to Coumadin. There are plans for IVF filter. He has passed all previous blood work and drug screens and nicotine screen. He will follow-up in the office afterwards    I personally sent a message to Dr. Jose Luis Pedraza and Dr. Rachel Rosa to coordinate his overall health as well as coordinate his IVC filter placement. On brief review of imaging, it seems to me that there already is a filter in position from recent imaging. I am hoping Dr. Rachel Rosa can comment.     I discussed with Patrizia Milan that his history, symptoms, signs and imaging are most consistent with hip arthritis    We reviewed the natural history of these conditions and treatment options ranging from conservative measures (rest, icing, activity modification, physical therapy, pain meds, cortisone injection)  to surgical options. We had a long discussion with the patient about their hip. We discussed surgical and non surgical options for hip arthritis. The most important thing is to work to maintain their range of motion. Next they can try medications including tylenol and NSAIDs. They can try glucosamine or chondroitin. They should also ice frequently and avoid activities that make their hip hurt. Cortisone injections and Synvisc injections are also options when medicine has failed. We finally discussed surgical options including arthroscopic debridement versus hip replacement. Often the arthritis is too far gone for an arthroscopic debridement and pain relief will be short term. Their ultimate solution will be a hip replacement when they are ready for it. They should put it off until they can no longer stand the pain and when nothing else has worked. Conservative measures have failed. He is not interested in cortisone injections. I think he is an appropriate candidate for surgery due to his ongoing symptoms and dysfunction despite conservative measures. The procedure would be LEFT  [address] Total Hip Arthroplasty, left direct anterior    We will use Cell [employer]. Perioperative considerations include:  Preop PCP eval, DVT History, PE History and Chronic anticoagulation other than Aspirin. We reviewed the risks, benefits, alternatives of this approach.  We discussed risks including, but not limited to, bleeding, pain, infection, scarring, damage to the neurovascular structures, blood clots, pulmonary embolus, stiffness, implant instability or loosening, implant failure, incomplete relief of pain, and incomplete return of function. His perioperative risk is significantly higher than a standard replacement. First, he has a history of venous thrombolic disease, last known clot was 2 years ago. He now is on Coumadin and remains anticoagulated. We will coordinate IVC filter placement for him. Second, he has a history of nicotine dependence and smoking. He has quit now improved this to be negative on preoperative screen. In addition, he has a history of previous heroin addiction, which has been in remission for over a year. This remains negative on drug screen. We also reviewed the surgical details, expected recovery, and rehabilitation (6-9 months). He expressed understanding and will undergo preoperative medical evaluation and optimization. He is definitely an inpatient candidate at the time of surgery considering all the comorbidities and close monitoring we will need to perform.

## 2022-10-07 NOTE — LETTER
Dr. Jose Luis Pedraza -thanks for helping to optimize [facility]. He is a complex case with his history of venous thrombolic disease. I agree with warfarin to Lovenox bridge for him. Dr. Rachel Rosa- I agree with preoperative IVC filter placement. We are planning for surgery on 11/2/2022. I am wondering if you could please place filter sometime before then. I am not sure when the optimum window would be prior to surgery, 1 to 2 weeks before? I am open to your suggestion. We are planning left anterior hip surgery so if you enter the right groin, the wound should not be an issue. **Also, I believe the patient is a poor historian. I am reviewing his imaging from his lumbar spine x-ray and his CT abdomen taken August 2022. It looks like he already has a filter in position. Are you able to confirm? Please call me with updates or if anything changes. My cellphone is [phone].   Thank you    Bandar Horton

## 2022-10-10 NOTE — TELEPHONE ENCOUNTER
Spoke with patient. He is scheduled for hip replacement on 11/2. Will discuss with Dr. Bisi Wiseman and call patient jeffy after I get details of procedure that needs to be done.

## 2022-10-11 NOTE — TELEPHONE ENCOUNTER
Spoke with patient. Zev Zhu has spoken to [facility]. Patient already has IVC filter.

## 2022-10-13 ENCOUNTER — ANTI-COAG VISIT (OUTPATIENT)
Dept: PHARMACY | Age: 67
End: 2022-10-13
Payer: MEDICARE

## 2022-10-13 ENCOUNTER — TELEPHONE (OUTPATIENT)
Dept: PHARMACY | Age: 67
End: 2022-10-13

## 2022-10-13 ENCOUNTER — TELEPHONE (OUTPATIENT)
Dept: ORTHOPEDIC SURGERY | Age: 67
End: 2022-10-13

## 2022-10-13 DIAGNOSIS — I26.99 PULMONARY EMBOLISM, BILATERAL (HCC): Primary | ICD-10-CM

## 2022-10-13 DIAGNOSIS — I82.413 ACUTE DEEP VEIN THROMBOSIS (DVT) OF FEMORAL VEIN OF BOTH LOWER EXTREMITIES (HCC): ICD-10-CM

## 2022-10-13 LAB — INTERNATIONAL NORMALIZATION RATIO, POC: 1.6

## 2022-10-13 PROCEDURE — 85610 PROTHROMBIN TIME: CPT

## 2022-10-13 PROCEDURE — 99212 OFFICE O/P EST SF 10 MIN: CPT

## 2022-10-13 NOTE — TELEPHONE ENCOUNTER
Patient is scheduled for left hip arthroplasty on 11/2/22. There are plans for IVC placement prior to procedure. Pt is currently on warfarin for PE (2018) and bilateral DVTs 6/4/21 (while on Xarelto). Please advise on plan for holding warfarin PTP so I can reinforce with pt at next visit. I am assuming enoxaparin is not recommended in this case due to IVC filter placement.     Paulina Frias, PharmD, [facility]  [facility]   [place] [address] Ph: [phone]  [facility] Ph: [phone]  10/13/2022 1:36 PM

## 2022-10-13 NOTE — PROGRESS NOTES
Mar Young is a 75 y.o. male with PMHx significant for bilateral PE, DVT (6/4/21) while on Xarelto, HTN who presents to clinic 10/13/2022 for anticoagulation monitoring and adjustment. Pt dx with bilat DVT 6/4/21 while on Xarelto 2yrs for hx of PE. Pt stopped Xarelto on 6/6/21 and started warfarin + lovenox bridge on 6/7/21.     Anticoagulation Indication(s):  PE 2018, bilateral DVT 6/4/21 (on Xarelto)  Referring Physician:   Dr. Laura Rosales (Also send notes to Dr Kinza Rooney, or responsible resident)  Goal INR Range:  2-3  Duration of Anticoagulation Therapy:  indefinite  Time of day dose taken: prefers to take in AM  Product patient has at home: warfarin 5 mg (peach color)    INR Summary                           Warfarin regimen (mg)  Date INR   A/P   Sun Mon Tue Wed Thu Fri Sat Mg/wk  10/13 1.6 Below goal, increase 5 10 5 5 10 5 5 45  10/6 1.6 Below goal, (missed) 5 5 5 5 10/5 7.5 5 37.5   9/29 1.3 Below goal, 10mgx1 5 5 5 5 10/7.5 5 5 37.5  9/22 1.4 Below goal, increase 5 5 5 2.5 5 5 5 32.5  9/13 1.4 Below goal, (missed) 5 2.5 7.5/5 2.5 5 2.5 5 27.5  9/6 2.1 At goal, no change 5 2.5 5 2.5 5 2.5 5 27.5  8/30 1.3 Below goal, increase 5 2.5 5 2.5 5 2.5 5 27.5  8/23 2.2 At goal, continue 5 2.5 5 2.5 5 2.5 2.5 25  8/16 1.4 Below goal, increase 5 2.5 5 2.5 5 2.5 2.5 25  8/4 2.1 At goal, increase 5 2.5 2.5 2.5 2.5 2.5 2.5 20  7/28 3.2 Above goal, continue 2.5 2.5 2.5 2.5 2.5 2.5 2.5 17.5  7/21 5.5 Above goal, holdx1  2.5 2.5 2.5 2.5 5 0/2.5 2.5 20  decrease   6/23 3.1 Above goal, continue 5 2.5 2.5 2.5 5 2.5 2.5 22.5  6/9 2.9 At goal, continue  5 2.5 2.5 2.5 5 2.5 2.5 22.5  5/26 1.9 Below goal, increase  5 2.5 2.5 2.5 5 2.5 2.5 22.5  5/12 2.8 At goal, no change 5 2.5 2.5 2.5 2.5 2.5 2.5 20  5/5 4.4 Above goal, holdx1 5 2.5 2.5 2.5 2.5 2.5 2.5 20  4/21 3.1 Above goal, decrease 5 2.5 5 2.5 5 2.5 2.5 25  4/12 2.3 At goal, no change 5 2.5 5 2.5 5 2.5 5 27.5  4/4 2.7 At goal, resume 5 2.5 5 2.5 5 2.5 5 27.5  3/31 7.63 Hold x 3 days  3/17 3.2 Above goal, continue 5 5 5 5 5 5 5 35  3/1 1.5 Below goal, (Missed) 5 5 7.5/5 5 5 5 5 35  2/8 1.7 Below goal, bolus 5 5 7.5/5 5 5 5 5 35  1/20 3.7 Above goal, decrease 5 5 5 5 5 2.5 5 32.5  1/5 3.6 Above goal, hold 5 5 5 5 5 0/5 5 35  12/14   2.2       At goal, no change      5 5 5 5 5 5 5 35  11/18 2.8 At goal, no change 5 5 5 5 5 5 5 35   11/11 1.3 Below goal, cont 5 5 5 5 5 5 5 35  11/4 5.1 Above goal, hold+dec 5 5 5 5 0/5 0/5 5 35  10/25 5.3 Above goal, hold+dec  5 5 0/5 0/5 7.5 5 5 37.5  10/13 1.8 Below goal, increase 5 5 7.5 5 7.5 5 5 40  10/6 1.3 Below goal (missed) 5 5 5 10/5 7.5 5 5 37.5  9/13 2.2 At goal, continue 5 5 5 5 7.5 5 5 37.5  8/30     1.8       Below goal, cont 5 5 5 5 7.5 5 5 37.5  7/26     3.1       At goal, no change 5 5 5 5 7.5 5 5 37.5  7/15 2.1 At goal, increase 5 5 5 5 7.5 5 5 37.5  7/8 5.6 Above goal, holdx1 5 5 5 5 5 5 5 35  6/28 2.1 At goal, no change 5 7.5 5 7.5 5 7.5 5 42.5  6/21 4.3 Above goal, holdx1 5 x 5 7.5 5 7.5 5   6/14 4.2 Above goal, hold 7.5 10 0/7.5 5/7.5 7.5 7.5 7.5   6/10 1.3 Below goal, increase 7.5 INR   10 10 7.5     Started warfarin 5 mg daily on 6/7/21    Last CBC:  Lab Results   Component Value Date    RBC 4.07 (L) 08/05/2022    HGB 13.4 (L) 08/05/2022    HCT 40.1 (L) 08/05/2022    MCV 98.4 08/05/2022    MCH 32.9 08/05/2022    MPV 7.7 08/05/2022    RDW 15.9 (H) 08/05/2022     08/05/2022     Patient History:  Recent hospitalizations/HC visits 10/7&10/28/22: ortho re: hip replacement  4/27/22: Dr Sanna Manuel ([facility]) recommends IVC placed prior to hip replacement  4/7/22: [facility]- Pt needs to repeat stool hemoccult, then schedule colonoscopy--pt looking for GI doctor closer to home. Recent medication changes None reported   Medications taken regularly that may interact with warfarin or alter INR No significant drug interactions identified   Warfarin dose taken as prescribed Questionable.  Pt claims that he always follows the dosing calendar, but can rarely verbalize his dose. Today, he reports taking a 7.5 mg dose on Sunday instead of 5mg, but then corrected to say he thought he put away the extra half tablet. He does use a pillbox, but it seems he may not always fill appropriately   Signs/symptoms of bleeding Black stools resolved  Stool hemoccult negative 4/6/22   Vitamin K intake Normally has 0-1 serving of green, leafy vegetables per week; Pt often notes that he has very little appetite. Recent vomiting/diarrhea/fever, changes in weight or activity level None reported   Tobacco or alcohol use Often drinks 2-3 large glasses of wine, but states each glass only has ~4 oz wine mixed with grape juice. Occasionally has more. 1/5/22: quit smoking (was smoking 2 cig/d)   7/21/22: 1.5 pint of wine 2 nights in past week  Stopped drinking alcohol as of 8/6    Upcoming surgeries or procedures Hip surgery on 11/2, will have IVC filter placement 2 days prior to surgery. Pt was instructed to hold warfarin for 5 days PTP. Colonoscopy TBD--He is trying to schedule colonoscopy closer to home and will call his insurance about this. \"As of 5/17/22:  Patient still has nicotine present and also has cocaine present. I have informed the patient that he will need to take care of these issues and passes at least 2 drug tests before we will put him back on the schedule. I have told him for now his surgery is postponed indefinitely\"      Assessment/Plan:  Patient's INR was subtherapeutic (1.6) today, despite bolus last week. Last INR thought to be low due to missed dose. It is very difficult to determine whether pt is taking the appropriate dose. I have asked him to lynn his dosing calendar after he has taken each dose and bring pillbox and dosing calendar to every visit. It is possible he is requiring more warfarin because he has been sober for the past 2 months.    Patient likes to eat liver and broccoli, but typically avoids these foods. He states that he has had no green foods in the last week. His hip surgery has been scheduled for . He will have a IVC placed prior to surgery. Will develop chirag-op anticoagulation plan for patient after consulting with surgeon and pcp. This is the 5th week that the patients INR has been subtherapeutic. Discussed risk of DVT and PE with patient and why he is at increased risk while his INR is low. As of now he has no s/s of thrombosis but we discussed the s/s and he will be alert and call the clinic/ seek medical help as needed. Stressed compliance with pt    Patient was instructed to increase warfarin dose to 5 mg daily, except 10 mg on Monday and Thursday. Repeat INR in 1 week. Patient was reminded to maintain consistent vitamin K intake and call with any bleeding, medication changes, or fever/vomiting/diarrhea. Patient understands dosing directions and information discussed. Dosing schedule and follow up appointment given to patient. Progress note routed to referring physician's office. Patient acknowledges working in consult agreement with pharmacist as referred by his/her physician. Next Clinic Appointment: 10/20    Please call The [address] [place] [address] Avenue [phone] with any questions. Thanks!   Branden Raman, PharmD, [address] [facility]  Medication Management [facility]   [address] Ph: [phone]  Ayden Goss Ph: [phone]  10/13/2022 11:04 AM        For Pharmacy Admin Tracking Only    Intervention Detail: Adherence Monitorin and Dose Adjustment: 1, reason: Therapy Optimization  Total # of Interventions Recommended: 2  Total # of Interventions Accepted: 2  Time Spent (min): 20

## 2022-10-14 NOTE — TELEPHONE ENCOUNTER
Auth: # [id number]    Date: 11/02/22  Type of SX:  INPATIENT  Location: [address] [place] [address]  CPT: 17107   DX Code: M16.12  SX area: L HIP  Insurance: Charlene Walls[address] 04/14/23  CALL FROM ESE/NURSE Estrella Klein

## 2022-10-20 ENCOUNTER — ANTI-COAG VISIT (OUTPATIENT)
Dept: PHARMACY | Age: 67
End: 2022-10-20
Payer: MEDICARE

## 2022-10-20 DIAGNOSIS — I82.413 ACUTE DEEP VEIN THROMBOSIS (DVT) OF FEMORAL VEIN OF BOTH LOWER EXTREMITIES (HCC): ICD-10-CM

## 2022-10-20 DIAGNOSIS — I26.99 PULMONARY EMBOLISM, BILATERAL (HCC): Primary | ICD-10-CM

## 2022-10-20 LAB — INTERNATIONAL NORMALIZATION RATIO, POC: 2.3

## 2022-10-20 PROCEDURE — 85610 PROTHROMBIN TIME: CPT

## 2022-10-20 PROCEDURE — 99211 OFF/OP EST MAY X REQ PHY/QHP: CPT

## 2022-10-20 NOTE — PROGRESS NOTES
Dejah Powell is a 75 y.o. male with PMHx significant for bilateral PE, DVT (6/4/21) while on Xarelto, HTN who presents to clinic 10/20/2022 for anticoagulation monitoring and adjustment. Pt dx with bilat DVT 6/4/21 while on Xarelto 2yrs for hx of PE. Pt stopped Xarelto on 6/6/21 and started warfarin + lovenox bridge on 6/7/21.     Anticoagulation Indication(s):  PE 2018, bilateral DVT 6/4/21 (on Xarelto)  Referring Physician:   Dr. Azeb Polanco (Also send notes to Dr Grider Patient, or responsible resident)  Goal INR Range:  2-3  Duration of Anticoagulation Therapy:  indefinite  Time of day dose taken: prefers to take in AM  Product patient has at home: warfarin 5 mg (peach color)    INR Summary                           Warfarin regimen (mg)  Date INR   A/P   Sun Mon Tue Wed Thu Fri Sat Mg/wk  10/20 2.3 At goal, continue 5 10 5 5 10 5 5 45   10/13 1.6 Below goal, increase 5 10 5 5 10 5 5 45  10/6 1.6 Below goal, (missed) 5 5 5 5 10/5 7.5 5 37.5   9/29 1.3 Below goal, 10mgx1 5 5 5 5 10/7.5 5 5 37.5  9/22 1.4 Below goal, increase 5 5 5 2.5 5 5 5 32.5  9/13 1.4 Below goal, (missed) 5 2.5 7.5/5 2.5 5 2.5 5 27.5  9/6 2.1 At goal, no change 5 2.5 5 2.5 5 2.5 5 27.5  8/30 1.3 Below goal, increase 5 2.5 5 2.5 5 2.5 5 27.5  8/23 2.2 At goal, continue 5 2.5 5 2.5 5 2.5 2.5 25  8/16 1.4 Below goal, increase 5 2.5 5 2.5 5 2.5 2.5 25  8/4 2.1 At goal, increase 5 2.5 2.5 2.5 2.5 2.5 2.5 20  7/28 3.2 Above goal, continue 2.5 2.5 2.5 2.5 2.5 2.5 2.5 17.5  7/21 5.5 Above goal, holdx1  2.5 2.5 2.5 2.5 5 0/2.5 2.5 20  decrease   6/23 3.1 Above goal, continue 5 2.5 2.5 2.5 5 2.5 2.5 22.5  6/9 2.9 At goal, continue  5 2.5 2.5 2.5 5 2.5 2.5 22.5  5/26 1.9 Below goal, increase  5 2.5 2.5 2.5 5 2.5 2.5 22.5  5/12 2.8 At goal, no change 5 2.5 2.5 2.5 2.5 2.5 2.5 20  5/5 4.4 Above goal, holdx1 5 2.5 2.5 2.5 2.5 2.5 2.5 20  4/21 3.1 Above goal, decrease 5 2.5 5 2.5 5 2.5 2.5 25  4/12 2.3 At goal, no change 5 2.5 5 2.5 5 2.5 5 27.5  4/4 2.7 At goal, resume 5 2.5 5 2.5 5 2.5 5 27.5  3/31 7.63 Hold x 3 days  3/17 3.2 Above goal, continue 5 5 5 5 5 5 5 35  3/1 1.5 Below goal, (Missed) 5 5 7.5/5 5 5 5 5 35  2/8 1.7 Below goal, bolus 5 5 7.5/5 5 5 5 5 35  1/20 3.7 Above goal, decrease 5 5 5 5 5 2.5 5 32.5  1/5 3.6 Above goal, hold 5 5 5 5 5 0/5 5 35  12/14   2.2       At goal, no change      5 5 5 5 5 5 5 35  11/18 2.8 At goal, no change 5 5 5 5 5 5 5 35   11/11 1.3 Below goal, cont 5 5 5 5 5 5 5 35  11/4 5.1 Above goal, hold+dec 5 5 5 5 0/5 0/5 5 35  10/25 5.3 Above goal, hold+dec  5 5 0/5 0/5 7.5 5 5 37.5  10/13 1.8 Below goal, increase 5 5 7.5 5 7.5 5 5 40  10/6 1.3 Below goal (missed) 5 5 5 10/5 7.5 5 5 37.5  9/13 2.2 At goal, continue 5 5 5 5 7.5 5 5 37.5  8/30     1.8       Below goal, cont 5 5 5 5 7.5 5 5 37.5  7/26     3.1       At goal, no change 5 5 5 5 7.5 5 5 37.5  7/15 2.1 At goal, increase 5 5 5 5 7.5 5 5 37.5  7/8 5.6 Above goal, holdx1 5 5 5 5 5 5 5 35  6/28 2.1 At goal, no change 5 7.5 5 7.5 5 7.5 5 42.5  6/21 4.3 Above goal, holdx1 5 x 5 7.5 5 7.5 5   6/14 4.2 Above goal, hold 7.5 10 0/7.5 5/7.5 7.5 7.5 7.5   6/10 1.3 Below goal, increase 7.5 INR   10 10 7.5     Started warfarin 5 mg daily on 6/7/21    Last CBC:  Lab Results   Component Value Date    RBC 4.07 (L) 08/05/2022    HGB 13.4 (L) 08/05/2022    HCT 40.1 (L) 08/05/2022    MCV 98.4 08/05/2022    MCH 32.9 08/05/2022    MPV 7.7 08/05/2022    RDW 15.9 (H) 08/05/2022     08/05/2022     Patient History:  Recent hospitalizations/HC visits 10/7&10/28/22: ortho re: hip replacement  4/27/22: Dr Penny Enrique ([facility]) recommends IVC placed prior to hip replacement  4/7/22: [facility] GI- Pt needs to repeat stool hemoccult, then schedule colonoscopy--pt looking for GI doctor closer to home.     Recent medication changes None reported   Medications taken regularly that may interact with warfarin or alter INR No significant drug interactions identified   Warfarin dose taken as prescribed Questionable. 10/20: patient states he is following dosing however he took incorrect dose prior to coming in today. 10/13:Pt claims that he always follows the dosing calendar, but can rarely verbalize his dose. Today, he reports taking a 7.5 mg dose on Sunday instead of 5mg, but then corrected to say he thought he put away the extra half tablet. He does use a pillbox, but it seems he may not always fill appropriately   Signs/symptoms of bleeding Black stools resolved  Stool hemoccult negative 4/6/22   Vitamin K intake Normally has 0-1 serving of green, leafy vegetables per week; Pt often notes that he has very little appetite. Recent vomiting/diarrhea/fever, changes in weight or activity level None reported   Tobacco or alcohol use Often drinks 2-3 large glasses of wine, but states each glass only has ~4 oz wine mixed with grape juice. Occasionally has more. 1/5/22: quit smoking (was smoking 2 cig/d)   7/21/22: 1.5 pint of wine 2 nights in past week  Stopped drinking alcohol as of 8/6    Upcoming surgeries or procedures Hip surgery on 11/2, will have IVC filter placement 2 days prior to surgery. Pt was instructed to hold warfarin for 5 days PTP and use a prophylactic Lovenox bridge per cardiology. Colonoscopy TBD--He is trying to schedule colonoscopy closer to home and will call his insurance about this. \"As of 5/17/22:  Patient still has nicotine present and also has cocaine present. I have informed the patient that he will need to take care of these issues and passes at least 2 drug tests before we will put him back on the schedule. I have told him for now his surgery is postponed indefinitely\"      Assessment/Plan:  Patient's INR was therapeutic (2.3) today. Patient states that he is taking the correct dose, however patient only took 5mg (1 tablet today) rather than the 10mg that he is to take.  It is very difficult to determine whether pt is taking the appropriate dose. I have asked him to lynn his dosing calendar after he has taken each dose and bring pillbox and dosing calendar to every visit, however he is forgetting to bring this in. It is possible he is requiring more warfarin because he has been sober for the past 2 months. Patient likes to eat liver and broccoli, but typically avoids these foods. He states that he has had no green foods in the last week. His hip surgery has been scheduled for . Working with cardiology, PCP and Ortho in development of bridging plan. Discussions with cardiology have lead to the development of a Lovenox bridge using prophylactic Lovenox dosing rather than treatment dosing. Patient will be counseled on bridge next week at visit. Patient was instructed to NOT take warfarin next Thursday prior to coming to his visit with the clinic. Patient was instructed to continue warfarin dose to 5 mg daily, except 10 mg on Monday and Thursday. Repeat INR in 1 week at which time Lovenox bridge will be discussed. . Patient was reminded to maintain consistent vitamin K intake and call with any bleeding, medication changes, or fever/vomiting/diarrhea. Patient understands dosing directions and information discussed. Dosing schedule and follow up appointment given to patient. Progress note routed to referring physician's office. Patient acknowledges working in consult agreement with pharmacist as referred by his/her physician. Next Clinic Appointment: 10/20    Please call The [address] [place] [address] [phone] with any questions. Thanks!   Emperatriz BobbyD  [place]: [address]: [phone]  10/20/2022 11:55 AM    For Pharmacy Admin Tracking Only    Intervention Detail: Adherence Monitorin  Total # of Interventions Recommended: 2  Total # of Interventions Accepted: 2  Time Spent (min): 20

## 2022-10-21 RX ORDER — FUROSEMIDE 20 MG/1
TABLET ORAL
Qty: 30 TABLET | Refills: 2 | Status: SHIPPED | OUTPATIENT
Start: 2022-10-21 | End: 2022-11-02

## 2022-10-24 ENCOUNTER — TELEPHONE (OUTPATIENT)
Dept: ORTHOPEDIC SURGERY | Age: 67
End: 2022-10-24

## 2022-10-24 NOTE — TELEPHONE ENCOUNTER
Orthopedic Nurse Navigator Summary    COVID:  Vaccinated    Patient Name:  Isaac Dixon  Anticipated Date of Surgery:  11/02/22  Attended Pre-op Education Class:  No email  PCP: Jena Jordan MD  Date of PCP visit for H&P: 10/28/22  Is patient in a Pain Management program:  Review of Medical history reveals history of: Bilateral PE, HTN, Hyperlipidemia, Bilateral lower extremity edema    Critical Lab Values  - Hemoglobin (g/dL):  Date: 08/05/22 Value 13.4  - Hematocrit(%): Date: 08/05/22   Value 40.1  - HgbA1C:  Date: 08/05/22  Value 6.2  - Albumin:  Date: 08/05/22  Value 4.0  - BUN:  Date: 08/05/22  Value 8  - Creatinine:  Date: 08/05/22  Value 0.6    Patient still needs MRSA swab- told patient to get test done prior to surgery    Coronary Artery Disease/HTN/CHF history: Yes  Cardiologist: No  Cardiac clearance necessary:    Date of cardiac clearance appt:  Final Cardiac recommendations: On any anticoagulation: Coumadin 5mg QD, will hold 5 days prior and have Lovenox bridge     Diabetes History:  No  Most recent HgbA1C: 6.2  Pulmonary:  COPD/Emphysema/Use of home oxygen: No  Alcohol use: States he is not currently drinking    BMI greater than 40 at time of scheduling: Additional medical concerns:   Additional recommendations for above concerns:  Attended Pre-Hab program:    Anticipated Discharge Disposition:  Home with OPT  Who will be with patient at home following discharge:  His daughter   Equipment patient already has:  none  Bedroom on first or second floor:  first  Bathroom on first or second floor:  first  Weight bearing status:  wbat  Pre-op ambulatory status: painful ambulation  Number of entry steps:  13  Caregiver assistance:  part time    Vivi Veronica RN  Date:   10/24/22

## 2022-10-27 ENCOUNTER — ANTI-COAG VISIT (OUTPATIENT)
Dept: PHARMACY | Age: 67
End: 2022-10-27
Payer: MEDICARE

## 2022-10-27 DIAGNOSIS — I26.99 PULMONARY EMBOLISM, BILATERAL (HCC): Primary | ICD-10-CM

## 2022-10-27 DIAGNOSIS — I82.413 ACUTE DEEP VEIN THROMBOSIS (DVT) OF FEMORAL VEIN OF BOTH LOWER EXTREMITIES (HCC): ICD-10-CM

## 2022-10-27 LAB — INTERNATIONAL NORMALIZATION RATIO, POC: 1.3

## 2022-10-27 PROCEDURE — 99212 OFFICE O/P EST SF 10 MIN: CPT

## 2022-10-27 PROCEDURE — 85610 PROTHROMBIN TIME: CPT

## 2022-10-27 NOTE — PROGRESS NOTES
Duyaugustine Massey is a 75 y.o. male with PMHx significant for bilateral PE, DVT (6/4/21) while on Xarelto, HTN who presents to clinic 10/27/2022 for anticoagulation monitoring and adjustment. Pt dx with bilat DVT 6/4/21 while on Xarelto 2yrs for hx of PE. Pt stopped Xarelto on 6/6/21 and started warfarin + lovenox bridge on 6/7/21.     Anticoagulation Indication(s):  PE 2018, bilateral DVT 6/4/21 (on Xarelto)  Referring Physician:   Dr. Kalpana John (Also send notes to Dr Maritza Jones, or responsible resident)  Goal INR Range:  2-3  Duration of Anticoagulation Therapy:  indefinite  Time of day dose taken: prefers to take in AM  Product patient has at home: warfarin 5 mg (peach color)    INR Summary                           Warfarin regimen (mg)  Date INR   A/P   Sun Mon Tue Wed Thu Fri Sat Mg/wk  10/27 1.3 Below goal (missed) 5 10 5 5 10 5 5 45  10/20 2.3 At goal, continue 5 10 5 5 10 5 5 45   10/13 1.6 Below goal, increase 5 10 5 5 10 5 5 45  10/6 1.6 Below goal, (missed) 5 5 5 5 10/5 7.5 5 37.5   9/29 1.3 Below goal, 10mgx1 5 5 5 5 10/7.5 5 5 37.5  9/22 1.4 Below goal, increase 5 5 5 2.5 5 5 5 32.5  9/13 1.4 Below goal, (missed) 5 2.5 7.5/5 2.5 5 2.5 5 27.5  9/6 2.1 At goal, no change 5 2.5 5 2.5 5 2.5 5 27.5  8/30 1.3 Below goal, increase 5 2.5 5 2.5 5 2.5 5 27.5  8/23 2.2 At goal, continue 5 2.5 5 2.5 5 2.5 2.5 25  8/16 1.4 Below goal, increase 5 2.5 5 2.5 5 2.5 2.5 25  8/4 2.1 At goal, increase 5 2.5 2.5 2.5 2.5 2.5 2.5 20  7/28 3.2 Above goal, continue 2.5 2.5 2.5 2.5 2.5 2.5 2.5 17.5  7/21 5.5 Above goal, holdx1  2.5 2.5 2.5 2.5 5 0/2.5 2.5 20  decrease   6/23 3.1 Above goal, continue 5 2.5 2.5 2.5 5 2.5 2.5 22.5  6/9 2.9 At goal, continue  5 2.5 2.5 2.5 5 2.5 2.5 22.5  5/26 1.9 Below goal, increase  5 2.5 2.5 2.5 5 2.5 2.5 22.5  5/12 2.8 At goal, no change 5 2.5 2.5 2.5 2.5 2.5 2.5 20  5/5 4.4 Above goal, holdx1 5 2.5 2.5 2.5 2.5 2.5 2.5 20  4/21 3.1 Above goal, significant drug interactions identified   Warfarin dose taken as prescribed Questionable. Pt claims that he always follows the dosing calendar, but can rarely verbalize his dose. 10/27/22: thinks he took 10 mg on Sunday only and 5 mg the other days. Is not sure if he missed any other days. 10/20: patient states he is following dosing however he took incorrect dose prior to coming in today. 10/13:Today, he reports taking a 7.5 mg dose on Sunday instead of 5mg, but then corrected to say he thought he put away the extra half tablet. He does use a pillbox, but it seems he may not always fill appropriately   Signs/symptoms of bleeding Black stools resolved  Stool hemoccult negative 4/6/22   Vitamin K intake Normally has 0-1 serving of green, leafy vegetables per week; Pt often notes that he has very little appetite. Recent vomiting/diarrhea/fever, changes in weight or activity level None reported   Tobacco or alcohol use Often drinks 2-3 large glasses of wine, but states each glass only has ~4 oz wine mixed with grape juice. Occasionally has more. 1/5/22: quit smoking (was smoking 2 cig/d)   7/21/22: 1.5 pint of wine 2 nights in past week  Stopped drinking alcohol as of 8/6    Upcoming surgeries or procedures Hip surgery on 11/2. Pt was instructed to hold warfarin for 5 days PTP. Will implement usual prophylactic DVT measures post-op per ortho's recs. Colonoscopy TBD--He is trying to schedule colonoscopy closer to home and will call his insurance about this. \"As of 5/17/22:  Patient still has nicotine present and also has cocaine present. I have informed the patient that he will need to take care of these issues and passes at least 2 drug tests before we will put him back on the schedule. I have told him for now his surgery is postponed indefinitely\"      Assessment/Plan:  Patient's INR was therapeutic (1.3) today, likely due to noncompliance.  Patient thinks he took 10 mg on Sunday and 5 mg all other days, but he is not confident. It is very difficult to determine whether pt is taking the appropriate dose. I have asked him to lynn his dosing calendar after he has taken each dose and bring pillbox and dosing calendar to every visit, however he is forgetting to bring this in. It is possible he is requiring more warfarin because he has been sober for the past 2 months. Patient likes to eat liver and broccoli, but typically avoids these foods. He states that he has had no green foods in the last week. His hip surgery has been scheduled for . Will implement usual pharm DVT prophylaxis post-op per ortho's recs. Patient was instructed to take 10 mg tonight, then hold warfarin 10/28-. Resume normal warfarin dose post-op, or per surgeon's recs:  5 mg daily, except 10 mg on Monday and Thursday. Repeat INR 1 week after surgery. Patient was reminded to maintain consistent vitamin K intake and call with any bleeding, medication changes, or fever/vomiting/diarrhea. Patient understands dosing directions and information discussed. Dosing schedule and follow up appointment given to patient. Progress note routed to referring physician's office. Patient acknowledges working in consult agreement with pharmacist as referred by his/her physician. Next Clinic Appointment:     Please call The [address] [place] [address] Avenue [phone] with any questions. Thanks!   Samina Joya, PharmD, [facility]  Medication Management Clinic   [facility] Ph: [phone]  Timothy Adams Ph: [phone]  10/27/2022 11:00 AM      For Pharmacy Admin Tracking Only    Intervention Detail: Adherence Monitorin  Total # of Interventions Recommended: 2  Total # of Interventions Accepted: 2  Time Spent (min): 20

## 2022-10-28 ENCOUNTER — OFFICE VISIT (OUTPATIENT)
Dept: INTERNAL MEDICINE CLINIC | Age: 67
End: 2022-10-28
Payer: MEDICARE

## 2022-10-28 ENCOUNTER — OFFICE VISIT (OUTPATIENT)
Dept: ORTHOPEDIC SURGERY | Age: 67
End: 2022-10-28
Payer: MEDICARE

## 2022-10-28 VITALS
HEIGHT: 70 IN | TEMPERATURE: 97.1 F | SYSTOLIC BLOOD PRESSURE: 130 MMHG | DIASTOLIC BLOOD PRESSURE: 81 MMHG | WEIGHT: 170.1 LBS | HEART RATE: 66 BPM | BODY MASS INDEX: 24.35 KG/M2 | OXYGEN SATURATION: 100 %

## 2022-10-28 VITALS — BODY MASS INDEX: 24.34 KG/M2 | WEIGHT: 170 LBS | HEIGHT: 70 IN

## 2022-10-28 DIAGNOSIS — F17.210 CIGARETTE NICOTINE DEPENDENCE WITHOUT COMPLICATION: ICD-10-CM

## 2022-10-28 DIAGNOSIS — M16.12 PRIMARY OSTEOARTHRITIS OF LEFT HIP: ICD-10-CM

## 2022-10-28 DIAGNOSIS — Z01.818 PREOP TESTING: ICD-10-CM

## 2022-10-28 DIAGNOSIS — Z86.718 HISTORY OF DVT (DEEP VEIN THROMBOSIS): Primary | ICD-10-CM

## 2022-10-28 PROCEDURE — 1123F ACP DISCUSS/DSCN MKR DOCD: CPT | Performed by: PHYSICIAN ASSISTANT

## 2022-10-28 PROCEDURE — 99214 OFFICE O/P EST MOD 30 MIN: CPT | Performed by: PHYSICIAN ASSISTANT

## 2022-10-28 PROCEDURE — 99213 OFFICE O/P EST LOW 20 MIN: CPT

## 2022-10-28 PROCEDURE — 93005 ELECTROCARDIOGRAM TRACING: CPT

## 2022-10-28 RX ORDER — ENOXAPARIN SODIUM 100 MG/ML
80 INJECTION SUBCUTANEOUS 2 TIMES DAILY
Qty: 7.2 ML | Refills: 0 | Status: SHIPPED | OUTPATIENT
Start: 2022-10-28 | End: 2022-11-04 | Stop reason: SDUPTHER

## 2022-10-28 ASSESSMENT — ANXIETY QUESTIONNAIRES
3. WORRYING TOO MUCH ABOUT DIFFERENT THINGS: 1
6. BECOMING EASILY ANNOYED OR IRRITABLE: 0
1. FEELING NERVOUS, ANXIOUS, OR ON EDGE: 0
5. BEING SO RESTLESS THAT IT IS HARD TO SIT STILL: 1
4. TROUBLE RELAXING: 3
IF YOU CHECKED OFF ANY PROBLEMS ON THIS QUESTIONNAIRE, HOW DIFFICULT HAVE THESE PROBLEMS MADE IT FOR YOU TO DO YOUR WORK, TAKE CARE OF THINGS AT HOME, OR GET ALONG WITH OTHER PEOPLE: SOMEWHAT DIFFICULT
7. FEELING AFRAID AS IF SOMETHING AWFUL MIGHT HAPPEN: 0
GAD7 TOTAL SCORE: 5
2. NOT BEING ABLE TO STOP OR CONTROL WORRYING: 0

## 2022-10-28 ASSESSMENT — PATIENT HEALTH QUESTIONNAIRE - PHQ9
2. FEELING DOWN, DEPRESSED OR HOPELESS: 0
SUM OF ALL RESPONSES TO PHQ QUESTIONS 1-9: 0
SUM OF ALL RESPONSES TO PHQ9 QUESTIONS 1 & 2: 0
1. LITTLE INTEREST OR PLEASURE IN DOING THINGS: 0
SUM OF ALL RESPONSES TO PHQ QUESTIONS 1-9: 0

## 2022-10-28 NOTE — PROGRESS NOTES
Pre-Op Examination    :  Michelle Mendez                                               : 1955  Age: 75 y.o. MRN: [id number]  Date : 10/28/2022    Referring Physician: Dr Sidney Mays    Procedure: Left total hip arthroplasty on 2022    HISTORY OF PRESENT ILLNESS:   The patient is a 75 y.o. male who presents for preoperative examination. Planned anesthesia:  General  Known anesthesia problems: None  Bleeding risk:  High  Personal or FH of DVT/PE:  Personal history unprovoked PE , Bilateral lower extremity DVT  while on Xarelto  Patient objection to receiving blood products: None      Past Medical History:        Diagnosis Date    Anxiety 2010    Back pain 2012    DVT (deep venous thrombosis) [facility]     History of heroin abuse ([facility]     HLD (hyperlipidemia) 3/23/2022    Hypertension 10/25/2011    Pulmonary embolism ([facility]     FRANCHESKA       Past Surgical History:        Procedure Laterality Date    COLONOSCOPY N/A 2019    COLONOSCOPY performed by David Olea MD at [place] [address]  2019    COLONOSCOPY WITH BIOPSY performed by David Olea MD at [address] [place] [address] Right     ENDOSCOPY, COLON, DIAGNOSTIC      UPPER GASTROINTESTINAL ENDOSCOPY N/A 2020    EGD DIAGNOSTIC ONLY performed by David Olea MD at [place] [address] N/A 3/18/2020    PUSH ENTEROSCOPY performed by David Olea MD at [facility] ENDOSCOPY       Family History:       Problem Relation Age of Onset    High Blood Pressure Mother     Cancer Father         ? BRAIN    Kidney Disease Brother         ESRD ON DIALYSIS       Social History:   TOBACCO:   reports that he quit smoking about 17 months ago. His smoking use included cigars and cigarettes. He started smoking about 38 years ago. He has a 15.00 pack-year smoking history. He has never used smokeless tobacco.  ETOH:   reports current alcohol use.   OCCUPATION:      Allergies:  Valsartan-hydrochlorothiazide    Current Medications:    Prior to Admission medications    Medication Sig Start Date End Date Taking? Authorizing Provider   furosemide (LASIX) 20 MG tablet TAKE 1 TABLET BY MOUTH EVERY DAY 10/21/22  Yes Pamela Bui MD   vitamin D3 (CHOLECALCIFEROL) 25 MCG (1000 UT) TABS tablet TAKE 1 TABLET BY MOUTH EVERY DAY 9/23/22  Yes Kenyatta Calix MD   folic acid (FOLVITE) 1 MG tablet TAKE 1 TABLET BY MOUTH EVERY DAY 9/21/22  Yes Kenyatta Calix MD   warfarin (COUMADIN) 5 MG tablet TAKE 1 TABLET BY MOUTH EVERY DAY 9/21/22  Yes Kenyatta Calix MD   mupirocin Venora Mantle) 2 % ointment Apply twice daily to each nare for 5 days prior to surgical procedure 9/2/22  Yes Danay Waller PA-C   melatonin (RA MELATONIN) 3 MG TABS tablet Take 1 tablet by mouth nightly as needed (Sleep) 8/5/22  Yes Micha Encarnacion MD   olmesartan (BENICAR) 20 MG tablet Take 1 tablet by mouth daily 6/22/22  Yes Tiffanie Young MD   amLODIPine (NORVASC) 5 MG tablet Take 1 tablet by mouth daily 6/22/22  Yes Tiffanie Young MD   diclofenac sodium (VOLTAREN) 1 % GEL Apply 4 g topically 4 times daily as needed for Pain 1/25/22  Yes Aramis Tinoco MD   nicotine (NICODERM CQ) 14 MG/24HR APPLY 1 [facility]  Patient not taking: Reported on 10/28/2022 4/25/22   Aramis Tinoco MD   methadone (DOLOPHINE) 10 MG/ML solution Take 35 mg by mouth daily. Patient not taking: No sig reported    Historical Provider, MD       REVIEW OF SYSTEMS:  Review of Systems   All other systems reviewed and are negative. Physical Exam:      Vitals: /81 (Site: Right Upper Arm, Position: Sitting, Cuff Size: Large Adult)   Pulse 66   Temp 97.1 °F (36.2 °C) (Temporal)   Ht 5' 10\" (1.778 m)   Wt 170 lb 1.6 oz (77.2 kg)   SpO2 100%   BMI 24.41 kg/m²     Body mass index is 24.41 kg/m².   Wt Readings from Last 3 Encounters:   10/28/22 170 lb 1.6 oz (77.2 kg)   10/28/22 170 lb (77.1 kg)   10/07/22 170 lb (77.1 kg)     Physical Exam  Constitutional: General: He is not in acute distress. Appearance: Normal appearance. He is not toxic-appearing. HENT:      Head: Normocephalic and atraumatic. Eyes:      Extraocular Movements: Extraocular movements intact. Cardiovascular:      Rate and Rhythm: Normal rate and regular rhythm. Pulses: Normal pulses. Pulmonary:      Effort: Pulmonary effort is normal.      Breath sounds: Normal breath sounds. Abdominal:      General: Abdomen is flat. Bowel sounds are normal.      Palpations: Abdomen is soft. Musculoskeletal:         General: Normal range of motion. Skin:     General: Skin is warm and dry. Neurological:      General: No focal deficit present. Mental Status: He is alert and oriented to person, place, and time. Psychiatric:         Mood and Affect: Mood normal.       Radiology: None  EKG: Ordered in office     /Plan:   There are no diagnoses linked to this encounter. Known risk factors for perioperative complications: On chronic warfarin therapy  {Preop assessment:15420    Pre-Operative Risk assessment using 2014 ACC/AHA guidelines     Emergent procedure NO  Active Cardiac Condition NO (decompensated HF, Arrhythmia, MI <3 weeks, severe valve disease)  Risk Level of Procedure Intermediate Risk (intraperitoneal, intrathoracic, HENT, orthopedic, or carotid endarterectomy, etc.)  Revised Cardiac Risk Index Risk factors: None  Measurement of Exercise Tolerance before Surgery >4 Yes    According to the 2014 ACC/AHA pre-operative risk assessment guidelines Jordan Lizarraga is a low risk for major cardiac complications during a intermediate risk procedure and may continue as planned. Specific medication recommendations are listed below. Medications recommended to continue should be taken with a sip of water even when NPO.      Further recommendations from consultants: None    Medication Recommendations:  Coumadin Should be bridged - Coumadin should be held 5 days prior to surgery and Enoxaparin should be started 3 days preop or when the INR is subtheraputic. Enoxaparin should be held one dose prior to surgery. The INR should be checked 24 hrs prior to surgery. If the INR is > 1.5 low dose Vit K can be given and the INR should be checked on the day of surgery. If the INR is normal continue with surgery as planned. Coumadin can be restarted with post-operative enoxaparin. Enoxaparin should be restarted postoperatively 1) major surgery or high bleeding risk 48-72 hours after hemostasis. 2) minor surgery or low bleeding risk 24 hours after hemostasis. Enoxaparin should be stopped when the INR returns to therapeutic range      1. 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 Bridging with Lovenox while off Warfarin  2. Change in medication regimen before surgery: Hold Warfarin 5 days prior to surgery, restart warfarin within 24 hours following surgery. Hold other medications on day of surgery  3. Prophylaxis for cardiac events with perioperative beta-blockers:  no  4. Deep vein thrombosis prophylaxis:  Bridging with lovenox while holding Warfarin    Jung Catalan DO, PGY-2   10/28/2022, 3:01 PM     Addendum to Resident H& P/Progress note:  I have personally seen,examined and evaluated the patient.  I have reviewed the current history, physical findings, labs and assessment and plan and agree with note as documented by resident MD ( Shoaib Caicedo)      Laura Jay MD, [address]

## 2022-10-28 NOTE — PATIENT INSTRUCTIONS
Continue to hold Warfarin (Coumadin) until after your surgery. We are starting you on a different blood thinner while you are holding your Coumadin. This is Lovenox (Enoxaparin). You will take 80mg injections twice per day. You will be given 9 total doses. Take 1 dose tonight and then take twice per day, every 12 hours. DO NOT take a dose on the day of surgery.

## 2022-10-28 NOTE — PROGRESS NOTES
Dr Breanne Dela Cruz      Date /Time 10/28/2022       10:52 AM EST  Name Kate Patino             1955   Location  [facility] SURG  MRN [id number]                Chief Complaint   Patient presents with    Hip Pain     CK LEFT HIP PRE-OP        History of Present Illness    Kate Patino is a 75 y.o. male who presents with left hip pain. Patient presents to the office today for a follow-up visit. He is due to have a left total hip arthroplasty next week. He comes in today and has been fairly noncompliant with his preoperative medical optimization. He seemed surprised and shocked that he needed a physical from his primary care physician and has not seen them yet. In addition he is confused on when to stop his Coumadin. It is written in his chart several times that Coy Kennedy has directed him to stop his Coumadin 5 days before surgery. Postoperatively we can restart his normal dose of Coumadin without use of Lovenox. Previous history: Patient has passed his nicotine and drug test.  His labs appear to be an appropriate order. He has seen the vascular surgeon and there are plans for IVF filter the week before surgery. He has had no new injury or trauma. He continues to complain of global left hip pain. Previous history:  His pain has gotten worse and is interested in surgery. Pain continues to be concentrated over his groin. He does have increased pain with activities and improvement with rest.  He again states that he has stopped smoking and is clean from heroin. He is currently on Coumadin for history of DVT. I have reviewed Dr. Joseph Sanchez note and there is plans for IVF filter before total hip arthroplasty. Previous history: He presents for bilateral hip pain, left worse than right. He is interested in surgical solution for this at this point. His pain is gotten significantly worse than previous visits.   He reports he has quit smoking almost completely for the last month and a half. He still reports that his heroin addiction is in remission. He has switched to taking Coumadin instead of Xarelto for history of venous thrombolic disease  He says he is quit smoking. Previous history: At patient's last visit he was complaining of bilateral extremity swelling. We were concerned about a DVT. We did order a bilateral lower extremity venous Doppler. They were positive for massive DVTs of bilateral lower extremity while taking Xarelto. Since then he has been placed on Coumadin. He states his lower extremity swelling is improving but continues to have significant hip pain. He does have advanced osteoarthritis of his hip. Previous history: Patient presents the office today for a new problem. Patient is here with a chief complaint of left greater than right hip pain. Patient has had pain for several months with increased symptoms over the last few weeks. His symptoms are concentrated lateral greater than groin. Patient does have increased pain with weightbearing activities. He also complains of lumbar pain and bilateral thigh pain. He does not complain of any pain symptoms in his lower legs or feet. No numbness and tingling. He has tried NSAIDs, activity modifications and assistive devices without any significant improvement. He does have a history of a PE and is taking Xarelto. He also has a history of heroin addiction but states he has been clean for 1 year.       Past History  Past Medical History:   Diagnosis Date    Anxiety 4/7/2010    Back pain 7/24/2012    DVT (deep venous thrombosis) ([facility] [address]     History of heroin abuse ([facility] [address]     HLD (hyperlipidemia) 3/23/2022    Hypertension 10/25/2011    Pulmonary embolism ([facility] [address]     FRANCHESKA     Past Surgical History:   Procedure Laterality Date    COLONOSCOPY N/A 7/26/2019    COLONOSCOPY performed by James Avila MD at [facility]  8/30/2019    COLONOSCOPY WITH BIOPSY performed by James Avila MD at [place] [address] ELBOW SURGERY Right     ENDOSCOPY, COLON, DIAGNOSTIC      UPPER GASTROINTESTINAL ENDOSCOPY N/A 2020    EGD DIAGNOSTIC ONLY performed by Jay Hawthorne MD at [place] [address] N/A 3/18/2020    PUSH ENTEROSCOPY performed by Jay Hawthorne MD at [facility] ENDOSCOPY     Social History     Tobacco Use    Smoking status: Former     Packs/day: 0.50     Years: 30.00     Pack years: 15.00     Types: Cigars, Cigarettes     Start date:      Quit date: 2021     Years since quittin.4    Smokeless tobacco: Never    Tobacco comments:     1 DAILY   Substance Use Topics    Alcohol use: Yes     Comment: pint of wine a day      Current Outpatient Medications on File Prior to Visit   Medication Sig Dispense Refill    furosemide (LASIX) 20 MG tablet TAKE 1 TABLET BY MOUTH EVERY DAY 30 tablet 2    vitamin D3 (CHOLECALCIFEROL) 25 MCG (1000 UT) TABS tablet TAKE 1 TABLET BY MOUTH EVERY DAY 90 tablet 1    folic acid (FOLVITE) 1 MG tablet TAKE 1 TABLET BY MOUTH EVERY DAY 90 tablet 0    warfarin (COUMADIN) 5 MG tablet TAKE 1 TABLET BY MOUTH EVERY DAY 90 tablet 1    mupirocin (BACTROBAN) 2 % ointment Apply twice daily to each nare for 5 days prior to surgical procedure 1 g 0    melatonin (RA MELATONIN) 3 MG TABS tablet Take 1 tablet by mouth nightly as needed (Sleep) 30 tablet 3    olmesartan (BENICAR) 20 MG tablet Take 1 tablet by mouth daily 30 tablet 3    amLODIPine (NORVASC) 5 MG tablet Take 1 tablet by mouth daily 30 tablet 3    nicotine (NICODERM CQ) 14 MG/24HR APPLY 1 PATCH ONTO THE SKIN EVERY DAY 28 patch 1    diclofenac sodium (VOLTAREN) 1 % GEL Apply 4 g topically 4 times daily as needed for Pain 50 g 3    methadone (DOLOPHINE) 10 MG/ML solution Take 35 mg by mouth daily. (Patient not taking: No sig reported)       No current facility-administered medications on file prior to visit.         ASCVD 10-YEAR RISK SCORE  The 10-year ASCVD risk score (Chinyere GLYNN, et al., 2019) is: 18%    Values used to calculate the score:      Age: 75 years      Sex: Male      Is Non- : Yes      Diabetic: No      Tobacco smoker: No      Systolic Blood Pressure: 113 mmHg      Is BP treated: Yes      HDL Cholesterol: 58 mg/dL      Total Cholesterol: 232 mg/dL   . Review of Systems  10-point ROS is negative other than HPI. Physical Exam  Based off 1997 Exam Criteria  Ht 5' 10\" (1.778 m)   Wt 170 lb (77.1 kg)   BMI 24.39 kg/m²      Constitutional:       General: He is not in acute distress. Appearance: Normal appearance. Cardiovascular:      Rate and Rhythm: Normal rate and regular rhythm. Pulses: Normal pulses. Pulmonary:      Effort: Pulmonary effort is normal. No respiratory distress. Neurological:      Mental Status: He is alert and oriented to person, place, and time. Mental status is at baseline.      Musculoskeletal:  Gait:  antalgic    Spine / Hip Exam:      RIGHT  LEFT    Lumbar Spine Exam  [] All Neg    [] All Neg     Straight leg raise  []  []Not tested   []  []Not tested    Clonus  []  []Not tested   []  []Not tested    Pain with motion  [x]  []Not tested   [x]  []Not tested    Radiculopathy  [x]  []Not tested   [x]  []Not tested    Paraspinal muscle tenderness  [x] Paraspinal  [x]Midline   [x] Paraspinal  [x]Midline   Sensation RIGHT  LEFT    L3  [x] Normal []Decreased    [x] Normal []Decreased   L4  [] Normal  [x]Decreased   [] Normal [x]Decreased   L5  [] Normal [x]Decreased   [] Normal [x]Decreased   S1  [] Normal  [x]Decreased   [] Normal [x]Decreased   Pelvis       Scoliosis  [] Nml  [x] Present     Leg-length discrepency  [x] Equal  [] Right longer   [] Left longer   Range of Motion Active Passive Active Passive   Hip Flexion 90  90    Abduction 20  20    External Rotation @ 90 flex 35  35    Internal Rotation @ 90 flex -10  -10           Hip Impingement / Dysplasia  [] All Neg  [] Not tested   [] All Neg  [] Not tested    Hip impingement test  [x]  []Not tested   [x] []Not tested    C-sign  [x]  []Not tested   [x]  []Not tested    Anterior instability apprehension  []  []Not tested   []  []Not tested    Posterior instability apprehension  []  []Not tested   []  []Not tested    Uncontained Internal rotation  []  []Not tested  []  []Not tested          Abductors  [x] All Neg  [] Not tested   [x] All Neg  [] Not tested    Medius strength  []  []Not tested   []  []Not tested    Minimum strength  []  []Not tested   []  []Not tested    IT band tendonitis  []  []Not tested   []  []Not tested    Trochanteric tenderness  []  []Not tested  []  []Not tested   Sciatic neuropathic pain  []  []Not tested   []  []Not tested           Post-arthroplasty  [] All Neg  [] Not tested   [] All Neg  [] Not tested    Rectus tendonitis  []  []Not tested   []  []Not tested    Iliopsoas tendonitis       Start-up pain  []  []Not tested   []  []Not tested      Markedly stiff hips, stiff painful back as well. Some radiculopathy. Calf swelling improved compared to previous exam.    Imaging    Previous Bilateral hip: [facility] [place]  Previous Radiographs: End-stage osteoarthritis with severe osteophyte formation, joint space narrowing, cyst and sclerosis. Arthritic and lordotic lumbar spine as well. Some spondylolisthesis present in the lower lumbar segments. Lumbar spine x-ray from February 2021: 2 view x-ray demonstrates facet joint arthropathy at multiple levels without evidence of overt DISH. They also demonstrate previous IVC filter. CT abdomen from August 2022: Demonstrates indwelling IVC filter placement it seems. Assessment and Plan  Demetrius Mora was seen today for hip pain. Diagnoses and all orders for this visit:    History of DVT (deep vein thrombosis)    Primary osteoarthritis of left hip           Patient is suffering from advanced osteoarthritis osteoarthritis of bilateral hips. Patient is currently anticoagulated for massive bilateral DVTs.   He did develop massive bilateral lower extremity DVTs while already anticoagulated for previous DVTs on Xarelto. Patient has been converted to Coumadin. He now has an IVF filter in place. Patient will stop his Coumadin 5 days before surgery. He will need clearance from his medical doctor for surgery. In addition we will restart his normal Coumadin afterwards without Lovenox bridge      I discussed with Yas Dove that his history, symptoms, signs and imaging are most consistent with hip arthritis    We reviewed the natural history of these conditions and treatment options ranging from conservative measures (rest, icing, activity modification, physical therapy, pain meds, cortisone injection)  to surgical options. We had a long discussion with the patient about their hip. We discussed surgical and non surgical options for hip arthritis. The most important thing is to work to maintain their range of motion. Next they can try medications including tylenol and NSAIDs. They can try glucosamine or chondroitin. They should also ice frequently and avoid activities that make their hip hurt. Cortisone injections and Synvisc injections are also options when medicine has failed. We finally discussed surgical options including arthroscopic debridement versus hip replacement. Often the arthritis is too far gone for an arthroscopic debridement and pain relief will be short term. Their ultimate solution will be a hip replacement when they are ready for it. They should put it off until they can no longer stand the pain and when nothing else has worked. Conservative measures have failed. He is not interested in cortisone injections. I think he is an appropriate candidate for surgery due to his ongoing symptoms and dysfunction despite conservative measures. The procedure would be LEFT  [address] Total Hip Arthroplasty, left direct anterior    We will use Cell Saver.     Perioperative considerations include:  Preop PCP eval, DVT History, PE History and Chronic anticoagulation other than Aspirin. We reviewed the risks, benefits, alternatives of this approach. We discussed risks including, but not limited to, bleeding, pain, infection, scarring, damage to the neurovascular structures, blood clots, pulmonary embolus, stiffness, implant instability or loosening, implant failure, incomplete relief of pain, and incomplete return of function. His perioperative risk is significantly higher than a standard replacement. First, he has a history of venous thrombolic disease, last known clot was 2 years ago. He now is on Coumadin and remains anticoagulated. We will coordinate IVC filter placement for him. Second, he has a history of nicotine dependence and smoking. He has quit now improved this to be negative on preoperative screen. In addition, he has a history of previous heroin addiction, which has been in remission for over a year. This remains negative on drug screen. We also reviewed the surgical details, expected recovery, and rehabilitation (6-9 months). He expressed understanding and will undergo preoperative medical evaluation and optimization. He is definitely an inpatient candidate at the time of surgery considering all the comorbidities and close monitoring we will need to perform.

## 2022-10-31 ENCOUNTER — TELEPHONE (OUTPATIENT)
Dept: ORTHOPEDIC SURGERY | Age: 67
End: 2022-10-31

## 2022-10-31 NOTE — TELEPHONE ENCOUNTER
[facility] did not have the L[facility] injection and said it won't be in until tomorrow. He wants to know what to do since he was supposed to be giving himself shots prior to the surgery.

## 2022-11-01 ENCOUNTER — TELEPHONE (OUTPATIENT)
Dept: ORTHOPEDIC SURGERY | Age: 67
End: 2022-11-01

## 2022-11-02 ENCOUNTER — TELEPHONE (OUTPATIENT)
Dept: ORTHOPEDIC SURGERY | Age: 67
End: 2022-11-02

## 2022-11-02 ENCOUNTER — TELEPHONE (OUTPATIENT)
Dept: INTERNAL MEDICINE CLINIC | Age: 67
End: 2022-11-02

## 2022-11-02 RX ORDER — OMEPRAZOLE MAGNESIUM 20 MG
CAPSULE,DELAYED RELEASE (ENTERIC COATED) ORAL
Qty: 30 TABLET | Refills: 3 | Status: SHIPPED | OUTPATIENT
Start: 2022-11-02

## 2022-11-02 RX ORDER — FUROSEMIDE 20 MG/1
TABLET ORAL
Qty: 90 TABLET | Refills: 1 | Status: SHIPPED | OUTPATIENT
Start: 2022-11-02

## 2022-11-02 NOTE — TELEPHONE ENCOUNTER
Patient call back and stated he has 2 insurance he has Medicare A & B plus the one that is already on file. He would like to have a call back to see if his insurance will pay for that injection that he needs. Please Advise.

## 2022-11-02 NOTE — TELEPHONE ENCOUNTER
Requested Prescriptions     Pending Prescriptions Disp Refills    [facility] MELATONIN 3 MG TABS tablet [Pharmacy Med Name: [facility] MELATONIN 3 MG TABLET] 30 tablet 3     Sig: TAKE 1 TABLET BY MOUTH NIGHTLY AS NEEDED (SLEEP).        Last Clinic Visit:  10/28/2022     Next Clinic Appointment:  11/18/2022

## 2022-11-02 NOTE — TELEPHONE ENCOUNTER
General Question     Subject: Miguel Do  Patient and /or Facility Request: Donaldo Díaz  Contact Number: [phone]      THE PT CALLED BECAUSE HE'S SUPPOSED TO HAVE SX TODAY W/DR [facility], BUT HIS INSURANCE ISN'T GOING TO COVER HIS INJECTIONS, SO HE DOESN'T KNOW WHAT TO DO AT THIS POINT.       PLEASE RETURN HIS CALL AT THE ABOVE PHONE #

## 2022-11-02 NOTE — TELEPHONE ENCOUNTER
PT STATED [facility] TOLD HIM THE HIS INS WILL NOT COVER LOVENOX RX. PT TO ALSO REACH OUT TO PHARM TO HAVE THEM CALL THE [facility]. PT CAN BE REACHED [phone].

## 2022-11-02 NOTE — TELEPHONE ENCOUNTER
Requested Prescriptions     Pending Prescriptions Disp Refills    furosemide (LASIX) 20 MG tablet [Pharmacy Med Name: FUROSEMIDE 20 MG TABLET] 90 tablet      Sig: TAKE 1 TABLET  [facility] Visit:  10/28/2022     Next Clinic Appointment:  11/18/2022

## 2022-11-03 NOTE — TELEPHONE ENCOUNTER
Patient called [facility] to state his surgery was cancelled because the insurance would not cover the Lovenox and he has been off his Warfarin for 2 days (5mg take daily). I spoke with Gem Roca at [facility] and they have no immediate plans to reschedule him. Patient is concerned because he has had no blood thinner for 2 days. I left a message for Coumadin clinic to call the patient Friday morning when they are back in the office. The patient states he has been on Warfarin 5 mg daily and is stable on that dose. I instructed the patient to take his usual dose of Warfarin today and to contact the Coumadin clinic tomorrow.

## 2022-11-03 NOTE — TELEPHONE ENCOUNTER
LVM with pt to resume warfarin 10 mg on Thurs and Mon, and 5 mg all other days. Come in for INR check on 11/8/22 as previously scheduled.      Marleny Mitchell, PharmD, [facility]  [facility]   Jayden [address] Ph: [phone]  Vesta Martinez Ph: [phone]  11/3/2022 5:41 PM

## 2022-11-04 ENCOUNTER — TELEPHONE (OUTPATIENT)
Dept: INTERNAL MEDICINE CLINIC | Age: 67
End: 2022-11-04

## 2022-11-04 RX ORDER — ENOXAPARIN SODIUM 100 MG/ML
INJECTION SUBCUTANEOUS
Qty: 16 ML | Refills: 0 | Status: SHIPPED | OUTPATIENT
Start: 2022-11-04

## 2022-11-04 NOTE — TELEPHONE ENCOUNTER
Left message for patient to call clinic. His Lovenox has been approved and being filled at [facility] pharmacy on file. Patient may now call surgeon  to reschedule hip surgery. Also patient had EGD scheduled with  12-9-22; patient will need Lovenox bridge for this procedure also. Note faxed to . copy in media tab of chart.

## 2022-11-08 ENCOUNTER — ANTI-COAG VISIT (OUTPATIENT)
Dept: PHARMACY | Age: 67
End: 2022-11-08
Payer: MEDICARE

## 2022-11-08 DIAGNOSIS — I26.99 PULMONARY EMBOLISM, BILATERAL (HCC): Primary | ICD-10-CM

## 2022-11-08 DIAGNOSIS — I82.413 ACUTE DEEP VEIN THROMBOSIS (DVT) OF FEMORAL VEIN OF BOTH LOWER EXTREMITIES (HCC): ICD-10-CM

## 2022-11-08 LAB — INR BLD: 2

## 2022-11-08 PROCEDURE — 85610 PROTHROMBIN TIME: CPT

## 2022-11-08 PROCEDURE — 99212 OFFICE O/P EST SF 10 MIN: CPT

## 2022-11-08 NOTE — PROGRESS NOTES
ANTICOAGULATION SERVICE    Jonathan Ovalle is a 75 y.o. male with PMHx significant for bilateral PE, DVT (6/4/21) while on Xarelto, HTN who presents to clinic 11/8/2022 for anticoagulation monitoring and adjustment. Pt dx with bilat DVT 6/4/21 while on Xarelto 2yrs for hx of PE. Pt stopped Xarelto on 6/6/21 and started warfarin + lovenox bridge on 6/7/21.     Anticoagulation Indication(s): PE 2018, bilateral DVT 6/4/21 (on Xarelto)  Referring Physician: Dr. Nino Ybarra (Also send notes to Dr Ai Gaviria, or responsible resident)  Goal INR Range: 2-3  Duration of Anticoagulation Therapy: indefinite  Time of day dose taken: prefers to take in AM  Product patient has at home: warfarin 5 mg (peach color)    INR Summary                           Warfarin regimen (mg)  Date INR   A/P   Sun Mon Tue Wed Thu Fri Sat Mg/wk  11/8 2.0 At goal, continue 5 10 5 5 10 5 5 45  10/27 1.3 Below goal (missed) 5 10 5 5 10 5 5 45  10/20 2.3 At goal, continue 5 10 5 5 10 5 5 45   10/13 1.6 Below goal, increase 5 10 5 5 10 5 5 45  10/6 1.6 Below goal, (missed) 5 5 5 5 10/5 7.5 5 37.5   9/29 1.3 Below goal, 10mgx1 5 5 5 5 10/7.5 5 5 37.5  9/22 1.4 Below goal, increase 5 5 5 2.5 5 5 5 32.5  9/13 1.4 Below goal, (missed) 5 2.5 7.5/5 2.5 5 2.5 5 27.5  9/6 2.1 At goal, no change 5 2.5 5 2.5 5 2.5 5 27.5  8/30 1.3 Below goal, increase 5 2.5 5 2.5 5 2.5 5 27.5  8/23 2.2 At goal, continue 5 2.5 5 2.5 5 2.5 2.5 25  8/16 1.4 Below goal, increase 5 2.5 5 2.5 5 2.5 2.5 25  8/4 2.1 At goal, increase 5 2.5 2.5 2.5 2.5 2.5 2.5 20  7/28 3.2 Above goal, continue 2.5 2.5 2.5 2.5 2.5 2.5 2.5 17.5  7/21 5.5 Above goal, holdx1  2.5 2.5 2.5 2.5 5 0/2.5 2.5 20  decrease   6/23 3.1 Above goal, continue 5 2.5 2.5 2.5 5 2.5 2.5 22.5  6/9 2.9 At goal, continue  5 2.5 2.5 2.5 5 2.5 2.5 22.5  5/26 1.9 Below goal, increase  5 2.5 2.5 2.5 5 2.5 2.5 22.5  5/12 2.8 At goal, no change 5 2.5 2.5 2.5 2.5 2.5 2.5 20  5/5 4.4 Above goal, holdx1 5 2.5 2.5 2.5 2.5 2.5 2.5 20  4/21 3.1 Above goal, decrease 5 2.5 5 2.5 5 2.5 2.5 25  4/12 2.3 At goal, no change 5 2.5 5 2.5 5 2.5 5 27.5  4/4 2.7 At goal, resume 5 2.5 5 2.5 5 2.5 5 27.5  3/31 7.63 Hold x 3 days  3/17 3.2 Above goal, continue 5 5 5 5 5 5 5 35  3/1 1.5 Below goal, (Missed) 5 5 7.5/5 5 5 5 5 35  2/8 1.7 Below goal, bolus 5 5 7.5/5 5 5 5 5 35  1/20 3.7 Above goal, decrease 5 5 5 5 5 2.5 5 32.5  1/5 3.6 Above goal, hold 5 5 5 5 5 0/5 5 35  12/14   2.2       At goal, no change      5 5 5 5 5 5 5 35  11/18 2.8 At goal, no change 5 5 5 5 5 5 5 35   11/11 1.3 Below goal, cont 5 5 5 5 5 5 5 35  11/4 5.1 Above goal, hold+dec 5 5 5 5 0/5 0/5 5 35  10/25 5.3 Above goal, hold+dec  5 5 0/5 0/5 7.5 5 5 37.5  10/13 1.8 Below goal, increase 5 5 7.5 5 7.5 5 5 40  10/6 1.3 Below goal (missed) 5 5 5 10/5 7.5 5 5 37.5  9/13 2.2 At goal, continue 5 5 5 5 7.5 5 5 37.5  8/30     1.8       Below goal, cont 5 5 5 5 7.5 5 5 37.5  7/26     3.1       At goal, no change 5 5 5 5 7.5 5 5 37.5  7/15 2.1 At goal, increase 5 5 5 5 7.5 5 5 37.5  7/8 5.6 Above goal, holdx1 5 5 5 5 5 5 5 35  6/28 2.1 At goal, no change 5 7.5 5 7.5 5 7.5 5 42.5  6/21 4.3 Above goal, holdx1 5 x 5 7.5 5 7.5 5   6/14 4.2 Above goal, hold 7.5 10 0/7.5 5/7.5 7.5 7.5 7.5   6/10 1.3 Below goal, increase 7.5 INR   10 10 7.5     Started warfarin 5 mg daily on 6/7/21    Last CBC:  Lab Results   Component Value Date    RBC 4.07 (L) 08/05/2022    HGB 13.4 (L) 08/05/2022    HCT 40.1 (L) 08/05/2022    MCV 98.4 08/05/2022    MCH 32.9 08/05/2022    MPV 7.7 08/05/2022    RDW 15.9 (H) 08/05/2022     08/05/2022     Patient History:  Recent hospitalizations/HC visits 10/7&10/28/22: ortho re: hip replacement  4/27/22: Dr Ashleigh Schaefer ([facility]) recommends IVC placed prior to hip replacement  4/7/22: [facility]- Pt needs to repeat stool hemoccult, then schedule colonoscopy--pt looking for GI doctor closer to home.     Recent medication changes None reported   Medications taken regularly that may interact with warfarin or alter INR No significant drug interactions identified   Warfarin dose taken as prescribed Questionable. Pt claims that he always follows the dosing calendar, but can rarely verbalize his dose. 10/27/22: thinks he took 10 mg on Sunday only and 5 mg the other days. Is not sure if he missed any other days. 10/20: patient states he is following dosing however he took incorrect dose prior to coming in today. 10/13:Today, he reports taking a 7.5 mg dose on Sunday instead of 5mg, but then corrected to say he thought he put away the extra half tablet. He does use a pillbox, but it seems he may not always fill appropriately   Signs/symptoms of bleeding Black stools resolved  Stool hemoccult negative 4/6/22   Vitamin K intake Normally has 0-1 serving of green, leafy vegetables per week; Pt often notes that he has very little appetite. Recent vomiting/diarrhea/fever, changes in weight or activity level None reported   Tobacco or alcohol use Often drinks 2-3 large glasses of wine, but states each glass only has ~4 oz wine mixed with grape juice. Occasionally has more. 1/5/22: quit smoking (was smoking 2 cig/d)   7/21/22: 1.5 pint of wine 2 nights in past week  Stopped drinking alcohol as of 8/6    Upcoming surgeries or procedures Hip surgery on 11/2 CANCELED - patient will notify clinic when rescheduled  12/9 EGD + Colonoscopy per Dr. Lori Cisneros     Assessment/Plan:  Patient's INR was therapeutic (2.0) today. He held warfarin 10/28-11/2 for hip surgery on 11/2, but surgery was canceled on the day of procedure due to patient not having Lovenox injections. According to orthopedic surgeon note on 10/28, patient was to stop warfarin 5 days prior to surgery and resume it post-op with NO Lovenox bridge due to plan for IVC filter placement prior to surgery. However, patient also saw his PCP on 10/28, who prescribed Lovenox injections.  Patient reports that he did not have IVC filter placed recently. Lovenox injections were not covered by patient's insurance initially, and therefore surgery was canceled on . Patient was off warfarin x 6 days with no Lovenox. He resumed warfarin on 11/3 per clinic instructions and has been taking it every day since then. He reports today that Lovenox is now approved and he will be trying to reschedule hip surgery soon. He also has EGD+colonoscopy scheduled on . Patient has been asked to lynn his dosing calendar after he has taken each dose and bring pillbox and dosing calendar to every visit, however he forgets to bring this in. It is possible he is requiring more warfarin because he has been sober for the past few months. Patient likes to eat liver and broccoli, but typically avoids these foods. He denies any other changes today. Patient was instructed to continue warfarin 5 mg daily, except 10 mg on Mon+Thur. Repeat INR weekly as it is labile. Patient was reminded to maintain consistent vitamin K intake and call with any bleeding, medication changes, or fever/vomiting/diarrhea. Patient understands dosing directions and information discussed. Dosing schedule and follow up appointment given to patient. Progress note routed to referring physician's office. Patient acknowledges working in consult agreement with pharmacist as referred by his/her physician. Next Clinic Appointment:     Please call The [address] [place] [address] Avenue [phone] with any questions. Thanks!   Hung Esqueda, PharmD Candidate  [facility]   Phoenix Jewell Ph: [phone]  Ayden Goss Ph: [phone]  2022 2:07 PM      For Pharmacy Admin Tracking Only    Intervention Detail: Adherence Monitorin  Total # of Interventions Recommended: 0  Total # of Interventions Accepted: 0  Time Spent (min): 30

## 2022-11-09 ENCOUNTER — TELEPHONE (OUTPATIENT)
Dept: ORTHOPEDIC SURGERY | Age: 67
End: 2022-11-09

## 2022-11-09 NOTE — TELEPHONE ENCOUNTER
Other PATIENT IS CALLING TO RESCHEDULE SURGERY. PATIENT SAYS HE RECEIVED INJECTIONS THAT HE NEEDED TO HAVE SURGERY.  Doris 30 [phone]

## 2022-11-09 NOTE — TELEPHONE ENCOUNTER
Patient was instructed to hold warfarin for 5 days prior to procedure, and resume post-op when safe. As discussed with Dr Penny Enrique and Dr Radha Lema, pt does not require lovenox bridge for surgery. If lovenox is recommended by primary care, please discuss with other providers to avoid confusion for the patient and so patient can proceed with surgery as soon as possible. If it is decided that he does in fact need lovenox, please notify our clinic so we can provide/reinforce education to patient and review bridge plan.      Marissa Miramontes, PharmD, [facility]  Medication Management Clinic   [address] Ph: [phone]  Fatuma Miller Ph: [phone]  11/9/2022 3:25 PM

## 2022-11-10 NOTE — TELEPHONE ENCOUNTER
ANTICOAGULATION FOLLOW-UP CLINIC VISIT    Patient Name:  Alexandru Crews  Date:  6/5/2017  Contact Type:  Face to Face    SUBJECTIVE:     Patient Findings     Positives No Problem Findings           OBJECTIVE    INR Protime   Date Value Ref Range Status   06/05/2017 2.3 (A) 0.86 - 1.14 Final       ASSESSMENT / PLAN  INR assessment THER    Recheck INR In: 4 WEEKS    INR Location Clinic      Anticoagulation Summary as of 6/5/2017     INR goal 2.0-3.0   Today's INR 2.3   Maintenance plan 2.5 mg (2.5 mg x 1) every day   Full instructions 2.5 mg every day   Weekly total 17.5 mg   No change documented Bee Mesa RN   Plan last modified Allie Awan RN (4/8/2016)   Next INR check 7/7/2017   Priority INR   Target end date     Indications   Long-term (current) use of anticoagulants [Z79.01] [Z79.01]  Atrial fibrillation (H) [I48.91]         Anticoagulation Episode Summary     INR check location     Preferred lab     Send INR reminders to [facility] CL[facility]    Comments       Anticoagulation Care Providers     Provider Role Specialty Phone number    Paul Knowles MD Responsible Internal Medicine [phone]            See the Encounter Report to view Anticoagulation Flowsheet and Dosing Calendar (Go to Encounters tab in chart review, and find the Anticoagulation Therapy Visit)    Dosage adjustment made based on physician directed care plan. Reviewed both bleeding and clotting signs and symptoms with patient this visit. Pt. has no further questions or concerns.  Will call with any changes to diet, medications, or missed doses at INR line [phone].      Bee Mesa, SHELLEY                Surgery and/or Procedure Scheduling     Contact Name: Yelena Rudi Request: RESCHEDULE   Patient Contact Number: [phone]     Pt CALLING AGAIN TO CHECK ON RESCHEDULING SX

## 2022-11-11 ENCOUNTER — TELEPHONE (OUTPATIENT)
Dept: INTERNAL MEDICINE CLINIC | Age: 67
End: 2022-11-11

## 2022-11-11 NOTE — TELEPHONE ENCOUNTER
's office called. Patient is scheduled for EGD 12-9-22; patient needs to stop Coumadin 5 days before procedure. Patient needs Lovenox bridge. Lovenox was ordered and patient's pharmacy says it was approved and ready. I contacted Coumadin clinic and they will order the Bridge of Lovenox for this procedure. Also patient has contacted orthopedic surgeon's office but no procedure as of this date is on the schedule yet. Patient will also need to consult with Coumadin Clinic prior to hip surgery.   Will fax GI 's order sheet to Coumadin Clinic

## 2022-11-14 ENCOUNTER — TELEPHONE (OUTPATIENT)
Dept: ORTHOPEDIC SURGERY | Age: 67
End: 2022-11-14

## 2022-11-14 NOTE — TELEPHONE ENCOUNTER
General Question     Subject: KILEY MIKE WHARTON STATES PATIENTS AUTHORIZATION NUMBER FOR SX IS GOOD THROUGH 05/2023. SHE WOULD TO KNOW PATIENT NEW SURGERY DATE. PLEASE ADVISE.    Patient KILEY  Contact Number: [phone]

## 2022-11-17 ENCOUNTER — ANTI-COAG VISIT (OUTPATIENT)
Dept: PHARMACY | Age: 67
End: 2022-11-17
Payer: MEDICARE

## 2022-11-17 ENCOUNTER — TELEPHONE (OUTPATIENT)
Dept: PHARMACY | Age: 67
End: 2022-11-17

## 2022-11-17 DIAGNOSIS — I82.413 ACUTE DEEP VEIN THROMBOSIS (DVT) OF FEMORAL VEIN OF BOTH LOWER EXTREMITIES (HCC): ICD-10-CM

## 2022-11-17 DIAGNOSIS — I26.99 PULMONARY EMBOLISM, BILATERAL (HCC): Primary | ICD-10-CM

## 2022-11-17 LAB — INR BLD: 2.1

## 2022-11-17 PROCEDURE — 99211 OFF/OP EST MAY X REQ PHY/QHP: CPT

## 2022-11-17 PROCEDURE — 85610 PROTHROMBIN TIME: CPT

## 2022-11-17 NOTE — TELEPHONE ENCOUNTER
Patient is requesting a call back to reschedule his surgery with Dr Sena Pantoja. He is in a lot of pain.      Phil Ren, PharmD, [facility]  Medication Management Clinic   [address] Ph: [phone]  Merlene Bowman Ph: [phone]  11/17/2022 12:54 PM

## 2022-11-17 NOTE — PROGRESS NOTES
Broderick Hernandez is a 75 y.o. male with PMHx significant for bilateral PE, DVT (6/4/21) while on Xarelto, HTN who presents to clinic 11/17/2022 for anticoagulation monitoring and adjustment. Pt dx with bilat DVT 6/4/21 while on Xarelto 2yrs for hx of PE. Pt stopped Xarelto on 6/6/21 and started warfarin + lovenox bridge on 6/7/21.     Anticoagulation Indication(s): PE 2018, bilateral DVT 6/4/21 (on Xarelto)  Referring Physician: Dr. Vickey Ward (Also send notes to Dr Todd Diggs, or responsible resident)  Goal INR Range: 2-3  Duration of Anticoagulation Therapy: indefinite  Time of day dose taken: prefers to take in AM  Product patient has at home: warfarin 5 mg (peach color)    INR Summary                           Warfarin regimen (mg)  Date INR   A/P   Sun Mon Tue Wed Thu Fri Sat Mg/wk  11/17 2.1 At goal, continue 5 10 5 5 10 5 5 45  11/8 2.0 At goal, continue 5 10 5 5 10 5 5 45  10/27 1.3 Below goal (missed) 5 10 5 5 10 5 5 45  10/20 2.3 At goal, continue 5 10 5 5 10 5 5 45   10/13 1.6 Below goal, increase 5 10 5 5 10 5 5 45  10/6 1.6 Below goal, (missed) 5 5 5 5 10/5 7.5 5 37.5   9/29 1.3 Below goal, 10mgx1 5 5 5 5 10/7.5 5 5 37.5  9/22 1.4 Below goal, increase 5 5 5 2.5 5 5 5 32.5  9/13 1.4 Below goal, (missed) 5 2.5 7.5/5 2.5 5 2.5 5 27.5  9/6 2.1 At goal, no change 5 2.5 5 2.5 5 2.5 5 27.5  8/30 1.3 Below goal, increase 5 2.5 5 2.5 5 2.5 5 27.5  8/23 2.2 At goal, continue 5 2.5 5 2.5 5 2.5 2.5 25  8/16 1.4 Below goal, increase 5 2.5 5 2.5 5 2.5 2.5 25  8/4 2.1 At goal, increase 5 2.5 2.5 2.5 2.5 2.5 2.5 20  7/28 3.2 Above goal, continue 2.5 2.5 2.5 2.5 2.5 2.5 2.5 17.5  7/21 5.5 Above goal, holdx1  2.5 2.5 2.5 2.5 5 0/2.5 2.5 20  decrease   6/23 3.1 Above goal, continue 5 2.5 2.5 2.5 5 2.5 2.5 22.5  6/9 2.9 At goal, continue  5 2.5 2.5 2.5 5 2.5 2.5 22.5  5/26 1.9 Below goal, increase  5 2.5 2.5 2.5 5 2.5 2.5 22.5  5/12 2.8 At goal, no change 5 2.5 2.5 2.5 2.5 2.5 2.5 20  5/5 4.4 Above goal, holdx1 5 2.5 2.5 2.5 2.5 2.5 2.5 20  4/21 3.1 Above goal, decrease 5 2.5 5 2.5 5 2.5 2.5 25  4/12 2.3 At goal, no change 5 2.5 5 2.5 5 2.5 5 27.5  4/4 2.7 At goal, resume 5 2.5 5 2.5 5 2.5 5 27.5  3/31 7.63 Hold x 3 days  3/17 3.2 Above goal, continue 5 5 5 5 5 5 5 35  3/1 1.5 Below goal, (Missed) 5 5 7.5/5 5 5 5 5 35  2/8 1.7 Below goal, bolus 5 5 7.5/5 5 5 5 5 35  1/20 3.7 Above goal, decrease 5 5 5 5 5 2.5 5 32.5    Last CBC:  Lab Results   Component Value Date    RBC 4.07 (L) 08/05/2022    HGB 13.4 (L) 08/05/2022    HCT 40.1 (L) 08/05/2022    MCV 98.4 08/05/2022    MCH 32.9 08/05/2022    MPV 7.7 08/05/2022    RDW 15.9 (H) 08/05/2022     08/05/2022     Patient History:  Recent hospitalizations/HC visits 10/7&10/28/22: ortho re: hip replacement  4/27/22: Dr Chari Mcfarlane ([facility]) recommends IVC placed prior to hip replacement  4/7/22: OV GI- Pt needs to repeat stool hemoccult, then schedule colonoscopy--pt looking for GI doctor closer to home. Recent medication changes None reported   Medications taken regularly that may interact with warfarin or alter INR No significant drug interactions identified   Warfarin dose taken as prescribed Questionable. Pt claims that he always follows the dosing calendar, but can rarely verbalize his dose. 10/27/22: thinks he took 10 mg on Sunday only and 5 mg the other days. Is not sure if he missed any other days. 10/20: patient states he is following dosing however he took incorrect dose prior to coming in today. 10/13:Today, he reports taking a 7.5 mg dose on Sunday instead of 5mg, but then corrected to say he thought he put away the extra half tablet. He does use a pillbox, but it seems he may not always fill appropriately   Signs/symptoms of bleeding Black stools resolved  Stool hemoccult negative 4/6/22   Vitamin K intake Normally has 0-1 serving of green, leafy vegetables per week;  Pt often notes that he has very little appetite. Recent vomiting/diarrhea/fever, changes in weight or activity level None reported   Tobacco or alcohol use Often drinks 2-3 large glasses of wine, but states each glass only has ~4 oz wine mixed with grape juice. Occasionally has more. 1/5/22: quit smoking (was smoking 2 cig/d)   7/21/22: 1.5 pint of wine 2 nights in past week  Stopped drinking alcohol as of 8/6    Upcoming surgeries or procedures Hip surgery on 11/2 CANCELED - patient will notify clinic when rescheduled  12/9 EGD + Colonoscopy per Dr. Lori Cisneros     Assessment/Plan:  Patient's INR was therapeutic (2.1) today. He denies any changes to his meds, diet, lifestyle. Patient has EGD+ colonoscopy scheduled for 12/9 which he will be bridging. Lovenox was recently approved by his insurance, per last note. Patient also notes that he does have an IVC filter, contrary to what the last note says. Patient has been asked to lynn his dosing calendar after he has taken each dose and bring pillbox and dosing calendar to every visit, however he forgets to bring this in. It is possible he is requiring more warfarin because he has been sober for the past few months. Patient likes to eat liver and broccoli, but typically avoids these foods. He denies any other changes today. Patient was instructed to continue warfarin 5 mg daily, except 10 mg on Mon+Thur. Repeat INR in 2 weeks. Will discuss lovenox bridge plan (below) at next visit. Patient was reminded to maintain consistent vitamin K intake and call with any bleeding, medication changes, or fever/vomiting/diarrhea.       Date Days to procedure AM PM   12/3 -6 None LAST dose of warfarin   12/4 -5 None None   12/5 -4 Lovenox 80 mg Lovenox 80 mg   12/6 -3 Lovenox 80 mg Lovenox 80 mg   12/7 -2 Lovenox 80 mg Lovenox 80 mg   12/8 -1 Lovenox 80 mg None   12/9 Procedure None Warfarin 5 mg*    12/10 +1 Lovenox 80 mg *  Lovenox 80 mg + Warfarin 10 mg    12/11 +2 Lovenox 80 mg  Lovenox 80 mg + Warfarin 5 mg    12/12 +3 Lovenox 80 mg  Lovenox 80 mg + Warfarin 10 mg    12/13 +4 Lovenox 80 mg  Lovenox 80 mg + Warfarin 5 mg    12/14 +5 Lovenox 80 mg  Lovenox 80 mg + Warfarin 5 mg    12/15 +6 INR check and further warfarin/Lovenox dosing will be decided at that time. *Resume warfarin and Lovenox when safe at the discretion of surgeon. Patient understands dosing directions and information discussed. Dosing schedule and follow up appointment given to patient. Progress note routed to referring physician's office. Patient acknowledges working in consult agreement with pharmacist as referred by his/her physician. Next Clinic Appointment: 12/1    Please call The [address] [place] [address] Avenue [phone] with any questions. Thanks!   Karli Craig, PharmD Candidate  Medication Management Clinic   [facility] Ph: [phone]  Caitlin Page Ph: [phone]  11/17/2022 11:11 AM      For Pharmacy Admin Tracking Only    Total # of Interventions Recommended: 0  Total # of Interventions Accepted: 0  Time Spent (min): 30

## 2022-11-22 NOTE — TELEPHONE ENCOUNTER
Noted.  Lovenox bridge for EGD will be reviewed with patient on 12/1/22. Will review plan for lovenox bridge for hip surgery if/when that is rescheduled.      Marleny Mitchell, PharmD, [facility]  Medication Management Clinic   [facility] [address] Ph: [phone]  Vesta Martinez Ph: [phone]  11/22/2022 4:45 PM

## 2022-11-23 ENCOUNTER — TELEPHONE (OUTPATIENT)
Dept: ORTHOPEDIC SURGERY | Age: 67
End: 2022-11-23

## 2022-11-23 NOTE — TELEPHONE ENCOUNTER
Other PATIENT CALLED TO VERIFY APPOINTMENT TODAY. PATIENT IS AWARE SINCE SURGERY WAS CANCELLED THAT WHY APPOINTMENT WAS CANCELLED. PATIENT WOULD LIKE A CALL BACK. PATIENT SAYS THAT HE HAS NOT HEARD BACK FROM THE OFFICE REGARDING SURGERY SCHEDULING.  Doris  [phone]

## 2022-11-30 ENCOUNTER — OFFICE VISIT (OUTPATIENT)
Dept: INTERNAL MEDICINE CLINIC | Age: 67
End: 2022-11-30
Payer: MEDICARE

## 2022-11-30 VITALS
SYSTOLIC BLOOD PRESSURE: 130 MMHG | BODY MASS INDEX: 24.58 KG/M2 | TEMPERATURE: 97.1 F | DIASTOLIC BLOOD PRESSURE: 83 MMHG | RESPIRATION RATE: 18 BRPM | WEIGHT: 171.3 LBS | HEART RATE: 86 BPM | OXYGEN SATURATION: 98 %

## 2022-11-30 DIAGNOSIS — I10 PRIMARY HYPERTENSION: Primary | ICD-10-CM

## 2022-11-30 DIAGNOSIS — I82.413 ACUTE DEEP VEIN THROMBOSIS (DVT) OF FEMORAL VEIN OF BOTH LOWER EXTREMITIES (HCC): ICD-10-CM

## 2022-11-30 DIAGNOSIS — M16.0 PRIMARY OSTEOARTHRITIS OF BOTH HIPS: ICD-10-CM

## 2022-11-30 DIAGNOSIS — Z23 NEEDS FLU SHOT: ICD-10-CM

## 2022-11-30 DIAGNOSIS — Z87.891 HISTORY OF TOBACCO ABUSE: ICD-10-CM

## 2022-11-30 PROCEDURE — 6360000002 HC RX W HCPCS: Performed by: STUDENT IN AN ORGANIZED HEALTH CARE EDUCATION/TRAINING PROGRAM

## 2022-11-30 PROCEDURE — 99213 OFFICE O/P EST LOW 20 MIN: CPT | Performed by: STUDENT IN AN ORGANIZED HEALTH CARE EDUCATION/TRAINING PROGRAM

## 2022-11-30 RX ORDER — WARFARIN SODIUM 5 MG/1
TABLET ORAL
Qty: 90 TABLET | Refills: 1 | Status: SHIPPED | OUTPATIENT
Start: 2022-11-30

## 2022-11-30 RX ORDER — KETOROLAC TROMETHAMINE 30 MG/ML
30 INJECTION, SOLUTION INTRAMUSCULAR; INTRAVENOUS ONCE
Status: COMPLETED | OUTPATIENT
Start: 2022-11-30 | End: 2022-11-30

## 2022-11-30 RX ORDER — OXYCODONE HYDROCHLORIDE AND ACETAMINOPHEN 5; 325 MG/1; MG/1
1 TABLET ORAL EVERY 6 HOURS PRN
Qty: 28 TABLET | Refills: 0 | Status: SHIPPED | OUTPATIENT
Start: 2022-11-30 | End: 2022-12-30

## 2022-11-30 RX ADMIN — KETOROLAC TROMETHAMINE 30 MG: 30 INJECTION, SOLUTION INTRAMUSCULAR; INTRAVENOUS at 11:35

## 2022-11-30 ASSESSMENT — ENCOUNTER SYMPTOMS
COUGH: 0
ABDOMINAL PAIN: 0
NAUSEA: 0
SHORTNESS OF BREATH: 0
WHEEZING: 0
RHINORRHEA: 0
CONSTIPATION: 0
BACK PAIN: 1
CHEST TIGHTNESS: 0
DIARRHEA: 0
VOMITING: 0

## 2022-11-30 NOTE — PROGRESS NOTES
[facility] Outpatient Internal Medicine Clinic    Maria Ines Singh is a 75 y.o. male, here for evaluation of the following concerns:routine follow up    HPI    Maria nIes Singh has PMHx of hypertension, hyperlipidemia, DVT on Coumadin (lifelong). 10/10 Hip pain   Severe osteoarthritis of b/l hips  Was planned for left hip replacement, didn't happen due to issues with anticoagulation   Was supposed to see ortho today, rescheduled for Jan 6  Has been on pain meds long time ago   Takes nothing for pain now    HTN  Diet can be better  Ex limited by hip pain  Weight stable  Takes meds as prescribed  Does not check BP at home  Denies CP, SOB, vision changes, HA, peripheral edema    Stopped smoking 4 months ago   ~ 35 ppd    Will get flu shot today     He also has EGD+colonoscopy scheduled on 12/9 with Allegra Cook     Talked about Shingles     Review of Systems   Constitutional:  Negative for activity change, appetite change, chills, fatigue, fever and unexpected weight change. HENT:  Negative for congestion and rhinorrhea. Respiratory:  Negative for cough, chest tightness, shortness of breath and wheezing. Cardiovascular:  Negative for chest pain, palpitations and leg swelling. Gastrointestinal:  Negative for abdominal pain, constipation, diarrhea, nausea and vomiting. Endocrine: Negative for polydipsia and polyuria. Genitourinary:  Negative for difficulty urinating, dysuria and frequency. Musculoskeletal:  Positive for arthralgias, back pain, gait problem and myalgias. Neurological:  Negative for dizziness, tremors, seizures, weakness and headaches. Psychiatric/Behavioral:  Negative for confusion. MEDICATIONS:  Prior to Visit Medications    Medication Sig Taking?  Authorizing Provider   warfarin (COUMADIN) 5 MG tablet TAKE 1 TABLET BY MOUTH EVERY DAY Yes Dimitris Boudreaux MD   oxyCODONE-acetaminophen (PERCOCET) 5-325 MG per tablet Take 1 tablet by mouth every 6 hours as needed for Pain for up Cardiovascular:      Rate and Rhythm: Normal rate and regular rhythm. Pulses: Normal pulses. Heart sounds: Normal heart sounds. No murmur heard. Pulmonary:      Effort: Pulmonary effort is normal. No respiratory distress. Breath sounds: Normal breath sounds. Abdominal:      General: Bowel sounds are normal. There is no distension. Palpations: Abdomen is soft. Musculoskeletal:         General: No swelling. Neurological:      Mental Status: He is alert. Mental status is at baseline. ASSESSMENT/PLAN:     1. Primary hypertension  Assessment & Plan:  Well controlled  Counseled on low salt diet  Continue current therapy  2. Primary osteoarthritis of both hips  Assessment & Plan: Will give 30mg IM ketorolac  28 nos percocet 5  Will follow up with ortho in Jan  Orders:  -     ketorolac (TORADOL) injection 30 mg; 30 mg, IntraMUSCular, ONCE, 1 dose, On Wed 11/30/22 at 1145Do not administer for more than 5 days  -     oxyCODONE-acetaminophen (PERCOCET) 5-325 MG per tablet; Take 1 tablet by mouth every 6 hours as needed for Pain for up to 30 days. Intended supply: 7 days. Take lowest dose possible to manage pain, Disp-28 tablet, R-0Print  3. History of tobacco abuse  Assessment & Plan:  Congratulated on quitting X 4 months  Reprinted new lung cancer screening  Orders:  -     CT Lung Screen (Initial or Annual); Future  4. Acute deep vein thrombosis (DVT) of femoral vein of both lower extremities (HCC)  Comments: Follows coumadin clinic  Orders:  -     warfarin (COUMADIN) 5 MG tablet; TAKE 1 TABLET BY MOUTH EVERY DAY, Disp-90 tablet, R-1Normal  5. Needs flu shot  -     Influenza, FLUAD, (age 72 y+), IM, Preservative Free, 0.5 mL    Return in about 4 months (around 3/30/2023). The patient was staffed with the teaching attending: Dr. Sofi Trimble. An electronic signature was used to authenticate this note.     --Dustin Galindo MD

## 2022-12-01 ENCOUNTER — ANTI-COAG VISIT (OUTPATIENT)
Dept: PHARMACY | Age: 67
End: 2022-12-01
Payer: MEDICARE

## 2022-12-01 DIAGNOSIS — I26.99 PULMONARY EMBOLISM, BILATERAL (HCC): Primary | ICD-10-CM

## 2022-12-01 DIAGNOSIS — I82.413 ACUTE DEEP VEIN THROMBOSIS (DVT) OF FEMORAL VEIN OF BOTH LOWER EXTREMITIES (HCC): ICD-10-CM

## 2022-12-01 LAB — INTERNATIONAL NORMALIZATION RATIO, POC: 1.5

## 2022-12-01 PROCEDURE — 99212 OFFICE O/P EST SF 10 MIN: CPT

## 2022-12-01 PROCEDURE — 85610 PROTHROMBIN TIME: CPT

## 2022-12-01 NOTE — PROGRESS NOTES
Elly Olmedo is a 75 y.o. male with PMHx significant for bilateral PE, DVT (6/4/21) while on Xarelto, HTN who presents to clinic 12/1/2022 for anticoagulation monitoring and adjustment. Pt dx with bilat DVT 6/4/21 while on Xarelto 2yrs for hx of PE. Pt stopped Xarelto on 6/6/21 and started warfarin + lovenox bridge on 6/7/21.     Anticoagulation Indication(s): PE 2018, bilateral DVT 6/4/21 (on Xarelto)  Referring Physician: Dr. Sunita Storm (Also send notes to Dr Chad Velázquez, or responsible resident)  Goal INR Range: 2-3  Duration of Anticoagulation Therapy: indefinite  Time of day dose taken: prefers to take in AM  Product patient has at home: warfarin 5 mg (peach color)    INR Summary                           Warfarin regimen (mg)  Date INR   A/P   Sun Mon Tue Wed Thu Fri Sat Mg/wk  12/1 1.5 Below goal (missed) 5 10 5 5 10 5 5 45  11/17 2.1 At goal, continue 5 10 5 5 10 5 5 45  11/8 2.0 At goal, continue 5 10 5 5 10 5 5 45  10/27 1.3 Below goal (missed) 5 10 5 5 10 5 5 45  10/20 2.3 At goal, continue 5 10 5 5 10 5 5 45   10/13 1.6 Below goal, increase 5 10 5 5 10 5 5 45  10/6 1.6 Below goal, (missed) 5 5 5 5 10/5 7.5 5 37.5   9/29 1.3 Below goal, 10mgx1 5 5 5 5 10/7.5 5 5 37.5  9/22 1.4 Below goal, increase 5 5 5 2.5 5 5 5 32.5  9/13 1.4 Below goal, (missed) 5 2.5 7.5/5 2.5 5 2.5 5 27.5  9/6 2.1 At goal, no change 5 2.5 5 2.5 5 2.5 5 27.5  8/30 1.3 Below goal, increase 5 2.5 5 2.5 5 2.5 5 27.5  8/23 2.2 At goal, continue 5 2.5 5 2.5 5 2.5 2.5 25  8/16 1.4 Below goal, increase 5 2.5 5 2.5 5 2.5 2.5 25  8/4 2.1 At goal, increase 5 2.5 2.5 2.5 2.5 2.5 2.5 20  7/28 3.2 Above goal, continue 2.5 2.5 2.5 2.5 2.5 2.5 2.5 17.5  7/21 5.5 Above goal, holdx1  2.5 2.5 2.5 2.5 5 0/2.5 2.5 20  decrease   6/23 3.1 Above goal, continue 5 2.5 2.5 2.5 5 2.5 2.5 22.5  6/9 2.9 At goal, continue  5 2.5 2.5 2.5 5 2.5 2.5 22.5  5/26 1.9 Below goal, increase  5 2.5 2.5 2.5 5 2.5 2.5 22.5  5/12 2.8 At goal, no change 5 2.5 2.5 2.5 2.5 2.5 2.5 20  5/5 4.4 Above goal, holdx1 5 2.5 2.5 2.5 2.5 2.5 2.5 20  4/21 3.1 Above goal, decrease 5 2.5 5 2.5 5 2.5 2.5 25  4/12 2.3 At goal, no change 5 2.5 5 2.5 5 2.5 5 27.5  4/4 2.7 At goal, resume 5 2.5 5 2.5 5 2.5 5 27.5  3/31 7.63 Hold x 3 days  3/17 3.2 Above goal, continue 5 5 5 5 5 5 5 35  3/1 1.5 Below goal, (Missed) 5 5 7.5/5 5 5 5 5 35  2/8 1.7 Below goal, bolus 5 5 7.5/5 5 5 5 5 35  1/20 3.7 Above goal, decrease 5 5 5 5 5 2.5 5 32.5    Last CBC:  Lab Results   Component Value Date    RBC 4.07 (L) 08/05/2022    HGB 13.4 (L) 08/05/2022    HCT 40.1 (L) 08/05/2022    MCV 98.4 08/05/2022    MCH 32.9 08/05/2022    MPV 7.7 08/05/2022    RDW 15.9 (H) 08/05/2022     08/05/2022     Patient History:  Recent hospitalizations/HC visits 10/7&10/28/22: ortho re: hip replacement  4/27/22: Dr Ny Tran ([facility]) recommends IVC placed prior to hip replacement  4/7/22: OV GI- Pt needs to repeat stool hemoccult, then schedule colonoscopy--pt looking for GI doctor closer to home. Recent medication changes None reported   Medications taken regularly that may interact with warfarin or alter INR No significant drug interactions identified   Warfarin dose taken as prescribed Questionable. Pt claims that he always follows the dosing calendar, but can rarely verbalize his dose. 10/27/22: thinks he took 10 mg on Sunday only and 5 mg the other days. Is not sure if he missed any other days. 10/20: patient states he is following dosing however he took incorrect dose prior to coming in today. 10/13:Today, he reports taking a 7.5 mg dose on Sunday instead of 5mg, but then corrected to say he thought he put away the extra half tablet. He does use a pillbox, but it seems he may not always fill appropriately   Signs/symptoms of bleeding Black stools resolved  Stool hemoccult negative 4/6/22   Vitamin K intake Normally has 0-1 serving of green, leafy vegetables per week;  Pt often notes that he has very little appetite. Recent vomiting/diarrhea/fever, changes in weight or activity level None reported   Tobacco or alcohol use Often drinks 2-3 large glasses of wine, but states each glass only has ~4 oz wine mixed with grape juice. Occasionally has more. 1/5/22: quit smoking (was smoking 2 cig/d)   7/21/22: 1.5 pint of wine 2 nights in past week  Stopped drinking alcohol as of 8/6    Upcoming surgeries or procedures Hip surgery on 11/2 CANCELED - patient will notify clinic when rescheduled  12/9 EGD + Colonoscopy per Dr. Tatyana Hall     Assessment/Plan:  Patient's INR was subtherapeutic (1.5) today, likely due to warfarin dosing error. He thinks he missed his warfarin dose two days ago (11/29) and also only took half his dose on 11/28, but he is not certain. He was prescribed Percocet PRN hip pain by PCP yesterday, which should not affect INR. Patient has EGD + colonoscopy scheduled for 12/9 and will be bridging with Lovenox (see plan below). Patient also notes that he does have an IVC filter, contrary to what the last note says. He will notify clinic when hip surgery is rescheduled. Patient has been asked to lynn his dosing calendar after he has taken each dose and bring pillbox and dosing calendar to every visit, however he forgets to bring this in. It is possible he is requiring more warfarin because he has been sober for the past few months. Patient likes to eat liver and broccoli, but typically avoids these foods. He denies any other changes today. Patient was instructed to continue warfarin 5 mg daily, except 10 mg on Mon+Thur. Repeat INR in 2 weeks. Discussed Lovenox bridge plan (below) thoroughly with patient today. He was encouraged to call with any questions about the plan. Patient was reminded to maintain consistent vitamin K intake and call with any bleeding, medication changes, or fever/vomiting/diarrhea.       Date Days to procedure AM PM   12/3 -6 None LAST dose of warfarin   12/4 -5 None None   12/5 -4 Lovenox 80 mg Lovenox 80 mg   12/6 -3 Lovenox 80 mg Lovenox 80 mg   12/7 -2 Lovenox 80 mg Lovenox 80 mg   12/8 -1 Lovenox 80 mg None   12/9 Procedure None Warfarin 5 mg*    12/10 +1 Lovenox 80 mg *  Lovenox 80 mg + Warfarin 10 mg    12/11 +2 Lovenox 80 mg  Lovenox 80 mg + Warfarin 5 mg    12/12 +3 Lovenox 80 mg  Lovenox 80 mg + Warfarin 10 mg    12/13 +4 Lovenox 80 mg  Lovenox 80 mg + Warfarin 5 mg    12/14 +5 Lovenox 80 mg  Lovenox 80 mg + Warfarin 5 mg    12/15 +6 INR check and further warfarin/Lovenox dosing will be decided at that time. *Resume warfarin and Lovenox when safe at the discretion of surgeon. Patient understands dosing directions and information discussed. Dosing schedule and follow up appointment given to patient. Progress note routed to referring physician's office. Patient acknowledges working in consult agreement with pharmacist as referred by his/her physician. Next Clinic Appointment: 12/15    Please call The [address] [place] [address] Avenue [phone] with any questions. Thanks! Linda Harris.  Araceli Jang, PharmD, BCPS  [facility] Medication Management Clinic  Ph: [phone]  12/1/2022 2:10 PM      For Pharmacy Admin Tracking Only    Total # of Interventions Recommended: 0  Total # of Interventions Accepted: 0  Time Spent (min): 30

## 2022-12-08 ENCOUNTER — ANESTHESIA EVENT (OUTPATIENT)
Dept: ENDOSCOPY | Age: 67
End: 2022-12-08
Payer: MEDICARE

## 2022-12-08 NOTE — PROGRESS NOTES
ENDOSCOPY PREOP INSTRUCTIONS      You are scheduled for a procedure at [facility] [employer] on 12/9/22 @ 12. You will need to arrival by: 10:30 (at least an hour & a half prior to planned start time)  Report to the MAIN entrance on [address] and register at the surgery center on the left-hand side of the lobby  You will need your insurance card and photo id and a list of all medications taken on a regular basis. Please include the dose/frequency. For your procedure:     PLEASE FOLLOW ALL INSTRUCTIONS & PREPS GIVEN TO YOU BY YOUR DOCTOR'S OFFICE. If you have not received these instructions yet, please call the office immediately. Make sure to read them as soon as received. If you are taking blood thinners, Aspirin or diabetic medication, make sure to call your doctor as soon as possible for instructions prior to your procedure. Please dress comfortably and do not wear any lotion, powders or jewelry  Arrange for someone to be with you and sign you out & drive you home after your procedure. THIS PERSON MUST WAIT AT HOSPITAL THE ENTIRE TIME. We allow 2 adult visitors with you in the hospital & masks are strongly recommended.     WOMEN ONLY OF CHILDBEARING AGE: Please make sure to be able to give a urine sample on arrival      If you have further questions, you may contact your Endoscopist's office or Pre Admission Testing staff at [phone]

## 2022-12-09 ENCOUNTER — HOSPITAL ENCOUNTER (OUTPATIENT)
Age: 67
Setting detail: OUTPATIENT SURGERY
Discharge: HOME | End: 2022-12-09
Attending: INTERNAL MEDICINE | Admitting: INTERNAL MEDICINE
Payer: MEDICARE

## 2022-12-09 ENCOUNTER — ANESTHESIA (OUTPATIENT)
Dept: ENDOSCOPY | Age: 67
End: 2022-12-09
Payer: MEDICARE

## 2022-12-09 VITALS
OXYGEN SATURATION: 100 % | HEIGHT: 70 IN | TEMPERATURE: 97.6 F | RESPIRATION RATE: 16 BRPM | HEART RATE: 62 BPM | WEIGHT: 171 LBS | BODY MASS INDEX: 24.48 KG/M2 | SYSTOLIC BLOOD PRESSURE: 133 MMHG | DIASTOLIC BLOOD PRESSURE: 79 MMHG

## 2022-12-09 DIAGNOSIS — R74.8 ABNORMAL TRANSAMINASES: ICD-10-CM

## 2022-12-09 DIAGNOSIS — R63.4 WEIGHT LOSS: ICD-10-CM

## 2022-12-09 DIAGNOSIS — K70.0 FATTY LIVER, ALCOHOLIC: ICD-10-CM

## 2022-12-09 DIAGNOSIS — R93.3 ABNORMAL FINDING ON GI TRACT IMAGING: ICD-10-CM

## 2022-12-09 DIAGNOSIS — Z86.010 PERSONAL HISTORY OF COLONIC POLYPS: ICD-10-CM

## 2022-12-09 PROCEDURE — 7100000010 HC PHASE II RECOVERY - FIRST 15 MIN: Performed by: INTERNAL MEDICINE

## 2022-12-09 PROCEDURE — 6360000002 HC RX W HCPCS: Performed by: NURSE ANESTHETIST, CERTIFIED REGISTERED

## 2022-12-09 PROCEDURE — 88305 TISSUE EXAM BY PATHOLOGIST: CPT

## 2022-12-09 PROCEDURE — 3609027000 HC COLONOSCOPY: Performed by: INTERNAL MEDICINE

## 2022-12-09 PROCEDURE — 3609012400 HC EGD TRANSORAL BIOPSY SINGLE/MULTIPLE: Performed by: INTERNAL MEDICINE

## 2022-12-09 PROCEDURE — 7100000011 HC PHASE II RECOVERY - ADDTL 15 MIN: Performed by: INTERNAL MEDICINE

## 2022-12-09 PROCEDURE — 2709999900 HC NON-CHARGEABLE SUPPLY: Performed by: INTERNAL MEDICINE

## 2022-12-09 PROCEDURE — 2580000003 HC RX 258: Performed by: ANESTHESIOLOGY

## 2022-12-09 PROCEDURE — 3700000000 HC ANESTHESIA ATTENDED CARE: Performed by: INTERNAL MEDICINE

## 2022-12-09 PROCEDURE — 3700000001 HC ADD 15 MINUTES (ANESTHESIA): Performed by: INTERNAL MEDICINE

## 2022-12-09 RX ORDER — PROPOFOL 10 MG/ML
INJECTION, EMULSION INTRAVENOUS PRN
Status: DISCONTINUED | OUTPATIENT
Start: 2022-12-09 | End: 2022-12-09 | Stop reason: SDUPTHER

## 2022-12-09 RX ORDER — PANTOPRAZOLE SODIUM 40 MG/1
40 TABLET, DELAYED RELEASE ORAL
Qty: 90 TABLET | Refills: 3 | Status: SHIPPED | OUTPATIENT
Start: 2022-12-09

## 2022-12-09 RX ORDER — SODIUM CHLORIDE 0.9 % (FLUSH) 0.9 %
5-40 SYRINGE (ML) INJECTION PRN
Status: DISCONTINUED | OUTPATIENT
Start: 2022-12-09 | End: 2022-12-09 | Stop reason: HOSPADM

## 2022-12-09 RX ORDER — SODIUM CHLORIDE, SODIUM LACTATE, POTASSIUM CHLORIDE, CALCIUM CHLORIDE 600; 310; 30; 20 MG/100ML; MG/100ML; MG/100ML; MG/100ML
INJECTION, SOLUTION INTRAVENOUS CONTINUOUS
Status: DISCONTINUED | OUTPATIENT
Start: 2022-12-09 | End: 2022-12-09 | Stop reason: HOSPADM

## 2022-12-09 RX ORDER — SODIUM CHLORIDE 0.9 % (FLUSH) 0.9 %
5-40 SYRINGE (ML) INJECTION EVERY 12 HOURS SCHEDULED
Status: DISCONTINUED | OUTPATIENT
Start: 2022-12-09 | End: 2022-12-09 | Stop reason: HOSPADM

## 2022-12-09 RX ORDER — SODIUM CHLORIDE 9 MG/ML
INJECTION, SOLUTION INTRAVENOUS PRN
Status: DISCONTINUED | OUTPATIENT
Start: 2022-12-09 | End: 2022-12-09 | Stop reason: HOSPADM

## 2022-12-09 RX ORDER — LIDOCAINE HYDROCHLORIDE 20 MG/ML
INJECTION, SOLUTION INTRAVENOUS PRN
Status: DISCONTINUED | OUTPATIENT
Start: 2022-12-09 | End: 2022-12-09 | Stop reason: SDUPTHER

## 2022-12-09 RX ADMIN — PROPOFOL 50 MG: 10 INJECTION, EMULSION INTRAVENOUS at 13:14

## 2022-12-09 RX ADMIN — LIDOCAINE HYDROCHLORIDE 100 MG: 20 INJECTION, SOLUTION INTRAVENOUS at 13:14

## 2022-12-09 RX ADMIN — SODIUM CHLORIDE, POTASSIUM CHLORIDE, SODIUM LACTATE AND CALCIUM CHLORIDE: 600; 310; 30; 20 INJECTION, SOLUTION INTRAVENOUS at 12:54

## 2022-12-09 RX ADMIN — PHENYLEPHRINE HYDROCHLORIDE 50 MCG: 10 INJECTION, SOLUTION INTRAMUSCULAR; INTRAVENOUS; SUBCUTANEOUS at 13:26

## 2022-12-09 RX ADMIN — PROPOFOL 50 MG: 10 INJECTION, EMULSION INTRAVENOUS at 13:17

## 2022-12-09 RX ADMIN — PROPOFOL 50 MG: 10 INJECTION, EMULSION INTRAVENOUS at 13:20

## 2022-12-09 ASSESSMENT — PAIN SCALES - GENERAL: PAINLEVEL_OUTOF10: 0

## 2022-12-09 ASSESSMENT — PAIN - FUNCTIONAL ASSESSMENT: PAIN_FUNCTIONAL_ASSESSMENT: 0-10

## 2022-12-09 NOTE — DISCHARGE INSTRUCTIONS
ENDOSCOPY DISCHARGE INSTRUCTIONS:    Call the physician that did your procedure for any questions or concern:    [facility]: [phone]  DR. ÁLVARO PERES      ACTIVITY:    There are potential side effects to the medications used for sedation and anesthesia during your procedure. These include:  Dizziness or light-headedness, confusion or memory loss, delayed reaction times, loss of coordination, nausea and vomiting. Because of your increased risk for injury, we ask that you observe the following precautions: For the next 24 hours,  DO NOT operate an automobile, bicycle, motorcycle, , power tools or large equipment of any kind. Do not drink alcohol, sign any legal documents or make any legal decisions for 24 hours. Do not bend your head over lower than your heart. DO sit on the side of bed/couch awhile before getting up. Plan on bedrest or quiet relaxation today. You may resume normal activities in 24 hours. DIET:    Your first meal today should be light, avoiding spicy and fatty foods. If you tolerate this first meal, then you may advance to your regular diet unless otherwise advised by your physician. NORMAL SYMPTOMS:  -Mild sore throat if youve had an EGD   -Gaseous discomfort    NOTIFY YOUR PHYSICIAN IF THESE SYMPTOMS OCCUR:  1. Fever (greater than 100)  5. Increased abdominal bloating  2. Severe pain    6. Excessive bleeding  3. Nausea and vomiting  7. Chest pain                                                                    4. Chills    8. Shortness of breath    ADDITIONAL INSTRUCTIONS:    Biopsy results: Call [address],[facility] for biopsy results in 1 week    Educational Information:          Please review these discharge instructions this evening or tomorrow for  information you may have forgotten. We want to thank you for choosing the [facility] as your health care provider. We always strive to provide you with excellent care while you are here.  You may receive a survey in the mail regarding your care. We would appreciate you taking a few minutes of your time to complete this survey. ENDOSCOPY DISCHARGE INSTRUCTIONS:    Call the physician that did your procedure for any questions or concern:    [facility]: [phone]  DR. ÁLVARO PERES      ACTIVITY:    There are potential side effects to the medications used for sedation and anesthesia during your procedure. These include:  Dizziness or light-headedness, confusion or memory loss, delayed reaction times, loss of coordination, nausea and vomiting. Because of your increased risk for injury, we ask that you observe the following precautions: For the next 24 hours,  DO NOT operate an automobile, bicycle, motorcycle, , power tools or large equipment of any kind. Do not drink alcohol, sign any legal documents or make any legal decisions for 24 hours. Do not bend your head over lower than your heart. DO sit on the side of bed/couch awhile before getting up. Plan on bedrest or quiet relaxation today. You may resume normal activities in 24 hours. DIET:    Your first meal today should be light, avoiding spicy and fatty foods. If you tolerate this first meal, then you may advance to your regular diet unless otherwise advised by your physician. NORMAL SYMPTOMS:  -Mild sore throat if youve had an EGD   -Gaseous discomfort    NOTIFY YOUR PHYSICIAN IF THESE SYMPTOMS OCCUR:  1. Fever (greater than 100)  5. Increased abdominal bloating  2. Severe pain    6. Excessive bleeding  3. Nausea and vomiting  7. Chest pain                                                                    4. Chills    8. Shortness of breath    ADDITIONAL INSTRUCTIONS:    Biopsy results: Call [address],[facility] for biopsy results in 1 week    Educational Information:          Please review these discharge instructions this evening or tomorrow for  information you may have forgotten. We want to thank you for choosing the [facility] as your health care provider. We always strive to provide you with excellent care while you are here. You may receive a survey in the mail regarding your care. We would appreciate you taking a few minutes of your time to complete this survey.

## 2022-12-09 NOTE — ANESTHESIA PRE PROCEDURE
Department of Anesthesiology  Preprocedure Note       Name:  Maien Edward   Age:  75 y.o.  :  1955                                          MRN:  [id number]         Date:  2022      Surgeon: Brett Ha):  Katy Julien MD    Procedure: Procedure(s):  ESOPHAGOGASTRODUODENOSCOPY  COLONOSCOPY    Medications prior to admission:   Prior to Admission medications    Medication Sig Start Date End Date Taking? Authorizing Provider   warfarin (COUMADIN) 5 MG tablet TAKE 1 TABLET BY MOUTH EVERY DAY 22   Dustin Galindo MD   oxyCODONE-acetaminophen (PERCOCET) 5-325 MG per tablet Take 1 tablet by mouth every 6 hours as needed for Pain for up to 30 days. Intended supply: 7 days. Take lowest dose possible to manage pain 22  Dustin Galindo MD   enoxaparin (LOVENOX) 80 MG/0.8ML Start Lovenox 80mg every 12 hours 1 day before surgery. Hold Lovenox the morning of the surgery and restart it that evening if no bleeding complications. Restart Coumadin following the surgery and continue Lovenox every 12 hours until your Coumadin is therapeutic through the [facility]. 22   To Armas DO   furosemide (LASIX) 20 MG tablet TAKE 1 TABLET BY MOUTH EVERY DAY 22   Lorna Miles MD   [facility] MELATONIN 3 MG TABS tablet TAKE 1 TABLET BY MOUTH NIGHTLY AS NEEDED (SLEEP).  22   Lorna Miles MD   folic acid (FOLVITE) 1 MG tablet TAKE 1 TABLET BY MOUTH EVERY DAY 22   Erin Jewell MD   mupirocin Lupe Garcia) 2 % ointment Apply twice daily to each nare for 5 days prior to surgical procedure  Patient not taking: Reported on 2022   Ike Acosta PA-C   olmesartan (BENICAR) 20 MG tablet Take 1 tablet by mouth daily 22   Bren Wood MD   amLODIPine (NORVASC) 5 MG tablet Take 1 tablet by mouth daily 22   Bren Wood MD   diclofenac sodium (VOLTAREN) 1 % GEL Apply 4 g topically 4 times daily as needed for Pain  Patient not taking: Reported on 2022   Finesse De La Cruz MD [place]       Current medications:    Current Facility-Administered Medications   Medication Dose Route Frequency Provider Last Rate Last Admin    lactated ringers infusion   IntraVENous Continuous Tennyson Sheets, DO        sodium chloride flush 0.9 % injection 5-40 mL  5-40 mL IntraVENous 2 times per day Robert Sheets, DO        sodium chloride flush 0.9 % injection 5-40 mL  5-40 mL IntraVENous PRN Tennyson Sheets, DO        0.9 % sodium chloride infusion   IntraVENous PRN Robert Sheets, DO           Allergies:  No Known Allergies    Problem List:    Patient Active Problem List   Diagnosis Code    Anxiety F41.9    Hypertension I10    Back pain M54.9    History of tobacco abuse Z87.891    Pulmonary embolism, bilateral (HCC) I26.99    Hyperpigmented skin lesion L81.9    History of heroin abuse ([facility]) F11.11    Acute deep vein thrombosis (DVT) of femoral vein of both lower extremities ([facility]) I82.413    Hypovitaminosis D E55.9    Low folate E53.8    HLD (hyperlipidemia) E78.5    Bilateral lower extremity edema R60.0    Wears glasses Z97.3    Wears dentures Z97.2    Ambulates with cane Z99.89    Primary osteoarthritis of both hips M16.0       Past Medical History:        Diagnosis Date    Ambulates with cane     Anxiety 04/07/2010    Back pain 07/24/2012    History of [facility] abuse ([facility] [address]     HLD (hyperlipidemia) 03/23/2022    Hypertension 10/25/2011    Wears dentures     full set    Wears glasses        Past Surgical History:        Procedure Laterality Date    COLONOSCOPY N/A 7/26/2019    COLONOSCOPY performed by Joey Nieto MD at [place] [address]  8/30/2019    COLONOSCOPY WITH BIOPSY performed by Joey Nieto MD at [address] [place] [address] Right     ENDOSCOPY, COLON, DIAGNOSTIC      UPPER GASTROINTESTINAL ENDOSCOPY N/A 2/26/2020    EGD DIAGNOSTIC ONLY performed by Joey Nieto MD at [place] [address] N/A 3/18/2020 PUSH ENTEROSCOPY performed by Dariusz Mendoza MD at [address] History:    Social History     Tobacco Use    Smoking status: Former     Packs/day: 0.50     Years: 30.00     Pack years: 15.00     Types: Cigars, Cigarettes     Start date:      Quit date: 2021     Years since quittin.6    Smokeless tobacco: Never   Substance Use Topics    Alcohol use: Not Currently                                Counseling given: Not Answered      Vital Signs (Current): There were no vitals filed for this visit.                                            BP Readings from Last 3 Encounters:   22 130/83   10/28/22 130/81   22 129/82       NPO Status:                                                                                 BMI:   Wt Readings from Last 3 Encounters:   22 171 lb 4.8 oz (77.7 kg)   10/28/22 170 lb 1.6 oz (77.2 kg)   10/28/22 170 lb (77.1 kg)     There is no height or weight on file to calculate BMI.    CBC:   Lab Results   Component Value Date/Time    WBC 6.7 2022 11:42 AM    RBC 4.07 2022 11:42 AM    HGB 13.4 2022 11:42 AM    HCT 40.1 2022 11:42 AM    MCV 98.4 2022 11:42 AM    RDW 15.9 2022 11:42 AM     2022 11:42 AM       CMP:   Lab Results   Component Value Date/Time     2022 11:42 AM    K 3.8 2022 11:42 AM    K 4.6 2021 01:43 PM    CL 92 2022 11:42 AM    CO2 26 2022 11:42 AM    BUN 8 2022 11:42 AM    CREATININE 0.6 2022 11:42 AM    GFRAA >60 2022 11:42 AM    AGRATIO 1.4 2022 11:42 AM    LABGLOM >60 2022 11:42 AM    GLUCOSE 111 2022 11:42 AM    PROT 6.9 2022 11:42 AM    CALCIUM 9.6 2022 11:42 AM    BILITOT 1.3 2022 11:42 AM    ALKPHOS 188 2022 11:42 AM    AST 47 2022 11:42 AM    ALT 34 2022 11:42 AM       POC Tests: No results for input(s): POCGLU, POCNA, POCK, POCCL, POCBUN, POCHEMO, POCHCT in the last 72 hours.    Coags:   Lab Results   Component Value Date/Time    PROTIME 19.0 08/05/2022 11:42 AM    INR 1.5 12/01/2022 12:14 PM    INR 2.10 11/17/2022 12:00 AM    APTT 33.5 08/05/2022 11:42 AM       HCG (If Applicable): No results found for: PREGTESTUR, PREGSERUM, HCG, HCGQUANT     ABGs: No results found for: PHART, PO2ART, JAS3STT, UBB4ZUY, BEART, U4TKXBNQ     Type & Screen (If Applicable):  No results found for: LABABO, LABRH    Drug/Infectious Status (If Applicable):  No results found for: HIV, HEPCAB    COVID-19 Screening (If Applicable): No results found for: COVID19        Anesthesia Evaluation  Patient summary reviewed and Nursing notes reviewed no history of anesthetic complications:   Airway: Mallampati: II  TM distance: >3 FB   Neck ROM: full  Mouth opening: > = 3 FB   Dental:    (+) upper dentures, lower dentures and edentulous      Pulmonary:Negative Pulmonary ROS                              Cardiovascular:    (+) hypertension:,                   Neuro/Psych:   Negative Neuro/Psych ROS              GI/Hepatic/Renal: Neg GI/Hepatic/Renal ROS            Endo/Other:                      ROS comment: Polysubstance abuse history Abdominal:             Vascular:   + DVT, PE. Other Findings:           Anesthesia Plan      general     ASA 1    (65-year-old male presents for esophagogastroduodenoscopy and colonoscopy. Plan general anesthesia with ASA standard monitors. Questions answered. Patient agreeable with anesthetic plan.)  Induction: intravenous. Anesthetic plan and risks discussed with patient. Plan discussed with CRNA.     Attending anesthesiologist reviewed and agrees with Wilner Andrade MD   12/9/2022

## 2022-12-09 NOTE — ANESTHESIA POSTPROCEDURE EVALUATION
Department of Anesthesiology  Postprocedure Note    Patient: Michelle Mendez  MRN: [id number]  YOB: 1955  Date of evaluation: 12/9/2022      Procedure Summary     Date: 12/09/22 Room / Location: [facility]    Anesthesia Start: 4969 Anesthesia Stop: 1335    Procedures:       EGD BIOPSY      COLONOSCOPY Diagnosis:       Abnormal transaminases      Abnormal finding on GI tract imaging      Personal history of colonic polyps      Fatty liver, alcoholic      Weight loss      (Abnormal transaminases [R74.8] Abnormal finding on GI tract imaging [R93. 3]Personal history of colonic polyps [Z86.010] Fatty liver, alcoholic [O01.6] Weight loss [R63.4])    Surgeons: Bob Reed MD Responsible Provider: Yoav Kahn MD    Anesthesia Type: general ASA Status: 3          Anesthesia Type: No value filed.     Ander Phase I: Ander Score: 10    Ander Phase II: Ander Score: 10      Anesthesia Post Evaluation    Patient location during evaluation: PACU  Patient participation: complete - patient participated  Level of consciousness: awake and alert  Pain score: 0  Airway patency: patent  Nausea & Vomiting: no nausea and no vomiting  Complications: no  Cardiovascular status: hemodynamically stable  Respiratory status: acceptable  Hydration status: euvolemic

## 2022-12-09 NOTE — PROCEDURES
[address] [place] [address]/[facility]  Colonoscopy Note    Patient: Truman Collet  : 1955  Acct#:     Procedure: Colonoscopy     Date:  2022    Surgeon:  Caio Nails MD    Referring Physician:  Brent Loaiza MD    Anesthesia: IV propofol, per anesthesia    EBL: <50 mL    Indications: anemia, weight loss     Procedure: An informed consent was obtained from the patient after explanation of indications, benefits, possible risks and complications of the procedure. The patient was then taken to the endoscopy suite, placed in the left lateral decubitus position, and the above IV anesthesia was administered. A digital rectal examination was performed and revealed negative without mass, lesions or tenderness. The Olympus PCFQ-H190 video colonoscope was placed in the patient's rectum under digital direction and advanced to the cecum. The cecum was identified by characteristic anatomy and ballottment. The prep was inadequate. Findings:  Solid stool was found throughout the entire right and transverse colon which precluded visualization. Diverticulosis      The scope was then withdrawn into the rectum and retroflexed. The retroflexed view of the anal verge and rectum demonstrates hemorrhoids. The scope was straightened, the colon was decompressed and the scope was withdrawn from the patient. The patient tolerated the procedure well and was taken to the PACU in good condition. Biopsies:  no       Impression:   Solid stool was found throughout the entire right and transverse colon which precluded visualization.   Diverticulosis    Recommendations:  Repeat colonoscopy early next year with more extensive prep    Brent Loaiza MD  [facility]

## 2022-12-09 NOTE — H&P
Gastroenterology Note                 Pre-operative History and Physical    Patient: Maine Edward  : 1955  CSN:     History Obtained From:   Patient or guardian. HISTORY OF PRESENT ILLNESS:    The patient is a 75 y.o. male here for EGD/colon for anemia, weight loss, abnl CT       Past Medical History:    Past Medical History:   Diagnosis Date    Ambulates with cane     Anxiety 2010    Back pain 2012    History of heroin abuse ([facility] [address]     HLD (hyperlipidemia) 2022    Hx of blood clots 2018    x3, two in leg and one in chest    Hypertension 10/25/2011    Wears dentures     full set    Wears glasses      Past Surgical History:    Past Surgical History:   Procedure Laterality Date    COLONOSCOPY N/A 2019    COLONOSCOPY performed by Mikey [facility]on MD at [facility]  2019    COLONOSCOPY WITH BIOPSY performed by Mikey Christianson MD at [address] [place] [address] Right     ENDOSCOPY, COLON, DIAGNOSTIC      UPPER GASTROINTESTINAL ENDOSCOPY N/A 2020    EGD DIAGNOSTIC ONLY performed by Mikey Christianson MD at [address] [place] [address] N/A 3/18/2020    PUSH ENTEROSCOPY performed by Mikey Christianson MD at [place] [address]     Medications Prior to Admission:   No current facility-administered medications on file prior to encounter.      Current Outpatient Medications on File Prior to Encounter   Medication Sig Dispense Refill    folic acid (FOLVITE) 1 MG tablet TAKE 1 TABLET BY MOUTH EVERY DAY 90 tablet 0    mupirocin (BACTROBAN) 2 % ointment Apply twice daily to each nare for 5 days prior to surgical procedure (Patient not taking: Reported on 2022) 1 g 0    olmesartan (BENICAR) 20 MG tablet Take 1 tablet by mouth daily 30 tablet 3    amLODIPine (NORVASC) 5 MG tablet Take 1 tablet by mouth daily 30 tablet 3    diclofenac sodium (VOLTAREN) 1 % GEL Apply 4 g topically 4 times daily as needed for Pain (Patient not taking: Reported on 2022) 50 g 3        Allergies:  Patient has no known allergies. Social History:   Social History     Tobacco Use    Smoking status: Former     Packs/day: 0.50     Years: 30.00     Pack years: 15.00     Types: Cigars, Cigarettes     Start date:      Quit date: 2021     Years since quittin.6    Smokeless tobacco: Never   Substance Use Topics    Alcohol use: Not Currently     Alcohol/week: 4.0 standard drinks     Types: 4 Shots of liquor per week     Family History:   Family History   Problem Relation Age of Onset    High Blood Pressure Mother     Cancer Father         ? BRAIN    Kidney Disease Brother         ESRD ON DIALYSIS       PHYSICAL EXAM:      /76   Pulse 58   Temp 98.4 °F (36.9 °C) (Temporal)   Resp 18   Ht 5' 10\" (1.778 m)   Wt 171 lb (77.6 kg)   SpO2 99%   BMI 24.54 kg/m²  I        Heart:  RRR, normal s1s2    Lungs:  CTA and normal effort    Abdomen:   Soft, nt nd. ASSESSMENT AND PLAN:    1. Patient is a 75 y.o. male here for endoscopy with MAC sedation. 2.  Procedure options, risks and benefits reviewed with patient and/or guardian. They express understanding.      Ashleigh Lujan MD  [address] [place] [address]

## 2022-12-09 NOTE — PROCEDURES
[address] [place] [address]  Endoscopy Note    Patient: Kade Pina  : 1955  Acct#:     Procedure: Esophagogastroduodenoscopy with biopsy    Date:  2022     Surgeon:  Dinesh Valentine MD      Anesthesia:    IV propofol, per anesthesia       EBL: <50 mL    Indications:  GERD, anemia     Description of Procedure:    Informed consent was obtained from the patient after explanation of indications, benefits and possible risks and complications of the procedure. The patient was then taken to the endoscopy suite, placed in the left lateral decubitus position and the above IV sedation was administrered. The Olympus videoendoscope (GIF-H190) was placed in the patient's mouth and under direct visualization passed into the esophagus. The scope was then advanced into the stomach and to the second portion of the duodenum. A retroflexed exam of the gastric cardia and fundus was performed. The scope was then withdrawn back into the stomach, it was decompressed, and the scope was completely withdrawn. Findings:  Multiple small clean-based ulcers with surrounding erythema and second portion of duodenum. The duodenal bulb was normal.  Mild striped erythema gastric antrum. Biopsies were obtained evaluate for H. pylori. Small hiatal hernia. Normal esophagus. The patient tolerated the procedure well and was taken to the post anesthesia care unit in good condition. Biopsies: Yes. Impression:    Multiple small clean-based ulcers with surrounding erythema and second portion of duodenum. The duodenal bulb was normal.  Mild striped erythema gastric antrum. Biopsies were obtained evaluate for H. pylori. Small hiatal hernia. Normal esophagus. Recommendations:   Await pathology results.   PPI daily      Dinesh Valentine MD  [address] [facility] [place]/[facility]

## 2022-12-09 NOTE — PROGRESS NOTES
Ambulatory Surgery/Procedure Discharge Note    Vitals:    12/09/22 1357   BP: 133/79   Pulse: 62   Resp: 16   Temp:    SpO2: 100%   T 97.2  Pt meets discharge criteria per Ander score. In: 400 [I.V.:400]  Out: -     Restroom use offered before discharge. Yes    Pain assessment:  none  Pain Level: 0    Pt and S.O./family states \"ready to go home\". Pt alert and oriented x4. IV removed. Denies N/V or pain. Discharge instructions given to pt and wife with pt permission. Pt and wife verbalized understanding of all instructions. Left with all belongings and discharge instructions. Patient discharged to home/self care.  Patient discharged via wheel chair by transporter to waiting family/S.O.       12/9/2022 3:15 PM

## 2022-12-13 NOTE — TELEPHONE ENCOUNTER
Appears is new to [facility] @7/1/22 (so why 1st fill in Aug) and per reconcile dispense hx, has [facility] the other [facility] in Oct/Nov since [facility] closed.

## 2022-12-13 NOTE — TELEPHONE ENCOUNTER
[facility]: ADHERENCE REVIEW  Identified care gap per [employer]: fills at [facility]: ACE/ARB adherence    Last Visit: 11/30/22    Patient identified as LIS = 2, therefore patient may be able to receive a 90-day supply for the same cost as a 30-day supply        Patient not found in [facility]    [address] [place] [address] Records claims through 12/4/22 ( YTD Mateusz Salinas = Filled only once; Potential Fail Date: 12/19/22 ):   OLMESA MEDOX TAB 20MG last filled on 8/26/22 for 90 day supply. Next refill due: 11/24/22    Per  [facility] Portal:  Same as above    Per [facility]:   OLMESA MEDOX TAB 20MG 0 refills left. Per pharmacy medication was [facility] due to other [facility] closing. Cannot send refill request to provider    BP Readings from Last 3 Encounters:   12/09/22 133/79   11/30/22 130/83   10/28/22 130/81     Estimated Creatinine Clearance: 122 mL/min (A) (based on SCr of 0.6 mg/dL (L)). PLAN  The following are interventions that have been identified:   - Patient overdue refilling OLMESA MEDOX TAB 20MG and active on home medication list.     0 refills remain left on script. Pharmacy cannot send refill request to provider due to medication was transferred to their location    Will route to Pharmacist.         Future Appointments   Date Time Provider Cain Carrasoc   12/15/2022 11:30 AM [address] [facility]   1/6/2023  8:45 AM Bobby Penaloza MD [facility]   1/30/2023  2:15 PM Ryan Silva MD [facility].   [address] [facility] free: [phone]

## 2022-12-13 NOTE — TELEPHONE ENCOUNTER
Kamilla Galarza MD, patient out of refills - eligible for 100-day supply.  Rx pended for your signature/modification as appropriate    LOV: 11/30/22  Next: 1/30/23    Thank you,  Yulia King, PharmD, [facility]  [facility], toll free: [phone], option 1

## 2022-12-13 NOTE — LETTER
[place]  [address], [address]        [address] [facility] [id number]           12/15/22     Dear Philip Villagran,    We tried to reach you recently regarding your olmesartan 20mg daily, but were unable to reach you on the telephone. If you are no longer taking or taking differently, please call us at the number below so that we can discuss this and update your medication profile. [facility] , if you haven't already. It appears that this medication has not been filled at proper times. We are worried you might be missing doses or not taking it as directed. It is important that you take your medications regularly and try not to miss a single dose.     Some ways to help you remember to take and refill your medications are to:  · Use a pill box, set an alarm, and/or keep your medication near something that you do every day  · Ask your pharmacy if they participate in [facility]", a program where you can  all of your medications on the same day  · Ask your pharmacy if you can be set up with automatic refill, where they will automatically refill your prescription when it is due and let you know it's ready to     Sincerely,   Jose Eduardo King, PharmD, [facility], toll free: [phone], option 1

## 2022-12-15 RX ORDER — OLMESARTAN MEDOXOMIL 20 MG/1
20 TABLET ORAL DAILY
Qty: 100 TABLET | Refills: 1 | Status: SHIPPED | OUTPATIENT
Start: 2022-12-15

## 2022-12-15 NOTE — TELEPHONE ENCOUNTER
Kev Yang MD and/or Kayce Ayala MD, patient out of refills - eligible for 100-day supply.  Rx pended for your signature/modification as appropriate     LOV: 11/30/22  Next: 1/30/23     Thank you,  Yulia King, PharmD, [facility] [address] [place] [address], toll free: [phone], option 1

## 2022-12-15 NOTE — TELEPHONE ENCOUNTER
Noted olmesartan 100-ds rx approved to [facility], thank you! Spoke to [facility] - confirm rx received and in process of being filled to be ready for . Billed to [facility]. Patient gets message when ready.     Attempting to reach patient; unable to reach - will send letter.    =======================================================   For Pharmacy Admin Tracking Only    CPA in place:  No  Recommendation Provided To: Provider: 1 via Note to Provider  Intervention Detail: Refill(s) Provided  Gap Closed?: No   Intervention Accepted By: Provider: 1  Time Spent (min): 15

## 2022-12-20 ENCOUNTER — ANTI-COAG VISIT (OUTPATIENT)
Dept: PHARMACY | Age: 67
End: 2022-12-20
Payer: MEDICARE

## 2022-12-20 DIAGNOSIS — I82.413 ACUTE DEEP VEIN THROMBOSIS (DVT) OF FEMORAL VEIN OF BOTH LOWER EXTREMITIES (HCC): ICD-10-CM

## 2022-12-20 DIAGNOSIS — I26.99 PULMONARY EMBOLISM, BILATERAL (HCC): Primary | ICD-10-CM

## 2022-12-20 LAB — INTERNATIONAL NORMALIZATION RATIO, POC: 2.9

## 2022-12-20 PROCEDURE — 99211 OFF/OP EST MAY X REQ PHY/QHP: CPT

## 2022-12-20 PROCEDURE — 85610 PROTHROMBIN TIME: CPT

## 2022-12-20 NOTE — PROGRESS NOTES
Mar Young is a 75 y.o. male with PMHx significant for bilateral PE, DVT (6/4/21) while on Xarelto, HTN who presents to clinic 12/20/2022 for anticoagulation monitoring and adjustment. Pt dx with bilat DVT 6/4/21 while on Xarelto 2yrs for hx of PE. Pt stopped Xarelto on 6/6/21 and started warfarin + lovenox bridge on 6/7/21.     Anticoagulation Indication(s): PE 2018, bilateral DVT 6/4/21 (on Xarelto)  Referring Physician: Dr. Laura Rosales (Also send notes to Dr Kinza Rooney, or responsible resident)  Goal INR Range: 2-3  Duration of Anticoagulation Therapy: indefinite  Time of day dose taken: prefers to take in AM  Product patient has at home: warfarin 5 mg (peach color)    INR Summary                           Warfarin regimen (mg)  Date INR   A/P   Sun Mon Tue Wed Thu Fri Sat Mg/wk  12/20 2.9 At goal, continue 5 10 5 5 10 5 5 45  12/1 1.5 Below goal (missed) 5 10 5 5 10 5 5 45  11/17 2.1 At goal, continue 5 10 5 5 10 5 5 45  11/8 2.0 At goal, continue 5 10 5 5 10 5 5 45  10/27 1.3 Below goal (missed) 5 10 5 5 10 5 5 45  10/20 2.3 At goal, continue 5 10 5 5 10 5 5 45   10/13 1.6 Below goal, increase 5 10 5 5 10 5 5 45  10/6 1.6 Below goal, (missed) 5 5 5 5 10/5 7.5 5 37.5   9/29 1.3 Below goal, 10mgx1 5 5 5 5 10/7.5 5 5 37.5  9/22 1.4 Below goal, increase 5 5 5 2.5 5 5 5 32.5  9/13 1.4 Below goal, (missed) 5 2.5 7.5/5 2.5 5 2.5 5 27.5  9/6 2.1 At goal, no change 5 2.5 5 2.5 5 2.5 5 27.5  8/30 1.3 Below goal, increase 5 2.5 5 2.5 5 2.5 5 27.5  8/23 2.2 At goal, continue 5 2.5 5 2.5 5 2.5 2.5 25  8/16 1.4 Below goal, increase 5 2.5 5 2.5 5 2.5 2.5 25  8/4 2.1 At goal, increase 5 2.5 2.5 2.5 2.5 2.5 2.5 20  7/28 3.2 Above goal, continue 2.5 2.5 2.5 2.5 2.5 2.5 2.5 17.5  7/21 5.5 Above goal, holdx1  2.5 2.5 2.5 2.5 5 0/2.5 2.5 20  decrease   6/23 3.1 Above goal, continue 5 2.5 2.5 2.5 5 2.5 2.5 22.5  6/9 2.9 At goal, continue  5 2.5 2.5 2.5 5 2.5 2.5 22.5  5/26 1.9 Below goal, increase 5 2.5 2.5 2.5 5 2.5 2.5 22.5  5/12 2.8 At goal, no change 5 2.5 2.5 2.5 2.5 2.5 2.5 20  5/5 4.4 Above goal, holdx1 5 2.5 2.5 2.5 2.5 2.5 2.5 20  4/21 3.1 Above goal, decrease 5 2.5 5 2.5 5 2.5 2.5 25  4/12 2.3 At goal, no change 5 2.5 5 2.5 5 2.5 5 27.5  4/4 2.7 At goal, resume 5 2.5 5 2.5 5 2.5 5 27.5  3/31 7.63 Hold x 3 days  3/17 3.2 Above goal, continue 5 5 5 5 5 5 5 35  3/1 1.5 Below goal, (Missed) 5 5 7.5/5 5 5 5 5 35  2/8 1.7 Below goal, bolus 5 5 7.5/5 5 5 5 5 35  1/20 3.7 Above goal, decrease 5 5 5 5 5 2.5 5 32.5    Last CBC:  Lab Results   Component Value Date    RBC 4.07 (L) 08/05/2022    HGB 13.4 (L) 08/05/2022    HCT 40.1 (L) 08/05/2022    MCV 98.4 08/05/2022    MCH 32.9 08/05/2022    MPV 7.7 08/05/2022    RDW 15.9 (H) 08/05/2022     08/05/2022     Patient History:  Recent hospitalizations/HC visits 10/7&10/28/22: ortho re: hip replacement  4/27/22: Dr Rachel Rosa ([facility]) recommends IVC placed prior to hip replacement  4/7/22: OV GI- Pt needs to repeat stool hemoccult, then schedule colonoscopy--pt looking for GI doctor closer to home. Recent medication changes None reported   Medications taken regularly that may interact with warfarin or alter INR No significant drug interactions identified   Warfarin dose taken as prescribed Questionable. Pt claims that he always follows the dosing calendar, but can rarely verbalize his dose. 10/27/22: thinks he took 10 mg on Sunday only and 5 mg the other days. Is not sure if he missed any other days. 10/20: patient states he is following dosing however he took incorrect dose prior to coming in today. 10/13:Today, he reports taking a 7.5 mg dose on Sunday instead of 5mg, but then corrected to say he thought he put away the extra half tablet.  He does use a pillbox, but it seems he may not always fill appropriately   Signs/symptoms of bleeding Black stools resolved  Stool hemoccult negative 4/6/22   Vitamin K intake Normally has 0-1 serving of green, leafy vegetables per week; Pt often notes that he has very little appetite. Recent vomiting/diarrhea/fever, changes in weight or activity level None reported   Tobacco or alcohol use Often drinks 2-3 large glasses of wine, but states each glass only has ~4 oz wine mixed with grape juice. Occasionally has more. 1/5/22: quit smoking (was smoking 2 cig/d)   7/21/22: 1.5 pint of wine 2 nights in past week  Stopped drinking alcohol as of 8/6    Upcoming surgeries or procedures Hip surgery on 11/2 CANCELED - patient will notify clinic when rescheduled     Assessment/Plan:  Patient's INR was therapeutic (2.9) today. He denies any warfarin dosing errors this time. He held warfarin x 5 days for EGD + colonoscopy on 12/9 and bridged with Lovenox. He took Lovenox prior to procedure but not afterward. He states he was not told to take it after the procedure, although I reviewed the plan with him thoroughly and gave him a written copy of the instructions. Colonoscopy will need repeated soon as prep was inadequate. EGD showed duodenal ulcers. Patient was prescribed Protonix, which does not interact with warfarin. Patient notes that he does have an IVC filter. He will notify clinic when hip surgery is rescheduled. He is scheduled to see ortho surgeon on 1/6. Patient has been asked to lynn his dosing calendar after he has taken each dose and bring pillbox and dosing calendar to every visit, however he forgets to bring this in. It is possible he is requiring more warfarin because he has been sober for the past few months. Patient likes to eat liver and broccoli, but typically avoids these foods. He denies any other changes today. Patient was instructed to continue warfarin 5 mg daily, except 10 mg on Mon+Thur. Repeat INR in 2 weeks. Patient was reminded to maintain consistent vitamin K intake and call with any bleeding, medication changes, or fever/vomiting/diarrhea.      Patient understands dosing directions and information discussed. Dosing schedule and follow up appointment given to patient. Progress note routed to referring physician's office. Patient acknowledges working in consult agreement with pharmacist as referred by his/her physician. Next Clinic Appointment: 1/5    Please call [facility] [address] [phone] with any questions. Thanks! Joseph Rider.  Delano Leventhal, PharmD, [facility]  [address] [facility]  Ph: [phone]  12/20/2022 11:20 AM      For Pharmacy Admin Tracking Only    Total # of Interventions Recommended: 0  Total # of Interventions Accepted: 0  Time Spent (min): 30

## 2022-12-28 RX ORDER — AMLODIPINE BESYLATE 5 MG/1
5 TABLET ORAL DAILY
Qty: 30 TABLET | Refills: 3 | Status: SHIPPED | OUTPATIENT
Start: 2022-12-28

## 2022-12-28 NOTE — TELEPHONE ENCOUNTER
PT NEED REFILL ON AMLODIPINE. [facility] [address] LISTED ON PROFILE.  PT CAN BE REACHED  [address]

## 2022-12-28 NOTE — TELEPHONE ENCOUNTER
Requested Prescriptions     Pending Prescriptions Disp Refills    amLODIPine (NORVASC) 5 MG tablet 30 tablet 3     Sig: Take 1 tablet by mouth daily       Last Clinic Visit:  11/30/2022     Next Clinic Appointment:  1/30/2023

## 2023-01-05 ENCOUNTER — ANTI-COAG VISIT (OUTPATIENT)
Dept: PHARMACY | Age: 68
End: 2023-01-05
Payer: MEDICARE

## 2023-01-05 DIAGNOSIS — I26.99 PULMONARY EMBOLISM, BILATERAL (HCC): Primary | ICD-10-CM

## 2023-01-05 DIAGNOSIS — I82.413 ACUTE DEEP VEIN THROMBOSIS (DVT) OF FEMORAL VEIN OF BOTH LOWER EXTREMITIES (HCC): ICD-10-CM

## 2023-01-05 LAB — INTERNATIONAL NORMALIZATION RATIO, POC: 2.2

## 2023-01-05 PROCEDURE — 99211 OFF/OP EST MAY X REQ PHY/QHP: CPT

## 2023-01-05 PROCEDURE — 85610 PROTHROMBIN TIME: CPT

## 2023-01-05 NOTE — PROGRESS NOTES
Erick Kee is a 75 y.o. male with PMHx significant for bilateral PE, DVT (6/4/21) while on Xarelto, HTN who presents to clinic 1/5/2023 for anticoagulation monitoring and adjustment. Pt dx with bilat DVT 6/4/21 while on Xarelto 2yrs for hx of PE. Pt stopped Xarelto on 6/6/21 and started warfarin + lovenox bridge on 6/7/21.     Anticoagulation Indication(s): PE 2018, bilateral DVT 6/4/21 (on Xarelto)  Referring Physician: Dr. Jimbo Ruvalcaba (Also send notes to Dr Elizabeth Tierney, or responsible resident)  Goal INR Range: 2-3  Duration of Anticoagulation Therapy: indefinite  Time of day dose taken: prefers to take in AM  Product patient has at home: warfarin 5 mg (peach color)    INR Summary                           Warfarin regimen (mg)  Date INR   A/P   Sun Mon Tue Wed Thu Fri Sat Mg/wk  1/5 2.2 At goal, no change 5 10 5 5 10 5 5 45  12/20 2.9 At goal, continue 5 10 5 5 10 5 5 45  12/1 1.5 Below goal (missed) 5 10 5 5 10 5 5 45  11/17 2.1 At goal, continue 5 10 5 5 10 5 5 45  11/8 2.0 At goal, continue 5 10 5 5 10 5 5 45  10/27 1.3 Below goal (missed) 5 10 5 5 10 5 5 45  10/20 2.3 At goal, continue 5 10 5 5 10 5 5 45   10/13 1.6 Below goal, increase 5 10 5 5 10 5 5 45  10/6 1.6 Below goal, (missed) 5 5 5 5 10/5 7.5 5 37.5   9/29 1.3 Below goal, 10mgx1 5 5 5 5 10/7.5 5 5 37.5  9/22 1.4 Below goal, increase 5 5 5 2.5 5 5 5 32.5  9/13 1.4 Below goal, (missed) 5 2.5 7.5/5 2.5 5 2.5 5 27.5  9/6 2.1 At goal, no change 5 2.5 5 2.5 5 2.5 5 27.5  8/30 1.3 Below goal, increase 5 2.5 5 2.5 5 2.5 5 27.5  8/23 2.2 At goal, continue 5 2.5 5 2.5 5 2.5 2.5 25  8/16 1.4 Below goal, increase 5 2.5 5 2.5 5 2.5 2.5 25  8/4 2.1 At goal, increase 5 2.5 2.5 2.5 2.5 2.5 2.5 20  7/28 3.2 Above goal, continue 2.5 2.5 2.5 2.5 2.5 2.5 2.5 17.5  7/21 5.5 Above goal, holdx1  2.5 2.5 2.5 2.5 5 0/2.5 2.5 20  decrease   6/23 3.1 Above goal, continue 5 2.5 2.5 2.5 5 2.5 2.5 22.5  6/9 2.9 At goal, continue 5 2.5 2.5 2.5 5 2.5 2.5 22.5  5/26 1.9 Below goal, increase  5 2.5 2.5 2.5 5 2.5 2.5 22.5  5/12 2.8 At goal, no change 5 2.5 2.5 2.5 2.5 2.5 2.5 20  5/5 4.4 Above goal, holdx1 5 2.5 2.5 2.5 2.5 2.5 2.5 20  4/21 3.1 Above goal, decrease 5 2.5 5 2.5 5 2.5 2.5 25  4/12 2.3 At goal, no change 5 2.5 5 2.5 5 2.5 5 27.5  4/4 2.7 At goal, resume 5 2.5 5 2.5 5 2.5 5 27.5  3/31 7.63 Hold x 3 days  3/17 3.2 Above goal, continue 5 5 5 5 5 5 5 35  3/1 1.5 Below goal, (Missed) 5 5 7.5/5 5 5 5 5 35  2/8 1.7 Below goal, bolus 5 5 7.5/5 5 5 5 5 35  1/20 3.7 Above goal, decrease 5 5 5 5 5 2.5 5 32.5    Last CBC:  Lab Results   Component Value Date    RBC 4.07 (L) 08/05/2022    HGB 13.4 (L) 08/05/2022    HCT 40.1 (L) 08/05/2022    MCV 98.4 08/05/2022    MCH 32.9 08/05/2022    MPV 7.7 08/05/2022    RDW 15.9 (H) 08/05/2022     08/05/2022     Patient History:  Recent hospitalizations/HC visits 10/7&10/28/22: ortho re: hip replacement  4/27/22: Dr Reza Acharya ([facility]) recommends IVC placed prior to hip replacement  4/7/22: OV GI- Pt needs to repeat stool hemoccult, then schedule colonoscopy--pt looking for GI doctor closer to home. Recent medication changes None reported   Medications taken regularly that may interact with warfarin or alter INR No significant drug interactions identified   Warfarin dose taken as prescribed Questionable. Pt claims that he always follows the dosing calendar, but can rarely verbalize his dose. 1/5/23: may have missed his dose yesterday, but is not sure    10/27/22: thinks he took 10 mg on Sunday only and 5 mg the other days. Is not sure if he missed any other days. 10/20: patient states he is following dosing however he took incorrect dose prior to coming in today. 10/13:Today, he reports taking a 7.5 mg dose on Sunday instead of 5mg, but then corrected to say he thought he put away the extra half tablet.  He does use a pillbox, but it seems he may not always fill appropriately   Signs/symptoms of bleeding Black stools resolved  Stool hemoccult negative 4/6/22   Vitamin K intake Normally has 0-1 serving of green, leafy vegetables per week; Pt often notes that he has very little appetite. Recent vomiting/diarrhea/fever, changes in weight or activity level None reported   Tobacco or alcohol use Often drinks 2-3 large glasses of wine, but states each glass only has ~4 oz wine mixed with grape juice. Occasionally has more. 1/5/22: quit smoking (was smoking 2 cig/d)   7/21/22: 1.5 pint of wine 2 nights in past week  Stopped drinking alcohol as of 8/6    Upcoming surgeries or procedures Hip surgery on 11/2 CANCELED - patient will notify clinic when rescheduled     Assessment/Plan:  Patient's INR was therapeutic (2.2) today. He may have missed yesterday's dose, but is not sure. He denies any other changes since last visit. He held warfarin x 5 days for EGD + colonoscopy on 12/9 and bridged with Lovenox. He took Lovenox prior to procedure but not afterward. He states he was not told to take it after the procedure, although I reviewed the plan with him thoroughly and gave him a written copy of the instructions. Colonoscopy will need repeated soon as prep was inadequate. EGD showed duodenal ulcers. Patient was prescribed Protonix, which does not interact with warfarin. Patient notes that he does have an IVC filter. He will notify clinic when hip surgery is rescheduled. He is scheduled to see ortho surgeon on 1/6. Patient has been asked to lynn his dosing calendar after he has taken each dose and bring pillbox and dosing calendar to every visit, however he forgets to bring this in. It is possible he is requiring more warfarin because he has been sober for the past few months. Patient likes to eat liver and broccoli, but typically avoids these foods. Patient was instructed to continue warfarin 5 mg daily, except 10 mg on Mon+Thur. Repeat INR in 3 weeks.  Patient was reminded to maintain consistent vitamin K intake and call with any bleeding, medication changes, or fever/vomiting/diarrhea. Patient understands dosing directions and information discussed. Dosing schedule and follow up appointment given to patient. Progress note routed to referring physician's office. Patient acknowledges working in consult agreement with pharmacist as referred by his/her physician. Next Clinic Appointment:     Please call [facility] [address] [phone] with any questions. Thanks!   Dominic Hsieh, PharmD, [facility]  Medication Management Clinic   [place]illy Esparza Ph: [phone]  Amirah Friedman Ph: [phone]  2023 12:03 PM    For Pharmacy Admin Tracking Only    Intervention Detail: Adherence Monitorin  Total # of Interventions Recommended: 1  Total # of Interventions Accepted: 1  Time Spent (min): 15

## 2023-01-11 ENCOUNTER — OFFICE VISIT (OUTPATIENT)
Dept: ORTHOPEDIC SURGERY | Age: 68
End: 2023-01-11
Payer: MEDICARE

## 2023-01-11 VITALS — BODY MASS INDEX: 24.48 KG/M2 | WEIGHT: 171 LBS | HEIGHT: 70 IN

## 2023-01-11 DIAGNOSIS — M16.12 PRIMARY OSTEOARTHRITIS OF LEFT HIP: Primary | ICD-10-CM

## 2023-01-11 PROCEDURE — 1123F ACP DISCUSS/DSCN MKR DOCD: CPT | Performed by: PHYSICIAN ASSISTANT

## 2023-01-11 PROCEDURE — 99213 OFFICE O/P EST LOW 20 MIN: CPT | Performed by: PHYSICIAN ASSISTANT

## 2023-01-11 NOTE — PROGRESS NOTES
Dr Chana Garvey      Date /Time 1/11/2023       10:52 AM EST  Name Vielka Azar             1955   Location  [facility]  MRN [id number]                Chief Complaint   Patient presents with    Follow-up     Left Hip         History of Present Illness    Vielka Azar is a 75 y.o. male who presents with left hip pain. Patient presents to the office today for a follow-up visit. He had his total knee hip arthroplasty canceled due to failure to obtain his Lovenox injections. He comes in today and has been fairly noncompliant with his preoperative medical optimization. He seemed surprised and shocked that he needed a physical from his primary care physician and has not seen them yet. In addition he is confused on when to stop his Coumadin. It is written in his chart several times that Abel Kay has directed him to stop his Coumadin 5 days before surgery. Postoperatively we can restart his normal dose of Coumadin without use of Lovenox. Previous history: Patient has passed his nicotine and drug test.  His labs appear to be an appropriate order. He has seen the vascular surgeon and there are plans for IVF filter the week before surgery. He has had no new injury or trauma. He continues to complain of global left hip pain. Previous history:  His pain has gotten worse and is interested in surgery. Pain continues to be concentrated over his groin. He does have increased pain with activities and improvement with rest.  He again states that he has stopped smoking and is clean from heroin. He is currently on Coumadin for history of DVT. I have reviewed Dr. Jj Arriaza note and there is plans for IVF filter before total hip arthroplasty. Previous history: He presents for bilateral hip pain, left worse than right. He is interested in surgical solution for this at this point. His pain is gotten significantly worse than previous visits.   He reports he has quit smoking almost completely for the last month and a half.  He still reports that his heroin addiction is in remission.  He has switched to taking Coumadin instead of Xarelto for history of venous thrombolic disease  He says he is quit smoking.    Previous history:   At patient's last visit he was complaining of bilateral extremity swelling.  We were concerned about a DVT.  We did order a bilateral lower extremity venous Doppler.  They were positive for massive DVTs of bilateral lower extremity while taking Xarelto.  Since then he has been placed on Coumadin.  He states his lower extremity swelling is improving but continues to have significant hip pain.  He does have advanced osteoarthritis of his hip.    Previous history: Patient presents the office today for a new problem.  Patient is here with a chief complaint of left greater than right hip pain.  Patient has had pain for several months with increased symptoms over the last few weeks.  His symptoms are concentrated lateral greater than groin.  Patient does have increased pain with weightbearing activities.  He also complains of lumbar pain and bilateral thigh pain.  He does not complain of any pain symptoms in his lower legs or feet.  No numbness and tingling.  He has tried NSAIDs, activity modifications and assistive devices without any significant improvement.  He does have a history of a PE and is taking Xarelto.  He also has a history of heroin addiction but states he has been clean for 1 year.      Past History  Past Medical History:   Diagnosis Date    Ambulates with cane     Anxiety 04/07/2010    Back pain 07/24/2012    History of heroin abuse (HCC)     HLD (hyperlipidemia) 03/23/2022    Hx of blood clots 2018    x3, two in leg and one in chest    Hypertension 10/25/2011    Wears dentures     full set    Wears glasses      Past Surgical History:   Procedure Laterality Date    COLONOSCOPY N/A 7/26/2019    COLONOSCOPY performed by Farzaneh Kapadia MD at [facility] ENDOSCOPY     COLONOSCOPY  2019    COLONOSCOPY WITH BIOPSY performed by John Garcia MD at [address] N/A 2022    COLONOSCOPY performed by Nataly Houston MD at [address] Right     ENDOSCOPY, COLON, DIAGNOSTIC      UPPER GASTROINTESTINAL ENDOSCOPY N/A 2020    EGD DIAGNOSTIC ONLY performed by John Garcia MD at [address] [place] N/A 3/18/2020    PUSH ENTEROSCOPY performed by John Garcia MD at [address] N/A 2022    EGD BIOPSY performed by Nataly Houston MD at [facility] ENDOSCOPY     Social History     Tobacco Use    Smoking status: Former     Packs/day: 0.50     Years: 30.00     Pack years: 15.00     Types: Cigars, Cigarettes     Start date:      Quit date: 2021     Years since quittin.6    Smokeless tobacco: Never   Substance Use Topics    Alcohol use: Not Currently     Alcohol/week: 4.0 standard drinks     Types: 4 Shots of liquor per week      Current Outpatient Medications on File Prior to Visit   Medication Sig Dispense Refill    amLODIPine (NORVASC) 5 MG tablet Take 1 tablet by mouth daily 30 tablet 3    olmesartan (BENICAR) 20 MG tablet Take 1 tablet by mouth daily 100 tablet 1    pantoprazole (PROTONIX) 40 MG tablet Take 1 tablet by mouth every morning (before breakfast) 90 tablet 3    warfarin (COUMADIN) 5 MG tablet TAKE 1 TABLET BY MOUTH EVERY DAY 90 tablet 1    enoxaparin (LOVENOX) 80 MG/0.8ML Start Lovenox 80mg every 12 hours 1 day before surgery. Hold Lovenox the morning of the surgery and restart it that evening if no bleeding complications. Restart Coumadin following the surgery and continue Lovenox every 12 hours until your Coumadin is therapeutic through the Coumadin Clinic. 16 mL 0    furosemide (LASIX) 20 MG tablet TAKE 1 TABLET BY MOUTH EVERY DAY 90 tablet 1    [facility] MELATONIN 3 MG TABS tablet TAKE 1 TABLET BY MOUTH NIGHTLY AS NEEDED (SLEEP).  30 tablet 3    folic acid (FOLVITE) 1 MG tablet TAKE 1 TABLET BY MOUTH EVERY DAY 90 tablet 0    mupirocin (BACTROBAN) 2 % ointment Apply twice daily to each nare for 5 days prior to surgical procedure (Patient not taking: Reported on 12/9/2022) 1 g 0    diclofenac sodium (VOLTAREN) 1 % GEL Apply 4 g topically 4 times daily as needed for Pain (Patient not taking: Reported on 12/9/2022) 50 g 3     No current facility-administered medications on file prior to visit. ASCVD 10-YEAR RISK SCORE  The 10-year ASCVD risk score (Chinyere GLYNN, et al., 2019) is: 19%    Values used to calculate the score:      Age: 75 years      Sex: Male      Is Non- : Yes      Diabetic: No      Tobacco smoker: No      Systolic Blood Pressure: 192 mmHg      Is BP treated: Yes      HDL Cholesterol: 58 mg/dL      Total Cholesterol: 232 mg/dL   . Review of Systems  10-point ROS is negative other than HPI. Physical Exam  Based off 1997 Exam Criteria  Ht 5' 10\" (1.778 m)   Wt 171 lb (77.6 kg)   BMI 24.54 kg/m²      Constitutional:       General: He is not in acute distress. Appearance: Normal appearance. Cardiovascular:      Rate and Rhythm: Normal rate and regular rhythm. Pulses: Normal pulses. Pulmonary:      Effort: Pulmonary effort is normal. No respiratory distress. Neurological:      Mental Status: He is alert and oriented to person, place, and time. Mental status is at baseline.      Musculoskeletal:  Gait:  antalgic    Spine / Hip Exam:      RIGHT  LEFT    Lumbar Spine Exam  [] All Neg    [] All Neg     Straight leg raise  []  []Not tested   []  []Not tested    Clonus  []  []Not tested   []  []Not tested    Pain with motion  [x]  []Not tested   [x]  []Not tested    Radiculopathy  [x]  []Not tested   [x]  []Not tested    Paraspinal muscle tenderness  [x] Paraspinal  [x]Midline   [x] Paraspinal  [x]Midline   Sensation RIGHT  LEFT    L3  [x] Normal []Decreased    [x] Normal []Decreased   L4  [] Normal  [x]Decreased   [] Normal [x]Decreased   L5  [] Normal [x]Decreased   [] Normal [x]Decreased   S1  [] Normal  [x]Decreased   [] Normal [x]Decreased   Pelvis       Scoliosis  [] Nml  [x] Present     Leg-length discrepency  [x] Equal  [] Right longer   [] Left longer   Range of Motion Active Passive Active Passive   Hip Flexion 90  90    Abduction 20  20    External Rotation @ 90 flex 35  35    Internal Rotation @ 90 flex -10  -10           Hip Impingement / Dysplasia  [] All Neg  [] Not tested   [] All Neg  [] Not tested    Hip impingement test  [x]  []Not tested   [x]  []Not tested    C-sign  [x]  []Not tested   [x]  []Not tested    Anterior instability apprehension  []  []Not tested   []  []Not tested    Posterior instability apprehension  []  []Not tested   []  []Not tested    Uncontained Internal rotation  []  []Not tested  []  []Not tested          Abductors  [x] All Neg  [] Not tested   [x] All Neg  [] Not tested    Medius strength  []  []Not tested   []  []Not tested    Minimum strength  []  []Not tested   []  []Not tested    IT band tendonitis  []  []Not tested   []  []Not tested    Trochanteric tenderness  []  []Not tested  []  []Not tested   Sciatic neuropathic pain  []  []Not tested   []  []Not tested           Post-arthroplasty  [] All Neg  [] Not tested   [] All Neg  [] Not tested    Rectus tendonitis  []  []Not tested   []  []Not tested    Iliopsoas tendonitis       Start-up pain  []  []Not tested   []  []Not tested      Markedly stiff hips, stiff painful back as well. Some radiculopathy. Calf swelling improved compared to previous exam.    Imaging    Previous Bilateral hip: [facility]  Previous Radiographs: End-stage osteoarthritis with severe osteophyte formation, joint space narrowing, cyst and sclerosis. Arthritic and lordotic lumbar spine as well. Some spondylolisthesis present in the lower lumbar segments.       Lumbar spine x-ray from February 2021: 2 view x-ray demonstrates facet joint arthropathy at multiple levels without evidence of overt DISH. They also demonstrate previous IVC filter. CT abdomen from August 2022: Demonstrates indwelling IVC filter placement it seems. Assessment and Plan  There are no diagnoses linked to this encounter. Patient has been noncompliant and has not followed directions at many different steps during the process. Because of this I am not comfortable signing him up for hip replacement at this time. I will have the patient follow-up with Dr. Robert Almonte to discuss possibly rescheduling his surgery. Patient is suffering from advanced osteoarthritis osteoarthritis of bilateral hips. Patient is currently anticoagulated for massive bilateral DVTs. He did develop massive bilateral lower extremity DVTs while already anticoagulated for previous DVTs on Xarelto. Patient has been converted to Coumadin. He now has an IVF filter in place. Patient will stop his Coumadin 5 days before surgery. He will need clearance from his medical doctor for surgery. In addition we will restart his normal Coumadin afterwards without Lovenox bridge      I discussed with Tasha Jacob that his history, symptoms, signs and imaging are most consistent with hip arthritis    We reviewed the natural history of these conditions and treatment options ranging from conservative measures (rest, icing, activity modification, physical therapy, pain meds, cortisone injection)  to surgical options. We had a long discussion with the patient about their hip. We discussed surgical and non surgical options for hip arthritis. The most important thing is to work to maintain their range of motion. Next they can try medications including tylenol and NSAIDs. They can try glucosamine or chondroitin. They should also ice frequently and avoid activities that make their hip hurt. Cortisone injections and Synvisc injections are also options when medicine has failed.  We finally discussed surgical options including arthroscopic debridement versus hip replacement. Often the arthritis is too far gone for an arthroscopic debridement and pain relief will be short term. Their ultimate solution will be a hip replacement when they are ready for it. They should put it off until they can no longer stand the pain and when nothing else has worked. Conservative measures have failed. He is not interested in cortisone injections. I think he is an appropriate candidate for surgery due to his ongoing symptoms and dysfunction despite conservative measures. The procedure would be LEFT  [address] Total Hip Arthroplasty, left direct anterior    We will use Cell Saver. Perioperative considerations include:  Preop PCP eval, DVT History, PE History and Chronic anticoagulation other than Aspirin. We reviewed the risks, benefits, alternatives of this approach. We discussed risks including, but not limited to, bleeding, pain, infection, scarring, damage to the neurovascular structures, blood clots, pulmonary embolus, stiffness, implant instability or loosening, implant failure, incomplete relief of pain, and incomplete return of function. His perioperative risk is significantly higher than a standard replacement. First, he has a history of venous thrombolic disease, last known clot was 2 years ago. He now is on Coumadin and remains anticoagulated. We will coordinate IVC filter placement for him. Second, he has a history of nicotine dependence and smoking. He has quit now improved this to be negative on preoperative screen. In addition, he has a history of previous heroin addiction, which has been in remission for over a year. This remains negative on drug screen. We also reviewed the surgical details, expected recovery, and rehabilitation (6-9 months). He expressed understanding and will undergo preoperative medical evaluation and optimization.     He is definitely an inpatient candidate at the time of surgery considering all the comorbidities and close monitoring we will need to perform.

## 2023-01-26 ENCOUNTER — ANTI-COAG VISIT (OUTPATIENT)
Dept: PHARMACY | Age: 68
End: 2023-01-26
Payer: MEDICARE

## 2023-01-26 DIAGNOSIS — I82.413 ACUTE DEEP VEIN THROMBOSIS (DVT) OF FEMORAL VEIN OF BOTH LOWER EXTREMITIES (HCC): ICD-10-CM

## 2023-01-26 DIAGNOSIS — I26.99 PULMONARY EMBOLISM, BILATERAL (HCC): Primary | ICD-10-CM

## 2023-01-26 LAB — INTERNATIONAL NORMALIZATION RATIO, POC: 2

## 2023-01-26 PROCEDURE — 85610 PROTHROMBIN TIME: CPT

## 2023-01-26 PROCEDURE — 99212 OFFICE O/P EST SF 10 MIN: CPT

## 2023-01-26 NOTE — PROGRESS NOTES
Shirin Macedo is a 75 y.o. male with PMHx significant for bilateral PE, DVT (6/4/21) while on Xarelto, HTN who presents to clinic 1/26/2023 for anticoagulation monitoring and adjustment. Pt dx with bilat DVT 6/4/21 while on Xarelto 2yrs for hx of PE. Pt stopped Xarelto on 6/6/21 and started warfarin + lovenox bridge on 6/7/21.     Anticoagulation Indication(s): PE 2018, bilateral DVT 6/4/21 (on Xarelto)  Referring Physician: Dr. Pio Anderson (Also send notes to Dr Alisa Anderson, or responsible resident)  Goal INR Range: 2-3  Duration of Anticoagulation Therapy: indefinite  Time of day dose taken: prefers to take in AM  Product patient has at home: warfarin 5 mg (peach color)    INR Summary                           Warfarin regimen (mg)  Date INR   A/P   Sun Mon Tue Wed Thu Fri Sat Mg/wk  1/26 2.0 At goal, no change 10 5 5 5 5 5 5 40  1/5 2.2 At goal, no change 5 10 5 5 10 5 5 45  12/20 2.9 At goal, continue 5 10 5 5 10 5 5 45  12/1 1.5 Below goal (missed) 5 10 5 5 10 5 5 45  11/17 2.1 At goal, continue 5 10 5 5 10 5 5 45  11/8 2.0 At goal, continue 5 10 5 5 10 5 5 45  10/27 1.3 Below goal (missed) 5 10 5 5 10 5 5 45  10/20 2.3 At goal, continue 5 10 5 5 10 5 5 45   10/13 1.6 Below goal, increase 5 10 5 5 10 5 5 45  10/6 1.6 Below goal, (missed) 5 5 5 5 10/5 7.5 5 37.5   9/29 1.3 Below goal, 10mgx1 5 5 5 5 10/7.5 5 5 37.5  9/22 1.4 Below goal, increase 5 5 5 2.5 5 5 5 32.5  9/13 1.4 Below goal, (missed) 5 2.5 7.5/5 2.5 5 2.5 5 27.5  9/6 2.1 At goal, no change 5 2.5 5 2.5 5 2.5 5 27.5  8/30 1.3 Below goal, increase 5 2.5 5 2.5 5 2.5 5 27.5  8/23 2.2 At goal, continue 5 2.5 5 2.5 5 2.5 2.5 25  8/16 1.4 Below goal, increase 5 2.5 5 2.5 5 2.5 2.5 25  8/4 2.1 At goal, increase 5 2.5 2.5 2.5 2.5 2.5 2.5 20  7/28 3.2 Above goal, continue 2.5 2.5 2.5 2.5 2.5 2.5 2.5 17.5  7/21 5.5 Above goal, holdx1  2.5 2.5 2.5 2.5 5 0/2.5 2.5 20  decrease   6/23 3.1 Above goal, continue 5 2.5 2.5 2.5 5 2.5 2.5 22.5  6/9 2.9 At goal, continue  5 2.5 2.5 2.5 5 2.5 2.5 22.5  5/26 1.9 Below goal, increase  5 2.5 2.5 2.5 5 2.5 2.5 22.5  5/12 2.8 At goal, no change 5 2.5 2.5 2.5 2.5 2.5 2.5 20  5/5 4.4 Above goal, holdx1 5 2.5 2.5 2.5 2.5 2.5 2.5 20  4/21 3.1 Above goal, decrease 5 2.5 5 2.5 5 2.5 2.5 25  4/12 2.3 At goal, no change 5 2.5 5 2.5 5 2.5 5 27.5  4/4 2.7 At goal, resume 5 2.5 5 2.5 5 2.5 5 27.5  3/31 7.63 Hold x 3 days  3/17 3.2 Above goal, continue 5 5 5 5 5 5 5 35  3/1 1.5 Below goal, (Missed) 5 5 7.5/5 5 5 5 5 35  2/8 1.7 Below goal, bolus 5 5 7.5/5 5 5 5 5 35  1/20 3.7 Above goal, decrease 5 5 5 5 5 2.5 5 32.5    Last CBC:  Lab Results   Component Value Date    RBC 4.07 (L) 08/05/2022    HGB 13.4 (L) 08/05/2022    HCT 40.1 (L) 08/05/2022    MCV 98.4 08/05/2022    MCH 32.9 08/05/2022    MPV 7.7 08/05/2022    RDW 15.9 (H) 08/05/2022     08/05/2022     Patient History:  Recent hospitalizations/HC visits 10/7&10/28/22: ortho re: hip replacement  4/27/22: Dr Cherelle Lockett ([facility]) recommends IVC placed prior to hip replacement  4/7/22: [facility]- Pt needs to repeat stool hemoccult, then schedule colonoscopy--pt looking for GI doctor closer to home. Recent medication changes None reported   Medications taken regularly that may interact with warfarin or alter INR No significant drug interactions identified   Warfarin dose taken as prescribed Questionable. Pt often claims that he follows the dosing calendar, but can rarely verbalize his dose. 1/26/23: is confident he has been taking 10 mg on sundays and 5 mg all other days for at least the past 3 weeks. Admits he has not been looking at dosing calendar. 1/5/23: may have missed his dose yesterday  10/27/22: 10 mg on Sunday, 5 mg other days. 10/20: says he is following dosing however he took incorrect dose prior to coming in today.    Signs/symptoms of bleeding None reported  Stool hemoccult negative 4/6/22   Vitamin K intake Normally has 0-1 serving of green, leafy vegetables per week; Pt often notes that he has very little appetite. Recent vomiting/diarrhea/fever, changes in weight or activity level None reported   Tobacco or alcohol use Often drinks 2-3 large glasses of wine, but states each glass only has ~4 oz wine mixed with grape juice. Occasionally has more. 1/5/22: quit smoking (was smoking 2 cig/d)   7/21/22: 1.5 pint of wine 2 nights in past week  Stopped drinking alcohol as of 8/6    Upcoming surgeries or procedures Hip surgery on 11/2 CANCELED - patient will notify clinic when rescheduled  Colonoscopy scheduled 3/3/23, but pt plans to r/s this. Assessment/Plan:  Patient's INR was therapeutic (2.0) today, despite taking incorrect dose. He is confident he has been taking this dose for at least the past 3 weeks. Patient has been asked to lynn his dosing calendar after he has taken each dose and bring pillbox and dosing calendar to every visit, however he forgets to bring this in. He denies any other changes since last visit. He held warfarin x 5 days for EGD + colonoscopy on 12/9 and bridged with Lovenox. He took Lovenox prior to procedure but not afterward. He states he was not told to take it after the procedure, although I reviewed the plan with him thoroughly and gave him a written copy of the instructions. Colonoscopy will need repeated soon as prep was inadequate. He will notify the clinic when scheduled. EGD showed duodenal ulcers. Patient was prescribed Protonix, which does not interact with warfarin. Patient notes that he does have an IVC filter. He will notify clinic when hip surgery is rescheduled. He is scheduled to see ortho surgeon tomorrow. Patient was instructed to continue current warfarin dose of 5 mg daily, except 10 mg on Sundays. Repeat INR in 4 weeks. Patient was reminded to maintain consistent vitamin K intake and call with any bleeding, medication changes, or fever/vomiting/diarrhea. Patient understands dosing directions and information discussed. Dosing schedule and follow up appointment given to patient. Progress note routed to referring physician's office. Patient acknowledges working in consult agreement with pharmacist as referred by his/her physician. Next Clinic Appointment:     Please call [facility] [address] [phone] with any questions. Thanks!   Esmer Garza, PharmD, [facility]  Medication Management Clinic   Elodia Watts Ph: [phone]  Jackieerlin Yu Ph: [phone]  2023 10:56 AM    For Pharmacy Admin Tracking Only    Intervention Detail: Adherence Monitorin and Dose Adjustment: 1, reason: Therapy De-escalation  Total # of Interventions Recommended: 2  Total # of Interventions Accepted: 2  Time Spent (min): 15

## 2023-01-27 ENCOUNTER — OFFICE VISIT (OUTPATIENT)
Dept: ORTHOPEDIC SURGERY | Age: 68
End: 2023-01-27

## 2023-01-27 DIAGNOSIS — M16.12 PRIMARY OSTEOARTHRITIS OF LEFT HIP: Primary | ICD-10-CM

## 2023-01-27 NOTE — PROGRESS NOTES
Dr An Thompson      Date /Time 1/27/2023       10:52 AM EST  Name Agustín Haley             1955   Location  [facility]  MRN [id number]                Chief Complaint   Patient presents with    Follow-up     Left hip-  Discuss tx/ sx   Xr today        History of Present Illness    Agustín Haley is a 75 y.o. male who presents with left hip pain. Patient presents to the office today for a follow-up visit. He had his total knee hip arthroplasty canceled due to failure to obtain his Lovenox injections. This is the second time we had to cancel his surgery. The first time was canceled due to positive drug test for cocaine. He comes in today and has been fairly noncompliant with his preoperative medical optimization. Previous history: Patient has passed his nicotine and drug test.  His labs appear to be an appropriate order. He has seen the vascular surgeon and there are plans for IVF filter the week before surgery. He has had no new injury or trauma. He continues to complain of global left hip pain. Previous history:  His pain has gotten worse and is interested in surgery. Pain continues to be concentrated over his groin. He does have increased pain with activities and improvement with rest.  He again states that he has stopped smoking and is clean from heroin. He is currently on Coumadin for history of DVT. I have reviewed Dr. Karina Dominguez note and there is plans for IVF filter before total hip arthroplasty. Previous history: He presents for bilateral hip pain, left worse than right. He is interested in surgical solution for this at this point. His pain is gotten significantly worse than previous visits. He reports he has quit smoking almost completely for the last month and a half. He still reports that his heroin addiction is in remission. He has switched to taking Coumadin instead of Xarelto for history of venous thrombolic disease  He says he is quit smoking.     Previous history: At patient's last visit he was complaining of bilateral extremity swelling. We were concerned about a DVT. We did order a bilateral lower extremity venous Doppler. They were positive for massive DVTs of bilateral lower extremity while taking Xarelto. Since then he has been placed on Coumadin. He states his lower extremity swelling is improving but continues to have significant hip pain. He does have advanced osteoarthritis of his hip. Previous history: Patient presents the office today for a new problem. Patient is here with a chief complaint of left greater than right hip pain. Patient has had pain for several months with increased symptoms over the last few weeks. His symptoms are concentrated lateral greater than groin. Patient does have increased pain with weightbearing activities. He also complains of lumbar pain and bilateral thigh pain. He does not complain of any pain symptoms in his lower legs or feet. No numbness and tingling. He has tried NSAIDs, activity modifications and assistive devices without any significant improvement. He does have a history of a PE and is taking Xarelto. He also has a history of heroin addiction but states he has been clean for 1 year.       Past History  Past Medical History:   Diagnosis Date    Ambulates with cane     Anxiety 04/07/2010    Back pain 07/24/2012    History of heroin abuse ([facility] [address]     HLD (hyperlipidemia) 03/23/2022    Hx of blood clots 2018    x3, two in leg and one in chest    Hypertension 10/25/2011    Wears dentures     full set    Wears glasses      Past Surgical History:   Procedure Laterality Date    COLONOSCOPY N/A 7/26/2019    COLONOSCOPY performed by Grover Limon MD at [facility]  8/30/2019    COLONOSCOPY WITH BIOPSY performed by Grover Limon MD at [facility] N/A 12/9/2022    COLONOSCOPY performed by Can Cherry MD at [facility] Right     ENDOSCOPY, COLON, DIAGNOSTIC      UPPER GASTROINTESTINAL ENDOSCOPY N/A 2020    EGD DIAGNOSTIC ONLY performed by Grover Limon MD at [address] N/A 3/18/2020    PUSH ENTEROSCOPY performed by Grover Limon MD at [facility] N/A 2022    EGD BIOPSY performed by Can Cherry MD at [facility] ENDOSCOPY     Social History     Tobacco Use    Smoking status: Former     Packs/day: 0.50     Years: 30.00     Pack years: 15.00     Types: Cigars, Cigarettes     Start date:      Quit date: 2021     Years since quittin.7    Smokeless tobacco: Never   Substance Use Topics    Alcohol use: Not Currently     Alcohol/week: 4.0 standard drinks     Types: 4 Shots of liquor per week      Current Outpatient Medications on File Prior to Visit   Medication Sig Dispense Refill    amLODIPine (NORVASC) 5 MG tablet Take 1 tablet by mouth daily 30 tablet 3    olmesartan (BENICAR) 20 MG tablet Take 1 tablet by mouth daily 100 tablet 1    pantoprazole (PROTONIX) 40 MG tablet Take 1 tablet by mouth every morning (before breakfast) 90 tablet 3    warfarin (COUMADIN) 5 MG tablet TAKE 1 TABLET BY MOUTH EVERY DAY 90 tablet 1    enoxaparin (LOVENOX) 80 MG/0.8ML Start Lovenox 80mg every 12 hours 1 day before surgery. Hold Lovenox the morning of the surgery and restart it that evening if no bleeding complications. Restart Coumadin following the surgery and continue Lovenox every 12 hours until your Coumadin is therapeutic through the [facility]. 16 mL 0    furosemide (LASIX) 20 MG tablet TAKE 1 TABLET BY MOUTH EVERY DAY 90 tablet 1    [facility] MELATONIN 3 MG TABS tablet TAKE 1 TABLET BY MOUTH NIGHTLY AS NEEDED (SLEEP).  30 tablet 3    folic acid (FOLVITE) 1 MG tablet TAKE 1 TABLET BY MOUTH EVERY DAY 90 tablet 0    mupirocin (BACTROBAN) 2 % ointment Apply twice daily to each nare for 5 days prior to surgical procedure (Patient not taking: Reported on 2022) 1 g 0    diclofenac sodium (VOLTAREN) 1 % GEL Apply 4 g topically 4 times daily as needed for Pain (Patient not taking: Reported on 12/9/2022) 50 g 3     No current facility-administered medications on file prior to visit. ASCVD 10-YEAR RISK SCORE  The 10-year ASCVD risk score (Chinyere GLYNN, et al., 2019) is: 19%    Values used to calculate the score:      Age: 75 years      Sex: Male      Is Non- : Yes      Diabetic: No      Tobacco smoker: No      Systolic Blood Pressure: 650 mmHg      Is BP treated: Yes      HDL Cholesterol: 58 mg/dL      Total Cholesterol: 232 mg/dL   . Review of Systems  10-point ROS is negative other than HPI. Physical Exam  Based off 1997 Exam Criteria  There were no vitals taken for this visit. Constitutional:       General: He is not in acute distress. Appearance: Normal appearance. Cardiovascular:      Rate and Rhythm: Normal rate and regular rhythm. Pulses: Normal pulses. Pulmonary:      Effort: Pulmonary effort is normal. No respiratory distress. Neurological:      Mental Status: He is alert and oriented to person, place, and time. Mental status is at baseline.      Musculoskeletal:  Gait:  antalgic    Spine / Hip Exam:      RIGHT  LEFT    Lumbar Spine Exam  [] All Neg    [] All Neg     Straight leg raise  []  []Not tested   []  []Not tested    Clonus  []  []Not tested   []  []Not tested    Pain with motion  [x]  []Not tested   [x]  []Not tested    Radiculopathy  [x]  []Not tested   [x]  []Not tested    Paraspinal muscle tenderness  [x] Paraspinal  [x]Midline   [x] Paraspinal  [x]Midline   Sensation RIGHT  LEFT    L3  [x] Normal []Decreased    [x] Normal []Decreased   L4  [] Normal  [x]Decreased   [] Normal [x]Decreased   L5  [] Normal [x]Decreased   [] Normal [x]Decreased   S1  [] Normal  [x]Decreased   [] Normal [x]Decreased   Pelvis       Scoliosis  [] Nml  [x] Present     Leg-length discrepency  [x] Equal  [] Right longer   [] Left longer   Range of Motion Active Passive Active Passive   Hip Flexion 90  90    Abduction 20  20    External Rotation @ 90 flex 35  35    Internal Rotation @ 90 flex -10  -10           Hip Impingement / Dysplasia  [] All Neg  [] Not tested   [] All Neg  [] Not tested    Hip impingement test  [x]  []Not tested   [x]  []Not tested    C-sign  [x]  []Not tested   [x]  []Not tested    Anterior instability apprehension  []  []Not tested   []  []Not tested    Posterior instability apprehension  []  []Not tested   []  []Not tested    Uncontained Internal rotation  []  []Not tested  []  []Not tested          Abductors  [x] All Neg  [] Not tested   [x] All Neg  [] Not tested    Medius strength  []  []Not tested   []  []Not tested    Minimum strength  []  []Not tested   []  []Not tested    IT band tendonitis  []  []Not tested   []  []Not tested    Trochanteric tenderness  []  []Not tested  []  []Not tested   Sciatic neuropathic pain  []  []Not tested   []  []Not tested           Post-arthroplasty  [] All Neg  [] Not tested   [] All Neg  [] Not tested    Rectus tendonitis  []  []Not tested   []  []Not tested    Iliopsoas tendonitis       Start-up pain  []  []Not tested   []  []Not tested      Markedly stiff hips, stiff painful back as well. Some radiculopathy. Calf swelling improved compared to previous exam.    Imaging    Previous Bilateral hip: [facility] [place]  Previous Radiographs: End-stage osteoarthritis with severe osteophyte formation, joint space narrowing, cyst and sclerosis. Arthritic and lordotic lumbar spine as well. Some spondylolisthesis present in the lower lumbar segments. Lumbar spine x-ray from February 2021: 2 view x-ray demonstrates facet joint arthropathy at multiple levels without evidence of overt DISH. They also demonstrate previous IVC filter. CT abdomen from August 2022: Demonstrates indwelling IVC filter placement it seems.       Assessment and Plan  Xenia Wilson was seen today for follow-up. Diagnoses and all orders for this visit:    Primary osteoarthritis of left hip  -     XR HIP LEFT (2-3 VIEWS); Future        Patient has been noncompliant and has not followed directions at many different steps during the process. Because of this I am not comfortable signing him up for hip replacement at this time. With his noncompliance and his vast array of comorbidities he is at a near 030% complication rate. Thus we will not proceed with total hip arthroplasty at this point    Patient is suffering from advanced osteoarthritis osteoarthritis of bilateral hips. Patient is currently anticoagulated for massive bilateral DVTs. He did develop massive bilateral lower extremity DVTs while already anticoagulated for previous DVTs on Xarelto. Patient has been converted to Coumadin. He now has an IVF filter in place. I discussed with Layo Flores that his history, symptoms, signs, and imaging are most consistent with hip arthritis    We reviewed the natural history of these conditions and treatment options ranging from conservative measures (rest, icing, activity modification, physical therapy, pain meds, cortisone injection) to surgical options. In terms of treatment, I recommended continuing with rest, icing, avoidance of painful activities, NSAIDs or pain meds as tolerated, and physical therapy. We discussed surgical options as well, should conservative measures fail.

## 2023-02-23 ENCOUNTER — ANTI-COAG VISIT (OUTPATIENT)
Dept: PHARMACY | Age: 68
End: 2023-02-23
Payer: MEDICARE

## 2023-02-23 DIAGNOSIS — I26.99 PULMONARY EMBOLISM, BILATERAL (HCC): Primary | ICD-10-CM

## 2023-02-23 DIAGNOSIS — I82.413 ACUTE DEEP VEIN THROMBOSIS (DVT) OF FEMORAL VEIN OF BOTH LOWER EXTREMITIES (HCC): ICD-10-CM

## 2023-02-23 LAB — INR BLD: 2.6

## 2023-02-23 PROCEDURE — 99211 OFF/OP EST MAY X REQ PHY/QHP: CPT

## 2023-02-23 PROCEDURE — 85610 PROTHROMBIN TIME: CPT

## 2023-02-23 NOTE — PROGRESS NOTES
Mina Massey is a 75 y.o. male with PMHx significant for bilateral PE, DVT (6/4/21) while on Xarelto, HTN who presents to clinic 2/23/2023 for anticoagulation monitoring and adjustment. Pt dx with bilat DVT 6/4/21 while on Xarelto 2yrs for hx of PE. Pt stopped Xarelto on 6/6/21 and started warfarin + lovenox bridge on 6/7/21.     Anticoagulation Indication(s): PE 2018, bilateral DVT 6/4/21 (on Xarelto)  Referring Physician: Dr. Kalpana John (Also send notes to Dr Maritza Jones, or responsible resident)  Goal INR Range: 2-3  Duration of Anticoagulation Therapy: indefinite  Time of day dose taken: prefers to take in AM  Product patient has at home: warfarin 5 mg (peach color)    INR Summary                           Warfarin regimen (mg)  Date INR   A/P   Sun Mon Tue Wed Thu Fri Sat Mg/wk  2/23 2.6 At goal, no change 10 5 5 5 5 5 5 40  1/26 2.0 At goal, no change 10 5 5 5 5 5 5 40  1/5 2.2 At goal, no change 5 10 5 5 10 5 5 45  12/20 2.9 At goal, continue 5 10 5 5 10 5 5 45  12/1 1.5 Below goal (missed) 5 10 5 5 10 5 5 45  11/17 2.1 At goal, continue 5 10 5 5 10 5 5 45  11/8 2.0 At goal, continue 5 10 5 5 10 5 5 45  10/27 1.3 Below goal (missed) 5 10 5 5 10 5 5 45  10/20 2.3 At goal, continue 5 10 5 5 10 5 5 45   10/13 1.6 Below goal, increase 5 10 5 5 10 5 5 45  10/6 1.6 Below goal, (missed) 5 5 5 5 10/5 7.5 5 37.5   9/29 1.3 Below goal, 10mgx1 5 5 5 5 10/7.5 5 5 37.5  9/22 1.4 Below goal, increase 5 5 5 2.5 5 5 5 32.5  9/13 1.4 Below goal, (missed) 5 2.5 7.5/5 2.5 5 2.5 5 27.5  9/6 2.1 At goal, no change 5 2.5 5 2.5 5 2.5 5 27.5  8/30 1.3 Below goal, increase 5 2.5 5 2.5 5 2.5 5 27.5  8/23 2.2 At goal, continue 5 2.5 5 2.5 5 2.5 2.5 25  8/16 1.4 Below goal, increase 5 2.5 5 2.5 5 2.5 2.5 25  8/4 2.1 At goal, increase 5 2.5 2.5 2.5 2.5 2.5 2.5 20  7/28 3.2 Above goal, continue 2.5 2.5 2.5 2.5 2.5 2.5 2.5 17.5  7/21 5.5 Above goal, holdx1 2.5 2.5 2.5 2.5 5 0/2.5 2.5 20  decrease   6/23 3.1 Above goal, continue 5 2.5 2.5 2.5 5 2.5 2.5 22.5  6/9 2.9 At goal, continue  5 2.5 2.5 2.5 5 2.5 2.5 22.5  5/26 1.9 Below goal, increase  5 2.5 2.5 2.5 5 2.5 2.5 22.5  5/12 2.8 At goal, no change 5 2.5 2.5 2.5 2.5 2.5 2.5 20  5/5 4.4 Above goal, holdx1 5 2.5 2.5 2.5 2.5 2.5 2.5 20  4/21 3.1 Above goal, decrease 5 2.5 5 2.5 5 2.5 2.5 25  4/12 2.3 At goal, no change 5 2.5 5 2.5 5 2.5 5 27.5  4/4 2.7 At goal, resume 5 2.5 5 2.5 5 2.5 5 27.5  3/31 7.63 Hold x 3 days  3/17 3.2 Above goal, continue 5 5 5 5 5 5 5 35  3/1 1.5 Below goal, (Missed) 5 5 7.5/5 5 5 5 5 35  2/8 1.7 Below goal, bolus 5 5 7.5/5 5 5 5 5 35  1/20 3.7 Above goal, decrease 5 5 5 5 5 2.5 5 32.5    Last CBC:  Lab Results   Component Value Date    RBC 4.07 (L) 08/05/2022    HGB 13.4 (L) 08/05/2022    HCT 40.1 (L) 08/05/2022    MCV 98.4 08/05/2022    MCH 32.9 08/05/2022    MPV 7.7 08/05/2022    RDW 15.9 (H) 08/05/2022     08/05/2022     Patient History:  Recent hospitalizations/HC visits 10/7&10/28/22: ortho re: hip replacement  4/27/22: Dr Juan Roe ([facility]) recommends IVC placed prior to hip replacement  4/7/22: [facility]- Pt needs to repeat stool hemoccult, then schedule colonoscopy--pt looking for GI doctor closer to home. Recent medication changes None reported   Medications taken regularly that may interact with warfarin or alter INR No significant drug interactions identified   Warfarin dose taken as prescribed Questionable. Pt often claims that he follows the dosing calendar, but can rarely verbalize his dose. 1/26/23: is confident he has been taking 10 mg on sundays and 5 mg all other days for at least the past 3 weeks. Admits he has not been looking at dosing calendar. 1/5/23: may have missed his dose yesterday  10/27/22: 10 mg on Sunday, 5 mg other days. 10/20: says he is following dosing however he took incorrect dose prior to coming in today.    Signs/symptoms of bleeding None reported  Stool hemoccult negative 4/6/22   Vitamin K intake Normally has 0-1 serving of green, leafy vegetables per week; Pt often notes that he has very little appetite. Recent vomiting/diarrhea/fever, changes in weight or activity level None reported   Tobacco or alcohol use Often drinks 2-3 large glasses of wine, but states each glass only has ~4 oz wine mixed with grape juice. Occasionally has more. 1/5/22: quit smoking (was smoking 2 cig/d)   7/21/22: 1.5 pint of wine 2 nights in past week  Stopped drinking alcohol as of 8/6    Upcoming surgeries or procedures Hip surgery on 11/2 CANCELED - patient will notify clinic when rescheduled  Colonoscopy potentially scheduled 3/3 or 3/28/23. Assessment/Plan:  Patient's INR was therapeutic (2.6) today. Patient has been asked to lynn his dosing calendar after he has taken each dose and bring pillbox and dosing calendar to every visit, however he forgets to bring this in. He denies any other changes since last visit. Patient is unsure whether his colonoscopy is happening on 3/3 or 3/28. He will call clinic once confirmed. If procedure on 3/3 will adjust anticoag appointment to go over bridging plan. He held warfarin x 5 days for EGD + colonoscopy on 12/9 and bridged with Lovenox. He took Lovenox prior to procedure but not afterward. He states he was not told to take it after the procedure, although the plan was reviewed with him thoroughly and he was given a written copy of the instructions. Colonoscopy will need repeated soon as prep was inadequate. He will notify the clinic when scheduled. EGD showed duodenal ulcers. Patient was prescribed Protonix, which does not interact with warfarin. Patient notes that he does have an IVC filter. He will notify clinic when hip surgery is rescheduled. He is scheduled to see ortho surgeon at [facility] on 2/27. Considering having other hip done as well now.     Patient was instructed to continue current warfarin dose of 5 mg daily, except 10 mg on Sundays. Repeat INR tentatively in 4 weeks. Patient was reminded to maintain consistent vitamin K intake and call with any bleeding, medication changes, or fever/vomiting/diarrhea. Patient understands dosing directions and information discussed. Dosing schedule and follow up appointment given to patient. Progress note routed to referring physician's office. Patient acknowledges working in consult agreement with pharmacist as referred by his/her physician. Next Clinic Appointment: 3/23    Please call The [address] [place] [address] Avenue [phone] with any questions. Thanks!   Victorina Eid - PharmD Candidate 2023  [facility]   Nadira Anderson Ph: [phone]  Alberta Joshi Ph: [phone]  2/23/2023 11:02 AM    For Pharmacy Admin Tracking Only    Time Spent (min): 15

## 2023-03-17 DIAGNOSIS — I82.413 ACUTE DEEP VEIN THROMBOSIS (DVT) OF FEMORAL VEIN OF BOTH LOWER EXTREMITIES (HCC): Primary | ICD-10-CM

## 2023-03-17 RX ORDER — ENOXAPARIN SODIUM 100 MG/ML
80 INJECTION SUBCUTANEOUS 2 TIMES DAILY
Qty: 16 ML | Refills: 0
Start: 2023-04-24 | End: 2023-05-04

## 2023-03-17 NOTE — PROGRESS NOTES
Received documentation from GI regarding need to discontinue warfarin for 5 days prior to colonoscopy planned 4/28/23. Called patient regarding lovenox bridge instructions. Will call medication into pharmacy. Plan discussed with patient, as below:    Stop warfarin 4/23 (5 days prior to your procedure). Start taking lovenox on 4/24 (24 hours after your last warfarin dose). Hold 1 lovenox dose before the procedure. You will need to check INR 1-2 days before surgery to ensure it is normal. Restart your warfarin the evening after procedure (4/28). You will continue taking warfarin and lovenox for either 5 days post-procedure OR once INR is therapeutic range (2-3), whichever comes first.    Encouraged patient to call with any questions.     Sheba Rossi MD  PGY-3

## 2023-03-23 ENCOUNTER — ANTI-COAG VISIT (OUTPATIENT)
Dept: PHARMACY | Age: 68
End: 2023-03-23
Payer: COMMERCIAL

## 2023-03-23 DIAGNOSIS — I82.413 ACUTE DEEP VEIN THROMBOSIS (DVT) OF FEMORAL VEIN OF BOTH LOWER EXTREMITIES (HCC): ICD-10-CM

## 2023-03-23 DIAGNOSIS — I26.99 PULMONARY EMBOLISM, BILATERAL (HCC): Primary | ICD-10-CM

## 2023-03-23 LAB — INTERNATIONAL NORMALIZATION RATIO, POC: 3.3

## 2023-03-23 PROCEDURE — 99212 OFFICE O/P EST SF 10 MIN: CPT

## 2023-03-23 PROCEDURE — 85610 PROTHROMBIN TIME: CPT

## 2023-03-23 NOTE — PROGRESS NOTES
Clare Alfonso is a 75 y.o. male with PMHx significant for bilateral PE, DVT (6/4/21) while on Xarelto, HTN who presents to clinic 3/23/2023 for anticoagulation monitoring and adjustment. Pt dx with bilat DVT 6/4/21 while on Xarelto 2yrs for hx of PE. Pt stopped Xarelto on 6/6/21 and started warfarin + lovenox bridge on 6/7/21. Patient notes that he does have an IVC filter.      Anticoagulation Indication(s): PE 2018, bilateral DVT 6/4/21 (on Xarelto)  Referring Physician: Dr. Juan Jose Armas (Also send notes to Dr Raquel Rodriges, or responsible resident)  Goal INR Range: 2-3  Duration of Anticoagulation Therapy: indefinite  Time of day dose taken: prefers to take in AM  Product patient has at home: warfarin 5 mg (peach color)    INR Summary                           Warfarin regimen (mg)  Date INR   A/P   Sun Mon Tue Wed Thu Fri Sat Mg/wk  3/23 3.3 Above goal, continue 10 5 5 5 5 5 5 40  2/23 2.6 At goal, no change 10 5 5 5 5 5 5 40  1/26 2.0 At goal, no change 10 5 5 5 5 5 5 40  1/5 2.2 At goal, no change 5 10 5 5 10 5 5 45  12/20 2.9 At goal, continue 5 10 5 5 10 5 5 45  12/1 1.5 Below goal (missed) 5 10 5 5 10 5 5 45  11/17 2.1 At goal, continue 5 10 5 5 10 5 5 45  11/8 2.0 At goal, continue 5 10 5 5 10 5 5 45  10/27 1.3 Below goal (missed) 5 10 5 5 10 5 5 45  10/20 2.3 At goal, continue 5 10 5 5 10 5 5 45   10/13 1.6 Below goal, increase 5 10 5 5 10 5 5 45  10/6 1.6 Below goal, (missed) 5 5 5 5 10/5 7.5 5 37.5   9/29 1.3 Below goal, 10mgx1 5 5 5 5 10/7.5 5 5 37.5  9/22 1.4 Below goal, increase 5 5 5 2.5 5 5 5 32.5  9/13 1.4 Below goal, (missed) 5 2.5 7.5/5 2.5 5 2.5 5 27.5  9/6 2.1 At goal, no change 5 2.5 5 2.5 5 2.5 5 27.5  8/30 1.3 Below goal, increase 5 2.5 5 2.5 5 2.5 5 27.5  8/23 2.2 At goal, continue 5 2.5 5 2.5 5 2.5 2.5 25  8/16 1.4 Below goal, increase 5 2.5 5 2.5 5 2.5 2.5 25  8/4 2.1 At goal, increase 5 2.5 2.5 2.5 2.5 2.5 2.5 20  7/28 3.2 Above goal,

## 2023-03-23 NOTE — Clinical Note
See attached lovenox bridge for upcoming colonoscopy. Please let me know if you have any questions/concerns.

## 2023-03-31 RX ORDER — AMLODIPINE BESYLATE 5 MG/1
TABLET ORAL
Qty: 90 TABLET | Refills: 1 | Status: SHIPPED | OUTPATIENT
Start: 2023-03-31

## 2023-04-13 ENCOUNTER — TELEPHONE (OUTPATIENT)
Dept: PHARMACY | Age: 68
End: 2023-04-13

## 2023-04-20 ENCOUNTER — ANTI-COAG VISIT (OUTPATIENT)
Dept: PHARMACY | Age: 68
End: 2023-04-20
Payer: MEDICARE

## 2023-04-20 DIAGNOSIS — I26.99 PULMONARY EMBOLISM, BILATERAL (HCC): Primary | ICD-10-CM

## 2023-04-20 DIAGNOSIS — I82.413 ACUTE DEEP VEIN THROMBOSIS (DVT) OF FEMORAL VEIN OF BOTH LOWER EXTREMITIES (HCC): ICD-10-CM

## 2023-04-20 LAB — INTERNATIONAL NORMALIZATION RATIO, POC: 1.8

## 2023-04-20 PROCEDURE — 85610 PROTHROMBIN TIME: CPT

## 2023-04-20 PROCEDURE — 99213 OFFICE O/P EST LOW 20 MIN: CPT

## 2023-04-20 RX ORDER — ENOXAPARIN SODIUM 100 MG/ML
80 INJECTION SUBCUTANEOUS 2 TIMES DAILY
Qty: 16 ML | Refills: 0 | Status: SHIPPED | OUTPATIENT
Start: 2023-04-24

## 2023-04-20 NOTE — PROGRESS NOTES
Lovenox 80 mg Lovenox 80 mg   4/27 -1 Lovenox 80 mg  None   4/28 Procedure None Warfarin 5 mg*    4/29 +1 Lovenox 80 mg*  Lovenox 80 mg + Warfarin 5 mg    4/30 +2 Lovenox 80 mg  Lovenox 80 mg + Warfarin 10 mg    5/1 +3 Lovenox 80 mg  Lovenox 80 mg + Warfarin 5 mg    5/2 +4 Lovenox 80 mg  Lovenox 80 mg + Warfarin 5 mg    5/3 +5 Lovenox 80 mg  Lovenox 80 mg + Warfarin 5 mg    5/4 +6 INR check and further warfarin/Lovenox dosing will be decided at that time. *Resume warfarin and Lovenox when safe at the discretion of surgeon. Patient understands dosing directions and information discussed. Dosing schedule and follow up appointment given to patient. Progress note routed to referring physician's office. Patient acknowledges working in consult agreement with pharmacist as referred by his/her physician. Next Clinic Appointment: 5/4    Please call The [address] [place] [address] Avenue [phone] with any questions. Thanks!   Luther Calloway, PharmD  PGY-1 Pharmacy Resident  [facility]  [id number]  4/20/2023   10:57 AM    For Pharmacy Admin Tracking Only    Intervention Detail: New Rx: 1, reason: Needs Additional Therapy  Total # of Interventions Recommended: 1  Total # of Interventions Accepted: 1  Time Spent (min): 30

## 2023-05-04 ENCOUNTER — ANTI-COAG VISIT (OUTPATIENT)
Dept: PHARMACY | Age: 68
End: 2023-05-04
Payer: MEDICARE

## 2023-05-04 DIAGNOSIS — I26.99 PULMONARY EMBOLISM, BILATERAL (HCC): Primary | ICD-10-CM

## 2023-05-04 DIAGNOSIS — I82.413 ACUTE DEEP VEIN THROMBOSIS (DVT) OF FEMORAL VEIN OF BOTH LOWER EXTREMITIES (HCC): ICD-10-CM

## 2023-05-04 LAB — INTERNATIONAL NORMALIZATION RATIO, POC: 3.1

## 2023-05-04 PROCEDURE — 99211 OFF/OP EST MAY X REQ PHY/QHP: CPT

## 2023-05-04 PROCEDURE — 85610 PROTHROMBIN TIME: CPT

## 2023-05-04 NOTE — PROGRESS NOTES
Natalia Dave is a 75 y.o. male with PMHx significant for bilateral PE, DVT (6/4/21) while on Xarelto, HTN who presents to clinic 5/4/2023 for anticoagulation monitoring and adjustment. Pt dx with bilat DVT 6/4/21 while on Xarelto 2yrs for hx of PE. Pt stopped Xarelto on 6/6/21 and started warfarin + lovenox bridge on 6/7/21. Patient notes that he does have an IVC filter.      Anticoagulation Indication(s): PE 2018, bilateral DVT 6/4/21 (on Xarelto)  Referring Physician: Dr. Dionne Rdz (Also send notes to Dr Sharonda Perry, or responsible resident)  Goal INR Range: 2-3  Duration of Anticoagulation Therapy: indefinite  Time of day dose taken: prefers to take in AM  Product patient has at home: warfarin 5 mg (peach color)    INR Summary                           Warfarin regimen (mg)  Date INR   A/P   Sun Mon Tue Wed Thu Fri Sat Mg/wk  5/4 3.1 Above goal, continue 10 5 5 5 5 5 5 40  4/20 1.8 Below goal, continue 10 5 5 5 5 5 5 40  3/23 3.3 Above goal, continue 10 5 5 5 5 5 5 40  2/23 2.6 At goal, no change 10 5 5 5 5 5 5 40  1/26 2.0 At goal, no change 10 5 5 5 5 5 5 40  1/5 2.2 At goal, no change 5 10 5 5 10 5 5 45  12/20 2.9 At goal, continue 5 10 5 5 10 5 5 45  12/1 1.5 Below goal (missed) 5 10 5 5 10 5 5 45  11/17 2.1 At goal, continue 5 10 5 5 10 5 5 45  11/8 2.0 At goal, continue 5 10 5 5 10 5 5 45  10/27 1.3 Below goal (missed) 5 10 5 5 10 5 5 45  10/20 2.3 At goal, continue 5 10 5 5 10 5 5 45   10/13 1.6 Below goal, increase 5 10 5 5 10 5 5 45  10/6 1.6 Below goal, (missed) 5 5 5 5 10/5 7.5 5 37.5   9/29 1.3 Below goal, 10mgx1 5 5 5 5 10/7.5 5 5 37.5  9/22 1.4 Below goal, increase 5 5 5 2.5 5 5 5 32.5  9/13 1.4 Below goal, (missed) 5 2.5 7.5/5 2.5 5 2.5 5 27.5  9/6 2.1 At goal, no change 5 2.5 5 2.5 5 2.5 5 27.5  8/30 1.3 Below goal, increase 5 2.5 5 2.5 5 2.5 5 27.5  8/23 2.2 At goal, continue 5 2.5 5 2.5 5 2.5 2.5 25  8/16 1.4 Below goal,

## 2023-05-25 ENCOUNTER — ANTI-COAG VISIT (OUTPATIENT)
Dept: PHARMACY | Age: 68
End: 2023-05-25
Payer: MEDICARE

## 2023-05-25 DIAGNOSIS — I26.99 PULMONARY EMBOLISM, BILATERAL (HCC): Primary | ICD-10-CM

## 2023-05-25 DIAGNOSIS — I82.413 ACUTE DEEP VEIN THROMBOSIS (DVT) OF FEMORAL VEIN OF BOTH LOWER EXTREMITIES (HCC): ICD-10-CM

## 2023-05-25 LAB — INTERNATIONAL NORMALIZATION RATIO, POC: 2.6

## 2023-05-25 PROCEDURE — 99211 OFF/OP EST MAY X REQ PHY/QHP: CPT

## 2023-05-25 PROCEDURE — 85610 PROTHROMBIN TIME: CPT

## 2023-05-25 NOTE — PROGRESS NOTES
Nasreen Tovar is a 75 y.o. male with PMHx significant for bilateral PE, DVT (6/4/21) while on Xarelto, HTN who presents to clinic 5/25/2023 for anticoagulation monitoring and adjustment. Pt dx with bilat DVT 6/4/21 while on Xarelto 2yrs for hx of PE. Pt stopped Xarelto on 6/6/21 and started warfarin + lovenox bridge on 6/7/21. Patient notes that he does have an IVC filter.      Anticoagulation Indication(s): PE 2018, bilateral DVT 6/4/21 (on Xarelto)  Referring Physician: Dr. Poonam Pineda (Also send notes to Dr Sarwat Conley, or responsible resident)  Goal INR Range: 2-3  Duration of Anticoagulation Therapy: indefinite  Time of day dose taken: prefers to take in AM  Product patient has at home: warfarin 5 mg (peach color)    INR Summary                           Warfarin regimen (mg)  Date INR   A/P   Sun Mon Tue Wed Thu Fri Sat Mg/wk  5/25 2.6 At goal, no change 10 5 5 5 5 5 5 40  5/4 3.1 Above goal, continue 10 5 5 5 5 5 5 40  4/20 1.8 Below goal, continue 10 5 5 5 5 5 5 40  3/23 3.3 Above goal, continue 10 5 5 5 5 5 5 40  2/23 2.6 At goal, no change 10 5 5 5 5 5 5 40  1/26 2.0 At goal, no change 10 5 5 5 5 5 5 40  1/5 2.2 At goal, no change 5 10 5 5 10 5 5 45  12/20 2.9 At goal, continue 5 10 5 5 10 5 5 45  12/1 1.5 Below goal (missed) 5 10 5 5 10 5 5 45  11/17 2.1 At goal, continue 5 10 5 5 10 5 5 45  11/8 2.0 At goal, continue 5 10 5 5 10 5 5 45  10/27 1.3 Below goal (missed) 5 10 5 5 10 5 5 45  10/20 2.3 At goal, continue 5 10 5 5 10 5 5 45   10/13 1.6 Below goal, increase 5 10 5 5 10 5 5 45  10/6 1.6 Below goal, (missed) 5 5 5 5 10/5 7.5 5 37.5   9/29 1.3 Below goal, 10mgx1 5 5 5 5 10/7.5 5 5 37.5  9/22 1.4 Below goal, increase 5 5 5 2.5 5 5 5 32.5  9/13 1.4 Below goal, (missed) 5 2.5 7.5/5 2.5 5 2.5 5 27.5  9/6 2.1 At goal, no change 5 2.5 5 2.5 5 2.5 5 27.5  8/30 1.3 Below goal, increase 5 2.5 5 2.5 5 2.5 5 27.5  8/23 2.2 At goal, continue 5 2.5 5 2.5 5 2.5 2.5 25  8/16 1.4 Below

## 2023-05-31 DIAGNOSIS — Z79.01 WARFARIN ANTICOAGULATION: ICD-10-CM

## 2023-05-31 DIAGNOSIS — I26.99 PULMONARY EMBOLISM, BILATERAL (HCC): Primary | ICD-10-CM

## 2023-06-08 ENCOUNTER — ANTI-COAG VISIT (OUTPATIENT)
Dept: PHARMACY | Age: 68
End: 2023-06-08

## 2023-06-08 DIAGNOSIS — I82.413 ACUTE DEEP VEIN THROMBOSIS (DVT) OF FEMORAL VEIN OF BOTH LOWER EXTREMITIES (HCC): ICD-10-CM

## 2023-06-08 DIAGNOSIS — I26.99 PULMONARY EMBOLISM, BILATERAL (HCC): Primary | ICD-10-CM

## 2023-06-08 LAB — INTERNATIONAL NORMALIZATION RATIO, POC: 2.9

## 2023-06-08 PROCEDURE — 99212 OFFICE O/P EST SF 10 MIN: CPT

## 2023-06-08 PROCEDURE — 85610 PROTHROMBIN TIME: CPT

## 2023-06-08 NOTE — PROGRESS NOTES
Dosing schedule and follow up appointment given to patient. Progress note routed to referring physician's office. Patient acknowledges working in consult agreement with pharmacist as referred by his/her physician. Next Clinic Appointment: 6/26    Please call The [address] [phone] with any questions.      Vanita Craft, PharmD, [facility]  Medication Management Clinic   [address] Ph: [phone]  [facility] Ph: [phone]  6/8/2023 2:31 PM    For Pharmacy Admin Tracking Only    Intervention Detail: Dose Adjustment: 2, reason: Therapy De-escalation and New Rx: 1, reason: Needs Additional Therapy  Total # of Interventions Recommended: 2  Total # of Interventions Accepted: 2  Time Spent (min): 30

## 2023-06-27 ENCOUNTER — ANTI-COAG VISIT (OUTPATIENT)
Dept: PHARMACY | Age: 68
End: 2023-06-27

## 2023-06-27 DIAGNOSIS — I82.413 ACUTE DEEP VEIN THROMBOSIS (DVT) OF FEMORAL VEIN OF BOTH LOWER EXTREMITIES (HCC): ICD-10-CM

## 2023-06-27 DIAGNOSIS — I26.99 PULMONARY EMBOLISM, BILATERAL (HCC): Primary | ICD-10-CM

## 2023-06-27 LAB — INTERNATIONAL NORMALIZATION RATIO, POC: 2.1

## 2023-06-27 PROCEDURE — 99211 OFF/OP EST MAY X REQ PHY/QHP: CPT

## 2023-06-27 PROCEDURE — 85610 PROTHROMBIN TIME: CPT

## 2023-07-10 ENCOUNTER — TELEPHONE (OUTPATIENT)
Dept: INTERNAL MEDICINE CLINIC | Age: 68
End: 2023-07-10

## 2023-07-10 NOTE — TELEPHONE ENCOUNTER
PT TO HAVE KNEE SURGERY WITH DR KATERINE LINTON AT   PER MARQUIS, NURSE NAVIGATOR AT  [address] [place] [address] Drive. MARQUIS WANTS TO KNOW IF PT WILL BE BRIDGED WITH LOVENOX SINCE PT WILL NEED TO STOP COUMADIN 7 DAYS PRIOR TO SURG.  MARQUIS CAN BE REACHED -[phone]

## 2023-07-13 ENCOUNTER — TELEPHONE (OUTPATIENT)
Dept: INTERNAL MEDICINE CLINIC | Age: 68
End: 2023-07-13

## 2023-07-13 ENCOUNTER — ANTI-COAG VISIT (OUTPATIENT)
Dept: PHARMACY | Age: 68
End: 2023-07-13
Payer: MEDICARE

## 2023-07-13 DIAGNOSIS — I26.99 PULMONARY EMBOLISM, BILATERAL (HCC): Primary | ICD-10-CM

## 2023-07-13 DIAGNOSIS — I82.413 ACUTE DEEP VEIN THROMBOSIS (DVT) OF FEMORAL VEIN OF BOTH LOWER EXTREMITIES (HCC): ICD-10-CM

## 2023-07-13 LAB — INTERNATIONAL NORMALIZATION RATIO, POC: 4.5

## 2023-07-13 PROCEDURE — 85610 PROTHROMBIN TIME: CPT

## 2023-07-13 PROCEDURE — 99212 OFFICE O/P EST SF 10 MIN: CPT

## 2023-07-13 NOTE — PROGRESS NOTES
change 5 2.5 5 2.5 5 2.5 5 27.5  8/30 1.3 Below goal, increase 5 2.5 5 2.5 5 2.5 5 27.5  8/23 2.2 At goal, continue 5 2.5 5 2.5 5 2.5 2.5 25  8/16 1.4 Below goal, increase 5 2.5 5 2.5 5 2.5 2.5 25  8/4 2.1 At goal, increase 5 2.5 2.5 2.5 2.5 2.5 2.5 20  7/28 3.2 Above goal, continue 2.5 2.5 2.5 2.5 2.5 2.5 2.5 17.5    Last CBC:  Lab Results   Component Value Date    RBC 4.07 (L) 08/05/2022    HGB 13.4 (L) 08/05/2022    HCT 40.1 (L) 08/05/2022    MCV 98.4 08/05/2022    MCH 32.9 08/05/2022    MPV 7.7 08/05/2022    RDW 15.9 (H) 08/05/2022     08/05/2022     Patient History:  Recent hospitalizations/HC visits 10/7&10/28/22: ortho re: hip replacement  4/27/22: Dr Chalo Hope ([facility]) recommends IVC placed prior to hip replacement  4/7/22: [facility] GI- Pt needs to repeat stool hemoccult, then schedule colonoscopy--pt looking for GI doctor closer to home. Recent medication changes 6/8/23: Motrin 2-3 tablets in the past 3 days   3/21/23: steroid hip injection   Medications taken regularly that may interact with warfarin or alter INR No significant drug interactions identified   Warfarin dose taken as prescribed Questionable. Pt often claims that he follows the dosing calendar, but can rarely verbalize his dose. Signs/symptoms of bleeding None reported  Stool hemoccult negative 4/6/22   Vitamin K intake Normally has 0-1 serving of green, leafy vegetables per week; Pt often notes that he has very little appetite. Recent vomiting/diarrhea/fever, changes in weight or activity level None reported   Tobacco or alcohol use Often drinks 2-3 large glasses of wine, but states each glass only has ~4 oz wine mixed with grape juice. Occasionally has more.    1/5/22: quit smoking (was smoking 2 cig/d)   7/21/22: 1.5 pint of wine 2 nights in past week  8/6/22: Stopped drinking alcohol   3/23/23: Starting drinking 1-1.5 glasses of wine every night    Upcoming surgeries or procedures Hip surgery on 11/2/22 CANCELED   Colonoscopy cxl 6/23--

## 2023-08-03 ENCOUNTER — ANTI-COAG VISIT (OUTPATIENT)
Dept: PHARMACY | Age: 68
End: 2023-08-03
Payer: MEDICARE

## 2023-08-03 DIAGNOSIS — I82.413 ACUTE DEEP VEIN THROMBOSIS (DVT) OF FEMORAL VEIN OF BOTH LOWER EXTREMITIES (HCC): ICD-10-CM

## 2023-08-03 DIAGNOSIS — I26.99 PULMONARY EMBOLISM, BILATERAL (HCC): Primary | ICD-10-CM

## 2023-08-03 LAB — INTERNATIONAL NORMALIZATION RATIO, POC: 3.7

## 2023-08-03 PROCEDURE — 99212 OFFICE O/P EST SF 10 MIN: CPT

## 2023-08-03 PROCEDURE — 85610 PROTHROMBIN TIME: CPT

## 2023-08-03 NOTE — PROGRESS NOTES
Edilberto Ha is a 75 y.o. male with PMHx significant for bilateral PE, DVT (6/4/21) while on Xarelto, HTN who presents to clinic 8/3/2023 for anticoagulation monitoring and adjustment. Pt dx with bilat DVT 6/4/21 while on Xarelto 2yrs for hx of PE. Pt stopped Xarelto on 6/6/21 and started warfarin + lovenox bridge on 6/7/21. Patient notes that he does have an IVC filter.      Anticoagulation Indication(s): PE 2018, bilateral DVT 6/4/21 (on Xarelto)  Referring Physician: Dr. Mayo Alexis (Also send notes to Dr Sherry Lara, or responsible resident)  Goal INR Range: 2-3  Duration of Anticoagulation Therapy: indefinite  Time of day dose taken: prefers to take in AM  Product patient has at home: warfarin 5 mg (peach color)    INR Summary                           Warfarin regimen (mg)  Date INR   A/P   Sun Mon Tue Wed Thu Fri Sat Mg/wk  8/3 3.7 Above goal, dec x2  7.5 5 5 5 5 2.5/5 5 37.5  7/13 4.5 Above goal, hold  10 5 5 5 5 0/5 5 40  6/27 2.1 At goal, no change 10 5 5 5 5 5 5 40  6/8 2.9 See below  10 5 5 5 5 5 5 40  5/25 2.6 At goal, no change 10 5 5 5 5 5 5 40  5/4 3.1 Above goal, continue 10 5 5 5 5 5 5 40  4/20 1.8 Below goal, continue 10 5 5 5 5 5 5 40  3/23 3.3 Above goal, continue 10 5 5 5 5 5 5 40  2/23 2.6 At goal, no change 10 5 5 5 5 5 5 40  1/26 2.0 At goal, no change 10 5 5 5 5 5 5 40  1/5 2.2 At goal, no change 5 10 5 5 10 5 5 45  12/20 2.9 At goal, continue 5 10 5 5 10 5 5 45  12/1 1.5 Below goal (missed) 5 10 5 5 10 5 5 45  11/17 2.1 At goal, continue 5 10 5 5 10 5 5 45  11/8 2.0 At goal, continue 5 10 5 5 10 5 5 45  10/27 1.3 Below goal (missed) 5 10 5 5 10 5 5 45  10/20 2.3 At goal, continue 5 10 5 5 10 5 5 45   10/13 1.6 Below goal, increase 5 10 5 5 10 5 5 45  10/6 1.6 Below goal, (missed) 5 5 5 5 10/5 7.5 5 37.5   9/29 1.3 Below goal, 10mgx1 5 5 5 5 10/7.5 5 5 37.5  9/22 1.4 Below goal, increase 5 5 5 2.5 5 5 5 32.5  9/13 1.4 Below goal,

## 2023-08-17 ENCOUNTER — TELEPHONE (OUTPATIENT)
Dept: PHARMACY | Age: 68
End: 2023-08-17

## 2023-08-17 NOTE — TELEPHONE ENCOUNTER
Patient no called/no showed to appointment today. Left a message to call and reschedule to get INR ASAP.     Charles Callahan PharmD  Medication Management Clinic  [id number]  8/17/2023   11:11 AM    For Pharmacy Admin Tracking Only  Time Spent (min): 5

## 2023-08-24 NOTE — TELEPHONE ENCOUNTER
Pt scheduled for L ORALIA on 9/19/23 by Dr Rosey Stevenson at [facility]. Per note from Dr Rosey Stevenson 7/10/23:  \"Duplex to eval for residual clots, plan on d/c coumadin altogether b/c of filter as well. \"  Pt has no recollection of being told he needed a venous duplex. Per telephone encounter 7/10/23: \"PT TO HAVE KNEE SURGERY WITH DR KATERINE LINTON AT   PER [facility], NURSE NAVIGATOR AT  [address] [place] [address] Drive. MARQUIS WANTS TO KNOW IF PT WILL BE BRIDGED WITH LOVENOX SINCE PT WILL NEED TO STOP COUMADIN 7 DAYS PRIOR TO SURG. MARQUIS CAN BE REACHED -[phone]\"      PCP response: \"The patient will need to have a pre op evaluation and we will discuss all the medications in detail. The patient will have to arrange a visit with PCP or Anesthesiologist to discuss all the medication adjustment needed prior to the surgery. \"    Discussed this with pt. Explained that he needs to schedule a pre-op visit with pcp asap. Pt verbalized understanding. We will discuss plan at upcoming appt.      Ashley Sung, PharmD, [facility]  Medication Management Clinic   [address] [facility] Ph: [phone]  [facility] Ph: [phone]  8/24/2023 11:25 AM

## 2023-08-24 NOTE — TELEPHONE ENCOUNTER
Spoke with pt- r/s for 8/31.     Julita Platt, PharmD, [facility]  Medication Management Clinic   [address] [facility] Ph: [phone]  [facility] Ph: [phone]  8/24/2023 11:05 AM

## 2023-09-06 ENCOUNTER — TELEPHONE (OUTPATIENT)
Dept: PHARMACY | Age: 68
End: 2023-09-06

## 2023-09-06 ENCOUNTER — OFFICE VISIT (OUTPATIENT)
Dept: INTERNAL MEDICINE CLINIC | Age: 68
End: 2023-09-06
Payer: MEDICARE

## 2023-09-06 VITALS
TEMPERATURE: 97.3 F | RESPIRATION RATE: 20 BRPM | OXYGEN SATURATION: 94 % | HEIGHT: 70 IN | SYSTOLIC BLOOD PRESSURE: 134 MMHG | BODY MASS INDEX: 23.91 KG/M2 | WEIGHT: 167 LBS | DIASTOLIC BLOOD PRESSURE: 83 MMHG | HEART RATE: 60 BPM

## 2023-09-06 DIAGNOSIS — Z86.718 HISTORY OF DVT (DEEP VEIN THROMBOSIS): ICD-10-CM

## 2023-09-06 DIAGNOSIS — Z01.818 PRE-OP EXAM: Primary | ICD-10-CM

## 2023-09-06 DIAGNOSIS — M16.0 PRIMARY OSTEOARTHRITIS OF BOTH HIPS: ICD-10-CM

## 2023-09-06 DIAGNOSIS — F51.01 PRIMARY INSOMNIA: ICD-10-CM

## 2023-09-06 PROCEDURE — 96372 THER/PROPH/DIAG INJ SC/IM: CPT

## 2023-09-06 PROCEDURE — 99213 OFFICE O/P EST LOW 20 MIN: CPT

## 2023-09-06 PROCEDURE — 93005 ELECTROCARDIOGRAM TRACING: CPT

## 2023-09-06 PROCEDURE — 2500000003 HC RX 250 WO HCPCS

## 2023-09-06 PROCEDURE — 6360000002 HC RX W HCPCS

## 2023-09-06 RX ORDER — ENOXAPARIN SODIUM 100 MG/ML
1 INJECTION SUBCUTANEOUS 2 TIMES DAILY
Status: CANCELLED | OUTPATIENT
Start: 2023-09-06

## 2023-09-06 RX ORDER — TRIAMCINOLONE ACETONIDE 10 MG/ML
10 INJECTION, SUSPENSION INTRA-ARTICULAR; INTRALESIONAL ONCE
Qty: 1 ML | Refills: 0 | Status: CANCELLED | OUTPATIENT
Start: 2023-09-06 | End: 2023-09-06

## 2023-09-06 RX ORDER — LIDOCAINE HYDROCHLORIDE 10 MG/ML
0.5 INJECTION, SOLUTION EPIDURAL; INFILTRATION; INTRACAUDAL; PERINEURAL ONCE
Status: COMPLETED | OUTPATIENT
Start: 2023-09-06 | End: 2023-09-06

## 2023-09-06 RX ORDER — LANOLIN ALCOHOL/MO/W.PET/CERES
CREAM (GRAM) TOPICAL
Qty: 30 TABLET | Refills: 3 | Status: SHIPPED | OUTPATIENT
Start: 2023-09-06

## 2023-09-06 RX ORDER — TRIAMCINOLONE ACETONIDE 40 MG/ML
40 INJECTION, SUSPENSION INTRA-ARTICULAR; INTRAMUSCULAR ONCE
Status: COMPLETED | OUTPATIENT
Start: 2023-09-06 | End: 2023-09-06

## 2023-09-06 RX ORDER — ENOXAPARIN SODIUM 100 MG/ML
1 INJECTION SUBCUTANEOUS 2 TIMES DAILY
Qty: 16 ML | Refills: 0 | Status: SHIPPED | OUTPATIENT
Start: 2023-09-06 | End: 2023-09-16

## 2023-09-06 RX ORDER — LANOLIN ALCOHOL/MO/W.PET/CERES
CREAM (GRAM) TOPICAL
Qty: 30 TABLET | Refills: 3 | Status: CANCELLED | OUTPATIENT
Start: 2023-09-06

## 2023-09-06 RX ORDER — TRIAMCINOLONE ACETONIDE 10 MG/ML
10 INJECTION, SUSPENSION INTRA-ARTICULAR; INTRALESIONAL ONCE
Qty: 1 ML | Refills: 0 | Status: SHIPPED | OUTPATIENT
Start: 2023-09-06 | End: 2023-09-06 | Stop reason: SDUPTHER

## 2023-09-06 RX ADMIN — TRIAMCINOLONE ACETONIDE 40 MG: 40 INJECTION, SUSPENSION INTRA-ARTICULAR; INTRAMUSCULAR at 11:15

## 2023-09-06 RX ADMIN — LIDOCAINE HYDROCHLORIDE 0.5 ML: 10 INJECTION, SOLUTION EPIDURAL; INFILTRATION; INTRACAUDAL; PERINEURAL at 11:15

## 2023-09-06 ASSESSMENT — ENCOUNTER SYMPTOMS
NAUSEA: 0
VOMITING: 0
ABDOMINAL PAIN: 0
SHORTNESS OF BREATH: 0

## 2023-09-06 ASSESSMENT — PATIENT HEALTH QUESTIONNAIRE - PHQ9
SUM OF ALL RESPONSES TO PHQ9 QUESTIONS 1 & 2: 2
SUM OF ALL RESPONSES TO PHQ QUESTIONS 1-9: 2
SUM OF ALL RESPONSES TO PHQ QUESTIONS 1-9: 2
1. LITTLE INTEREST OR PLEASURE IN DOING THINGS: 1
SUM OF ALL RESPONSES TO PHQ QUESTIONS 1-9: 2
SUM OF ALL RESPONSES TO PHQ QUESTIONS 1-9: 2
2. FEELING DOWN, DEPRESSED OR HOPELESS: 1

## 2023-09-06 NOTE — PATIENT INSTRUCTIONS
Stop taking your warfarin five days before your surgery (on the 14th). Start taking Lovenox twice a day and stop 24 hours after surgery. Resume taking both lovenox and warfarin after surgery. Please take Lovenox twice a day for six days after surgery until your warfarin levels become therapeutic.

## 2023-09-06 NOTE — PROGRESS NOTES
Preoperative workup as follows: Patient is cleared for surgery  2. Change in medication regimen before surgery: Hold warfarin 5 days before surgery and start on Lovenox. Hold Lovenox one day before surgery. 3. Prophylaxis for cardiac events with perioperative beta-blockers:  None  4. Deep vein thrombosis prophylaxis:  On lovenox until a day before surgery      There are no Patient Instructions on file for this visit.     Violeta Donovan MD  PGY-2  9/6/2023, 10:45 AM

## 2023-09-06 NOTE — TELEPHONE ENCOUNTER
LVM with pt to r/s INR check asap.      Floridalma Camejo, PharmD, [facility]  Medication Management Clinic   [address] [facility] Ph: [phone]  [facility] Ph: [phone]  9/6/2023 4:32 PM

## 2023-09-14 ENCOUNTER — ANTI-COAG VISIT (OUTPATIENT)
Dept: PHARMACY | Age: 68
End: 2023-09-14
Payer: MEDICARE

## 2023-09-14 DIAGNOSIS — I26.99 PULMONARY EMBOLISM, BILATERAL (HCC): Primary | ICD-10-CM

## 2023-09-14 DIAGNOSIS — I82.413 ACUTE DEEP VEIN THROMBOSIS (DVT) OF FEMORAL VEIN OF BOTH LOWER EXTREMITIES (HCC): ICD-10-CM

## 2023-09-14 LAB — INTERNATIONAL NORMALIZATION RATIO, POC: 3.4

## 2023-09-14 PROCEDURE — 85610 PROTHROMBIN TIME: CPT

## 2023-09-14 PROCEDURE — 99211 OFF/OP EST MAY X REQ PHY/QHP: CPT

## 2023-09-14 RX ORDER — ENOXAPARIN SODIUM 100 MG/ML
80 INJECTION SUBCUTANEOUS 2 TIMES DAILY
Qty: 10 EACH | Refills: 0 | Status: SHIPPED | OUTPATIENT
Start: 2023-09-14

## 2023-09-14 RX ORDER — ENOXAPARIN SODIUM 100 MG/ML
80 INJECTION SUBCUTANEOUS 2 TIMES DAILY
Status: DISCONTINUED | OUTPATIENT
Start: 2023-09-14 | End: 2023-09-14

## 2023-09-14 NOTE — PROGRESS NOTES
Dyana Gill is a 70 y.o. male with PMHx significant for bilateral PE, DVT (6/4/21) while on Xarelto, HTN who presents to clinic 9/14/2023 for anticoagulation monitoring and adjustment. Pt dx with bilat DVT 6/4/21 while on Xarelto 2yrs for hx of PE. Pt stopped Xarelto on 6/6/21 and started warfarin + lovenox bridge on 6/7/21. Patient notes that he does have an IVC filter.      Anticoagulation Indication(s): PE 2018, bilateral DVT 6/4/21 (on Xarelto)  Referring Physician: Dr. Marlyn Hua (Also send notes to Dr Jacob Garcia, or responsible resident)  Goal INR Range: 2-3  Duration of Anticoagulation Therapy: indefinite  Time of day dose taken: prefers to take in AM  Product patient has at home: warfarin 5 mg (peach color)    INR Summary                           Warfarin regimen (mg)  Date INR   A/P   Sun Mon Tue Wed Thu Fri Sat Mg/wk  9/14 3.4 Above goal, dec 5 5 5 5 5 5 5 35  8/3 3.7 Above goal, dec x2  7.5 5 5 5 5 2.5/5 5 37.5  7/13 4.5 Above goal, hold  10 5 5 5 5 0/5 5 40  6/27 2.1 At goal, no change 10 5 5 5 5 5 5 40  6/8 2.9 See below  10 5 5 5 5 5 5 40  5/25 2.6 At goal, no change 10 5 5 5 5 5 5 40  5/4 3.1 Above goal, continue 10 5 5 5 5 5 5 40  4/20 1.8 Below goal, continue 10 5 5 5 5 5 5 40  3/23 3.3 Above goal, continue 10 5 5 5 5 5 5 40  2/23 2.6 At goal, no change 10 5 5 5 5 5 5 40  1/26 2.0 At goal, no change 10 5 5 5 5 5 5 40  1/5 2.2 At goal, no change 5 10 5 5 10 5 5 45  12/20 2.9 At goal, continue 5 10 5 5 10 5 5 45  12/1 1.5 Below goal (missed) 5 10 5 5 10 5 5 45  11/17 2.1 At goal, continue 5 10 5 5 10 5 5 45  11/8 2.0 At goal, continue 5 10 5 5 10 5 5 45  10/27 1.3 Below goal (missed) 5 10 5 5 10 5 5 45  10/20 2.3 At goal, continue 5 10 5 5 10 5 5 45   10/13 1.6 Below goal, increase 5 10 5 5 10 5 5 45  10/6 1.6 Below goal, (missed) 5 5 5 5 10/5 7.5 5 37.5   9/29 1.3 Below goal, 10mgx1 5 5 5 5 10/7.5 5 5 37.5  9/22 1.4 Below goal,

## 2023-09-28 ENCOUNTER — TELEPHONE (OUTPATIENT)
Dept: PHARMACY | Age: 68
End: 2023-09-28

## 2023-09-28 NOTE — TELEPHONE ENCOUNTER
Patient recently had hip surgery on 09/19/23 and has been receiving home health services through [facility] ([phone]). [address] and provided verbal order to get INR drawn at next visit and twice weekly thereafter. Spoke with representative at [facility] and said they may be able to get INR today (09/28/23), but the earliest may actually be tomorrow (09/29/23). Will follow up to see the soonest patient can get INR drawn.     Viviana Mo, PharmD  Medication Management Clinic  [facility]: [phone]  [facility]: [phone]  9/28/2023   11:57 AM    For Pharmacy Admin Tracking Only  Time Spent (min): 5

## 2023-09-29 ENCOUNTER — ANTI-COAG VISIT (OUTPATIENT)
Dept: PHARMACY | Age: 68
End: 2023-09-29

## 2023-09-29 DIAGNOSIS — I26.99 PULMONARY EMBOLISM, BILATERAL (HCC): Primary | ICD-10-CM

## 2023-09-29 DIAGNOSIS — I82.413 ACUTE DEEP VEIN THROMBOSIS (DVT) OF FEMORAL VEIN OF BOTH LOWER EXTREMITIES (HCC): ICD-10-CM

## 2023-09-29 LAB — INR BLD: 1

## 2023-09-29 NOTE — PROGRESS NOTES
Yuliana Alfred is a 70 y.o. male with PMHx significant for bilateral PE, DVT (6/4/21) while on Xarelto, HTN who presents to clinic 9/29/2023 for anticoagulation monitoring and adjustment. Pt dx with bilat DVT 6/4/21 while on Xarelto 2yrs for hx of PE. Pt stopped Xarelto on 6/6/21 and started warfarin + lovenox bridge on 6/7/21. Patient notes that he does have an IVC filter.      Anticoagulation Indication(s): PE 2018, bilateral DVT 6/4/21 (on Xarelto)  Referring Physician: Dr. Edwin Garcia (Also send notes to Dr Irish Valiente, or responsible resident)  Goal INR Range: 2-3  Duration of Anticoagulation Therapy: indefinite  Time of day dose taken: prefers to take in AM  Product patient has at home: warfarin 5 mg (peach color)    INR Summary                           Warfarin regimen (mg)  Date INR   A/P   Sun Mon Tue Wed Thu Fri Sat Mg/wk  9/29 1.0 Below goal, bolus 5 5 5 5 5 10/5 5 35  9/14 3.4 Above goal, dec 5 5 5 5 5 5 5 35  8/3 3.7 Above goal, dec x2  7.5 5 5 5 5 2.5/5 5 37.5  7/13 4.5 Above goal, hold  10 5 5 5 5 0/5 5 40  6/27 2.1 At goal, no change 10 5 5 5 5 5 5 40  6/8 2.9 See below  10 5 5 5 5 5 5 40  5/25 2.6 At goal, no change 10 5 5 5 5 5 5 40  5/4 3.1 Above goal, continue 10 5 5 5 5 5 5 40  4/20 1.8 Below goal, continue 10 5 5 5 5 5 5 40  3/23 3.3 Above goal, continue 10 5 5 5 5 5 5 40  2/23 2.6 At goal, no change 10 5 5 5 5 5 5 40  1/26 2.0 At goal, no change 10 5 5 5 5 5 5 40  1/5 2.2 At goal, no change 5 10 5 5 10 5 5 45  12/20 2.9 At goal, continue 5 10 5 5 10 5 5 45  12/1 1.5 Below goal (missed) 5 10 5 5 10 5 5 45  11/17 2.1 At goal, continue 5 10 5 5 10 5 5 45  11/8 2.0 At goal, continue 5 10 5 5 10 5 5 45  10/27 1.3 Below goal (missed) 5 10 5 5 10 5 5 45  10/20 2.3 At goal, continue 5 10 5 5 10 5 5 45   10/13 1.6 Below goal, increase 5 10 5 5 10 5 5 45  10/6 1.6 Below goal, (missed) 5 5 5 5 10/5 7.5 5 37.5   9/29 1.3 Below goal,

## 2023-10-03 ENCOUNTER — ANTI-COAG VISIT (OUTPATIENT)
Dept: PHARMACY | Age: 68
End: 2023-10-03

## 2023-10-03 DIAGNOSIS — I82.413 ACUTE DEEP VEIN THROMBOSIS (DVT) OF FEMORAL VEIN OF BOTH LOWER EXTREMITIES (HCC): ICD-10-CM

## 2023-10-03 DIAGNOSIS — I26.99 PULMONARY EMBOLISM, BILATERAL (HCC): Primary | ICD-10-CM

## 2023-10-03 LAB — INR BLD: 1.7

## 2023-10-03 NOTE — PROGRESS NOTES
Imelda Hurtado is a 70 y.o. male with PMHx significant for bilateral PE, DVT (6/4/21) while on Xarelto, HTN who presents to clinic 10/3/2023 for anticoagulation monitoring and adjustment. Pt dx with bilat DVT 6/4/21 while on Xarelto 2yrs for hx of PE. Pt stopped Xarelto on 6/6/21 and started warfarin + lovenox bridge on 6/7/21. Patient notes that he does have an IVC filter.      Anticoagulation Indication(s): PE 2018, bilateral DVT 6/4/21 (on Xarelto)  Referring Physician: Dr. Michael Mosher (Also send notes to Dr Aubrey Cordova, or responsible resident)  Goal INR Range: 2-3  Duration of Anticoagulation Therapy: indefinite  Time of day dose taken: prefers to take in AM  Product patient has at home: warfarin 5 mg (peach color)    INR Summary                           Warfarin regimen (mg)  Date INR   A/P   Sun Mon Tue Wed Thu Fri Sat Mg/wk  10/3 1.7 Below goal, continue 5 5 5 5 5 5 5 35   9/29 1.0 Below goal, bolus 5 5 5 5 5 10/5 5 35  9/14 3.4 Above goal, dec 5 5 5 5 5 5 5 35  8/3 3.7 Above goal, dec x2  7.5 5 5 5 5 2.5/5 5 37.5  7/13 4.5 Above goal, hold  10 5 5 5 5 0/5 5 40  6/27 2.1 At goal, no change 10 5 5 5 5 5 5 40  6/8 2.9 See below  10 5 5 5 5 5 5 40  5/25 2.6 At goal, no change 10 5 5 5 5 5 5 40  5/4 3.1 Above goal, continue 10 5 5 5 5 5 5 40  4/20 1.8 Below goal, continue 10 5 5 5 5 5 5 40  3/23 3.3 Above goal, continue 10 5 5 5 5 5 5 40  2/23 2.6 At goal, no change 10 5 5 5 5 5 5 40  1/26 2.0 At goal, no change 10 5 5 5 5 5 5 40  1/5 2.2 At goal, no change 5 10 5 5 10 5 5 45  12/20 2.9 At goal, continue 5 10 5 5 10 5 5 45  12/1 1.5 Below goal (missed) 5 10 5 5 10 5 5 45  11/17 2.1 At goal, continue 5 10 5 5 10 5 5 45  11/8 2.0 At goal, continue 5 10 5 5 10 5 5 45  10/27 1.3 Below goal (missed) 5 10 5 5 10 5 5 45  10/20 2.3 At goal, continue 5 10 5 5 10 5 5 45   10/13 1.6 Below goal, increase 5 10 5 5 10 5 5 45  10/6 1.6 Below goal, (missed) 5 5 5 5 10/5 7.5 5 37.5   9/29 1.3 Below

## 2023-10-04 DIAGNOSIS — F51.01 PRIMARY INSOMNIA: ICD-10-CM

## 2023-10-04 RX ORDER — WARFARIN SODIUM 5 MG/1
TABLET ORAL
Qty: 90 TABLET | Refills: 1 | Status: SHIPPED | OUTPATIENT
Start: 2023-10-04

## 2023-10-04 NOTE — TELEPHONE ENCOUNTER
I spoke with Nicki Aviles, PharmD, about this patient to clarify his prescription for both enoxaparin and warfarin. Pt follows at the INR clinic and Dr. Iker Low. She states he is supposed to only be taking his warfarin at this time. Pt had not been adherent to taking both warfarin and enoxaparin after his recent surgery but because his INR yesterday was 1.7 and trending towards goal INR of 2-3, he does not need to take enoxaparin at this time. Has appointment to have INR checked tomorrow. Dr. Iker Low said she will order his warfarin now so I will not reorder.

## 2023-10-04 NOTE — TELEPHONE ENCOUNTER
Requested Prescriptions     Pending Prescriptions Disp Refills    warfarin (COUMADIN) 5 MG tablet [Pharmacy Med Name: WARFARIN SODIUM 5 MG TABLET] 90 tablet 1     Sig: TAKE 1 TABLET BY MOUTH EVERY DAY       Last Clinic Visit:  9/6/2023     Next Clinic Appointment:  12/18/2023

## 2023-10-05 RX ORDER — LANOLIN ALCOHOL/MO/W.PET/CERES
CREAM (GRAM) TOPICAL
Qty: 90 TABLET | Refills: 1 | Status: SHIPPED | OUTPATIENT
Start: 2023-10-05

## 2023-10-05 NOTE — TELEPHONE ENCOUNTER
Requested Prescriptions     Pending Prescriptions Disp Refills    melatonin 3 MG TABS tablet [Pharmacy Med Name: MELATONIN 3 MG TABLET] 90 tablet 1     Sig: TAKE 1 TABLET BY MOUTH NIGHTLY AS NEEDED (SLEEP).        Last Clinic Visit:  9/6/2023     Next Clinic Appointment:  12/18/2023

## 2023-10-09 ENCOUNTER — ANTI-COAG VISIT (OUTPATIENT)
Dept: PHARMACY | Age: 68
End: 2023-10-09
Payer: MEDICARE

## 2023-10-09 DIAGNOSIS — I82.413 ACUTE DEEP VEIN THROMBOSIS (DVT) OF FEMORAL VEIN OF BOTH LOWER EXTREMITIES (HCC): ICD-10-CM

## 2023-10-09 DIAGNOSIS — I26.99 PULMONARY EMBOLISM, BILATERAL (HCC): Primary | ICD-10-CM

## 2023-10-09 LAB — INTERNATIONAL NORMALIZATION RATIO, POC: 2.5

## 2023-10-09 PROCEDURE — 85610 PROTHROMBIN TIME: CPT

## 2023-10-09 PROCEDURE — 99212 OFFICE O/P EST SF 10 MIN: CPT

## 2023-10-09 NOTE — PROGRESS NOTES
Maxi Vazquez is a 70 y.o. male with PMHx significant for bilateral PE, DVT (6/4/21) while on Xarelto, HTN who presents to clinic 10/9/2023 for anticoagulation monitoring and adjustment. Pt dx with bilat DVT 6/4/21 while on Xarelto 2yrs for hx of PE. Pt stopped Xarelto on 6/6/21 and started warfarin + lovenox bridge on 6/7/21. Patient notes that he does have an IVC filter.      Anticoagulation Indication(s): PE 2018, bilateral DVT 6/4/21 (on Xarelto)  Referring Physician: Dr. Tracey Mulligan (Also send notes to Dr Zack Flores, or responsible resident)  Goal INR Range: 2-3  Duration of Anticoagulation Therapy: indefinite  Time of day dose taken: prefers to take in AM  Product patient has at home: warfarin 5 mg (peach color)    INR Summary                           Warfarin regimen (mg)  Date INR   A/P   Sun Mon Tue Wed Thu Fri Sat Mg/wk  10/9 2.5 At goal, continue 5 5 5 5 5 5 5 35   10/3 1.7 Below goal, continue 5 5 5 5 5 5 5 35   9/29 1.0 Below goal, bolus 5 5 5 5 5 10/5 5 35  9/14 3.4 Above goal, dec 5 5 5 5 5 5 5 35  8/3 3.7 Above goal, dec x2  7.5 5 5 5 5 2.5/5 5 37.5  7/13 4.5 Above goal, hold  10 5 5 5 5 0/5 5 40  6/27 2.1 At goal, no change 10 5 5 5 5 5 5 40  6/8 2.9 See below  10 5 5 5 5 5 5 40  5/25 2.6 At goal, no change 10 5 5 5 5 5 5 40  5/4 3.1 Above goal, continue 10 5 5 5 5 5 5 40  4/20 1.8 Below goal, continue 10 5 5 5 5 5 5 40  3/23 3.3 Above goal, continue 10 5 5 5 5 5 5 40  2/23 2.6 At goal, no change 10 5 5 5 5 5 5 40  1/26 2.0 At goal, no change 10 5 5 5 5 5 5 40  1/5 2.2 At goal, no change 5 10 5 5 10 5 5 45  12/20 2.9 At goal, continue 5 10 5 5 10 5 5 45  12/1 1.5 Below goal (missed) 5 10 5 5 10 5 5 45  11/17 2.1 At goal, continue 5 10 5 5 10 5 5 45  11/8 2.0 At goal, continue 5 10 5 5 10 5 5 45  10/27 1.3 Below goal (missed) 5 10 5 5 10 5 5 45  10/20 2.3 At goal, continue 5 10 5 5 10 5 5 45   10/13 1.6 Below goal, increase 5 10 5 5 10 5 5 45  10/6 1.6 Below goal,

## 2023-10-24 ENCOUNTER — TELEPHONE (OUTPATIENT)
Dept: PHARMACY | Age: 68
End: 2023-10-24

## 2023-10-24 NOTE — TELEPHONE ENCOUNTER
Pt LVM. He lost his medicaid and may not have a ride to his appt on Thursday.  Called back and LVM to discuss options- refer to SW, quick draw, home testing, etc.     Jose Franco, PharmD, [facility]  Medication Management Clinic   [facility] Ph: [phone]  [address] [place] [address] Ph: [phone]  10/24/2023 4:15 PM

## 2023-10-25 ENCOUNTER — TELEPHONE (OUTPATIENT)
Dept: INTERNAL MEDICINE CLINIC | Age: 68
End: 2023-10-25

## 2023-10-25 DIAGNOSIS — R60.0 BILATERAL LOWER EXTREMITY EDEMA: Primary | ICD-10-CM

## 2023-10-25 RX ORDER — FUROSEMIDE 20 MG/1
20 TABLET ORAL DAILY
Qty: 90 TABLET | Refills: 1 | Status: SHIPPED | OUTPATIENT
Start: 2023-10-25

## 2023-10-25 NOTE — TELEPHONE ENCOUNTER
PT STATED HE NEED REFILL ON LASIX. PT ALSO WANTS TO KNOW IF HE CAN GET RX FOR MUSCLE RELAXER. PT STATED HE RECENTLY HAD HIP REPL.  PT CAN BE REACHED -[phone]

## 2023-10-25 NOTE — TELEPHONE ENCOUNTER
Pt scheduling an appt tomorrow for [facility] and should have insurance next week. He cxl visit tomorrow and WCB to schedule visit as soon as he has transportation.      Soraida Gibbs, PharmD, [facility]  Medication Management Clinic   [address] Ph: [phone]  Nadia Suarez Ph: [phone]  10/25/2023 9:38 AM

## 2023-10-25 NOTE — PROGRESS NOTES
Call patient to understand the request for Lasix given that he has not resulted in a while. Patient states that he had been taking his Lasix all the way up till prior to surgery. He has recently been experiencing lower extremity edema. I recommended him to be seen in clinic within a month. He also had a request for some muscle relaxant which I explained to him that needs to be prescribed by his orthopedic surgeon. Patient understood the need to call orthopedic surgeon and will be requesting that for the medication.

## 2023-10-31 NOTE — TELEPHONE ENCOUNTER
Pt scheduled Nov 10 with another appt [facility] family [facility] - he WCB with update.     Mary Klein, PharmD, [facility]  Medication Management Clinic   [address] Ph: [phone]  [facility] Ph: [phone]  10/31/2023 10:58 AM

## 2023-11-13 NOTE — TELEPHONE ENCOUNTER
Pt will be transferring to [facility]. He has a visit tomorrow and will discuss ongoing warfarin management with provider at that time. HE WCB with update. Informed pt that we will need a new referral if we are going to continue to manage warfarin.      Janice Salmon, PharmD, [facility]  Medication Management Clinic   [address] [facility] Ph: [phone]  [facility] Ph: [phone]  11/13/2023 3:05 PM

## 2023-11-16 NOTE — TELEPHONE ENCOUNTER
Patient left message via co[facility] clinic that he is transferring to new PCP at [facility]

## 2023-11-21 ENCOUNTER — ANTI-COAG VISIT (OUTPATIENT)
Dept: PHARMACY | Age: 68
End: 2023-11-21
Payer: MEDICARE

## 2023-11-21 DIAGNOSIS — I82.413 ACUTE DEEP VEIN THROMBOSIS (DVT) OF FEMORAL VEIN OF BOTH LOWER EXTREMITIES (HCC): ICD-10-CM

## 2023-11-21 DIAGNOSIS — I26.99 PULMONARY EMBOLISM, BILATERAL (HCC): Primary | ICD-10-CM

## 2023-11-21 LAB — INTERNATIONAL NORMALIZATION RATIO, POC: 1.3

## 2023-11-21 PROCEDURE — 85610 PROTHROMBIN TIME: CPT

## 2023-11-21 PROCEDURE — 99212 OFFICE O/P EST SF 10 MIN: CPT

## 2023-11-21 NOTE — TELEPHONE ENCOUNTER
Pt seen today for INR check. Was going to switch care to [facility] due to transportation issues,  but prefers the care he received at [facility] outpatient clinic. He now has his car back and would like to schedule f/u with the clinic.      Gurdeep Hamilton, PharmD, [facility]  Medication Management Clinic   [address] Ph: [phone]  Vidhya Cummings Ph: [phone]  11/21/2023 2:59 PM

## 2023-11-21 NOTE — PROGRESS NOTES
(missed) 5 5 5 5 10/5 7.5 5 37.5   9/29 1.3 Below goal, 10mgx1 5 5 5 5 10/7.5 5 5 37.5  9/22 1.4 Below goal, increase 5 5 5 2.5 5 5 5 32.5  9/13 1.4 Below goal, (missed) 5 2.5 7.5/5 2.5 5 2.5 5 27.5  9/6 2.1 At goal, no change 5 2.5 5 2.5 5 2.5 5 27.5  8/30 1.3 Below goal, increase 5 2.5 5 2.5 5 2.5 5 27.5  8/23 2.2 At goal, continue 5 2.5 5 2.5 5 2.5 2.5 25  8/16 1.4 Below goal, increase 5 2.5 5 2.5 5 2.5 2.5 25  8/4 2.1 At goal, increase 5 2.5 2.5 2.5 2.5 2.5 2.5 20  7/28 3.2 Above goal, continue 2.5 2.5 2.5 2.5 2.5 2.5 2.5 17.5    Last CBC:  Lab Results   Component Value Date    RBC 4.07 (L) 08/05/2022    HGB 13.4 (L) 08/05/2022    HCT 40.1 (L) 08/05/2022    MCV 98.4 08/05/2022    MCH 32.9 08/05/2022    MPV 7.7 08/05/2022    RDW 15.9 (H) 08/05/2022     08/05/2022     Patient History:  Recent hospitalizations/HC visits 9/19/23: hip replacement at [facility], held warfarin x5 days with Lovenox bridge   10/7&10/28/22: ortho re: hip replacement  4/27/22: Dr Cordelia Castillo ([facility]) recommends IVC placed prior to hip replacement  4/7/22: OV GI- Pt needs to repeat stool hemoccult, then schedule colonoscopy--pt looking for GI doctor closer to home. Recent medication changes None reported   Medications taken regularly that may interact with warfarin or alter INR No significant drug interactions identified   Warfarin dose taken as prescribed Questionable. Pt often claims that he follows the dosing calendar, but often misses doses. Thinks he missed a dose ~11/15. Signs/symptoms of bleeding None reported  Stool hemoccult negative 4/6/22   Vitamin K intake Normally has 0-1 serving of green, leafy vegetables per week; Pt often notes that he has very little appetite. Recent vomiting/diarrhea/fever, changes in weight or activity level None reported   Tobacco or alcohol use Often drinks 2-3 large glasses of wine, but states each glass only has ~4 oz wine mixed with grape juice. Occasionally has more.    1/5/22: quit smoking (was

## 2023-11-28 ENCOUNTER — ANTI-COAG VISIT (OUTPATIENT)
Dept: PHARMACY | Age: 68
End: 2023-11-28
Payer: MEDICARE

## 2023-11-28 DIAGNOSIS — I26.99 PULMONARY EMBOLISM, BILATERAL (HCC): Primary | ICD-10-CM

## 2023-11-28 DIAGNOSIS — I82.413 ACUTE DEEP VEIN THROMBOSIS (DVT) OF FEMORAL VEIN OF BOTH LOWER EXTREMITIES (HCC): ICD-10-CM

## 2023-11-28 LAB — INTERNATIONAL NORMALIZATION RATIO, POC: 1.7

## 2023-11-28 PROCEDURE — 99211 OFF/OP EST MAY X REQ PHY/QHP: CPT

## 2023-11-28 PROCEDURE — 85610 PROTHROMBIN TIME: CPT

## 2023-11-28 NOTE — PROGRESS NOTES
Alex Barry is a 70 y.o. male with PMHx significant for bilateral PE, DVT (6/4/21) while on Xarelto, HTN who presents to clinic 11/28/2023 for anticoagulation monitoring and adjustment. Pt dx with bilat DVT 6/4/21 while on Xarelto 2yrs for hx of PE. Pt stopped Xarelto on 6/6/21 and started warfarin + lovenox bridge on 6/7/21. Patient notes that he does have an IVC filter.      Anticoagulation Indication(s): PE 2018, bilateral DVT 6/4/21 (on Xarelto)  Referring Physician: Dr. Edison Torres   Goal INR Range: 2-3  Duration of Anticoagulation Therapy: indefinite  Time of day dose taken: prefers to take in AM  Product patient has at home: warfarin 5 mg (peach color)    INR Summary                           Warfarin regimen (mg)  Date INR   A/P   Sun Mon Tue Wed Thu Fri Sat Mg/wk  11/28 1.7 Below goal, cont 5 5 10 5 5 5 5 40  11/21 1.3 Below goal, incr 5 5 10 5 5 5 5 40   10/9 2.5 At goal, continue 5 5 5 5 5 5 5 35   10/3 1.7 Below goal, continue 5 5 5 5 5 5 5 35   9/29 1.0 Below goal, bolus 5 5 5 5 5 10/5 5 35  9/14 3.4 Above goal, dec 5 5 5 5 5 5 5 35  8/3 3.7 Above goal, dec x2  7.5 5 5 5 5 2.5/5 5 37.5  7/13 4.5 Above goal, hold  10 5 5 5 5 0/5 5 40  6/27 2.1 At goal, no change 10 5 5 5 5 5 5 40  6/8 2.9 See below  10 5 5 5 5 5 5 40  5/25 2.6 At goal, no change 10 5 5 5 5 5 5 40  5/4 3.1 Above goal, continue 10 5 5 5 5 5 5 40  4/20 1.8 Below goal, continue 10 5 5 5 5 5 5 40  3/23 3.3 Above goal, continue 10 5 5 5 5 5 5 40  2/23 2.6 At goal, no change 10 5 5 5 5 5 5 40  1/26 2.0 At goal, no change 10 5 5 5 5 5 5 40  1/5 2.2 At goal, no change 5 10 5 5 10 5 5 45  12/20 2.9 At goal, continue 5 10 5 5 10 5 5 45  12/1 1.5 Below goal (missed) 5 10 5 5 10 5 5 45  11/17 2.1 At goal, continue 5 10 5 5 10 5 5 45  11/8 2.0 At goal, continue 5 10 5 5 10 5 5 45  10/27 1.3 Below goal (missed) 5 10 5 5 10 5 5 45  10/20 2.3 At goal, continue 5 10 5 5 10 5 5 45   10/13 1.6 Below goal,

## 2023-12-05 ENCOUNTER — TELEPHONE (OUTPATIENT)
Dept: PHARMACY | Age: 68
End: 2023-12-05

## 2023-12-08 ENCOUNTER — OFFICE VISIT (OUTPATIENT)
Dept: INTERNAL MEDICINE CLINIC | Age: 68
End: 2023-12-08
Payer: MEDICARE

## 2023-12-08 VITALS
WEIGHT: 183.4 LBS | DIASTOLIC BLOOD PRESSURE: 99 MMHG | HEART RATE: 72 BPM | BODY MASS INDEX: 26.26 KG/M2 | HEIGHT: 70 IN | OXYGEN SATURATION: 99 % | SYSTOLIC BLOOD PRESSURE: 142 MMHG

## 2023-12-08 DIAGNOSIS — Z23 FLU VACCINE NEED: Primary | ICD-10-CM

## 2023-12-08 PROCEDURE — 90694 VACC AIIV4 NO PRSRV 0.5ML IM: CPT

## 2023-12-08 PROCEDURE — 6360000002 HC RX W HCPCS

## 2023-12-08 PROCEDURE — 99213 OFFICE O/P EST LOW 20 MIN: CPT

## 2023-12-08 PROCEDURE — G0008 ADMIN INFLUENZA VIRUS VAC: HCPCS

## 2023-12-08 RX ADMIN — INFLUENZA A VIRUS A/VICTORIA/4897/2022 IVR-238 (H1N1) ANTIGEN (FORMALDEHYDE INACTIVATED), INFLUENZA A VIRUS A/DARWIN/6/2021 IVR-227 (H3N2) ANTIGEN (FORMALDEHYDE INACTIVATED), INFLUENZA B VIRUS B/AUSTRIA/1359417/2021 BVR-26 ANTIGEN (FORMALDEHYDE INACTIVATED), INFLUENZA B VIRUS B/PHUKET/3073/2013 BVR-1B ANTIGEN (FORMALDEHYDE INACTIVATED) 0.5 ML: 15; 15; 15; 15 INJECTION, SUSPENSION INTRAMUSCULAR at 15:19

## 2023-12-08 ASSESSMENT — ENCOUNTER SYMPTOMS
VOMITING: 0
SHORTNESS OF BREATH: 0
CHEST TIGHTNESS: 0
COUGH: 0
ABDOMINAL PAIN: 0
DIARRHEA: 0
CONSTIPATION: 0
NAUSEA: 0

## 2023-12-12 ENCOUNTER — ANTI-COAG VISIT (OUTPATIENT)
Dept: PHARMACY | Age: 68
End: 2023-12-12
Payer: MEDICARE

## 2023-12-12 DIAGNOSIS — I26.99 PULMONARY EMBOLISM, BILATERAL (HCC): Primary | ICD-10-CM

## 2023-12-12 DIAGNOSIS — I82.413 ACUTE DEEP VEIN THROMBOSIS (DVT) OF FEMORAL VEIN OF BOTH LOWER EXTREMITIES (HCC): ICD-10-CM

## 2023-12-12 LAB — INTERNATIONAL NORMALIZATION RATIO, POC: 1.5

## 2023-12-12 PROCEDURE — 99212 OFFICE O/P EST SF 10 MIN: CPT

## 2023-12-12 PROCEDURE — 85610 PROTHROMBIN TIME: CPT

## 2023-12-12 RX ORDER — AMLODIPINE BESYLATE 10 MG/1
10 TABLET ORAL DAILY
COMMUNITY

## 2023-12-12 NOTE — PROGRESS NOTES
Lavinia Lau is a 70 y.o. male with PMHx significant for bilateral PE, DVT (6/4/21) while on Xarelto, HTN who presents to clinic 12/12/2023 for anticoagulation monitoring and adjustment. Pt dx with bilat DVT 6/4/21 while on Xarelto 2yrs for hx of PE. Pt stopped Xarelto on 6/6/21 and started warfarin + lovenox bridge on 6/7/21. Patient notes that he does have an IVC filter.      Anticoagulation Indication(s): PE 2018, bilateral DVT 6/4/21 (on Xarelto)  Referring Physician: Dr. Che Vasquez   Goal INR Range: 2-3  Duration of Anticoagulation Therapy: indefinite  Time of day dose taken: prefers to take in AM  Product patient has at home: warfarin 5 mg (peach color)    INR Summary                           Warfarin regimen (mg)  Date INR   A/P   Sun Mon Tue Wed Thu Fri Sat Mg/wk  12/12 1.5 Below goal, increase  5 5 10 5 5 5 5 40  11/28 1.7 Below goal, cont 5 5 10 5 5 5 5 40  11/21 1.3 Below goal, incr 5 5 10 5 5 5 5 40   10/9 2.5 At goal, continue 5 5 5 5 5 5 5 35   10/3 1.7 Below goal, continue 5 5 5 5 5 5 5 35   9/29 1.0 Below goal, bolus 5 5 5 5 5 10/5 5 35  9/14 3.4 Above goal, dec 5 5 5 5 5 5 5 35  8/3 3.7 Above goal, dec x2  7.5 5 5 5 5 2.5/5 5 37.5  7/13 4.5 Above goal, hold  10 5 5 5 5 0/5 5 40  6/27 2.1 At goal, no change 10 5 5 5 5 5 5 40  6/8 2.9 See below  10 5 5 5 5 5 5 40  5/25 2.6 At goal, no change 10 5 5 5 5 5 5 40  5/4 3.1 Above goal, continue 10 5 5 5 5 5 5 40  4/20 1.8 Below goal, continue 10 5 5 5 5 5 5 40  3/23 3.3 Above goal, continue 10 5 5 5 5 5 5 40  2/23 2.6 At goal, no change 10 5 5 5 5 5 5 40  1/26 2.0 At goal, no change 10 5 5 5 5 5 5 40  1/5 2.2 At goal, no change 5 10 5 5 10 5 5 45  12/20 2.9 At goal, continue 5 10 5 5 10 5 5 45  12/1 1.5 Below goal (missed) 5 10 5 5 10 5 5 45  11/17 2.1 At goal, continue 5 10 5 5 10 5 5 45  11/8 2.0 At goal, continue 5 10 5 5 10 5 5 45  10/27 1.3 Below goal (missed) 5 10 5 5 10 5 5 45  10/20 2.3 At goal,

## 2024-01-09 ENCOUNTER — ANTI-COAG VISIT (OUTPATIENT)
Dept: PHARMACY | Age: 69
End: 2024-01-09
Payer: MEDICARE

## 2024-01-09 ENCOUNTER — OFFICE VISIT (OUTPATIENT)
Dept: INTERNAL MEDICINE CLINIC | Age: 69
End: 2024-01-09
Payer: MEDICARE

## 2024-01-09 VITALS
SYSTOLIC BLOOD PRESSURE: 152 MMHG | RESPIRATION RATE: 16 BRPM | OXYGEN SATURATION: 97 % | HEART RATE: 68 BPM | DIASTOLIC BLOOD PRESSURE: 83 MMHG | BODY MASS INDEX: 26.99 KG/M2 | TEMPERATURE: 98.8 F | WEIGHT: 188.1 LBS

## 2024-01-09 DIAGNOSIS — I26.99 PULMONARY EMBOLISM, BILATERAL (HCC): Primary | ICD-10-CM

## 2024-01-09 DIAGNOSIS — M16.0 PRIMARY OSTEOARTHRITIS OF BOTH HIPS: ICD-10-CM

## 2024-01-09 DIAGNOSIS — M54.50 CHRONIC MIDLINE LOW BACK PAIN WITHOUT SCIATICA: ICD-10-CM

## 2024-01-09 DIAGNOSIS — I10 PRIMARY HYPERTENSION: ICD-10-CM

## 2024-01-09 DIAGNOSIS — Z12.5 PROSTATE CANCER SCREENING: ICD-10-CM

## 2024-01-09 DIAGNOSIS — R60.0 BILATERAL LOWER EXTREMITY EDEMA: ICD-10-CM

## 2024-01-09 DIAGNOSIS — G89.29 CHRONIC MIDLINE LOW BACK PAIN WITHOUT SCIATICA: ICD-10-CM

## 2024-01-09 DIAGNOSIS — R60.0 BILATERAL LOWER EXTREMITY EDEMA: Primary | ICD-10-CM

## 2024-01-09 DIAGNOSIS — I82.413 ACUTE DEEP VEIN THROMBOSIS (DVT) OF FEMORAL VEIN OF BOTH LOWER EXTREMITIES (HCC): ICD-10-CM

## 2024-01-09 LAB — INTERNATIONAL NORMALIZATION RATIO, POC: 3.7

## 2024-01-09 PROCEDURE — 85610 PROTHROMBIN TIME: CPT

## 2024-01-09 PROCEDURE — 99212 OFFICE O/P EST SF 10 MIN: CPT

## 2024-01-09 PROCEDURE — 99213 OFFICE O/P EST LOW 20 MIN: CPT

## 2024-01-09 RX ORDER — FUROSEMIDE 20 MG/1
40 TABLET ORAL DAILY
Qty: 90 TABLET | Refills: 1 | Status: SHIPPED | OUTPATIENT
Start: 2024-01-09 | End: 2024-01-09

## 2024-01-09 RX ORDER — FUROSEMIDE 20 MG/1
40 TABLET ORAL DAILY
Qty: 180 TABLET | Refills: 0 | Status: SHIPPED | OUTPATIENT
Start: 2024-01-09

## 2024-01-09 RX ORDER — OLMESARTAN MEDOXOMIL AND HYDROCHLOROTHIAZIDE 40/25 40; 25 MG/1; MG/1
1 TABLET ORAL DAILY
Qty: 90 TABLET | Refills: 1 | Status: SHIPPED | OUTPATIENT
Start: 2024-01-09

## 2024-01-09 RX ORDER — NICOTINE 21 MG/24HR
1 PATCH, TRANSDERMAL 24 HOURS TRANSDERMAL DAILY
Qty: 14 PATCH | Refills: 0 | Status: SHIPPED | OUTPATIENT
Start: 2024-01-09 | End: 2024-01-23

## 2024-01-09 ASSESSMENT — PATIENT HEALTH QUESTIONNAIRE - PHQ9
SUM OF ALL RESPONSES TO PHQ QUESTIONS 1-9: 0
2. FEELING DOWN, DEPRESSED OR HOPELESS: 0
1. LITTLE INTEREST OR PLEASURE IN DOING THINGS: 0
SUM OF ALL RESPONSES TO PHQ QUESTIONS 1-9: 0
SUM OF ALL RESPONSES TO PHQ QUESTIONS 1-9: 0
SUM OF ALL RESPONSES TO PHQ9 QUESTIONS 1 & 2: 0
SUM OF ALL RESPONSES TO PHQ QUESTIONS 1-9: 0

## 2024-01-09 ASSESSMENT — ENCOUNTER SYMPTOMS
CHEST TIGHTNESS: 0
SHORTNESS OF BREATH: 0
CONSTIPATION: 0
VOMITING: 0
BLOOD IN STOOL: 0
DIARRHEA: 0
ABDOMINAL PAIN: 0
COUGH: 0
NAUSEA: 0
BACK PAIN: 1

## 2024-01-09 NOTE — PATIENT INSTRUCTIONS
As discussed during your visit, please:    Stop taking amlodipine.    Stop taking the olmesartan (Benicar) and instead, start taking the combination medication olmesartan-hydrochlorothiazide    Increase the dose of furosemide (Lasix) to 40 mg once per day.    Keep your legs elevated above your waist height when resting.    Wear the compression stockings during the day.    Please have your labs drawn 3 days before your next clinic visit.  Get your labs drawn after at least 6 hours of fasting.  If you do not receive your results within one week of being drawn, please call the clinic at [phone].    When sitting, make sure you have lumbar support as demonstrated in the clinic.    You may try applying ice or a cold pack to your lower back.    Schedule an appointment with physical therapy.    If you have weakness in your legs or pain shooting down your legs, please call the clinic.    You may go to a local pharmacy to get the following vaccines:  RSV   Shingles  COVID-19  Polio    Please return in 2 weeks to follow-up on your blood pressure.     Please call the clinic if you have any questions or concerns, [phone].

## 2024-01-09 NOTE — PROGRESS NOTES
ANTICOAGULATION SERVICE    Francisco Manning is a 68 y.o. male with PMHx significant for bilateral PE, DVT (6/4/21) while on Xarelto, HTN who presents to clinic 1/9/2024 for anticoagulation monitoring and adjustment. Pt dx with bilat DVT 6/4/21 while on Xarelto 2yrs for hx of PE. Pt stopped Xarelto on 6/6/21 and started warfarin + lovenox bridge on 6/7/21. Patient notes that he does have an IVC filter.     Anticoagulation Indication(s): PE 2018, bilateral DVT 6/4/21 (on Xarelto)  Referring Physician: Dr. Toño James   Goal INR Range: 2-3  Duration of Anticoagulation Therapy: indefinite  Time of day dose taken: prefers to take in AM  Product patient has at home: warfarin 5 mg (peach color)    INR Summary                           Warfarin regimen (mg)  Date INR   A/P   Sun Mon Tue Wed Thu Fri Sat Mg/wk  1/9 3.7 Above goal, hold x1 5 5 10 0/5 5 5 5 40  12/12 1.5 Below goal, cont 5 5 10 5 5 5 5 40  11/28 1.7 Below goal, cont 5 5 10 5 5 5 5 40  11/21 1.3 Below goal, incr 5 5 10 5 5 5 5 40   10/9 2.5 At goal, continue 5 5 5 5 5 5 5 35   10/3 1.7 Below goal, continue 5 5 5 5 5 5 5 35   9/29 1.0 Below goal, bolus 5 5 5 5 5 10/5 5 35  9/14 3.4 Above goal, dec 5 5 5 5 5 5 5 35  8/3 3.7 Above goal, dec x2  7.5 5 5 5 5 2.5/5 5 37.5  7/13 4.5 Above goal, hold  10 5 5 5 5 0/5 5 40  6/27 2.1 At goal, no change 10 5 5 5 5 5 5 40  6/8 2.9 See below  10 5 5 5 5 5 5 40  5/25 2.6 At goal, no change 10 5 5 5 5 5 5 40  5/4 3.1 Above goal, continue 10 5 5 5 5 5 5 40  4/20 1.8 Below goal, continue 10 5 5 5 5 5 5 40  3/23 3.3 Above goal, continue 10 5 5 5 5 5 5 40  2/23 2.6 At goal, no change 10 5 5 5 5 5 5 40  1/26 2.0 At goal, no change 10 5 5 5 5 5 5 40  1/5 2.2 At goal, no change 5 10 5 5 10 5 5 45  12/20 2.9 At goal, continue 5 10 5 5 10 5 5 45  12/1 1.5 Below goal (missed) 5 10 5 5 10 5 5 45  11/17 2.1 At goal, continue 5 10 5 5 10 5 5 45  11/8 2.0 At goal, continue 5 10 5 5 10 5 5 45  10/27 1.3 Below goal

## 2024-01-09 NOTE — TELEPHONE ENCOUNTER
90-day supply request    Requested Prescriptions     Pending Prescriptions Disp Refills    furosemide (LASIX) 20 MG tablet [Pharmacy Med Name: FUROSEMIDE 20MG TABLETS] 180 tablet      Sig: TAKE 2 TABLETS BY MOUTH DAILY       Last Clinic Visit:  1/9/2024     Next Clinic Appointment:  1/23/2024

## 2024-01-09 NOTE — PROGRESS NOTES
[facility]    Mr. Francisco Manning is a 68 y.o.  gentleman with a MHx significant for HTN, BLE chronic venous insufficiency, DVT/PE, and anxiety, who presents to the clinic for a 5-week follow-up to discuss a 10-week h/o worsening BLE edema and a 1-month h/o progressively worsening low back pain.    Mr. Manning reports an ongoing, now 10-week h/o progressively increasing BLE edema with an additional 5-lb weight gain over the last 5 weeks.  The swelling is better when he wakes up from sleep in the morning.  He obtained compression stockings, but has not been wearing them.  He denies any diet changes, states that he does not eat TV dinners, canned foods, or other items rich in salt content.  He notes his BP to be in the 120s at home, despite the increase in the amlodipine from 5 mg qd to 10 mg qd.  He denies fever, sweats, chills, headache, lightheadedness, dizziness, numbness/tingling in hands/feet, cough/dyspnea, chest pain/pressure/tightness, palpitations, symptoms of orthopnea, symptoms of PND, decreased exercise tolerance, abdominal pain, changes in bowel habits, nausea, or vomiting.    He endorses a 4-week h/o gradual-onset, progressively worsening, constant, aching, deep, 10 out of 10 in intensity, low back pain in a bandlike pattern spanning the width of his back just above the belt line, which is exacerbated by lying flat on his back and \"walking a distance\".  Mild relief with application of a hot towel.  There is no change in severity with leaning forward.  He has not tried any over-the-counter medications for the pain.  Notably he had a total left hip replacement on 9/19/23 and has been having left hip pain with difficulty ambulating since then; and, more recently, he has been experiencing right hip pain.  He was not able to participate in physical therapy after the hip replacement as he had lost insurance until recently.  Additionally, over the last

## 2024-01-18 ENCOUNTER — ANTI-COAG VISIT (OUTPATIENT)
Dept: PHARMACY | Age: 69
End: 2024-01-18
Payer: MEDICARE

## 2024-01-18 DIAGNOSIS — I26.99 PULMONARY EMBOLISM, BILATERAL (HCC): Primary | ICD-10-CM

## 2024-01-18 DIAGNOSIS — I82.413 ACUTE DEEP VEIN THROMBOSIS (DVT) OF FEMORAL VEIN OF BOTH LOWER EXTREMITIES (HCC): ICD-10-CM

## 2024-01-18 LAB — INTERNATIONAL NORMALIZATION RATIO, POC: 1.8

## 2024-01-18 PROCEDURE — 99211 OFF/OP EST MAY X REQ PHY/QHP: CPT

## 2024-01-18 PROCEDURE — 85610 PROTHROMBIN TIME: CPT

## 2024-01-18 NOTE — PROGRESS NOTES
ANTICOAGULATION SERVICE    Francisco Manning is a 68 y.o. male with PMHx significant for bilateral PE, DVT (6/4/21) while on Xarelto, HTN who presents to clinic 1/18/2024 for anticoagulation monitoring and adjustment. Pt dx with bilat DVT 6/4/21 while on Xarelto 2yrs for hx of PE. Pt stopped Xarelto on 6/6/21 and started warfarin + lovenox bridge on 6/7/21. Patient notes that he does have an IVC filter.     Anticoagulation Indication(s): PE 2018, bilateral DVT 6/4/21 (on Xarelto)  Referring Physician: Dr. Toño James   Goal INR Range: 2-3  Duration of Anticoagulation Therapy: indefinite  Time of day dose taken: prefers to take in AM  Product patient has at home: warfarin 5 mg (peach color)    INR Summary                           Warfarin regimen (mg)  Date INR   A/P   Sun Mon Tue Wed Thu Fri Sat Mg/wk  1/18 1.8 Below goal, cont 5 5 10 5 5 5 5 40  1/9 3.7 Above goal, hold x1 5 5 10 0/5 5 5 5 40  12/12 1.5 Below goal, cont 5 5 10 5 5 5 5 40  11/28 1.7 Below goal, cont 5 5 10 5 5 5 5 40  11/21 1.3 Below goal, incr 5 5 10 5 5 5 5 40   10/9 2.5 At goal, continue 5 5 5 5 5 5 5 35   10/3 1.7 Below goal, continue 5 5 5 5 5 5 5 35   9/29 1.0 Below goal, bolus 5 5 5 5 5 10/5 5 35  9/14 3.4 Above goal, dec 5 5 5 5 5 5 5 35  8/3 3.7 Above goal, dec x2  7.5 5 5 5 5 2.5/5 5 37.5  7/13 4.5 Above goal, hold  10 5 5 5 5 0/5 5 40  6/27 2.1 At goal, no change 10 5 5 5 5 5 5 40  6/8 2.9 See below  10 5 5 5 5 5 5 40  5/25 2.6 At goal, no change 10 5 5 5 5 5 5 40  5/4 3.1 Above goal, continue 10 5 5 5 5 5 5 40  4/20 1.8 Below goal, continue 10 5 5 5 5 5 5 40  3/23 3.3 Above goal, continue 10 5 5 5 5 5 5 40  2/23 2.6 At goal, no change 10 5 5 5 5 5 5 40  1/26 2.0 At goal, no change 10 5 5 5 5 5 5 40  1/5 2.2 At goal, no change 5 10 5 5 10 5 5 45  12/20 2.9 At goal, continue 5 10 5 5 10 5 5 45  12/1 1.5 Below goal (missed) 5 10 5 5 10 5 5 45  11/17 2.1 At goal, continue 5 10 5 5 10 5 5 45  11/8 2.0 At goal,

## 2024-01-19 ENCOUNTER — TELEPHONE (OUTPATIENT)
Dept: PHARMACY | Age: 69
End: 2024-01-19

## 2024-01-19 DIAGNOSIS — I82.413 ACUTE DEEP VEIN THROMBOSIS (DVT) OF FEMORAL VEIN OF BOTH LOWER EXTREMITIES (HCC): ICD-10-CM

## 2024-01-19 DIAGNOSIS — I26.99 PULMONARY EMBOLISM, BILATERAL (HCC): Primary | ICD-10-CM

## 2024-01-19 NOTE — TELEPHONE ENCOUNTER
Dr. James,    The patient's warfarin therapy is currently being managed by [facility]. The referral on file is going to  soon. Please sign pended referral for patient to continue care with the anticoagulation clinic.     You can also fax signed referral to [phone] if you prefer to send paper copy instead of electronic.     Thanks,   Mala Arciniega, PharmD, BCPS  [facility]  Ginny: [phone]  Arti: [phone]  2024 9:05 AM

## 2024-01-23 DIAGNOSIS — Z12.5 PROSTATE CANCER SCREENING: ICD-10-CM

## 2024-01-23 DIAGNOSIS — R60.0 BILATERAL LOWER EXTREMITY EDEMA: ICD-10-CM

## 2024-01-23 DIAGNOSIS — I10 PRIMARY HYPERTENSION: ICD-10-CM

## 2024-01-24 LAB
ALBUMIN SERPL-MCNC: 4.5 G/DL (ref 3.4–5)
ANION GAP SERPL CALCULATED.3IONS-SCNC: 13 MMOL/L (ref 3–16)
BASOPHILS # BLD: 0 K/UL (ref 0–0.2)
BASOPHILS NFR BLD: 0.7 %
CALCIUM SERPL-MCNC: 9.1 MG/DL (ref 8.3–10.6)
CHLORIDE SERPL-SCNC: 99 MMOL/L (ref 99–110)
CHOLEST SERPL-MCNC: 144 MG/DL (ref 0–199)
CO2 SERPL-SCNC: 28 MMOL/L (ref 21–32)
CREAT SERPL-MCNC: 0.7 MG/DL (ref 0.8–1.3)
DEPRECATED RDW RBC AUTO: 19 % (ref 12.4–15.4)
EOSINOPHIL # BLD: 0 K/UL (ref 0–0.6)
EOSINOPHIL NFR BLD: 0.1 %
GFR SERPLBLD CREATININE-BSD FMLA CKD-EPI: >60 ML/MIN/{1.73_M2}
GLUCOSE SERPL-MCNC: 107 MG/DL (ref 70–99)
HCT VFR BLD AUTO: 39.3 % (ref 40.5–52.5)
HDLC SERPL-MCNC: 42 MG/DL (ref 40–60)
HGB BLD-MCNC: 12.9 G/DL (ref 13.5–17.5)
LDL CHOLESTEROL CALCULATED: 61 MG/DL
LYMPHOCYTES # BLD: 1.4 K/UL (ref 1–5.1)
MAGNESIUM SERPL-MCNC: 2 MG/DL (ref 1.8–2.4)
MCH RBC QN AUTO: 29.6 PG (ref 26–34)
MCHC RBC AUTO-ENTMCNC: 32.8 G/DL (ref 31–36)
MCV RBC AUTO: 90.2 FL (ref 80–100)
MONOCYTES # BLD: 0.5 K/UL (ref 0–1.3)
MONOCYTES NFR BLD: 10.6 %
NEUTROPHILS # BLD: 2.9 K/UL (ref 1.7–7.7)
NEUTROPHILS NFR BLD: 59.8 %
PHOSPHATE SERPL-MCNC: 3.3 MG/DL (ref 2.5–4.9)
PLATELET # BLD AUTO: 299 K/UL (ref 135–450)
PMV BLD AUTO: 8.2 FL (ref 5–10.5)
POTASSIUM SERPL-SCNC: 4 MMOL/L (ref 3.5–5.1)
PSA SERPL DL<=0.01 NG/ML-MCNC: 3.66 NG/ML (ref 0–4)
RBC # BLD AUTO: 4.36 M/UL (ref 4.2–5.9)
SODIUM SERPL-SCNC: 140 MMOL/L (ref 136–145)
TRIGL SERPL-MCNC: 204 MG/DL (ref 0–150)
VLDLC SERPL CALC-MCNC: 41 MG/DL
WBC # BLD AUTO: 4.9 K/UL (ref 4–11)

## 2024-02-01 ENCOUNTER — TELEPHONE (OUTPATIENT)
Dept: PHARMACY | Age: 69
End: 2024-02-01

## 2024-02-01 NOTE — TELEPHONE ENCOUNTER
Patient no-showed to Medication Management Clinic visit today.  Called patient and left voicemail asking patient to call back as soon as possible to reschedule appointment.    Sophia Goodman, PharmD, BCACP  2/1/2024 3:23 PM

## 2024-02-05 NOTE — TELEPHONE ENCOUNTER
LVM#2    Zee Goodman, PharmD, BCACP  Medication Management Clinic   [facility] Ginny Ph: [phone]  [facility] Arti Ph: [phone]  2/5/2024 10:37 AM

## 2024-02-09 DIAGNOSIS — R60.0 BILATERAL LOWER EXTREMITY EDEMA: ICD-10-CM

## 2024-02-09 RX ORDER — FUROSEMIDE 20 MG/1
40 TABLET ORAL DAILY
Qty: 180 TABLET | Refills: 0 | Status: SHIPPED | OUTPATIENT
Start: 2024-02-09

## 2024-02-09 NOTE — TELEPHONE ENCOUNTER
Requested Prescriptions     Pending Prescriptions Disp Refills    furosemide (LASIX) 20 MG tablet [Pharmacy Med Name: FUROSEMIDE 20MG TABLETS] 180 tablet 0     Sig: TAKE 2 TABLETS BY MOUTH DAILY       Last Clinic Visit:  1/9/2024     Next Clinic Appointment:  2/15/2024

## 2024-02-12 NOTE — TELEPHONE ENCOUNTER
Called, mailbox full/ unable to LVM.    Zee Goodman, PharmD, BCACP  Medication Management Clinic   [facility] Ginny Ph: [phone]  [facility] Arti Ph: [phone]  2/12/2024 2:58 PM

## 2024-02-14 NOTE — TELEPHONE ENCOUNTER
Called, same result.     Zee Goodman, PharmD, BCACP  Medication Management Clinic   [facility] Ginny Ph: [phone]  [facility] Arti Ph: [phone]  2/14/2024 11:31 AM

## 2024-02-22 NOTE — TELEPHONE ENCOUNTER
Called, same result.    Zee Goodman, PharmD, BCACP  Medication Management Clinic   [facility] Ginny Ph: [phone]  [facility] Arti Ph: [phone]  2/22/2024 3:23 PM

## 2024-02-28 DIAGNOSIS — I82.413 ACUTE DEEP VEIN THROMBOSIS (DVT) OF FEMORAL VEIN OF BOTH LOWER EXTREMITIES (HCC): ICD-10-CM

## 2024-02-28 RX ORDER — WARFARIN SODIUM 5 MG/1
5 TABLET ORAL DAILY
Qty: 90 TABLET | Refills: 1 | Status: SHIPPED | OUTPATIENT
Start: 2024-02-28

## 2024-02-28 NOTE — TELEPHONE ENCOUNTER
Requested Prescriptions     Pending Prescriptions Disp Refills    warfarin (COUMADIN) 5 MG tablet 90 tablet 1     Sig: Take 1 tablet by mouth daily       Last Clinic Visit:  1/9/2024     Next Clinic Appointment:  2/28/2024

## 2024-03-07 ENCOUNTER — ANTI-COAG VISIT (OUTPATIENT)
Dept: PHARMACY | Age: 69
End: 2024-03-07
Payer: MEDICARE

## 2024-03-07 DIAGNOSIS — I26.99 PULMONARY EMBOLISM, BILATERAL (HCC): Primary | ICD-10-CM

## 2024-03-07 DIAGNOSIS — I82.413 ACUTE DEEP VEIN THROMBOSIS (DVT) OF FEMORAL VEIN OF BOTH LOWER EXTREMITIES (HCC): ICD-10-CM

## 2024-03-07 LAB — INTERNATIONAL NORMALIZATION RATIO, POC: 3.1

## 2024-03-07 PROCEDURE — 99211 OFF/OP EST MAY X REQ PHY/QHP: CPT

## 2024-03-07 PROCEDURE — 85610 PROTHROMBIN TIME: CPT

## 2024-03-07 NOTE — PROGRESS NOTES
continue 5 10 5 5 10 5 5 45  11/8 2.0 At goal, continue 5 10 5 5 10 5 5 45  10/27 1.3 Below goal (missed) 5 10 5 5 10 5 5 45  10/20 2.3 At goal, continue 5 10 5 5 10 5 5 45       Last CBC:  Lab Results   Component Value Date    RBC 4.36 01/23/2024    HGB 12.9 (L) 01/23/2024    HCT 39.3 (L) 01/23/2024    MCV 90.2 01/23/2024    MCH 29.6 01/23/2024    MPV 8.2 01/23/2024    RDW 19.0 (H) 01/23/2024     01/23/2024     Patient History:  Recent hospitalizations/HC visits 9/19/23: hip replacement at [facility], held warfarin x5 days with Lovenox bridge   10/7&10/28/22: ortho re: hip replacement  4/27/22: Dr Jade ([facility]) recommends IVC placed prior to hip replacement  4/7/22: OV GI- Pt needs to repeat stool hemoccult, then schedule colonoscopy--pt looking for GI doctor closer to home.    Recent medication changes None reported   Medications taken regularly that may interact with warfarin or alter INR No significant drug interactions identified   Warfarin dose taken as prescribed Questionable. Pt often claims that he follows the dosing calendar, but often misses doses.  12/12/23: Missed a dose last week and only taking 5 mg daily   Signs/symptoms of bleeding None reported  Stool hemoccult negative 4/6/22   Vitamin K intake Normally has 0-1 serving of green, leafy vegetables per week; Pt often notes that he has very little appetite.    Recent vomiting/diarrhea/fever, changes in weight or activity level None reported   Tobacco or alcohol use Often drinks 2-3 large glasses of wine, but states each glass only has ~4 oz wine mixed with grape juice. Occasionally has more.   1/5/22: quit smoking (was smoking 2 cig/d)   11/21/23: quit drinking  1/9/24- current: drinking 2-3 beers a few times a week  1/18/24-current: has been smoking 1-2 cigars/d    Upcoming surgeries or procedures Colonoscopy cxl 6/23-- he was unable to tolerate the prep.      Assessment/Plan:  Patient's INR was slightly supratherapeutic (3.1) today. Patient has

## 2024-03-11 ENCOUNTER — OFFICE VISIT (OUTPATIENT)
Dept: INTERNAL MEDICINE CLINIC | Age: 69
End: 2024-03-11
Payer: MEDICARE

## 2024-03-11 VITALS
BODY MASS INDEX: 26 KG/M2 | TEMPERATURE: 97.3 F | HEIGHT: 70 IN | RESPIRATION RATE: 20 BRPM | OXYGEN SATURATION: 98 % | DIASTOLIC BLOOD PRESSURE: 87 MMHG | SYSTOLIC BLOOD PRESSURE: 143 MMHG | HEART RATE: 69 BPM | WEIGHT: 181.6 LBS

## 2024-03-11 DIAGNOSIS — G89.29 CHRONIC MIDLINE LOW BACK PAIN WITHOUT SCIATICA: ICD-10-CM

## 2024-03-11 DIAGNOSIS — R60.0 BILATERAL LOWER EXTREMITY EDEMA: Primary | ICD-10-CM

## 2024-03-11 DIAGNOSIS — F17.210 CIGARETTE NICOTINE DEPENDENCE WITHOUT COMPLICATION: ICD-10-CM

## 2024-03-11 DIAGNOSIS — I26.99 PULMONARY EMBOLISM, BILATERAL (HCC): ICD-10-CM

## 2024-03-11 DIAGNOSIS — I10 PRIMARY HYPERTENSION: ICD-10-CM

## 2024-03-11 DIAGNOSIS — F51.01 PRIMARY INSOMNIA: ICD-10-CM

## 2024-03-11 DIAGNOSIS — R97.20 ELEVATED PROSTATE SPECIFIC ANTIGEN (PSA): ICD-10-CM

## 2024-03-11 DIAGNOSIS — E53.8 LOW FOLATE: ICD-10-CM

## 2024-03-11 DIAGNOSIS — M54.50 CHRONIC MIDLINE LOW BACK PAIN WITHOUT SCIATICA: ICD-10-CM

## 2024-03-11 PROBLEM — F11.11 HISTORY OF HEROIN ABUSE (HCC): Status: RESOLVED | Noted: 2020-08-19 | Resolved: 2024-03-11

## 2024-03-11 PROCEDURE — 99213 OFFICE O/P EST LOW 20 MIN: CPT

## 2024-03-11 RX ORDER — FOLIC ACID 1 MG/1
1000 TABLET ORAL DAILY
Qty: 90 TABLET | Refills: 0 | Status: SHIPPED | OUTPATIENT
Start: 2024-03-11

## 2024-03-11 RX ORDER — NICOTINE 21 MG/24HR
1 PATCH, TRANSDERMAL 24 HOURS TRANSDERMAL DAILY
Qty: 14 PATCH | Refills: 0 | Status: SHIPPED | OUTPATIENT
Start: 2024-03-11 | End: 2024-03-25

## 2024-03-11 RX ORDER — LANOLIN ALCOHOL/MO/W.PET/CERES
1 CREAM (GRAM) TOPICAL NIGHTLY PRN
Qty: 90 TABLET | Refills: 1 | Status: SHIPPED | OUTPATIENT
Start: 2024-03-11

## 2024-03-11 RX ORDER — PANTOPRAZOLE SODIUM 40 MG/1
40 TABLET, DELAYED RELEASE ORAL
Qty: 90 TABLET | Refills: 3 | Status: SHIPPED | OUTPATIENT
Start: 2024-03-11

## 2024-03-11 ASSESSMENT — ENCOUNTER SYMPTOMS
SHORTNESS OF BREATH: 0
BACK PAIN: 1

## 2024-03-11 NOTE — ASSESSMENT & PLAN NOTE
Well-controlled, continue current medications  -Improving with medication changes, though unclear on the patient's actual medication adherence given poor medical literacy  - There was some report of a medication that worsened his lower extremity swelling, but he was unable to name it.  Of note, he stated that he stopped taking amlodipine and that it was not that medication       -He did state that he would call back with the medication information  - Continue Lasix 40 mg daily, continue Benicar 40-25 mg daily  - Patient has compression stockings but is not wearing them, instructed him to use them, and to elevate his legs when he can  - Educated the patient on a low-salt diet

## 2024-03-11 NOTE — ASSESSMENT & PLAN NOTE
Borderline controlled, continue current medications  - /87 in office today, patient admits to fast food diet, instructed him on a lower salt diet  - No medication changes made today on advisement of supervision

## 2024-03-11 NOTE — ASSESSMENT & PLAN NOTE
- PSA steadily trending up: 3.66 from 2.82 from 1.44 from 2.33  - Unlikely the cause of the patient's back pain, however warrants referral to urology  - Per patient preference, he would like to go to [facility], so external referral was made.  However he has to make the appointment

## 2024-03-11 NOTE — PROGRESS NOTES
mobility and strengthening exercises, and he states that he is looking forward to using the bike.    The patient notes that he did  cigarettes again, and is currently smoking.  He said that he quit last time with use of nicotine patch, but is worried about cost.    Review of Systems   Constitutional:  Negative for activity change and fatigue.   Respiratory:  Negative for shortness of breath.    Cardiovascular:  Positive for leg swelling. Negative for chest pain.   Genitourinary:  Negative for difficulty urinating and enuresis.   Musculoskeletal:  Positive for back pain.   Neurological:  Negative for weakness and numbness.       MEDICATIONS:  Prior to Visit Medications    Medication Sig Taking? Authorizing Provider   melatonin 3 MG TABS tablet Take 1 tablet by mouth nightly as needed (for sleep) Yes Akira Dorsey DO   nicotine (NICODERM CQ) 14 MG/24HR Place 1 patch onto the skin daily for 14 days Yes Akira Dorsey, DO   pantoprazole (PROTONIX) 40 MG tablet Take 1 tablet by mouth every morning (before breakfast) Yes Akira Dorsey DO   folic acid (FOLVITE) 1 MG tablet Take 1 tablet by mouth daily Yes Akira Dorsey DO   warfarin (COUMADIN) 5 MG tablet Take 1 tablet by mouth daily Yes Kris Edgar MD   furosemide (LASIX) 20 MG tablet TAKE 2 TABLETS BY MOUTH DAILY Yes Anastacia Murillo MD   olmesartan-hydroCHLOROthiazide (BENICAR HCT) 40-25 MG per tablet Take 1 tablet by mouth daily Yes Estrella Robles MD   diclofenac sodium (VOLTAREN) 1 % GEL Apply 4 g topically 4 times daily as needed for Pain Yes Kris Edgar MD        Vitals:    03/11/24 1358   BP: (!) 143/87   Site: Left Upper Arm   Position: Sitting   Cuff Size: Medium Adult   Pulse: 69   Resp: 20   Temp: 97.3 °F (36.3 °C)   TempSrc: Temporal   SpO2: 98%   Weight: 82.4 kg (181 lb 9.6 oz)   Height: 1.778 m (5' 10\")      Estimated body mass index is 26.06 kg/m² as calculated from the following:    Height as of

## 2024-03-11 NOTE — ASSESSMENT & PLAN NOTE
Improving with physical therapy  - Patient requested muscle relaxant, instructed him that this is not a good idea, as he has side effects, he is already complaining of losing some strength in his legs.  - Continue physical therapy

## 2024-03-11 NOTE — PATIENT INSTRUCTIONS
Looks like your PSA (prostate-specific antigen) has been slowly elevating over the past few years.  It is still just within normal limits, on the high side of normal.  I think it is best that you see a neurologist to make sure everything is okay with your prostate.  I put in an external referral for urology so that you can see a urologist at [facility] per your preference.  Because it is an external referral, you do need to call to make an appointment.  The number is listed in the referral which has been printed for you, and is here: [phone]   Please try to limit your fast food intake, or if you do eat fast food ask for low-salt options.  Please wear your compression stockings and elevate your legs when possible.  Swelling.

## 2024-03-21 ENCOUNTER — TELEPHONE (OUTPATIENT)
Dept: INTERNAL MEDICINE CLINIC | Age: 69
End: 2024-03-21

## 2024-03-21 NOTE — TELEPHONE ENCOUNTER
PT LVM STATING HE'S STILL WAITING FOR A CALL RE: PSA NUMBERS. PT IS CONCERNED BECAUSE HE WAS TOLD A LAST O/V HIS PSA WAS HIGH.  PT CAN BE REACHED [phone]

## 2024-05-02 ENCOUNTER — TELEPHONE (OUTPATIENT)
Dept: PHARMACY | Age: 69
End: 2024-05-02

## 2024-05-09 ENCOUNTER — ANTI-COAG VISIT (OUTPATIENT)
Dept: PHARMACY | Age: 69
End: 2024-05-09
Payer: COMMERCIAL

## 2024-05-09 DIAGNOSIS — I82.413 ACUTE DEEP VEIN THROMBOSIS (DVT) OF FEMORAL VEIN OF BOTH LOWER EXTREMITIES (HCC): ICD-10-CM

## 2024-05-09 DIAGNOSIS — I26.99 PULMONARY EMBOLISM, BILATERAL (HCC): Primary | ICD-10-CM

## 2024-05-09 LAB — INTERNATIONAL NORMALIZATION RATIO, POC: 2.3

## 2024-05-09 PROCEDURE — 85610 PROTHROMBIN TIME: CPT

## 2024-05-09 PROCEDURE — 99211 OFF/OP EST MAY X REQ PHY/QHP: CPT

## 2024-05-09 NOTE — PROGRESS NOTES
ANTICOAGULATION SERVICE    Francisco Manning is a 68 y.o. male with PMHx significant for bilateral PE, DVT (6/4/21) while on Xarelto, HTN who presents to clinic 5/9/2024 for anticoagulation monitoring and adjustment. Pt dx with bilat DVT 6/4/21 while on Xarelto 2yrs for hx of PE. Pt stopped Xarelto on 6/6/21 and started warfarin + lovenox bridge on 6/7/21. Patient notes that he does have an IVC filter.     Anticoagulation Indication(s): PE 2018, bilateral DVT 6/4/21 (on Xarelto)  Referring Physician: Dr. Toño James   Goal INR Range: 2-3  Duration of Anticoagulation Therapy: indefinite  Time of day dose taken: prefers to take in AM  Product patient has at home: warfarin 5 mg (peach color)    INR Summary                           Warfarin regimen (mg)  Date INR   A/P   Sun Mon Tue Wed Thu Fri Sat Mg/wk  5/9 2.3 Above goal, cont 5 5 10 5 5 5 5 40  3/7 3.1 Above goal, cont 5 5 10 5 5 5 5 40  1/18 1.8 Below goal, cont 5 5 10 5 5 5 5 40  1/9 3.7 Above goal, hold x1 5 5 10 0/5 5 5 5 40  12/12 1.5 Below goal, cont 5 5 10 5 5 5 5 40  11/28 1.7 Below goal, cont 5 5 10 5 5 5 5 40  11/21 1.3 Below goal, incr 5 5 10 5 5 5 5 40   10/9 2.5 At goal, continue 5 5 5 5 5 5 5 35   10/3 1.7 Below goal, continue 5 5 5 5 5 5 5 35   9/29 1.0 Below goal, bolus 5 5 5 5 5 10/5 5 35  9/14 3.4 Above goal, dec 5 5 5 5 5 5 5 35  8/3 3.7 Above goal, dec x2  7.5 5 5 5 5 2.5/5 5 37.5  7/13 4.5 Above goal, hold  10 5 5 5 5 0/5 5 40  6/27 2.1 At goal, no change 10 5 5 5 5 5 5 40  6/8 2.9 See below  10 5 5 5 5 5 5 40  5/25 2.6 At goal, no change 10 5 5 5 5 5 5 40  5/4 3.1 Above goal, continue 10 5 5 5 5 5 5 40  4/20 1.8 Below goal, continue 10 5 5 5 5 5 5 40  3/23 3.3 Above goal, continue 10 5 5 5 5 5 5 40  2/23 2.6 At goal, no change 10 5 5 5 5 5 5 40  1/26 2.0 At goal, no change 10 5 5 5 5 5 5 40  1/5 2.2 At goal, no change 5 10 5 5 10 5 5 45  12/20 2.9 At goal, continue 5 10 5 5 10 5 5 45  12/1 1.5 Below goal (missed) 5 10 5 5 10 5 5 45  11/17 2.1 At

## 2024-06-13 ENCOUNTER — ANTI-COAG VISIT (OUTPATIENT)
Dept: PHARMACY | Age: 69
End: 2024-06-13
Payer: COMMERCIAL

## 2024-06-13 DIAGNOSIS — I26.99 PULMONARY EMBOLISM, BILATERAL (HCC): Primary | ICD-10-CM

## 2024-06-13 DIAGNOSIS — I82.413 ACUTE DEEP VEIN THROMBOSIS (DVT) OF FEMORAL VEIN OF BOTH LOWER EXTREMITIES (HCC): ICD-10-CM

## 2024-06-13 LAB — INTERNATIONAL NORMALIZATION RATIO, POC: 2

## 2024-06-13 PROCEDURE — 99211 OFF/OP EST MAY X REQ PHY/QHP: CPT

## 2024-06-13 PROCEDURE — 85610 PROTHROMBIN TIME: CPT

## 2024-06-13 NOTE — PROGRESS NOTES
ANTICOAGULATION SERVICE    Francisco Manning is a 68 y.o. male with PMHx significant for bilateral PE, DVT (6/4/21) while on Xarelto, HTN who presents to clinic 6/13/2024 for anticoagulation monitoring and adjustment. Pt dx with bilat DVT 6/4/21 while on Xarelto 2yrs for hx of PE. Pt stopped Xarelto on 6/6/21 and started warfarin + lovenox bridge on 6/7/21. Patient notes that he does have an IVC filter.     Anticoagulation Indication(s): PE 2018, bilateral DVT 6/4/21 (on Xarelto)  Referring Physician: Dr. Toño James   Goal INR Range: 2-3  Duration of Anticoagulation Therapy: indefinite  Time of day dose taken: prefers to take in AM  Product patient has at home: warfarin 5 mg (peach color)    INR Summary                           Warfarin regimen (mg)  Date INR   A/P   Sun Mon Tue Wed Thu Fri Sat Mg/wk  6/13 2.0 At goal, cont  5 5 10 5 5 5 5 40  5/9 2.3 At goal, cont  5 5 10 5 5 5 5 40  3/7 3.1 Above goal, cont 5 5 10 5 5 5 5 40  1/18 1.8 Below goal, cont 5 5 10 5 5 5 5 40  1/9 3.7 Above goal, hold x1 5 5 10 0/5 5 5 5 40  12/12 1.5 Below goal, cont 5 5 10 5 5 5 5 40  11/28 1.7 Below goal, cont 5 5 10 5 5 5 5 40  11/21 1.3 Below goal, incr 5 5 10 5 5 5 5 40   10/9 2.5 At goal, continue 5 5 5 5 5 5 5 35   10/3 1.7 Below goal, continue 5 5 5 5 5 5 5 35   9/29 1.0 Below goal, bolus 5 5 5 5 5 10/5 5 35  9/14 3.4 Above goal, dec 5 5 5 5 5 5 5 35  8/3 3.7 Above goal, dec x2  7.5 5 5 5 5 2.5/5 5 37.5  7/13 4.5 Above goal, hold  10 5 5 5 5 0/5 5 40  6/27 2.1 At goal, no change 10 5 5 5 5 5 5 40  6/8 2.9 See below  10 5 5 5 5 5 5 40  5/25 2.6 At goal, no change 10 5 5 5 5 5 5 40  5/4 3.1 Above goal, continue 10 5 5 5 5 5 5 40  4/20 1.8 Below goal, continue 10 5 5 5 5 5 5 40  3/23 3.3 Above goal, continue 10 5 5 5 5 5 5 40  2/23 2.6 At goal, no change 10 5 5 5 5 5 5 40  1/26 2.0 At goal, no change 10 5 5 5 5 5 5 40  1/5 2.2 At goal, no change 5 10 5 5 10 5 5 45  12/20 2.9 At goal, continue 5 10 5 5 10 5 5 45  12/1 1.5 Below goal

## 2024-07-05 ENCOUNTER — TELEPHONE (OUTPATIENT)
Dept: INTERNAL MEDICINE CLINIC | Age: 69
End: 2024-07-05

## 2024-07-05 NOTE — TELEPHONE ENCOUNTER
Dr. Hatch ordered to send patient to ER .  Patient notified and instructed to go Immediately to ER to be evaluated . He agrees with the plan and states he will go now.

## 2024-07-05 NOTE — TELEPHONE ENCOUNTER
PT STATED HE'S HAVE BAD LEG PAIN WITH SWELLING.  PT STATED HE HAS HX OF BLOOD CLOTS IN HIS LEGS AND CHEST.  PT CAN BE REACHED -[phone]

## 2024-07-09 ENCOUNTER — TELEPHONE (OUTPATIENT)
Dept: PHARMACY | Age: 69
End: 2024-07-09

## 2024-07-09 DIAGNOSIS — I26.99 PULMONARY EMBOLISM, BILATERAL (HCC): Primary | ICD-10-CM

## 2024-07-09 DIAGNOSIS — I82.413 ACUTE DEEP VEIN THROMBOSIS (DVT) OF FEMORAL VEIN OF BOTH LOWER EXTREMITIES (HCC): ICD-10-CM

## 2024-07-09 NOTE — TELEPHONE ENCOUNTER
Pt no show to clinic appt    7/5 went to ED - DVT found in legs - started Eliquis and stopped warfarin    Zee - follow up with patient, unsure if we should be discharging from clinic or?

## 2024-07-15 RX ORDER — APIXABAN 5 MG (74)
5 KIT ORAL 2 TIMES DAILY
COMMUNITY
Start: 2024-07-05

## 2024-07-15 NOTE — TELEPHONE ENCOUNTER
Confirmed with pt that he is taking eliquis 5 mg twice daily. He stopped warfarin. He will need refill on eliquis from pcp. Order pended.  Pt d/c from [facility] clinic, as pt no longer needs INR checks.     Zee Goodman, PharmD, BCACP  [facility]   [facility] Ph: [phone]  [facility] Ph: [phone]  7/15/2024 3:22 PM

## 2024-07-15 NOTE — TELEPHONE ENCOUNTER
Requested Prescriptions     Pending Prescriptions Disp Refills    apixaban (ELIQUIS) 5 MG TABS tablet 180 tablet 0     Sig: Take 1 tablet by mouth 2 times daily       Last Clinic Visit:  3/11/2024     Next Clinic Appointment:  7/22/2024

## 2024-07-22 ENCOUNTER — OFFICE VISIT (OUTPATIENT)
Dept: INTERNAL MEDICINE CLINIC | Age: 69
End: 2024-07-22
Payer: MEDICARE

## 2024-07-22 VITALS
WEIGHT: 168.7 LBS | BODY MASS INDEX: 24.15 KG/M2 | SYSTOLIC BLOOD PRESSURE: 126 MMHG | TEMPERATURE: 97.7 F | DIASTOLIC BLOOD PRESSURE: 85 MMHG | OXYGEN SATURATION: 97 % | HEIGHT: 70 IN | RESPIRATION RATE: 16 BRPM | HEART RATE: 56 BPM

## 2024-07-22 DIAGNOSIS — F51.01 PRIMARY INSOMNIA: ICD-10-CM

## 2024-07-22 DIAGNOSIS — I82.413 ACUTE DEEP VEIN THROMBOSIS (DVT) OF FEMORAL VEIN OF BOTH LOWER EXTREMITIES (HCC): Primary | ICD-10-CM

## 2024-07-22 DIAGNOSIS — Z00.00 ROUTINE HEALTH MAINTENANCE: ICD-10-CM

## 2024-07-22 DIAGNOSIS — F17.210 CIGARETTE NICOTINE DEPENDENCE WITHOUT COMPLICATION: ICD-10-CM

## 2024-07-22 PROCEDURE — 99213 OFFICE O/P EST LOW 20 MIN: CPT

## 2024-07-22 RX ORDER — NICOTINE 21 MG/24HR
1 PATCH, TRANSDERMAL 24 HOURS TRANSDERMAL DAILY
Qty: 14 PATCH | Refills: 0 | Status: SHIPPED | OUTPATIENT
Start: 2024-07-22 | End: 2024-08-05

## 2024-07-22 RX ORDER — LANOLIN ALCOHOL/MO/W.PET/CERES
1 CREAM (GRAM) TOPICAL NIGHTLY PRN
Qty: 90 TABLET | Refills: 1 | Status: SHIPPED | OUTPATIENT
Start: 2024-07-22

## 2024-07-22 NOTE — PROGRESS NOTES
Reported on 7/22/2024  Akira Dorsey, DO        Vitals:    07/22/24 1337   BP: 126/85   Site: Left Upper Arm   Position: Sitting   Cuff Size: Medium Adult   Pulse: 56   Resp: 16   Temp: 97.7 °F (36.5 °C)   TempSrc: Temporal   SpO2: 97%   Weight: 76.5 kg (168 lb 11.2 oz)   Height: 1.778 m (5' 10\")      Estimated body mass index is 24.21 kg/m² as calculated from the following:    Height as of this encounter: 1.778 m (5' 10\").    Weight as of this encounter: 76.5 kg (168 lb 11.2 oz).  Physical Exam  Constitutional:       General: He is not in acute distress.     Appearance: He is not toxic-appearing.   HENT:      Mouth/Throat:      Mouth: Mucous membranes are moist.   Eyes:      Extraocular Movements: Extraocular movements intact.      Pupils: Pupils are equal, round, and reactive to light.   Cardiovascular:      Rate and Rhythm: Normal rate and regular rhythm.      Pulses: Normal pulses.      Heart sounds: No murmur heard.     No gallop.   Pulmonary:      Effort: Pulmonary effort is normal. No respiratory distress.      Breath sounds: Normal breath sounds. No wheezing or rales.   Abdominal:      General: Abdomen is flat.      Palpations: Abdomen is soft.      Tenderness: There is no abdominal tenderness.   Musculoskeletal:      Right lower leg: Edema (trace) present.      Left lower leg: Edema (trace) present.   Skin:     General: Skin is warm and dry.      Capillary Refill: Capillary refill takes less than 2 seconds.   Neurological:      General: No focal deficit present.      Mental Status: He is alert and oriented to person, place, and time.   Psychiatric:         Mood and Affect: Mood normal.         Thought Content: Thought content normal.         ASSESSMENT/PLAN:       Bilateral lower extremity swelling  - s/p thrombectomy of acute deep vein thrombosis of femoral vein of left lower extremity 7/16/24  - referral for vascular surgery for post-procedural follow-up made  - continue eliquis 5 MG  - continue

## 2024-07-22 NOTE — PATIENT INSTRUCTIONS
Please take your medications as directed.   Please see vascular surgery for your post-hospital follow up.  Please call to schedule an appointment with gastroenterology.  Please follow-up with outpatient clinic in approximately 2 months.   If you have any questions, do not hesitate to contact us or your PCP   If you feel seriously ill, please go directly to the Emergency Department, or call 911.

## 2024-08-06 DIAGNOSIS — E53.8 LOW FOLATE: ICD-10-CM

## 2024-08-06 RX ORDER — FOLIC ACID 1 MG/1
1000 TABLET ORAL DAILY
Qty: 90 TABLET | Refills: 0 | Status: SHIPPED | OUTPATIENT
Start: 2024-08-06

## 2024-08-06 NOTE — TELEPHONE ENCOUNTER
Requested Prescriptions     Pending Prescriptions Disp Refills    folic acid (FOLVITE) 1 MG tablet [Pharmacy Med Name: FOLIC ACID 1 MG TABLET] 90 tablet 0     Sig: TAKE 1 TABLET BY MOUTH EVERY DAY       Last Clinic Visit:  7/22/2024     Next Clinic Appointment:  9/10/2024

## 2024-08-16 ENCOUNTER — TELEPHONE (OUTPATIENT)
Dept: VASCULAR SURGERY | Age: 69
End: 2024-08-16

## 2024-08-23 DIAGNOSIS — E53.8 LOW FOLATE: ICD-10-CM

## 2024-08-23 RX ORDER — FOLIC ACID 1 MG/1
1000 TABLET ORAL DAILY
Qty: 90 TABLET | Refills: 0 | Status: SHIPPED | OUTPATIENT
Start: 2024-08-23

## 2025-01-03 RX ORDER — OLMESARTAN MEDOXOMIL AND HYDROCHLOROTHIAZIDE 40/25 40; 25 MG/1; MG/1
1 TABLET ORAL DAILY
Qty: 90 TABLET | Refills: 1 | Status: SHIPPED | OUTPATIENT
Start: 2025-01-03

## 2025-01-03 NOTE — TELEPHONE ENCOUNTER
Requested Prescriptions     Pending Prescriptions Disp Refills    olmesartan-hydroCHLOROthiazide (BENICAR HCT) 40-25 MG per tablet 90 tablet 1     Sig: Take 1 tablet by mouth daily       Last Clinic Visit:  7/22/2024     Next Clinic Appointment:  1/14/2025

## 2025-04-18 RX ORDER — PANTOPRAZOLE SODIUM 40 MG/1
40 TABLET, DELAYED RELEASE ORAL
Qty: 30 TABLET | Refills: 0 | Status: SHIPPED | OUTPATIENT
Start: 2025-04-18

## 2025-04-18 NOTE — TELEPHONE ENCOUNTER
Requested Prescriptions     Pending Prescriptions Disp Refills    pantoprazole (PROTONIX) 40 MG tablet [Pharmacy Med Name: PANTOPRAZOLE SOD DR 40 MG TAB] 90 tablet 3     Sig: TAKE 1 TABLET BY MOUTH EVERY DAY BEFORE BREAKFAST       Last Clinic Visit:  7/22/2024     Next Clinic Appointment:  Visit date not found

## 2025-06-08 DIAGNOSIS — I26.99 PULMONARY EMBOLISM, BILATERAL (HCC): ICD-10-CM

## 2025-07-28 RX ORDER — APIXABAN 5 MG/1
5 TABLET, FILM COATED ORAL 2 TIMES DAILY
Qty: 60 TABLET | Refills: 5 | OUTPATIENT
Start: 2025-07-28

## 2025-08-04 DIAGNOSIS — I26.99 PULMONARY EMBOLISM, BILATERAL (HCC): ICD-10-CM

## 2025-08-04 RX ORDER — APIXABAN 5 MG (74)
5 KIT ORAL 2 TIMES DAILY
Qty: 74 TABLET | Refills: 0 | Status: CANCELLED | OUTPATIENT
Start: 2025-08-04

## 2025-08-07 RX ORDER — OLMESARTAN MEDOXOMIL AND HYDROCHLOROTHIAZIDE 40/25 40; 25 MG/1; MG/1
1 TABLET ORAL DAILY
Qty: 90 TABLET | Refills: 1 | Status: SHIPPED | OUTPATIENT
Start: 2025-08-07

## (undated) DEVICE — FORCEPS BX L240CM JAW DIA2.4MM ORNG L CAP W/ NDL DISP RAD

## (undated) DEVICE — CANNULA SAMP CO2 AD GRN 7FT CO2 AND 7FT O2 TBNG UNIV CONN

## (undated) DEVICE — FORCEPS BX L240CM DIA2.4MM L NDL RAD JAW 4 133340